# Patient Record
Sex: MALE | Race: WHITE | NOT HISPANIC OR LATINO | Employment: FULL TIME | URBAN - METROPOLITAN AREA
[De-identification: names, ages, dates, MRNs, and addresses within clinical notes are randomized per-mention and may not be internally consistent; named-entity substitution may affect disease eponyms.]

---

## 2017-03-16 DIAGNOSIS — E11.21 TYPE 2 DIABETES MELLITUS WITH DIABETIC NEPHROPATHY, WITH LONG-TERM CURRENT USE OF INSULIN (H): Primary | ICD-10-CM

## 2017-03-16 DIAGNOSIS — Z79.4 TYPE 2 DIABETES MELLITUS WITH DIABETIC NEPHROPATHY, WITH LONG-TERM CURRENT USE OF INSULIN (H): Primary | ICD-10-CM

## 2017-03-17 DIAGNOSIS — E11.21 TYPE 2 DIABETES MELLITUS WITH DIABETIC NEPHROPATHY (H): ICD-10-CM

## 2017-03-17 RX ORDER — METFORMIN HCL 500 MG
2000 TABLET, EXTENDED RELEASE 24 HR ORAL
Qty: 360 TABLET | Refills: 1 | Status: CANCELLED | OUTPATIENT
Start: 2017-03-17

## 2017-03-17 NOTE — TELEPHONE ENCOUNTER
metFORMIN (GLUCOPHAGE-XR) 500 MG 24 hr tablet         Last Written Prescription Date: 9/21/16  Last Fill Quantity: 360, # refills: 1  Last Office Visit with [facility], [facility] or [facility] prescribing provider:  10/31/16   Next 5 appointments (look out 90 days)     Mar 21, 2017  8:20 AM CDT   SHORT with Mike Deal MD   [facility] ([facility]    [address] [place] [address] [phone]                   BP Readings from Last 3 Encounters:   10/31/16 110/80   09/23/16 112/70   03/03/16 126/78     Lab Results   Component Value Date    MICROL 9 07/11/2016     No results found for: MICROALBUMIN  Creatinine   Date Value Ref Range Status   08/06/2015 0.79 0.66 - 1.25 mg/dL Final   ]  GFR Estimate   Date Value Ref Range Status   08/06/2015 >90  Non  GFR Calc   >60 mL/min/1.7m2 Final   04/12/2015 >90  Non  GFR Calc   >60 mL/min/1.7m2 Final   02/09/2015 >90  Non  GFR Calc   >60 mL/min/1.7m2 Final     GFR Estimate If Black   Date Value Ref Range Status   08/06/2015 >90   GFR Calc   >60 mL/min/1.7m2 Final   04/12/2015 >90   GFR Calc   >60 mL/min/1.7m2 Final   02/09/2015 >90   GFR Calc   >60 mL/min/1.7m2 Final     Lab Results   Component Value Date    CHOL 173 07/11/2016     Lab Results   Component Value Date    HDL 37 07/11/2016     Lab Results   Component Value Date    LDL 71 07/11/2016    LDL 90 02/09/2015     Lab Results   Component Value Date    TRIG 323 07/11/2016     Lab Results   Component Value Date    CHOLHDLRATIO 4.5 02/09/2015     Lab Results   Component Value Date    AST 44 02/09/2015     Lab Results   Component Value Date    ALT 85 02/09/2015     Lab Results   Component Value Date    A1C 5.9 07/11/2016    A1C 7.7 11/23/2015    A1C 8.1 10/23/2015    A1C 12.3 08/06/2015    A1C 7.4 02/09/2015     Potassium   Date Value Ref Range Status   08/06/2015 4.1 3.4 - 5.3 mmol/L  Final

## 2017-03-17 NOTE — TELEPHONE ENCOUNTER
Call to patient-unable to reach patient.  Sent as per refill request.    Prescription approved per [facility] Refill Protocol.  Shea Walker RN  Message handled by Nurse Triage.

## 2017-03-17 NOTE — TELEPHONE ENCOUNTER
Verio Flex test strips for the new glucose meter         Last Written Prescription Date:  9/23/16  Last Fill Quantity: 360,   # refills: 3  Last Office Visit with G, P or [facility] prescribing provider: 10/31/16

## 2017-03-21 ENCOUNTER — OFFICE VISIT (OUTPATIENT)
Dept: PEDIATRICS | Facility: CLINIC | Age: 52
End: 2017-03-21
Payer: COMMERCIAL

## 2017-03-21 VITALS
TEMPERATURE: 98.7 F | HEART RATE: 65 BPM | RESPIRATION RATE: 16 BRPM | HEIGHT: 66 IN | SYSTOLIC BLOOD PRESSURE: 136 MMHG | BODY MASS INDEX: 46.62 KG/M2 | WEIGHT: 290.06 LBS | DIASTOLIC BLOOD PRESSURE: 68 MMHG | OXYGEN SATURATION: 98 %

## 2017-03-21 DIAGNOSIS — E11.21 TYPE 2 DIABETES MELLITUS WITH DIABETIC NEPHROPATHY, WITH LONG-TERM CURRENT USE OF INSULIN (H): Primary | ICD-10-CM

## 2017-03-21 DIAGNOSIS — N18.1 CKD (CHRONIC KIDNEY DISEASE) STAGE 1, GFR 90 ML/MIN OR GREATER: ICD-10-CM

## 2017-03-21 DIAGNOSIS — Z79.4 TYPE 2 DIABETES MELLITUS WITH DIABETIC NEPHROPATHY, WITH LONG-TERM CURRENT USE OF INSULIN (H): Primary | ICD-10-CM

## 2017-03-21 DIAGNOSIS — F32.5 MAJOR DEPRESSIVE DISORDER, SINGLE EPISODE IN FULL REMISSION (H): ICD-10-CM

## 2017-03-21 DIAGNOSIS — E66.01 MORBID OBESITY, UNSPECIFIED OBESITY TYPE (H): ICD-10-CM

## 2017-03-21 LAB
ALBUMIN SERPL-MCNC: 4.4 G/DL (ref 3.4–5)
ALP SERPL-CCNC: 39 U/L (ref 40–150)
ALT SERPL W P-5'-P-CCNC: 77 U/L (ref 0–70)
ANION GAP SERPL CALCULATED.3IONS-SCNC: 7 MMOL/L (ref 3–14)
AST SERPL W P-5'-P-CCNC: 32 U/L (ref 0–45)
BILIRUB SERPL-MCNC: 0.4 MG/DL (ref 0.2–1.3)
BUN SERPL-MCNC: 17 MG/DL (ref 7–30)
CALCIUM SERPL-MCNC: 10.1 MG/DL (ref 8.5–10.1)
CHLORIDE SERPL-SCNC: 103 MMOL/L (ref 94–109)
CO2 SERPL-SCNC: 30 MMOL/L (ref 20–32)
CREAT SERPL-MCNC: 0.84 MG/DL (ref 0.66–1.25)
GFR SERPL CREATININE-BSD FRML MDRD: ABNORMAL ML/MIN/1.7M2
GLUCOSE SERPL-MCNC: 122 MG/DL (ref 70–99)
HBA1C MFR BLD: 6.6 % (ref 4.3–6)
POTASSIUM SERPL-SCNC: 4.3 MMOL/L (ref 3.4–5.3)
PROT SERPL-MCNC: 7.7 G/DL (ref 6.8–8.8)
SODIUM SERPL-SCNC: 140 MMOL/L (ref 133–144)

## 2017-03-21 PROCEDURE — 80053 COMPREHEN METABOLIC PANEL: CPT | Performed by: INTERNAL MEDICINE

## 2017-03-21 PROCEDURE — 36415 COLL VENOUS BLD VENIPUNCTURE: CPT | Performed by: INTERNAL MEDICINE

## 2017-03-21 PROCEDURE — 99214 OFFICE O/P EST MOD 30 MIN: CPT | Performed by: INTERNAL MEDICINE

## 2017-03-21 PROCEDURE — 83036 HEMOGLOBIN GLYCOSYLATED A1C: CPT | Performed by: INTERNAL MEDICINE

## 2017-03-21 RX ORDER — LOSARTAN POTASSIUM AND HYDROCHLOROTHIAZIDE 12.5; 5 MG/1; MG/1
1 TABLET ORAL AT BEDTIME
Qty: 90 TABLET | Refills: 3 | Status: SHIPPED | OUTPATIENT
Start: 2017-03-21 | End: 2018-02-20

## 2017-03-21 RX ORDER — METFORMIN HCL 500 MG
2000 TABLET, EXTENDED RELEASE 24 HR ORAL
Qty: 360 TABLET | Refills: 3 | Status: SHIPPED | OUTPATIENT
Start: 2017-03-21 | End: 2018-02-20

## 2017-03-21 ASSESSMENT — ANXIETY QUESTIONNAIRES
2. NOT BEING ABLE TO STOP OR CONTROL WORRYING: NOT AT ALL
5. BEING SO RESTLESS THAT IT IS HARD TO SIT STILL: NOT AT ALL
6. BECOMING EASILY ANNOYED OR IRRITABLE: NOT AT ALL
7. FEELING AFRAID AS IF SOMETHING AWFUL MIGHT HAPPEN: NOT AT ALL
3. WORRYING TOO MUCH ABOUT DIFFERENT THINGS: NOT AT ALL
IF YOU CHECKED OFF ANY PROBLEMS ON THIS QUESTIONNAIRE, HOW DIFFICULT HAVE THESE PROBLEMS MADE IT FOR YOU TO DO YOUR WORK, TAKE CARE OF THINGS AT HOME, OR GET ALONG WITH OTHER PEOPLE: NOT DIFFICULT AT ALL
1. FEELING NERVOUS, ANXIOUS, OR ON EDGE: NOT AT ALL
GAD7 TOTAL SCORE: 0

## 2017-03-21 ASSESSMENT — PATIENT HEALTH QUESTIONNAIRE - PHQ9: 5. POOR APPETITE OR OVEREATING: NOT AT ALL

## 2017-03-21 NOTE — LETTER
My Depression Action Plan  Name: Glen Cho   Date of Birth 1965  Date: 3/21/2017    My doctor: Mike Deal   My clinic: [address]  [address]  [facility] [address] [phone]          GREEN    ZONE   Good Control    What it looks like:     Things are going generally well. You have normal up s and down s. You may even feel depressed from time to time, but bad moods usually last less than a day.   What you need to do:  1. Continue to care for yourself (see self care plan)  2. Check your depression survival kit and update it as needed  3. Follow your physician s recommendations including any medication.  4. Do not stop taking medication unless you consult with your physician first.           YELLOW         ZONE Getting Worse    What it looks like:     Depression is starting to interfere with your life.     It may be hard to get out of bed; you may be starting to isolate yourself from others.    Symptoms of depression are starting to last most all day and this has happened for several days.     You may have suicidal thoughts but they are not constant.   What you need to do:     1. Call your care team, your response to treatment will improve if you keep your care team informed of your progress. Yellow periods are signs an adjustment may need to be made.     2. Continue your self-care, even if you have to fake it!    3. Talk to someone in your support network    4. Open up your depression survival kit           RED    ZONE Medical Alert - Get Help    What it looks like:     Depression is seriously interfering with your life.     You may experience these or other symptoms: You can t get out of bed most days, can t work or engage in other necessary activities, you have trouble taking care of basic hygiene, or basic responsibilities, thoughts of suicide or death that will not go away, self-injurious behavior.     What you need to do:  1. Call your care  team and request a same-day appointment. If they are not available (weekends or after hours) call your local crisis line, emergency room or 911.      Electronically signed by: Charisma Ervin, March 21, 2017    Depression Self Care Plan / Survival Kit    Self-Care for Depression  Here s the deal. Your body and mind are really not as separate as most people think.  What you do and think affects how you feel and how you feel influences what you do and think. This means if you do things that people who feel good do, it will help you feel better.  Sometimes this is all it takes.  There is also a place for medication and therapy depending on how severe your depression is, so be sure to consult with your medical provider and/ or Behavioral Health Consultant if your symptoms are worsening or not improving.     In order to better manage my stress, I will:    Exercise  Get some form of exercise, every day. This will help reduce pain and release endorphins, the  feel good  chemicals in your brain. This is almost as good as taking antidepressants!  This is not the same as joining a gym and then never going! (they count on that by the way ) It can be as simple as just going for a walk or doing some gardening, anything that will get you moving.      Hygiene   Maintain good hygiene (Get out of bed in the morning, Make your bed, Brush your teeth, Take a shower, and Get dressed like you were going to work, even if you are unemployed).  If your clothes don't fit try to get ones that do.    Diet  I will strive to eat foods that are good for me, drink plenty of water, and avoid excessive sugar, caffeine, alcohol, and other mood-altering substances.  Some foods that are helpful in depression are: complex carbohydrates, B vitamins, flaxseed, fish or fish oil, fresh fruits and vegetables.    Psychotherapy  I agree to participate in Individual Therapy (if recommended).    Medication  If prescribed medications, I agree to take them.  Missing  doses can result in serious side effects.  I understand that drinking alcohol, or other illicit drug use, may cause potential side effects.  I will not stop my medication abruptly without first discussing it with my provider.    Staying Connected With Others  I will stay in touch with my friends, family members, and my primary care provider/team.    Use your imagination  Be creative.  We all have a creative side; it doesn t matter if it s oil painting, sand castles, or mud pies! This will also kick up the endorphins.    Witness Beauty  (AKA stop and smell the roses) Take a look outside, even in mid-winter. Notice colors, textures. Watch the squirrels and birds.     Service to others  Be of service to others.  There is always someone else in need.  By helping others we can  get out of ourselves  and remember the really important things.  This also provides opportunities for practicing all the other parts of the program.    Humor  Laugh and be silly!  Adjust your TV habits for less news and crime-drama and more comedy.    Control your stress  Try breathing deep, massage therapy, biofeedback, and meditation. Find time to relax each day.     My support system    Clinic Contact:  Phone number:    Contact 1:  Phone number:    Contact 2:  Phone number:    Buddhist/:  Phone number:    Therapist:  Phone number:    Local crisis center:    Phone number:    Other community support:  Phone number:

## 2017-03-21 NOTE — PATIENT INSTRUCTIONS
Lab work downstairs today.  (Go down the main stairs/elevator and re-enter the main part of the building on the first floor.  On the right hand side--with striped wallpaper--is the lab and xray waiting area. Give them your name at the window there and wait for them to call your name.)     Change to levemir when lantus runs out.      If your diabetes blood test (A1c) is higher, we can consider increasing your nighttime lantus from 65 to 70 for a few days, then to 75, then to 80, etc, to get your AM sugars below 100.    No changes in other meds.

## 2017-03-21 NOTE — MR AVS SNAPSHOT
After Visit Summary   3/21/2017    Glen Cho    MRN: [id number]           Patient Information     Date Of Birth          1965        Visit Information        Provider Department      3/21/2017 8:20 AM Mike Deal MD [facility]        Today's Diagnoses     Type 2 diabetes mellitus with diabetic nephropathy, with long-term current use of insulin (H)    -  1    Morbid obesity, unspecified obesity type (H)        CKD (chronic kidney disease) stage 1, GFR 90 ml/min or greater        Major depressive disorder, single episode in full remission (H)          Care Instructions    Lab work downstairs today.  (Go down the main stairs/elevator and re-enter the main part of the building on the first floor.  On the right hand side--with striped wallpaper--is the lab and xray waiting area. Give them your name at the window there and wait for them to call your name.)     Change to levemir when lantus runs out.      If your diabetes blood test (A1c) is higher, we can consider increasing your nighttime lantus from 65 to 70 for a few days, then to 75, then to 80, etc, to get your AM sugars below 100.    No changes in other meds.        Follow-ups after your visit        Who to contact     If you have questions or need follow up information about today's clinic visit or your schedule please contact [facility] directly at [phone].  Normal or non-critical lab and imaging results will be communicated to you by MyChart, letter or phone within 4 business days after the clinic has received the results. If you do not hear from us within 7 days, please contact the clinic through MyChart or phone. If you have a critical or abnormal lab result, we will notify you by phone as soon as possible.  Submit refill requests through [employer] or call your pharmacy and they will forward the refill request to us. Please allow 3 business days for your refill to be completed.          Additional Information  "About Your Visit        [employer] Information     [employer] gives you secure access to your electronic health record. If you see a primary care provider, you can also send messages to your care team and make appointments. If you have questions, please call your primary care clinic.  If you do not have a primary care provider, please call [phone] and they will assist you.        Care EveryWhere ID     This is your Care EveryWhere ID. This could be used by other organizations to access your [place] medical records  [id number]        Your Vitals Were     Pulse Temperature Respirations Height Pulse Oximetry BMI (Body Mass Index)    65 98.7  F (37.1  C) (Oral) 16 5' 6\" (1.676 m) 98% 46.82 kg/m2       Blood Pressure from Last 3 Encounters:   03/21/17 136/68   10/31/16 110/80   09/23/16 112/70    Weight from Last 3 Encounters:   03/21/17 290 lb 1 oz (131.6 kg)   10/31/16 273 lb (123.8 kg)   09/23/16 265 lb 4.8 oz (120.3 kg)              We Performed the Following     Comprehensive metabolic panel (BMP + Alb, Alk Phos, ALT, AST, Total. Bili, TP)     DEPRESSION ACTION PLAN (DAP)     Hemoglobin A1c          Today's Medication Changes          These changes are accurate as of: 3/21/17  8:47 AM.  If you have any questions, ask your nurse or doctor.               These medicines have changed or have updated prescriptions.        Dose/Directions    LANTUS SOLOSTAR 100 UNIT/ML injection   This may have changed:  additional instructions   Used for:  Type 2 diabetes mellitus with diabetic nephropathy, with long-term current use of insulin (H)   Generic drug:  insulin glargine   Changed by:  Mike Deal MD        75 unit sin the AM, and 65 at night.   Refills:  0            Where to get your medicines      These medications were sent to [facility] [address] [facility] - [place], [place] - [address]  [facility]  [address]  [place] , [facility] [address]     Phone:  [phone]     losartan-hydrochlorothiazide 50-12.5 MG per tablet    " metFORMIN 500 MG 24 hr tablet                Primary Care Provider Office Phone # Fax #    Mike Deal [phone]       [facility] [address] [place] [address] [address] [place] [address]        Thank you!     Thank you for choosing [facility]  for your care. Our goal is always to provide you with excellent care. Hearing back from our patients is one way we can continue to improve our services. Please take a few minutes to complete the written survey that you may receive in the mail after your visit with us. Thank you!             Your Updated Medication List - Protect others around you: Learn how to safely use, store and throw away your medicines at www.disposemymeds.org.          This list is accurate as of: 3/21/17  8:47 AM.  Always use your most recent med list.                   Brand Name Dispense Instructions for use    aspirin 325 MG tablet      Take 325 mg by mouth daily.       blood glucose monitoring lancets     1 Box    Use to test blood sugars 2-3 times daily or as directed.       blood glucose monitoring meter device kit     1 kit    Use to test blood sugar 3 times daily or as directed.       blood glucose monitoring test strip    FREESTYLE INSULINX    360 each    Use to test blood sugar 4 times daily or as directed.       insulin detemir 100 UNIT/ML injection    LEVEMIR FLEXPEN/FLEXTOUCH    3 Month    70 units in AM and 90 units at bedtime       insulin pen needle 32G X 4 MM     200 each    Use 2 pen needles daily or as directed.       LANTUS SOLOSTAR 100 UNIT/ML injection   Generic drug:  insulin glargine      75 unit sin the AM, and 65 at night.       losartan-hydrochlorothiazide 50-12.5 MG per tablet    HYZAAR    90 tablet    Take 1 tablet by mouth At Bedtime       metFORMIN 500 MG 24 hr tablet    GLUCOPHAGE-XR    360 tablet    Take 4 tablets (2,000 mg) by mouth daily (with dinner)       order for DME     1 Device    Equipment being ordered: Please dispense 1 set of  crutches       * order for DME     1 each    Equipment being ordered: glucometer  60 lancets and test strips (5 refills) for twice daily testing.       * order for DME     1 each    Equipment being ordered: glucose meter Please dispense what insurance covers       * order for DME     180 each    Equipment being ordered: test strips  Pleas test three times daily, or as directed.  Please dispense test strips for patient's current meter       * order for DME     300 each    Equipment being ordered: Eli Contour Next test strips Tests 5 times a day       * order for DME     400 each    Equipment being ordered: lancets Uses 4 a day.  Please dispense what insurance covers.       * order for DME     400 Device    Equipment being ordered: Verio flex test strips Patient tests 4 times daily.       * order for DME     1 Device    Equipment being ordered: meter Uses Verio Flex test strips       simvastatin 20 MG tablet    ZOCOR    90 tablet    Take 1 tablet (20 mg) by mouth At Bedtime       * Notice:  This list has 7 medication(s) that are the same as other medications prescribed for you. Read the directions carefully, and ask your doctor or other care provider to review them with you.

## 2017-03-21 NOTE — PROGRESS NOTES
SUBJECTIVE:                                                    Glen Cho is a 51 year old male who presents to clinic today for the following health issues:      Diabetes Follow-up      Patient is checking blood sugars: 3 times per day    Diabetic concerns: None     Symptoms of hypoglycemia (low blood sugar): none     Paresthesias (numbness or burning in feet) or sores: Yes Dry Skin On Right Foot     Date of last diabetic eye exam: Unknown       Amount of exercise or physical activity: None    Problems taking medications regularly: No    Medication side effects: none    Diet: carbohydrate counting      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      Problem list and histories reviewed & adjusted, as indicated.  Additional history: as documented    Last labs were in July.    AM sugars have been 130-140.   After meals then will run 175 and 190.      Taking 75 AM and 65-70 at night.  Has gained about 17 pounds recently.  Not as active as before.  Knees bothered him when was on his feet a lot.      Patient Active Problem List   Diagnosis     Anxiety     Major depressive disorder, single episode in full remission (H)     Rosacea     Hypertension goal BP (blood pressure) < 140/90     Heart murmur     Tear of lateral cartilage or meniscus of knee, current     Tubular adenoma of colon     CKD (chronic kidney disease) stage 1, GFR 90 ml/min or greater     Morbid obesity (H)     Hyperlipidemia LDL goal <100     Type 2 diabetes mellitus with diabetic nephropathy, with long-term current use of insulin (H)     Past Surgical History   Procedure Laterality Date     Ankle surgery  2/2004      R ORIF     Herniorrhaphy umbilical       Arthroscopy knee  3/26/2014     Procedure: ARTHROSCOPY KNEE;  ARTHROSCOPY KNEE- RIGHT   SURGEON REQUESTS CHOICE ANETHESIA ;  Surgeon: Sabino Simpson MD;  Location:  OR       Social History   Substance Use Topics     Smoking status: Former Smoker     Types: Cigarettes     Quit  date: 5/10/2000     Smokeless tobacco: Never Used     Alcohol use No      Comment: rarely     Family History   Problem Relation Age of Onset     CANCER Father      skin     Myocardial Infarction Father 57         Current Outpatient Prescriptions   Medication Sig Dispense Refill     insulin glargine (LANTUS SOLOSTAR) 100 UNIT/ML injection 75 unit sin the AM, and 65 at night.       metFORMIN (GLUCOPHAGE-XR) 500 MG 24 hr tablet Take 4 tablets (2,000 mg) by mouth daily (with dinner) 360 tablet 3     losartan-hydrochlorothiazide (HYZAAR) 50-12.5 MG per tablet Take 1 tablet by mouth At Bedtime 90 tablet 3     simvastatin (ZOCOR) 20 MG tablet Take 1 tablet (20 mg) by mouth At Bedtime 90 tablet 3     aspirin 325 MG tablet Take 325 mg by mouth daily.       order for DME Equipment being ordered: Verio flex test strips  Patient tests 4 times daily. 400 Device 1     order for DME Equipment being ordered: meter  Uses Verio Flex test strips 1 Device 0     insulin detemir (LEVEMIR FLEXPEN/FLEXTOUCH) 100 UNIT/ML injection 70 units in AM and 90 units at bedtime 3 Month 3     order for DME Equipment being ordered: lancets  Uses 4 a day.    Please dispense what insurance covers. 400 each 3     insulin pen needle 32G X 4 MM Use 2 pen needles daily or as directed. 200 each 3     blood glucose monitoring (FREESTYLE INSULINX) test strip Use to test blood sugar 4 times daily or as directed. 360 each 3     [DISCONTINUED] insulin glargine (LANTUS SOLOSTAR) 100 UNIT/ML PEN 70 unit sin the AM, and 90 at night. 3 Month 3     [DISCONTINUED] losartan-hydrochlorothiazide (HYZAAR) 50-12.5 MG per tablet Take 1 tablet by mouth At Bedtime 90 tablet 3     [DISCONTINUED] metFORMIN (GLUCOPHAGE-XR) 500 MG 24 hr tablet Take 4 tablets (2,000 mg) by mouth daily (with dinner) 360 tablet 1     order for DME Equipment being ordered: Eli Contour Next test strips  Tests 5 times a day 300 each 3     blood glucose monitoring (FREESTYLE) lancets Use to test blood  sugars 2-3 times daily or as directed. 1 Box prn     order for DME Equipment being ordered: test strips    Pleas test three times daily, or as directed.    Please dispense test strips for patient's current meter 180 each 3     order for DME Equipment being ordered: glucose meter  Please dispense what insurance covers 1 each 0     blood glucose monitoring (FREESTYLE INSULINX SYSTEM) meter device kit Use to test blood sugar 3 times daily or as directed. 1 kit 0     ORDER FOR DME Equipment being ordered: glucometer    60 lancets and test strips (5 refills) for twice daily testing. 1 each 0     ORDER FOR DME Equipment being ordered: Please dispense 1 set of crutches 1 Device 0     Allergies   Allergen Reactions     Amlodipine Besylate      Water retention     Cefdinir      Joint pains     Lisinopril      Water retention     Amoxicillin Rash     White spots     Recent Labs   Lab Test  03/21/17   0852  07/11/16   0810  11/23/15   1002   08/06/15   1458  04/12/15   1035  02/09/15   0805   03/24/14   0803  01/10/14   1049   02/05/13   0839   06/09/10   0817   A1C  6.6*  5.9  7.7*   < >  12.3*   --   7.4*   < >  9.9*   --    < >   --    < >   --    LDL   --   71   --    --    --    --   Cannot estimate LDL when triglyceride exceeds 400 mg/dL  90   --   63   --    --   100   < >  157*   HDL   --   37*   --    --    --    --   33*   --   23*   --    --   40   < >  31*   TRIG   --   323*   --    --    --    --   541*   --   370*   --    --   271*   < >  316*   ALT   --    --    --    --    --    --   85*   --   64   --    --   75*   < >   --    CR   --    --    --    --   0.79  0.76  0.78   --   0.82   --    < >  0.71   < >  0.90   GFRESTIMATED   --    --    --    --   >90  Non  GFR Calc    >90  Non  GFR Calc    >90  Non  GFR Calc     --   >90   --    < >  >90   < >  >90   GFRESTBLACK   --    --    --    --   >90   GFR Calc    >90   GFR Calc     ">90   GFR Calc     --   >90   --    < >  >90   < >  >90   POTASSIUM   --    --    --    --   4.1  4.3  4.0   --   4.3   --    < >  4.0   < >  3.9   TSH   --    --    --    --    --    --    --    --    --   1.29   --    --    --   1.48    < > = values in this interval not displayed.      BP Readings from Last 3 Encounters:   03/21/17 136/68   10/31/16 110/80   09/23/16 112/70    Wt Readings from Last 3 Encounters:   03/21/17 290 lb 1 oz (131.6 kg)   10/31/16 273 lb (123.8 kg)   09/23/16 265 lb 4.8 oz (120.3 kg)                  Labs reviewed in EPIC    Reviewed and updated as needed this visit by clinical staff       Reviewed and updated as needed this visit by Provider         ROS:  C: NEGATIVE for fever, chills, change in weight  E/M: NEGATIVE for ear, mouth and throat problems  R: NEGATIVE for significant cough or SOB  CV: NEGATIVE for chest pain, palpitations or peripheral edema    OBJECTIVE:                                                    /68 (BP Location: Right arm, Patient Position: Chair, Cuff Size: Adult Large)  Pulse 65  Temp 98.7  F (37.1  C) (Oral)  Resp 16  Ht 5' 6\" (1.676 m)  Wt 290 lb 1 oz (131.6 kg)  SpO2 98%  BMI 46.82 kg/m2  Body mass index is 46.82 kg/(m^2).   GENERAL: healthy, alert, well nourished, well hydrated, no distress  HENT: ear canals- normal; TMs- normal; Nose- normal; Mouth- no ulcers, no lesions  NECK: no tenderness, no adenopathy, no asymmetry, no masses, no stiffness; thyroid- normal to palpation  RESP: lungs clear to auscultation - no rales, no rhonchi, no wheezes  CV: regular rates and rhythm, normal S1 S2, no S3 or S4 and no murmur, no click or rub -  ABDOMEN: soft, no tenderness, no  hepatosplenomegaly, no masses, normal bowel sounds    Diagnostic test results:  Diagnostic Test Results:  none      ASSESSMENT/PLAN:                                                    1. Type 2 diabetes mellitus with diabetic nephropathy, with long-term current use of " insulin (H)  Parameters well controlled.  Continue secondary risk factor modification for BP, cholesterol, anticoagulation, and smoking cessation.   - Hemoglobin A1c  - Comprehensive metabolic panel (BMP + Alb, Alk Phos, ALT, AST, Total. Bili, TP)  - insulin glargine (LANTUS SOLOSTAR) 100 UNIT/ML injection; 75 unit sin the AM, and 65 at night.  - metFORMIN (GLUCOPHAGE-XR) 500 MG 24 hr tablet; Take 4 tablets (2,000 mg) by mouth daily (with dinner)  Dispense: 360 tablet; Refill: 3    2. Morbid obesity, unspecified obesity type (H)  Discussed weight loss strategies.     3. CKD (chronic kidney disease) stage 1, GFR 90 ml/min or greater  Secondary risk factor modification.   - losartan-hydrochlorothiazide (HYZAAR) 50-12.5 MG per tablet; Take 1 tablet by mouth At Bedtime  Dispense: 90 tablet; Refill: 3    4. Major depressive disorder, single episode in full remission (H)  Stable, off meds.  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       See Patient Instructions    Mike Deal MD  [facility]

## 2017-03-21 NOTE — NURSING NOTE
"Chief Complaint   Patient presents with     Diabetes     Diabetes Follow Up       Initial /68 (BP Location: Right arm, Patient Position: Chair, Cuff Size: Adult Large)  Pulse 65  Temp 98.7  F (37.1  C) (Oral)  Resp 16  Ht 5' 6\" (1.676 m)  Wt 290 lb 1 oz (131.6 kg)  SpO2 98%  BMI 46.82 kg/m2 Estimated body mass index is 46.82 kg/(m^2) as calculated from the following:    Height as of this encounter: 5' 6\" (1.676 m).    Weight as of this encounter: 290 lb 1 oz (131.6 kg).  Medication Reconciliation: irlanda Ervin CMA (AAMA) 3/21/2017 8:21 AM      "

## 2017-03-22 ASSESSMENT — ANXIETY QUESTIONNAIRES: GAD7 TOTAL SCORE: 0

## 2017-03-22 ASSESSMENT — PATIENT HEALTH QUESTIONNAIRE - PHQ9: SUM OF ALL RESPONSES TO PHQ QUESTIONS 1-9: 1

## 2017-05-01 ENCOUNTER — MYC MEDICAL ADVICE (OUTPATIENT)
Dept: PEDIATRICS | Facility: CLINIC | Age: 52
End: 2017-05-01

## 2017-05-01 ENCOUNTER — E-VISIT (OUTPATIENT)
Dept: PEDIATRICS | Facility: CLINIC | Age: 52
End: 2017-05-01
Payer: COMMERCIAL

## 2017-05-01 DIAGNOSIS — G25.3 STAPEDIAL MYOCLONUS: Primary | ICD-10-CM

## 2017-05-01 PROCEDURE — 99444 ZZC PHYSICIAN ONLINE EVALUATION & MANAGEMENT SERVICE: CPT | Performed by: INTERNAL MEDICINE

## 2017-05-23 ENCOUNTER — OFFICE VISIT (OUTPATIENT)
Dept: PEDIATRICS | Facility: CLINIC | Age: 52
End: 2017-05-23
Payer: COMMERCIAL

## 2017-05-23 VITALS
SYSTOLIC BLOOD PRESSURE: 136 MMHG | HEIGHT: 66 IN | HEART RATE: 67 BPM | WEIGHT: 279 LBS | TEMPERATURE: 97.8 F | DIASTOLIC BLOOD PRESSURE: 78 MMHG | OXYGEN SATURATION: 96 % | BODY MASS INDEX: 44.84 KG/M2

## 2017-05-23 DIAGNOSIS — R31.29 MICROSCOPIC HEMATURIA: ICD-10-CM

## 2017-05-23 DIAGNOSIS — R35.0 URINARY FREQUENCY: Primary | ICD-10-CM

## 2017-05-23 LAB
ALBUMIN SERPL-MCNC: 4.4 G/DL (ref 3.4–5)
ALBUMIN UR-MCNC: NEGATIVE MG/DL
ALP SERPL-CCNC: 43 U/L (ref 40–150)
ALT SERPL W P-5'-P-CCNC: 71 U/L (ref 0–70)
ANION GAP SERPL CALCULATED.3IONS-SCNC: 9 MMOL/L (ref 3–14)
APPEARANCE UR: CLEAR
AST SERPL W P-5'-P-CCNC: 35 U/L (ref 0–45)
BILIRUB SERPL-MCNC: 0.5 MG/DL (ref 0.2–1.3)
BILIRUB UR QL STRIP: NEGATIVE
BUN SERPL-MCNC: 18 MG/DL (ref 7–30)
CALCIUM SERPL-MCNC: 10.3 MG/DL (ref 8.5–10.1)
CHLORIDE SERPL-SCNC: 105 MMOL/L (ref 94–109)
CO2 SERPL-SCNC: 25 MMOL/L (ref 20–32)
COLOR UR AUTO: YELLOW
CREAT SERPL-MCNC: 0.8 MG/DL (ref 0.66–1.25)
GFR SERPL CREATININE-BSD FRML MDRD: ABNORMAL ML/MIN/1.7M2
GLUCOSE SERPL-MCNC: 159 MG/DL (ref 70–99)
GLUCOSE UR STRIP-MCNC: NEGATIVE MG/DL
HGB UR QL STRIP: ABNORMAL
KETONES UR STRIP-MCNC: NEGATIVE MG/DL
LEUKOCYTE ESTERASE UR QL STRIP: NEGATIVE
NITRATE UR QL: NEGATIVE
PH UR STRIP: 7 PH (ref 5–7)
POTASSIUM SERPL-SCNC: 4.2 MMOL/L (ref 3.4–5.3)
PROT SERPL-MCNC: 7.7 G/DL (ref 6.8–8.8)
PSA SERPL-ACNC: 0.65 UG/L (ref 0–4)
RBC #/AREA URNS AUTO: >100 /HPF (ref 0–2)
SODIUM SERPL-SCNC: 139 MMOL/L (ref 133–144)
SP GR UR STRIP: 1.02 (ref 1–1.03)
URN SPEC COLLECT METH UR: ABNORMAL
UROBILINOGEN UR STRIP-ACNC: 0.2 EU/DL (ref 0.2–1)
WBC #/AREA URNS AUTO: ABNORMAL /HPF (ref 0–2)

## 2017-05-23 PROCEDURE — G0103 PSA SCREENING: HCPCS | Performed by: INTERNAL MEDICINE

## 2017-05-23 PROCEDURE — 99214 OFFICE O/P EST MOD 30 MIN: CPT | Performed by: INTERNAL MEDICINE

## 2017-05-23 PROCEDURE — 36415 COLL VENOUS BLD VENIPUNCTURE: CPT | Performed by: INTERNAL MEDICINE

## 2017-05-23 PROCEDURE — 81001 URINALYSIS AUTO W/SCOPE: CPT | Performed by: INTERNAL MEDICINE

## 2017-05-23 PROCEDURE — 80053 COMPREHEN METABOLIC PANEL: CPT | Performed by: INTERNAL MEDICINE

## 2017-05-23 NOTE — NURSING NOTE
"Chief Complaint   Patient presents with     Urinary Problem       Initial /78 (BP Location: Right arm, Cuff Size: Adult Regular)  Pulse 67  Temp 97.8  F (36.6  C) (Oral)  Ht 5' 6\" (1.676 m)  Wt 279 lb (126.6 kg)  SpO2 96%  BMI 45.03 kg/m2 Estimated body mass index is 45.03 kg/(m^2) as calculated from the following:    Height as of this encounter: 5' 6\" (1.676 m).    Weight as of this encounter: 279 lb (126.6 kg).  Medication Reconciliation: complete     Ana Burrows MA   May 23, 2017,  10:59 AM    "

## 2017-05-23 NOTE — PROGRESS NOTES
SUBJECTIVE:                                                    Glen Cho is a 51 year old male who presents to clinic today for the following health issues:      Genitourinary symptoms  Dysuria, feeling that he has to urinate, but he doesn't - a few nights now. No burning sensation.       Sunday night woke up to urinate 5 times overnight.  Feels since then like has to urinate more than normal.  Normally does not have to urinate during the day more than once or twice per day.    Notes a tingling feeling at head of penis.      Last night, symptoms improved.  However, this AM noted a sensation in the bladder itself that is like a tingling.  NOt painful.  No burining with urination.  No change in color of urine.  Urine is yellow.  No cloudiness.  Last night only woke up 3 times to urinate.    No sexually transmitted disease exposure.      Of note, feels like may have to have a bowel movement, but cannot.      Also of note, has history of kidney stones, but no severe pain as has had in the past.     Problem list and histories reviewed & adjusted, as indicated.  Additional history: as documented    Patient Active Problem List   Diagnosis     Anxiety     Major depressive disorder, single episode in full remission (H)     Rosacea     Hypertension goal BP (blood pressure) < 140/90     Heart murmur     Tear of lateral cartilage or meniscus of knee, current     Tubular adenoma of colon     CKD (chronic kidney disease) stage 1, GFR 90 ml/min or greater     Morbid obesity (H)     Hyperlipidemia LDL goal <100     Type 2 diabetes mellitus with diabetic nephropathy, with long-term current use of insulin (H)     Past Surgical History:   Procedure Laterality Date     ANKLE SURGERY  2/2004     R ORIF     ARTHROSCOPY KNEE  3/26/2014    Procedure: ARTHROSCOPY KNEE;  ARTHROSCOPY KNEE- RIGHT   SURGEON REQUESTS CHOICE ANETHESIA ;  Surgeon: Sabino Simpson MD;  Location: [facility]     HERNIORRHAPHY UMBILICAL         Social History    Substance Use Topics     Smoking status: Former Smoker     Types: Cigarettes     Quit date: 5/10/2000     Smokeless tobacco: Never Used     Alcohol use No      Comment: rarely     Family History   Problem Relation Age of Onset     CANCER Father      skin     Myocardial Infarction Father 57         Current Outpatient Prescriptions   Medication Sig Dispense Refill     metFORMIN (GLUCOPHAGE-XR) 500 MG 24 hr tablet Take 4 tablets (2,000 mg) by mouth daily (with dinner) 360 tablet 3     losartan-hydrochlorothiazide (HYZAAR) 50-12.5 MG per tablet Take 1 tablet by mouth At Bedtime 90 tablet 3     order for DME Equipment being ordered: Verio flex test strips  Patient tests 4 times daily. 400 Device 1     order for DME Equipment being ordered: meter  Uses Verio Flex test strips 1 Device 0     insulin detemir (LEVEMIR FLEXPEN/FLEXTOUCH) 100 UNIT/ML injection 70 units in AM and 90 units at bedtime 3 Month 3     order for DME Equipment being ordered: lancets  Uses 4 a day.    Please dispense what insurance covers. 400 each 3     insulin pen needle 32G X 4 MM Use 2 pen needles daily or as directed. 200 each 3     simvastatin (ZOCOR) 20 MG tablet Take 1 tablet (20 mg) by mouth At Bedtime 90 tablet 3     blood glucose monitoring (FREESTYLE INSULINX) test strip Use to test blood sugar 4 times daily or as directed. 360 each 3     order for DME Equipment being ordered: Eli Contour Next test strips  Tests 5 times a day 300 each 3     blood glucose monitoring (FREESTYLE) lancets Use to test blood sugars 2-3 times daily or as directed. 1 Box prn     order for DME Equipment being ordered: test strips    Pleas test three times daily, or as directed.    Please dispense test strips for patient's current meter 180 each 3     order for DME Equipment being ordered: glucose meter  Please dispense what insurance covers 1 each 0     blood glucose monitoring (FREESTYLE INSULINX SYSTEM) meter device kit Use to test blood sugar 3 times daily or  "as directed. 1 kit 0     ORDER FOR DME Equipment being ordered: glucometer    60 lancets and test strips (5 refills) for twice daily testing. 1 each 0     ORDER FOR DME Equipment being ordered: Please dispense 1 set of crutches 1 Device 0     aspirin 325 MG tablet Take 325 mg by mouth daily.       Allergies   Allergen Reactions     Amlodipine Besylate      Water retention     Cefdinir      Joint pains     Lisinopril      Water retention     Amoxicillin Rash     White spots     BP Readings from Last 3 Encounters:   05/23/17 136/78   03/21/17 136/68   10/31/16 110/80    Wt Readings from Last 3 Encounters:   05/23/17 279 lb (126.6 kg)   03/21/17 290 lb 1 oz (131.6 kg)   10/31/16 273 lb (123.8 kg)                  Labs reviewed in EPIC    Reviewed and updated as needed this visit by clinical staff       Reviewed and updated as needed this visit by Provider         ROS:  C: NEGATIVE for fever, chills, change in weight  E/M: NEGATIVE for ear, mouth and throat problems  R: NEGATIVE for significant cough or SOB  CV: NEGATIVE for chest pain, palpitations or peripheral edema    OBJECTIVE:                                                    /78 (BP Location: Right arm, Cuff Size: Adult Regular)  Pulse 67  Temp 97.8  F (36.6  C) (Oral)  Ht 5' 6\" (1.676 m)  Wt 279 lb (126.6 kg)  SpO2 96%  BMI 45.03 kg/m2  Body mass index is 45.03 kg/(m^2).   GENERAL: healthy, alert, well nourished, well hydrated, no distress  HENT: ear canals- normal; TMs- normal; Nose- normal; Mouth- no ulcers, no lesions  NECK: no tenderness, no adenopathy, no asymmetry, no masses, no stiffness; thyroid- normal to palpation  RESP: lungs clear to auscultation - no rales, no rhonchi, no wheezes  CV: regular rates and rhythm, normal S1 S2, no S3 or S4 and no murmur, no click or rub -  ABDOMEN: soft, no tenderness, no  hepatosplenomegaly, no masses, normal bowel sounds  RECTAL;  No prostate enlargment or pain.   Diagnostic test results:  Diagnostic Test " Results:  none      ASSESSMENT/PLAN:                                                    1. Urinary frequency  Blood in urine.    - UA reflex to Microscopic and Culture  - Urine Microscopic    2. Microscopic hematuria  Check renal ultrasound and labs today.  return to clinic in 1 month to recheck for urine, and if still present would need urology evaluation.   Would also recommend dirty catch at that point, which patient was unable to perform today.   - Comprehensive metabolic panel (BMP + Alb, Alk Phos, ALT, AST, Total. Bili, TP)  - PSA, screen  - US Renal Complete; Future      See Patient Instructions    Mike Dela MD  [facility]

## 2017-05-23 NOTE — PATIENT INSTRUCTIONS
"Lab work downstairs today.  Directions:  As you walk through the first floor, you'll see (on the right) first the pharmacy, then some bathrooms, then the \"Lab and Imaging\" area. Give them your name at the window there and wait for them to call you.     They will call you to set up an ultrasound.      Set up a follow up in 2-4 weeks to make sure urine has cleared.    Mike Deal MD  Internal Medicine and Pediatrics     "

## 2017-05-23 NOTE — MR AVS SNAPSHOT
"              After Visit Summary   5/23/2017    Glen Cho    MRN: [id number]           Patient Information     Date Of Birth          1965        Visit Information        Provider Department      5/23/2017 10:40 AM Mike Deal MD [facility]        Today's Diagnoses     Urinary frequency    -  1    Microscopic hematuria          Care Instructions    Lab work downstairs today.  Directions:  As you walk through the first floor, you'll see (on the right) first the pharmacy, then some bathrooms, then the \"Lab and Imaging\" area. Give them your name at the window there and wait for them to call you.     They will call you to set up an ultrasound.      Set up a follow up in 2-4 weeks to make sure urine has cleared.    Mike Deal MD  Internal Medicine and Pediatrics           Follow-ups after your visit        Future tests that were ordered for you today     Open Future Orders        Priority Expected Expires Ordered    US Renal Complete Routine  5/23/2018 5/23/2017            Who to contact     If you have questions or need follow up information about today's clinic visit or your schedule please contact [facility] directly at [phone].  Normal or non-critical lab and imaging results will be communicated to you by [employer], letter or phone within 4 business days after the clinic has received the results. If you do not hear from us within 7 days, please contact the clinic through [employer] or phone. If you have a critical or abnormal lab result, we will notify you by phone as soon as possible.  Submit refill requests through [employer] or call your pharmacy and they will forward the refill request to us. Please allow 3 business days for your refill to be completed.          Additional Information About Your Visit        [employer] Information     [employer] gives you secure access to your electronic health record. If you see a primary care provider, you can also send messages to your care team " "and make appointments. If you have questions, please call your primary care clinic.  If you do not have a primary care provider, please call [phone] and they will assist you.        Care EveryWhere ID     This is your Care EveryWhere ID. This could be used by other organizations to access your [facility] medical records  [id number]        Your Vitals Were     Pulse Temperature Height Pulse Oximetry BMI (Body Mass Index)       67 97.8  F (36.6  C) (Oral) 5' 6\" (1.676 m) 96% 45.03 kg/m2        Blood Pressure from Last 3 Encounters:   05/23/17 136/78   03/21/17 136/68   10/31/16 110/80    Weight from Last 3 Encounters:   05/23/17 279 lb (126.6 kg)   03/21/17 290 lb 1 oz (131.6 kg)   10/31/16 273 lb (123.8 kg)              We Performed the Following     Comprehensive metabolic panel (BMP + Alb, Alk Phos, ALT, AST, Total. Bili, TP)     PSA, screen     UA reflex to Microscopic and Culture     Urine Microscopic        Primary Care Provider Office Phone # Fax #    Mike Deal [phone]       [facility] [address] [place] [address] [address] [place] [place] [address]        Thank you!     Thank you for choosing [facility]  for your care. Our goal is always to provide you with excellent care. Hearing back from our patients is one way we can continue to improve our services. Please take a few minutes to complete the written survey that you may receive in the mail after your visit with us. Thank you!             Your Updated Medication List - Protect others around you: Learn how to safely use, store and throw away your medicines at www.disposemymeds.org.          This list is accurate as of: 5/23/17 11:15 AM.  Always use your most recent med list.                   Brand Name Dispense Instructions for use    aspirin 325 MG tablet      Take 325 mg by mouth daily.       blood glucose monitoring lancets     1 Box    Use to test blood sugars 2-3 times daily or as directed.       blood glucose monitoring " meter device kit     1 kit    Use to test blood sugar 3 times daily or as directed.       blood glucose monitoring test strip    FREESTYLE INSULINX    360 each    Use to test blood sugar 4 times daily or as directed.       insulin detemir 100 UNIT/ML injection    LEVEMIR FLEXPEN/FLEXTOUCH    3 Month    70 units in AM and 90 units at bedtime       insulin pen needle 32G X 4 MM     200 each    Use 2 pen needles daily or as directed.       losartan-hydrochlorothiazide 50-12.5 MG per tablet    HYZAAR    90 tablet    Take 1 tablet by mouth At Bedtime       metFORMIN 500 MG 24 hr tablet    GLUCOPHAGE-XR    360 tablet    Take 4 tablets (2,000 mg) by mouth daily (with dinner)       order for DME     1 Device    Equipment being ordered: Please dispense 1 set of crutches       * order for DME     1 each    Equipment being ordered: glucometer  60 lancets and test strips (5 refills) for twice daily testing.       * order for DME     1 each    Equipment being ordered: glucose meter Please dispense what insurance covers       * order for DME     180 each    Equipment being ordered: test strips  Pleas test three times daily, or as directed.  Please dispense test strips for patient's current meter       * order for DME     300 each    Equipment being ordered: Eli Contour Next test strips Tests 5 times a day       * order for DME     400 each    Equipment being ordered: lancets Uses 4 a day.  Please dispense what insurance covers.       * order for DME     400 Device    Equipment being ordered: Verio flex test strips Patient tests 4 times daily.       * order for DME     1 Device    Equipment being ordered: meter Uses Verio Flex test strips       simvastatin 20 MG tablet    ZOCOR    90 tablet    Take 1 tablet (20 mg) by mouth At Bedtime       * Notice:  This list has 7 medication(s) that are the same as other medications prescribed for you. Read the directions carefully, and ask your doctor or other care provider to review them  with you.

## 2017-08-08 ENCOUNTER — MYC MEDICAL ADVICE (OUTPATIENT)
Dept: PEDIATRICS | Facility: CLINIC | Age: 52
End: 2017-08-08

## 2017-08-08 NOTE — TELEPHONE ENCOUNTER
Please review and advise.  Also, looks like patient need to be seen to repeat urine tests-see 5/23 office visit plan-was one month f/u to make sure kidneys are normalreturn to clinic in 1 month to recheck for urine, and if still present would need urology evaluation.   Would also recommend dirty catch at that point, which patient was unable to perform today.     Now, patient c/o night sweats.    Ok to schedule appointment to address both issues?    Shea Walker RN  Message handled by Nurse Triage.

## 2017-11-17 DIAGNOSIS — N18.1 CKD (CHRONIC KIDNEY DISEASE) STAGE 1, GFR 90 ML/MIN OR GREATER: ICD-10-CM

## 2017-11-20 RX ORDER — SIMVASTATIN 20 MG
TABLET ORAL
Qty: 30 TABLET | Refills: 0 | Status: SHIPPED | OUTPATIENT
Start: 2017-11-20 | End: 2017-12-26

## 2017-12-23 DIAGNOSIS — Z79.4 TYPE 2 DIABETES MELLITUS WITH DIABETIC NEPHROPATHY, WITH LONG-TERM CURRENT USE OF INSULIN (H): ICD-10-CM

## 2017-12-23 DIAGNOSIS — E11.21 TYPE 2 DIABETES MELLITUS WITH DIABETIC NEPHROPATHY, WITH LONG-TERM CURRENT USE OF INSULIN (H): ICD-10-CM

## 2017-12-26 DIAGNOSIS — N18.1 CKD (CHRONIC KIDNEY DISEASE) STAGE 1, GFR 90 ML/MIN OR GREATER: ICD-10-CM

## 2017-12-26 NOTE — TELEPHONE ENCOUNTER
Requested Prescriptions   Pending Prescriptions Disp Refills     LEVEMIR FLEXTOUCH 100 UNIT/ML injection [Pharmacy Med Name: LEVEMIR FLEXTOUCH 100 UNITS/ML]  Last Written Prescription Date:  11/30/16  Last Fill Quantity: 3,  # refills: 3   Last Office Visit with FMMATHIEU, [facility] or [facility] prescribing provider:  8/11/17   Future Office Visit:       2     Sig: INJECT SUB-Q 70 UNITS IN THE MORNING AND 90 UNITS AT BEDTIME    Long Acting Insulin Protocol Failed    12/23/2017  1:00 AM       Failed - Blood pressure less than 140/90 in past 6 months    BP Readings from Last 3 Encounters:   05/23/17 136/78   03/21/17 136/68   10/31/16 110/80                Failed - LDL on file in past 12 months    Recent Labs   Lab Test  07/11/16   0810   LDL  71            Failed - Microalbumin on file in past 12 months    Recent Labs   Lab Test  07/11/16   0809   MICROL  9   UMALCR  8.22            Failed - HgbA1C in past 3 or 6 months    Recent Labs   Lab Test  03/21/17   0852   A1C  6.6*            Failed - Recent visit with authorizing provider's specialty in past 6 months    Patient had office visit in the last 6 months or has a visit in the next 30 days with authorizing provider.  See chart review.            Passed - Serum creatinine on file in past 12 months    Recent Labs   Lab Test  05/23/17   1116   CR  0.80            Passed - Patient is age 18 or older

## 2017-12-26 NOTE — TELEPHONE ENCOUNTER
Requested Prescriptions   Pending Prescriptions Disp Refills     simvastatin (ZOCOR) 20 MG tablet [Pharmacy Med Name: SIMVASTATIN 20 MG TABLET]  Last Written Prescription Date:  11/20/17  Last Fill Quantity: 30,  # refills: 0   Last Office Visit with [facility], P or [facility] prescribing provider:  5/23/17   Future Office Visit:      30 tablet 0     Sig: TAKE 1 TABLET (20 MG) BY MOUTH AT BEDTIME    Statins Protocol Failed    12/26/2017 12:09 AM       Failed - LDL on file in past 12 months    Recent Labs   Lab Test  07/11/16   0810   LDL  71            Passed - No abnormal creatine kinase in past 12 months    No lab results found.         Passed - Recent or future visit with authorizing provider    Patient had office visit in the last year or has a visit in the next 30 days with authorizing provider.  See chart review.              Passed - Patient is age 18 or older

## 2017-12-28 RX ORDER — SIMVASTATIN 20 MG
TABLET ORAL
Qty: 90 TABLET | Refills: 3 | Status: SHIPPED | OUTPATIENT
Start: 2017-12-28 | End: 2018-02-20

## 2017-12-28 RX ORDER — INSULIN DETEMIR 100 [IU]/ML
INJECTION, SOLUTION SUBCUTANEOUS
Qty: 50 ML | Refills: 2 | Status: SHIPPED | OUTPATIENT
Start: 2017-12-28 | End: 2018-02-20

## 2017-12-28 NOTE — TELEPHONE ENCOUNTER
Please call. Make sure he can get in by March for a diabetes mellitus follow up.    Mike Deal MD  Internal Medicine and Pediatrics

## 2017-12-29 NOTE — TELEPHONE ENCOUNTER
Spoke with Glen - told him I will call in February to make sure he is scheduled for an appointment.    Ana Burrows MA   December 29, 2017,  12:00 PM

## 2018-01-03 DIAGNOSIS — N18.1 CKD (CHRONIC KIDNEY DISEASE) STAGE 1, GFR 90 ML/MIN OR GREATER: ICD-10-CM

## 2018-01-04 RX ORDER — SIMVASTATIN 20 MG
TABLET ORAL
Qty: 30 TABLET | Refills: 0 | OUTPATIENT
Start: 2018-01-04

## 2018-02-20 ENCOUNTER — OFFICE VISIT (OUTPATIENT)
Dept: PEDIATRICS | Facility: CLINIC | Age: 53
End: 2018-02-20
Payer: COMMERCIAL

## 2018-02-20 VITALS
DIASTOLIC BLOOD PRESSURE: 86 MMHG | SYSTOLIC BLOOD PRESSURE: 134 MMHG | TEMPERATURE: 98.3 F | WEIGHT: 288 LBS | OXYGEN SATURATION: 97 % | HEART RATE: 63 BPM | HEIGHT: 66 IN | BODY MASS INDEX: 46.28 KG/M2

## 2018-02-20 DIAGNOSIS — E11.21 TYPE 2 DIABETES MELLITUS WITH DIABETIC NEPHROPATHY, WITH LONG-TERM CURRENT USE OF INSULIN (H): Primary | ICD-10-CM

## 2018-02-20 DIAGNOSIS — Z79.4 TYPE 2 DIABETES MELLITUS WITH DIABETIC NEPHROPATHY, WITH LONG-TERM CURRENT USE OF INSULIN (H): Primary | ICD-10-CM

## 2018-02-20 DIAGNOSIS — E78.5 HYPERLIPIDEMIA LDL GOAL <100: ICD-10-CM

## 2018-02-20 DIAGNOSIS — N18.1 CKD (CHRONIC KIDNEY DISEASE) STAGE 1, GFR 90 ML/MIN OR GREATER: ICD-10-CM

## 2018-02-20 DIAGNOSIS — I10 HYPERTENSION GOAL BP (BLOOD PRESSURE) < 140/90: ICD-10-CM

## 2018-02-20 LAB
CHOLEST SERPL-MCNC: 168 MG/DL
CREAT UR-MCNC: 36 MG/DL
HBA1C MFR BLD: 8.5 % (ref 4.3–6)
HDLC SERPL-MCNC: 29 MG/DL
LDLC SERPL CALC-MCNC: ABNORMAL MG/DL
LDLC SERPL DIRECT ASSAY-MCNC: 92 MG/DL
MICROALBUMIN UR-MCNC: 6 MG/L
MICROALBUMIN/CREAT UR: 15.88 MG/G CR (ref 0–17)
NONHDLC SERPL-MCNC: 139 MG/DL
TRIGL SERPL-MCNC: 442 MG/DL
TSH SERPL DL<=0.005 MIU/L-ACNC: 2.05 MU/L (ref 0.4–4)

## 2018-02-20 PROCEDURE — 84443 ASSAY THYROID STIM HORMONE: CPT | Performed by: INTERNAL MEDICINE

## 2018-02-20 PROCEDURE — 99214 OFFICE O/P EST MOD 30 MIN: CPT | Performed by: INTERNAL MEDICINE

## 2018-02-20 PROCEDURE — 83036 HEMOGLOBIN GLYCOSYLATED A1C: CPT | Performed by: INTERNAL MEDICINE

## 2018-02-20 PROCEDURE — 83721 ASSAY OF BLOOD LIPOPROTEIN: CPT | Mod: 59 | Performed by: INTERNAL MEDICINE

## 2018-02-20 PROCEDURE — 80061 LIPID PANEL: CPT | Performed by: INTERNAL MEDICINE

## 2018-02-20 PROCEDURE — 82043 UR ALBUMIN QUANTITATIVE: CPT | Performed by: INTERNAL MEDICINE

## 2018-02-20 PROCEDURE — 36415 COLL VENOUS BLD VENIPUNCTURE: CPT | Performed by: INTERNAL MEDICINE

## 2018-02-20 RX ORDER — LOSARTAN POTASSIUM AND HYDROCHLOROTHIAZIDE 12.5; 5 MG/1; MG/1
1 TABLET ORAL AT BEDTIME
Qty: 90 TABLET | Refills: 3 | Status: SHIPPED | OUTPATIENT
Start: 2018-02-20 | End: 2019-02-05

## 2018-02-20 RX ORDER — METFORMIN HCL 500 MG
2000 TABLET, EXTENDED RELEASE 24 HR ORAL
Qty: 360 TABLET | Refills: 3 | Status: SHIPPED | OUTPATIENT
Start: 2018-02-20 | End: 2019-02-05

## 2018-02-20 RX ORDER — SIMVASTATIN 20 MG
TABLET ORAL
Qty: 90 TABLET | Refills: 3 | Status: SHIPPED | OUTPATIENT
Start: 2018-02-20 | End: 2019-02-05

## 2018-02-20 ASSESSMENT — PATIENT HEALTH QUESTIONNAIRE - PHQ9: 5. POOR APPETITE OR OVEREATING: NOT AT ALL

## 2018-02-20 ASSESSMENT — ANXIETY QUESTIONNAIRES
IF YOU CHECKED OFF ANY PROBLEMS ON THIS QUESTIONNAIRE, HOW DIFFICULT HAVE THESE PROBLEMS MADE IT FOR YOU TO DO YOUR WORK, TAKE CARE OF THINGS AT HOME, OR GET ALONG WITH OTHER PEOPLE: NOT DIFFICULT AT ALL
GAD7 TOTAL SCORE: 0
7. FEELING AFRAID AS IF SOMETHING AWFUL MIGHT HAPPEN: NOT AT ALL
1. FEELING NERVOUS, ANXIOUS, OR ON EDGE: NOT AT ALL
2. NOT BEING ABLE TO STOP OR CONTROL WORRYING: NOT AT ALL
5. BEING SO RESTLESS THAT IT IS HARD TO SIT STILL: NOT AT ALL
6. BECOMING EASILY ANNOYED OR IRRITABLE: NOT AT ALL
3. WORRYING TOO MUCH ABOUT DIFFERENT THINGS: NOT AT ALL

## 2018-02-20 NOTE — MR AVS SNAPSHOT
"              After Visit Summary   2/20/2018    Glen Cho    MRN: [id number]           Patient Information     Date Of Birth          1965        Visit Information        Provider Department      2/20/2018 8:00 AM Mike Deal MD [facility]        Today's Diagnoses     Type 2 diabetes mellitus with diabetic nephropathy, with long-term current use of insulin (H)    -  1    Hyperlipidemia LDL goal <100        Hypertension goal BP (blood pressure) < 140/90        CKD (chronic kidney disease) stage 1, GFR 90 ml/min or greater          Care Instructions    Lab work downstairs today.  Directions:  As you walk through the first floor, you'll see (on the right) first the pharmacy, then some bathrooms, then the \"Lab and Imaging\" area. Give them your name at the window there and wait for them to call you.     Ashiaer getting your Shingrix vaccine at our pharmacy after March.    For now, no change in insulin dosing.    Mike Deal MD  Internal Medicine and Pediatrics           Follow-ups after your visit        Who to contact     If you have questions or need follow up information about today's clinic visit or your schedule please contact [facility] directly at [phone].  Normal or non-critical lab and imaging results will be communicated to you by MyChart, letter or phone within 4 business days after the clinic has received the results. If you do not hear from us within 7 days, please contact the clinic through MyChart or phone. If you have a critical or abnormal lab result, we will notify you by phone as soon as possible.  Submit refill requests through [employer] or call your pharmacy and they will forward the refill request to us. Please allow 3 business days for your refill to be completed.          Additional Information About Your Visit        MyChart Information     [employer] gives you secure access to your electronic health record. If you see a primary care provider, you can also " "send messages to your care team and make appointments. If you have questions, please call your primary care clinic.  If you do not have a primary care provider, please call [phone] and they will assist you.        Care EveryWhere ID     This is your Care EveryWhere ID. This could be used by other organizations to access your [place] medical records  [id number]        Your Vitals Were     Pulse Temperature Height Pulse Oximetry BMI (Body Mass Index)       63 98.3  F (36.8  C) (Oral) 5' 6\" (1.676 m) 97% 46.48 kg/m2        Blood Pressure from Last 3 Encounters:   02/20/18 134/86   05/23/17 136/78   03/21/17 136/68    Weight from Last 3 Encounters:   02/20/18 288 lb (130.6 kg)   05/23/17 279 lb (126.6 kg)   03/21/17 290 lb 1 oz (131.6 kg)              We Performed the Following     Albumin Random Urine Quantitative with Creat Ratio     Hemoglobin A1c     Lipid panel reflex to direct LDL Fasting     TSH with free T4 reflex          Today's Medication Changes          These changes are accurate as of 2/20/18  8:30 AM.  If you have any questions, ask your nurse or doctor.               These medicines have changed or have updated prescriptions.        Dose/Directions    insulin detemir 100 UNIT/ML injection   Commonly known as:  LEVEMIR FLEXTOUCH   This may have changed:  See the new instructions.   Used for:  Type 2 diabetes mellitus with diabetic nephropathy, with long-term current use of insulin (H)   Changed by:  Mike Deal MD        INJECT SUB-Q 70 UNITS IN THE MORNING AND 90 UNITS AT BEDTIME   Quantity:  150 mL   Refills:  3       insulin pen needle 32G X 4 MM   Commonly known as:  BD KENTRELL U/F   This may have changed:  See the new instructions.   Used for:  Type 2 diabetes mellitus with diabetic nephropathy, with long-term current use of insulin (H)   Changed by:  Mike Deal MD        USE 2 PEN NEEDLES DAILY OR AS DIRECTED.   Quantity:  200 each   Refills:  0       * order for DME   This may have " changed:  Another medication with the same name was changed. Make sure you understand how and when to take each.   Used for:  Type 2 diabetes, HbA1c goal < 7% (H)   Changed by:  Mike Deal MD        Equipment being ordered: glucometer  60 lancets and test strips (5 refills) for twice daily testing.   Quantity:  1 each   Refills:  0       * order for DME   This may have changed:  Another medication with the same name was changed. Make sure you understand how and when to take each.   Used for:  Type 2 diabetes mellitus with diabetic nephropathy, with long-term current use of insulin (H)   Changed by:  Mike Deal MD        Equipment being ordered: meter Uses Verio Flex test strips   Quantity:  1 Device   Refills:  0       * order for DME   This may have changed:  additional instructions   Used for:  Type 2 diabetes mellitus with diabetic nephropathy, with long-term current use of insulin (H)   Changed by:  Mike Deal MD        Equipment being ordered: Verio flex test strips Patient tests 1 times daily.   Quantity:  100 Device   Refills:  3       simvastatin 20 MG tablet   Commonly known as:  ZOCOR   This may have changed:  See the new instructions.   Used for:  CKD (chronic kidney disease) stage 1, GFR 90 ml/min or greater   Changed by:  Mike Deal MD        TAKE 1 TABLET (20 MG) BY MOUTH AT BEDTIME   Quantity:  90 tablet   Refills:  3       * Notice:  This list has 3 medication(s) that are the same as other medications prescribed for you. Read the directions carefully, and ask your doctor or other care provider to review them with you.         Where to get your medicines      These medications were sent to [place] [id number] [place] [facility] [address]  [facility]  [id number]  [facility] [address]     Phone:  [phone]     insulin detemir 100 UNIT/ML injection    insulin pen needle 32G X 4 MM    losartan-hydrochlorothiazide 50-12.5 MG per tablet    metFORMIN 500 MG 24 hr tablet     simvastatin 20 MG tablet         Some of these will need a paper prescription and others can be bought over the counter.  Ask your nurse if you have questions.     Bring a paper prescription for each of these medications     order for DME                Primary Care Provider Office Phone # Fax #    Mike Deal [phone] [address] [place] [address]        Equal Access to Services     [facility]: Hadii aad ku hadasho Soomaali, waaxda luqadaha, qaybta kaalmada adeegyada, waxay idiin hayaan adeeg kharash la'aan . So [facility] [phone].    ATENCIÓN: Si habla español, tiene a jesus disposición servicios gratuitos de asistencia lingüística. Llame al [phone].    We comply with applicable federal civil rights laws and [place] laws. We do not discriminate on the basis of race, color, national origin, age, disability, sex, sexual orientation, or gender identity.            Thank you!     [facility]  [facility]. Our goal is always to provide you with excellent care. Hearing back from our patients is one way we can continue to improve our services. Please take a few minutes to complete the written survey that you may receive in the mail after your visit with us. Thank you!             Your Updated Medication List - Protect others around you: Learn how to safely use, store and throw away your medicines at www.disposemymeds.org.          This list is accurate as of 2/20/18  8:30 AM.  Always use your most recent med list.                   Brand Name Dispense Instructions for use Diagnosis    aspirin 325 MG tablet      Take 325 mg by mouth daily.        blood glucose monitoring lancets     1 Box    Use to test blood sugars 2-3 times daily or as directed.    Type 2 diabetes mellitus with diabetic nephropathy (H)       blood glucose monitoring meter device kit     1 kit    Use to test blood sugar 3 times daily or as directed.    Type 2 diabetes,  HbA1c goal < 7% (H)       blood glucose monitoring test strip    FREESTYLE INSULINX    360 each    Use to test blood sugar 4 times daily or as directed.    Type 2 diabetes mellitus with diabetic nephropathy (H)       insulin detemir 100 UNIT/ML injection    LEVEMIR FLEXTOUCH    150 mL    INJECT SUB-Q 70 UNITS IN THE MORNING AND 90 UNITS AT BEDTIME    Type 2 diabetes mellitus with diabetic nephropathy, with long-term current use of insulin (H)       insulin pen needle 32G X 4 MM    BD KENTRELL U/F    200 each    USE 2 PEN NEEDLES DAILY OR AS DIRECTED.    Type 2 diabetes mellitus with diabetic nephropathy, with long-term current use of insulin (H)       losartan-hydrochlorothiazide 50-12.5 MG per tablet    HYZAAR    90 tablet    Take 1 tablet by mouth At Bedtime    CKD (chronic kidney disease) stage 1, GFR 90 ml/min or greater       metFORMIN 500 MG 24 hr tablet    GLUCOPHAGE-XR    360 tablet    Take 4 tablets (2,000 mg) by mouth daily (with dinner)    Type 2 diabetes mellitus with diabetic nephropathy, with long-term current use of insulin (H)       order for DME     1 Device    Equipment being ordered: Please dispense 1 set of crutches    Tear of medial cartilage or meniscus of knee, current       * order for DME     1 each    Equipment being ordered: glucometer  60 lancets and test strips (5 refills) for twice daily testing.    Type 2 diabetes, HbA1c goal < 7% (H)       * order for DME     1 each    Equipment being ordered: glucose meter Please dispense what insurance covers    Type 2 diabetes, HbA1c goal < 7% (H)       * order for DME     180 each    Equipment being ordered: test strips  Pleas test three times daily, or as directed.  Please dispense test strips for patient's current meter    Type 2 diabetes mellitus with diabetic nephropathy (H)       * order for DME     300 each    Equipment being ordered: Eli Contour Next test strips Tests 5 times a day    Type 2 diabetes, HbA1c goal < 7% (H)       * order for DME      400 each    Equipment being ordered: lancets Uses 4 a day.  Please dispense what insurance covers.    Type 2 diabetes, HbA1c goal < 7% (H)       * order for DME     1 Device    Equipment being ordered: meter Uses Verio Flex test strips    Type 2 diabetes mellitus with diabetic nephropathy, with long-term current use of insulin (H)       * order for DME     100 Device    Equipment being ordered: Verio flex test strips Patient tests 1 times daily.    Type 2 diabetes mellitus with diabetic nephropathy, with long-term current use of insulin (H)       simvastatin 20 MG tablet    ZOCOR    90 tablet    TAKE 1 TABLET (20 MG) BY MOUTH AT BEDTIME    CKD (chronic kidney disease) stage 1, GFR 90 ml/min or greater       * Notice:  This list has 7 medication(s) that are the same as other medications prescribed for you. Read the directions carefully, and ask your doctor or other care provider to review them with you.

## 2018-02-20 NOTE — PATIENT INSTRUCTIONS
"Lab work downstairs today.  Directions:  As you walk through the first floor, you'll see (on the right) first the pharmacy, then some bathrooms, then the \"Lab and Imaging\" area. Give them your name at the window there and wait for them to call you.     Ashiaer getting your Shingrix vaccine at our pharmacy after March.    For now, no change in insulin dosing.    Mike Deal MD  Internal Medicine and Pediatrics   "

## 2018-02-20 NOTE — PROGRESS NOTES
SUBJECTIVE:   Glen Cho is a 52 year old male who presents to clinic today for the following health issues:      Follow up Diabetes, HTN, and Hyperlipidemia  Pt checks sugars at home once daily, with readings between 140-190. Pt does not check BP at home.  No diabetic concerns.  Last diabetic eye exam 1.5 years ago, normal.  Taking meds as prescribed, no SE.  Exercising 3-4 times per week, regular diet.     Fasting 12 hours today.       BP Readings from Last 2 Encounters:   05/23/17 136/78   03/21/17 136/68     Hemoglobin A1C (%)   Date Value   03/21/2017 6.6 (H)   07/11/2016 5.9     LDL Cholesterol Calculated (mg/dL)   Date Value   07/11/2016 71   02/09/2015     Cannot estimate LDL when triglyceride exceeds 400 mg/dL     LDL Cholesterol Direct (mg/dL)   Date Value   02/09/2015 90           Problem list and histories reviewed & adjusted, as indicated.  Additional history: as documented    Patient Active Problem List   Diagnosis     Anxiety     Major depressive disorder, single episode in full remission (H)     Rosacea     Hypertension goal BP (blood pressure) < 140/90     Heart murmur     Tear of lateral cartilage or meniscus of knee, current     Tubular adenoma of colon     CKD (chronic kidney disease) stage 1, GFR 90 ml/min or greater     Morbid obesity (H)     Hyperlipidemia LDL goal <100     Type 2 diabetes mellitus with diabetic nephropathy, with long-term current use of insulin (H)     Past Surgical History:   Procedure Laterality Date     ANKLE SURGERY  2/2004     R ORIF     ARTHROSCOPY KNEE  3/26/2014    Procedure: ARTHROSCOPY KNEE;  ARTHROSCOPY KNEE- RIGHT   SURGEON REQUESTS CHOICE ANETHESIA ;  Surgeon: Sabino Simpson MD;  Location: [facility] OR     HERNIORRHAPHY UMBILICAL         Social History   Substance Use Topics     Smoking status: Former Smoker     Types: Cigarettes     Quit date: 5/10/2000     Smokeless tobacco: Never Used     Alcohol use No      Comment: rarely     Family History   Problem  Relation Age of Onset     CANCER Father      skin     Myocardial Infarction Father 57         Current Outpatient Prescriptions   Medication Sig Dispense Refill     metFORMIN (GLUCOPHAGE-XR) 500 MG 24 hr tablet Take 4 tablets (2,000 mg) by mouth daily (with dinner) 360 tablet 3     simvastatin (ZOCOR) 20 MG tablet TAKE 1 TABLET (20 MG) BY MOUTH AT BEDTIME 90 tablet 3     losartan-hydrochlorothiazide (HYZAAR) 50-12.5 MG per tablet Take 1 tablet by mouth At Bedtime 90 tablet 3     insulin detemir (LEVEMIR FLEXTOUCH) 100 UNIT/ML injection INJECT SUB-Q 70 UNITS IN THE MORNING AND 90 UNITS AT BEDTIME 150 mL 3     insulin pen needle (BD KENTRELL U/F) 32G X 4 MM USE 2 PEN NEEDLES DAILY OR AS DIRECTED. 200 each 0     order for DME Equipment being ordered: Verio flex test strips  Patient tests 1 times daily. 100 Device 3     order for DME Equipment being ordered: meter  Uses Verio Flex test strips 1 Device 0     order for DME Equipment being ordered: lancets  Uses 4 a day.    Please dispense what insurance covers. 400 each 3     blood glucose monitoring (FREESTYLE INSULINX) test strip Use to test blood sugar 4 times daily or as directed. 360 each 3     order for DME Equipment being ordered: Eli Contour Next test strips  Tests 5 times a day 300 each 3     blood glucose monitoring (FREESTYLE) lancets Use to test blood sugars 2-3 times daily or as directed. 1 Box prn     order for DME Equipment being ordered: test strips    Pleas test three times daily, or as directed.    Please dispense test strips for patient's current meter 180 each 3     order for DME Equipment being ordered: glucose meter  Please dispense what insurance covers 1 each 0     blood glucose monitoring (FREESTYLE INSULINX SYSTEM) meter device kit Use to test blood sugar 3 times daily or as directed. 1 kit 0     ORDER FOR DME Equipment being ordered: glucometer    60 lancets and test strips (5 refills) for twice daily testing. 1 each 0     ORDER FOR DME Equipment  being ordered: Please dispense 1 set of crutches 1 Device 0     aspirin 325 MG tablet Take 325 mg by mouth daily.       [DISCONTINUED] LEVEMIR FLEXTOUCH 100 UNIT/ML injection INJECT SUB-Q 70 UNITS IN THE MORNING AND 90 UNITS AT BEDTIME 50 mL 2     [DISCONTINUED] simvastatin (ZOCOR) 20 MG tablet TAKE 1 TABLET (20 MG) BY MOUTH AT BEDTIME 90 tablet 3     [DISCONTINUED] metFORMIN (GLUCOPHAGE-XR) 500 MG 24 hr tablet Take 4 tablets (2,000 mg) by mouth daily (with dinner) 360 tablet 3     [DISCONTINUED] losartan-hydrochlorothiazide (HYZAAR) 50-12.5 MG per tablet Take 1 tablet by mouth At Bedtime 90 tablet 3     Allergies   Allergen Reactions     Amlodipine Besylate      Water retention     Cefdinir      Joint pains     Lisinopril      Water retention     Amoxicillin Rash     White spots     Recent Labs   Lab Test  02/20/18   0832  05/23/17   1116  03/21/17   0852  07/11/16   0810   02/09/15   0805   03/24/14   0803  01/10/14   1049   06/09/10   0817   A1C  8.5*   --   6.6*  5.9   < >  7.4*   < >  9.9*   --    < >   --    LDL   --    --    --   71   --   Cannot estimate LDL when triglyceride exceeds 400 mg/dL  90   --   63   --    < >  157*   HDL   --    --    --   37*   --   33*   --   23*   --    < >  31*   TRIG   --    --    --   323*   --   541*   --   370*   --    < >  316*   ALT   --   71*  77*   --    --   85*   --   64   --    < >   --    CR   --   0.80  0.84   --    < >  0.78   --   0.82   --    < >  0.90   GFRESTIMATED   --   >90  Non  GFR Calc    >90  Non  GFR Calc     --    < >  >90  Non  GFR Calc     --   >90   --    < >  >90   GFRESTBLACK   --   >90   GFR Calc    >90   GFR Calc     --    < >  >90   GFR Calc     --   >90   --    < >  >90   POTASSIUM   --   4.2  4.3   --    < >  4.0   --   4.3   --    < >  3.9   TSH   --    --    --    --    --    --    --    --   1.29   --   1.48    < > = values in this interval not  "displayed.      BP Readings from Last 3 Encounters:   02/20/18 134/86   05/23/17 136/78   03/21/17 136/68    Wt Readings from Last 3 Encounters:   02/20/18 288 lb (130.6 kg)   05/23/17 279 lb (126.6 kg)   03/21/17 290 lb 1 oz (131.6 kg)                  Labs reviewed in EPIC    Reviewed and updated as needed this visit by clinical staff       Reviewed and updated as needed this visit by Provider         ROS:  CONSTITUTIONAL: NEGATIVE for fever, chills, change in weight  ENT/MOUTH: NEGATIVE for ear, mouth and throat problems  RESP: NEGATIVE for significant cough or SOB  CV: NEGATIVE for chest pain, palpitations or peripheral edema    OBJECTIVE:                                                    /86 (BP Location: Right arm, Cuff Size: Adult Regular)  Pulse 63  Temp 98.3  F (36.8  C) (Oral)  Ht 5' 6\" (1.676 m)  Wt 288 lb (130.6 kg)  SpO2 97%  BMI 46.48 kg/m2  Body mass index is 46.48 kg/(m^2).   GENERAL: healthy, alert, well nourished, well hydrated, no distress  HENT: ear canals- normal; TMs- normal; Nose- normal; Mouth- no ulcers, no lesions  NECK: no tenderness, no adenopathy, no asymmetry, no masses, no stiffness; thyroid- normal to palpation  RESP: lungs clear to auscultation - no rales, no rhonchi, no wheezes  CV: regular rates and rhythm, normal S1 S2, no S3 or S4 and no murmur, no click or rub -  ABDOMEN: soft, no tenderness, no  hepatosplenomegaly, no masses, normal bowel sounds    Diagnostic test results:  Diagnostic Test Results:  none      ASSESSMENT/PLAN:                                                    1. Type 2 diabetes mellitus with diabetic nephropathy, with long-term current use of insulin (H)  Labs today show elevation in diabetes blood test (A1c).  Would have him focus more closely on diet and weight loss (admits to winter indiscretions).  Will increase his levemir further for now, as long as fasting sugars are not below 90.  Follow up in 3 months.  Add short acting at that time if not " at goal.    - Hemoglobin A1c  - TSH with free T4 reflex  - Albumin Random Urine Quantitative with Creat Ratio  - metFORMIN (GLUCOPHAGE-XR) 500 MG 24 hr tablet; Take 4 tablets (2,000 mg) by mouth daily (with dinner)  Dispense: 360 tablet; Refill: 3  - insulin detemir (LEVEMIR FLEXTOUCH) 100 UNIT/ML injection; INJECT SUB-Q 70 UNITS IN THE MORNING AND 90 UNITS AT BEDTIME  Dispense: 150 mL; Refill: 3  - insulin pen needle (BD KENTRELL U/F) 32G X 4 MM; USE 2 PEN NEEDLES DAILY OR AS DIRECTED.  Dispense: 200 each; Refill: 0  - order for DME; Equipment being ordered: Verio flex test strips  Patient tests 1 times daily.  Dispense: 100 Device; Refill: 3    2. Hyperlipidemia LDL goal <100  Continue statin.   - Lipid panel reflex to direct LDL Fasting    3. Hypertension goal BP (blood pressure) < 140/90  blood pressure at goal,    4. CKD (chronic kidney disease) stage 1, GFR 90 ml/min or greater  Secondary risk factor modification.   - simvastatin (ZOCOR) 20 MG tablet; TAKE 1 TABLET (20 MG) BY MOUTH AT BEDTIME  Dispense: 90 tablet; Refill: 3  - losartan-hydrochlorothiazide (HYZAAR) 50-12.5 MG per tablet; Take 1 tablet by mouth At Bedtime  Dispense: 90 tablet; Refill: 3      See Patient Instructions    Mike Deal MD  [facility]

## 2018-02-21 ASSESSMENT — PATIENT HEALTH QUESTIONNAIRE - PHQ9: SUM OF ALL RESPONSES TO PHQ QUESTIONS 1-9: 0

## 2018-02-21 ASSESSMENT — ANXIETY QUESTIONNAIRES: GAD7 TOTAL SCORE: 0

## 2018-03-14 ENCOUNTER — MYC MEDICAL ADVICE (OUTPATIENT)
Dept: PEDIATRICS | Facility: CLINIC | Age: 53
End: 2018-03-14

## 2018-03-14 DIAGNOSIS — E11.21 TYPE 2 DIABETES MELLITUS WITH DIABETIC NEPHROPATHY, WITH LONG-TERM CURRENT USE OF INSULIN (H): Primary | ICD-10-CM

## 2018-03-14 DIAGNOSIS — Z79.4 TYPE 2 DIABETES MELLITUS WITH DIABETIC NEPHROPATHY, WITH LONG-TERM CURRENT USE OF INSULIN (H): Primary | ICD-10-CM

## 2018-03-14 NOTE — TELEPHONE ENCOUNTER
Please review patient's blood sugars.  Range for fasting sugars is 220-260.  The numbers higher than in the past.    Please review and advise on insulin dosing.  Taking Levemir 70 units in the morning and 90 units at bedtime.  Taking 2,000 mg metformin daily.  a1c was higher.    Lab Results   Component Value Date    A1C 8.5 02/20/2018    A1C 6.6 03/21/2017    A1C 5.9 07/11/2016    A1C 7.7 11/23/2015    A1C 8.1 10/23/2015     He is working on diet and joined [employer].  Please advise.    Shea Walker, KINGA  Message handled by Nurse Triage.

## 2018-03-15 ENCOUNTER — TELEPHONE (OUTPATIENT)
Dept: FAMILY MEDICINE | Facility: CLINIC | Age: 53
End: 2018-03-15

## 2018-03-15 ENCOUNTER — NURSE TRIAGE (OUTPATIENT)
Dept: NURSING | Facility: CLINIC | Age: 53
End: 2018-03-15

## 2018-03-15 NOTE — TELEPHONE ENCOUNTER
Clinic Action Needed:  Reason for Call:Has questions about the use of his Novolog insulin.  He used it for the first time today and his fasting was 218/ and 2 hour pc was 255.  He typically eats 5 smaller meals per day and wants to know ho to handle this. Says he feels good otherwise.   Amari Vieyra RN [phone]  Patient Recommendations/Teaching:  Routed to: Dr Deal

## 2018-03-15 NOTE — TELEPHONE ENCOUNTER
Call to patient-qs answered, he will follow the plan as the fasting BG are in 220-250 ranges.  The BG does not get lower than 90.  He will send a my-chart in 2 weeks with his readings.    Shea Walker RN  Message handled by Nurse Triage.

## 2018-03-23 ENCOUNTER — TELEPHONE (OUTPATIENT)
Dept: EDUCATION SERVICES | Facility: CLINIC | Age: 53
End: 2018-03-23

## 2018-03-23 ENCOUNTER — ALLIED HEALTH/NURSE VISIT (OUTPATIENT)
Dept: EDUCATION SERVICES | Facility: CLINIC | Age: 53
End: 2018-03-23
Payer: COMMERCIAL

## 2018-03-23 DIAGNOSIS — Z79.4 TYPE 2 DIABETES MELLITUS WITH DIABETIC NEPHROPATHY, WITH LONG-TERM CURRENT USE OF INSULIN (H): ICD-10-CM

## 2018-03-23 DIAGNOSIS — E11.21 TYPE 2 DIABETES MELLITUS WITH DIABETIC NEPHROPATHY, WITH LONG-TERM CURRENT USE OF INSULIN (H): Primary | ICD-10-CM

## 2018-03-23 DIAGNOSIS — Z79.4 TYPE 2 DIABETES MELLITUS WITH DIABETIC NEPHROPATHY, WITH LONG-TERM CURRENT USE OF INSULIN (H): Primary | ICD-10-CM

## 2018-03-23 DIAGNOSIS — E11.21 TYPE 2 DIABETES MELLITUS WITH DIABETIC NEPHROPATHY, WITH LONG-TERM CURRENT USE OF INSULIN (H): ICD-10-CM

## 2018-03-23 PROCEDURE — G0108 DIAB MANAGE TRN  PER INDIV: HCPCS | Performed by: DIETITIAN, REGISTERED

## 2018-03-23 NOTE — LETTER
"    3/23/2018         RE: Glen Cho  [address] [facility] [address]        Dear Colleague,    Thank you for referring your patient, Glen Cho, to the [facility]. Please see a copy of my visit note below.    Diabetes Self Management Training: Individual Review Visit    Glen Cho presents today for education and evaluation of glucose control related to Type 2 diabetes.    He is accompanied by self    Patient's diabetes management related comments/concerns: recently started Novolog- \" I feels like a pin cushion\"    Patient's emotional response to diabetes: expresses readiness to learn and concern for health and well-being    Patient would like this visit to be focused around the following diabetes-related behaviors and goals: Monitoring and Taking Medication    ASSESSMENT:  Patient Problem List and Family Medical History reviewed for relevant medical history, current medical status, and diabetes risk factors.    Current Diabetes Management per Patient:      Diabetes Medication(s)     Biguanides Sig    metFORMIN (GLUCOPHAGE-XR) 500 MG 24 hr tablet Take 4 tablets (2,000 mg) by mouth daily (with dinner)    Insulin Sig    insulin aspart (NOVOLOG FLEXPEN) 100 UNIT/ML injection Inject 15 Units Subcutaneous 3 times daily (with meals) May increase as instructed if your 2 hour post-meal sugars remain above 150.    insulin detemir (LEVEMIR FLEXTOUCH) 100 UNIT/ML injection INJECT SUB-Q 70 UNITS IN THE MORNING AND 90 UNITS AT BEDTIME        Taking Levemir 84 units twice daily (splits into 4 injections)  Takes Novolog 15 units tid. Occasionally takes 20 units for high carb meal like pizza.  Misses injections rarely    Problems taking diabetes medications regularly? No , Side effects? 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 States \"he's always been overweight\". Has been trying to watch portions more carefully- eats smaller, more frequent meals. Had questions about when should he take the Novolog in relation to his meals.  Pt brought in the following food record:           Biggest Challenge to Healthy Eating: wants to lose weight    Physical Activity:    Limitations: knee pain  Joined the [employer] at beginning of the year  Cycling, swimming, leg weights 3x/week    Diabetes Complications:  Acute Complications: At risk for hypoglycemia? yes  Frequency of hypoglycemia: none    Vitals:  There were no vitals taken for this visit.  Estimated body mass index is 46.48 kg/(m^2) as calculated from the following:    Height as of 2/20/18: 1.676 m (5' 6\").    Weight as of 2/20/18: 130.6 kg (288 lb).   Last 3 BP:   BP Readings from Last 3 Encounters:   02/20/18 134/86   05/23/17 136/78   03/21/17 136/68       History   Smoking Status     Former Smoker     Types: Cigarettes     Quit date: 5/10/2000   Smokeless Tobacco     Never Used       Labs:  Lab Results   Component Value Date    A1C 8.5 02/20/2018     Lab Results   Component Value Date     05/23/2017     Lab Results   Component Value Date    LDL  02/20/2018     Cannot estimate LDL when triglyceride exceeds 400 mg/dL    LDL 92 02/20/2018     HDL Cholesterol   Date Value Ref Range Status   02/20/2018 29 (L) >39 mg/dL Final   ]  GFR Estimate   Date Value Ref Range Status   05/23/2017 >90  Non  GFR Calc   >60 mL/min/1.7m2 Final     GFR Estimate If Black   Date Value Ref Range Status   05/23/2017 >90   GFR Calc   >60 mL/min/1.7m2 Final     Lab Results   Component Value Date    CR 0.80 05/23/2017     No results found for: MICROALBUMIN    Socio/Economic Considerations:    Support system: " "spouse/significant other    Health Beliefs and Attitudes:   Patient Activation Measure Survey Score:  ADAM Score (Last Two) 11/7/2011   ADAM Raw Score 49   Activation Score 82.8   ADAM Level 4       Stage of Change: ACTION (Actively working towards change)      Diabetes knowledge and skills assessment:     Patient is knowledgeable in diabetes management concepts related to: Monitoring    Patient needs further education on the following diabetes management concepts: Healthy Eating, Being Active, Monitoring, Taking Medication, Problem Solving, and Reducing Risks     Barriers to Learning Assessment: No Barriers identified    Based on learning assessment above, most appropriate setting for further diabetes education would be: Group class or Individual setting.    INTERVENTION:   Discussed various medication options, monitoring, and lifestyle changes to help improve diabetes control. Pt admits \"because he doesn't feel the effect of high blood sugars, he doesn't always take it seriously enough\".     Education provided today on:  AADE Self-Care Behaviors:  Healthy Eating: carbohydrate counting, consistency in amount, composition, and timing of food intake and weight reduction  Being Active: describe appropriate activity program  Monitoring: log and interpret results, individual blood glucose targets and frequency of monitoring  Taking Medication: action of prescribed medication, when to take medications and basal vs bolus insulin  Problem Solving: high blood glucose - causes, signs/symptoms, treatment and prevention, low blood glucose - causes, signs/symptoms, treatment and prevention and carrying a carbohydrate source at all times  Reducing Risks: prevention, early diagnostic measures and treatment of complications and A1C - goals, relating to blood glucose levels, how often to check    Opportunities for ongoing education and support in diabetes-self management were discussed.    Pt verbalized understanding of concepts " discussed and recommendations provided today.       Education Materials Provided:  [facility] patient brochure  Tresiba start up kit  Carb Counting    PLAN:  See Patient Instructions for co-developed, patient-stated behavior change goals.    1. Consider use of Tresiba U200 in place of Levemir. This would allow for one basal insulin injection per day rather than four (flacatly takes 42 + 42 units bid). Would start with 130 units once daily (~80% of current dose). Will route new Medication request to Dr. Deal.    2. Consider use of GLP-1. Discussed once weekly option Ozempic which provides greater weight loss. However is not yet on pt's formulary. Discussed possibility of Victoza or Trulicity. Pt may be interested in one of these options and will let us know- he would like to do further research.     3. Recommend consider personal Mary [facility] CGM. Info provided.    AVS printed and provided to patient today.    FOLLOW-UP:  Follow-up planned for BG review by phone/e-mail/[employer]ent[employer].   Chart routed to referring provider.    Ongoing plan for education and support: Smartphone Christelle(s), Written resources (magazines, books, etc.), Follow-up visit with diabetes educator  and Follow-up with primary care provider    Kusum Duncan RD, VERÓNICAE  Diabetes     Time Spent: 80 minutes  Encounter Type: Individual    Any diabetes medication dose changes were made via the CDE Protocol and Collaborative Practice Agreement with the patient's primary care provider. A copy of this encounter was shared with the provider.

## 2018-03-23 NOTE — MR AVS SNAPSHOT
After Visit Summary   3/23/2018    Glen Cho    MRN: [id number]           Patient Information     Date Of Birth          1965        Visit Information        Provider Department      3/23/2018 10:30 AM Kusum Duncan [facility]        Care Instructions    My Diabetes Care Goals:    1. Recommend consider Tresiba U200 once daily in place of Levemir. Recommend starting dose 130 units once daily.     We can also consider once weekly Ozempic (GLP-1 non-insulin injectable). Right now - this is not on your formulary- but once daily Victoza or once weekly Trulicity is.     2. Aim for 45 gms of carbs per meal and 15 gms for a snack. Take Novolog with meals only if eatings carbs.     3. Increase Novolog to 20 units before each meal.     Follow up:  Call ([phone]), e-mail [email or web address]), or send [employer] message to educator with blood sugar readings in 2 weeks.     Bring blood glucose meter and logbook with you to all doctor and follow-up appointments.     [facility]:  For Your Diabetes Education and Nutrition Appointments Call:  [phone]   For Diabetes Education or Nutrition Related Questions:   Phone: [phone]  E-mail: [email or web address]  Fax: [phone]   If you need a medication refill please contact your pharmacy. Please allow 3 business days for your refills to be completed.    Instructions for emailing the Diabetes Educators    If you need to communicate a non-urgent message to a Diabetes Educator via email, please send to [email or web address].    Please follow the following email guidelines:    Subject line: Secure: your clinic name (example: Secure: [facility])  In the email please include: First name, middle initial, last name and date of birth.    We will be in touch with you within one (1) business day.             Follow-ups after your visit        Who to contact      If you have questions or need follow up information about today's clinic visit or your schedule please contact [facility] directly at [phone].  Normal or non-critical lab and imaging results will be communicated to you by MyChart, letter or phone within 4 business days after the clinic has received the results. If you do not hear from us within 7 days, please contact the clinic through Leakyhart or phone. If you have a critical or abnormal lab result, we will notify you by phone as soon as possible.  Submit refill requests through [employer] or call your pharmacy and they will forward the refill request to us. Please allow 3 business days for your refill to be completed.          Additional Information About Your Visit        Leapfactor Information     Leapfactor gives you secure access to your electronic health record. If you see a primary care provider, you can also send messages to your care team and make appointments. If you have questions, please call your primary care clinic.  If you do not have a primary care provider, please call [phone] and they will assist you.        Care EveryWhere ID     This is your Care EveryWhere ID. This could be used by other organizations to access your [facility] medical records  [id number]         Blood Pressure from Last 3 Encounters:   02/20/18 134/86   05/23/17 136/78   03/21/17 136/68    Weight from Last 3 Encounters:   02/20/18 130.6 kg (288 lb)   05/23/17 126.6 kg (279 lb)   03/21/17 131.6 kg (290 lb 1 oz)              Today, you had the following     No orders found for display       Primary Care Provider Office Phone # Fax #    Mike Deal [phone] [address] [place] [address]        Equal Access to Services     [facility]: Hadii dillan Aviles, shen doshi, qaavinashta lexi robles. So [facility] [phone].    ATENCIÓN: Maryanne gonzales,  tiene a jesus disposición servicios gratuitos de asistencia lingüística. Tricia santos [phone].    We comply with applicable federal civil rights laws and [place] laws. We do not discriminate on the basis of race, color, national origin, age, disability, sex, sexual orientation, or gender identity.            Thank you!     Thank you for choosing [facility]  for your care. Our goal is always to provide you with excellent care. Hearing back from our patients is one way we can continue to improve our services. Please take a few minutes to complete the written survey that you may receive in the mail after your visit with us. Thank you!             Your Updated Medication List - Protect others around you: Learn how to safely use, store and throw away your medicines at www.disposemymeds.org.          This list is accurate as of 3/23/18  4:07 PM.  Always use your most recent med list.                   Brand Name Dispense Instructions for use Diagnosis    aspirin 325 MG tablet      Take 325 mg by mouth daily.        blood glucose monitoring lancets     1 Box    Use to test blood sugars 2-3 times daily or as directed.    Type 2 diabetes mellitus with diabetic nephropathy (H)       blood glucose monitoring meter device kit     1 kit    Use to test blood sugar 3 times daily or as directed.    Type 2 diabetes, HbA1c goal < 7% (H)       blood glucose monitoring test strip    FREESTYLE INSULINX    360 each    Use to test blood sugar 4 times daily or as directed.    Type 2 diabetes mellitus with diabetic nephropathy (H)       insulin aspart 100 UNIT/ML injection    NovoLOG FLEXPEN    9 mL    Inject 15 Units Subcutaneous 3 times daily (with meals) May increase as instructed if your 2 hour post-meal sugars remain above 150.    Type 2 diabetes mellitus with diabetic nephropathy, with long-term current use of insulin (H)       insulin detemir 100 UNIT/ML injection    LEVEMIR FLEXTOUCH    150 mL    INJECT  SUB-Q 70 UNITS IN THE MORNING AND 90 UNITS AT BEDTIME    Type 2 diabetes mellitus with diabetic nephropathy, with long-term current use of insulin (H)       * insulin pen needle 32G X 4 MM    BD KENTRELL U/F    200 each    USE 2 PEN NEEDLES DAILY OR AS DIRECTED.    Type 2 diabetes mellitus with diabetic nephropathy, with long-term current use of insulin (H)       * insulin pen needle 32G X 4 MM    BD KENTRELL U/F    120 each    Use 4 daily or as directed.    Type 2 diabetes mellitus with diabetic nephropathy, with long-term current use of insulin (H)       losartan-hydrochlorothiazide 50-12.5 MG per tablet    HYZAAR    90 tablet    Take 1 tablet by mouth At Bedtime    CKD (chronic kidney disease) stage 1, GFR 90 ml/min or greater       metFORMIN 500 MG 24 hr tablet    GLUCOPHAGE-XR    360 tablet    Take 4 tablets (2,000 mg) by mouth daily (with dinner)    Type 2 diabetes mellitus with diabetic nephropathy, with long-term current use of insulin (H)       order for DME     1 Device    Equipment being ordered: Please dispense 1 set of crutches    Tear of medial cartilage or meniscus of knee, current       order for DME     1 each    Equipment being ordered: glucometer  60 lancets and test strips (5 refills) for twice daily testing.    Type 2 diabetes, HbA1c goal < 7% (H)       * order for DME     1 each    Equipment being ordered: glucose meter Please dispense what insurance covers    Type 2 diabetes, HbA1c goal < 7% (H)       * order for DME     180 each    Equipment being ordered: test strips  Pleas test three times daily, or as directed.  Please dispense test strips for patient's current meter    Type 2 diabetes mellitus with diabetic nephropathy (H)       * order for DME     300 each    Equipment being ordered: Eli Contour Next test strips Tests 5 times a day    Type 2 diabetes, HbA1c goal < 7% (H)       * order for DME     400 each    Equipment being ordered: lancets Uses 4 a day.  Please dispense what insurance covers.     Type 2 diabetes, HbA1c goal < 7% (H)       * order for DME     1 Device    Equipment being ordered: meter Uses Verio Flex test strips    Type 2 diabetes mellitus with diabetic nephropathy, with long-term current use of insulin (H)       * order for DME     100 Device    Equipment being ordered: Verio flex test strips Patient tests 1 times daily.    Type 2 diabetes mellitus with diabetic nephropathy, with long-term current use of insulin (H)       simvastatin 20 MG tablet    ZOCOR    90 tablet    TAKE 1 TABLET (20 MG) BY MOUTH AT BEDTIME    CKD (chronic kidney disease) stage 1, GFR 90 ml/min or greater       * Notice:  This list has 8 medication(s) that are the same as other medications prescribed for you. Read the directions carefully, and ask your doctor or other care provider to review them with you.

## 2018-03-23 NOTE — TELEPHONE ENCOUNTER
See DM Ed encounter note from today.     Recommend transition from Levemir total daily dose of 168 units (takes 4 injections of 42 units ) to --> U200 Tresiba 130 units once daily. Rx pended for PCP signature.     Pt advised will need ongoing adjustment of basal + meal time insulin. Follow up via [employer] planned.    Kusum Duncan RD, CDE  Diabetes

## 2018-03-23 NOTE — PATIENT INSTRUCTIONS
My Diabetes Care Goals:    1. Recommend consider Tresiba U200 once daily in place of Levemir. Recommend starting dose 130 units once daily.     We can also consider once weekly Ozempic (GLP-1 non-insulin injectable). Right now - this is not on your formulary- but once daily Victoza or once weekly Trulicity is.     2. Aim for 45 gms of carbs per meal and 15 gms for a snack. Take Novolog with meals only if eatings carbs.     3. Increase Novolog to 20 units before each meal.     Follow up:  Call ([phone]), e-mail ([email or web address]), or send "[employer]" message to educator with blood sugar readings in 2 weeks.     Bring blood glucose meter and logbook with you to all doctor and follow-up appointments.     [facility] the [facility]:  For Your Diabetes Education and Nutrition Appointments Call:  [phone]   For Diabetes Education or Nutrition Related Questions:   Phone: [phone]  E-mail: [email or web address]  Fax: [phone]   If you need a medication refill please contact your pharmacy. Please allow 3 business days for your refills to be completed.    Instructions for emailing the Diabetes Educators    If you need to communicate a non-urgent message to a Diabetes Educator via email, please send to [email or web address].    Please follow the following email guidelines:    Subject line: Secure: your clinic name (example: Secure: [facility])  In the email please include: First name, middle initial, last name and date of birth.    We will be in touch with you within one (1) business day.

## 2018-03-23 NOTE — PROGRESS NOTES
"Diabetes Self Management Training: Individual Review Visit    Glen Cho presents today for education and evaluation of glucose control related to Type 2 diabetes.    He is accompanied by self    Patient's diabetes management related comments/concerns: recently started Novolog- \" I feels like a pin cushion\"    Patient's emotional response to diabetes: expresses readiness to learn and concern for health and well-being    Patient would like this visit to be focused around the following diabetes-related behaviors and goals: Monitoring and Taking Medication    ASSESSMENT:  Patient Problem List and Family Medical History reviewed for relevant medical history, current medical status, and diabetes risk factors.    Current Diabetes Management per Patient:      Diabetes Medication(s)     Biguanides Sig    metFORMIN (GLUCOPHAGE-XR) 500 MG 24 hr tablet Take 4 tablets (2,000 mg) by mouth daily (with dinner)    Insulin Sig    insulin aspart (NOVOLOG FLEXPEN) 100 UNIT/ML injection Inject 15 Units Subcutaneous 3 times daily (with meals) May increase as instructed if your 2 hour post-meal sugars remain above 150.    insulin detemir (LEVEMIR FLEXTOUCH) 100 UNIT/ML injection INJECT SUB-Q 70 UNITS IN THE MORNING AND 90 UNITS AT BEDTIME        Taking Levemir 84 units twice daily (splits into 4 injections)  Takes Novolog 15 units tid. Occasionally takes 20 units for high carb meal like pizza.  Misses injections rarely    Problems taking diabetes medications regularly? No , Side effects? 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 States \"he's always been overweight\". Has been trying to watch portions more carefully- " "eats smaller, more frequent meals. Had questions about when should he take the Novolog in relation to his meals.  Pt brought in the following food record:           Biggest Challenge to Healthy Eating: wants to lose weight    Physical Activity:    Limitations: knee pain  Joined the [facility] at beginning of the year  Cycling, swimming, leg weights 3x/week    Diabetes Complications:  Acute Complications: At risk for hypoglycemia? yes  Frequency of hypoglycemia: none    Vitals:  There were no vitals taken for this visit.  Estimated body mass index is 46.48 kg/(m^2) as calculated from the following:    Height as of 2/20/18: 1.676 m (5' 6\").    Weight as of 2/20/18: 130.6 kg (288 lb).   Last 3 BP:   BP Readings from Last 3 Encounters:   02/20/18 134/86   05/23/17 136/78   03/21/17 136/68       History   Smoking Status     Former Smoker     Types: Cigarettes     Quit date: 5/10/2000   Smokeless Tobacco     Never Used       Labs:  Lab Results   Component Value Date    A1C 8.5 02/20/2018     Lab Results   Component Value Date     05/23/2017     Lab Results   Component Value Date    LDL  02/20/2018     Cannot estimate LDL when triglyceride exceeds 400 mg/dL    LDL 92 02/20/2018     HDL Cholesterol   Date Value Ref Range Status   02/20/2018 29 (L) >39 mg/dL Final   ]  GFR Estimate   Date Value Ref Range Status   05/23/2017 >90  Non  GFR Calc   >60 mL/min/1.7m2 Final     GFR Estimate If Black   Date Value Ref Range Status   05/23/2017 >90   GFR Calc   >60 mL/min/1.7m2 Final     Lab Results   Component Value Date    CR 0.80 05/23/2017     No results found for: MICROALBUMIN    Socio/Economic Considerations:    Support system: spouse/significant other    Health Beliefs and Attitudes:   Patient Activation Measure Survey Score:  ADAM Score (Last Two) 11/7/2011   ADAM Raw Score 49   Activation Score 82.8   ADAM Level 4       Stage of Change: ACTION (Actively working towards change)      Diabetes " "knowledge and skills assessment:     Patient is knowledgeable in diabetes management concepts related to: Monitoring    Patient needs further education on the following diabetes management concepts: Healthy Eating, Being Active, Monitoring, Taking Medication, Problem Solving, and Reducing Risks     Barriers to Learning Assessment: No Barriers identified    Based on learning assessment above, most appropriate setting for further diabetes education would be: Group class or Individual setting.    INTERVENTION:   Discussed various medication options, monitoring, and lifestyle changes to help improve diabetes control. Pt admits \"because he doesn't feel the effect of high blood sugars, he doesn't always take it seriously enough\".     Education provided today on:  AADE Self-Care Behaviors:  Healthy Eating: carbohydrate counting, consistency in amount, composition, and timing of food intake and weight reduction  Being Active: describe appropriate activity program  Monitoring: log and interpret results, individual blood glucose targets and frequency of monitoring  Taking Medication: action of prescribed medication, when to take medications and basal vs bolus insulin  Problem Solving: high blood glucose - causes, signs/symptoms, treatment and prevention, low blood glucose - causes, signs/symptoms, treatment and prevention and carrying a carbohydrate source at all times  Reducing Risks: prevention, early diagnostic measures and treatment of complications and A1C - goals, relating to blood glucose levels, how often to check    Opportunities for ongoing education and support in diabetes-self management were discussed.    Pt verbalized understanding of concepts discussed and recommendations provided today.       Education Materials Provided:  [facility] patient brochure  Tresiba start up kit  Carb Counting    PLAN:  See Patient Instructions for co-developed, patient-stated behavior change goals.    1. Consider use of Tresiba U200 in " place of Levemir. This would allow for one basal insulin injection per day rather than four (yamil takes 42 + 42 units bid). Would start with 130 units once daily (~80% of current dose). Will route new Medication request to Dr. Deal.    2. Consider use of GLP-1. Discussed once weekly option Ozempic which provides greater weight loss. However is not yet on pt's formulary. Discussed possibility of Victoza or Trulicity. Pt may be interested in one of these options and will let us know- he would like to do further research.     3. Recommend consider personal [facility] CGM. Info provided.    AVS printed and provided to patient today.    FOLLOW-UP:  Follow-up planned for BG review by phone/e-mail/[employer].   Chart routed to referring provider.    Ongoing plan for education and support: Smartphone Christelle(s), Written resources (magazines, books, etc.), Follow-up visit with diabetes educator  and Follow-up with primary care provider    Kusum Duncan RD, CDE  Diabetes     Time Spent: 80 minutes  Encounter Type: Individual    Any diabetes medication dose changes were made via the CDE Protocol and Collaborative Practice Agreement with the patient's primary care provider. A copy of this encounter was shared with the provider.

## 2018-03-23 NOTE — Clinical Note
Hi Dr. Deal,  Pt seen for DM ed. He is interested in switching to U200 Tresiba, and possibly adding a GLP-1 in the future.   Will send you a separate Medication request for signature on the Tresiba.  Thanks! Kusum Duncan RD, CDE Diabetes

## 2018-04-07 ENCOUNTER — NURSE TRIAGE (OUTPATIENT)
Dept: NURSING | Facility: CLINIC | Age: 53
End: 2018-04-07

## 2018-04-07 ENCOUNTER — TELEPHONE (OUTPATIENT)
Dept: FAMILY MEDICINE | Facility: CLINIC | Age: 53
End: 2018-04-07

## 2018-04-07 DIAGNOSIS — E11.21 TYPE 2 DIABETES MELLITUS WITH DIABETIC NEPHROPATHY, WITH LONG-TERM CURRENT USE OF INSULIN (H): ICD-10-CM

## 2018-04-07 DIAGNOSIS — Z79.4 TYPE 2 DIABETES MELLITUS WITH DIABETIC NEPHROPATHY, WITH LONG-TERM CURRENT USE OF INSULIN (H): ICD-10-CM

## 2018-04-07 NOTE — TELEPHONE ENCOUNTER
"\"I have been increasing my Novolog as instructed to keep my 2 hour post meal numbers <150.  I am up to 25 units before each meal now.  I will not have enough insulin to get me through the weekend.\"      [facility] has script for 15 units Novolog.  Writer will send the script dated 3/23/18 to them now.  Advised pt to continue doing what he has been at home, adjusting the insulin as directed by the doctor.  He stated his understanding and had no further questions.     Linnette Haynes RN/FNA    "

## 2018-04-09 DIAGNOSIS — E11.21 TYPE 2 DIABETES MELLITUS WITH DIABETIC NEPHROPATHY, WITH LONG-TERM CURRENT USE OF INSULIN (H): ICD-10-CM

## 2018-04-09 DIAGNOSIS — Z79.4 TYPE 2 DIABETES MELLITUS WITH DIABETIC NEPHROPATHY, WITH LONG-TERM CURRENT USE OF INSULIN (H): ICD-10-CM

## 2018-04-09 NOTE — TELEPHONE ENCOUNTER
"Requested Prescriptions   Pending Prescriptions Disp Refills     LEVEMIR FLEXTOUCH 100 UNIT/ML injection [Pharmacy Med Name: LEVEMIR FLEXTOUCH 100 UNITS/ML]  Last Written Prescription Date:  02/20/2018  Last Fill Quantity: 150mL,  # refills: 3   Last office visit: 2/20/2018 with prescribing provider:  Mike Deal MD    Future Office Visit:      1     Sig: INJECT SUB-Q 70 UNITS IN THE MORNING AND 90 UNITS AT BEDTIME    Long Acting Insulin Protocol Passed    4/9/2018  4:54 PM       Passed - Blood pressure less than 140/90 in past 6 months    BP Readings from Last 3 Encounters:   02/20/18 134/86   05/23/17 136/78   03/21/17 136/68                Passed - LDL on file in past 12 months    Recent Labs   Lab Test  02/20/18   0832   LDL  Cannot estimate LDL when triglyceride exceeds 400 mg/dL  92            Passed - Microalbumin on file in past 12 months    Recent Labs   Lab Test  02/20/18   0832   MICROL  6   UMALCR  15.88            Passed - Serum creatinine on file in past 12 months    Recent Labs   Lab Test  05/23/17   1116   CR  0.80            Passed - HgbA1C in past 3 or 6 months    Recent Labs   Lab Test  02/20/18   0832   A1C  8.5*            Passed - Patient is age 18 or older       Passed - Recent (6 mo) or future (30 days) visit within the authorizing provider's specialty    Patient had office visit in the last 6 months or has a visit in the next 30 days with authorizing provider or within the authorizing provider's specialty.  See \"Patient Info\" tab in inbasket, or \"Choose Columns\" in Meds & Orders section of the refill encounter.              "

## 2018-04-10 RX ORDER — INSULIN DETEMIR 100 [IU]/ML
INJECTION, SOLUTION SUBCUTANEOUS
Qty: 50 ML | Refills: 1 | Status: SHIPPED | OUTPATIENT
Start: 2018-04-10 | End: 2018-06-04

## 2018-04-10 NOTE — TELEPHONE ENCOUNTER
Prescription approved per [facility] Refill Protocol.    Maddy MOISE RN, BSN, PHN  [place] Flex RN

## 2018-04-17 ENCOUNTER — TRANSFERRED RECORDS (OUTPATIENT)
Dept: HEALTH INFORMATION MANAGEMENT | Facility: CLINIC | Age: 53
End: 2018-04-17

## 2018-04-26 ENCOUNTER — TELEPHONE (OUTPATIENT)
Dept: PEDIATRICS | Facility: CLINIC | Age: 53
End: 2018-04-26

## 2018-04-26 DIAGNOSIS — Z79.4 TYPE 2 DIABETES MELLITUS WITH DIABETIC NEPHROPATHY, WITH LONG-TERM CURRENT USE OF INSULIN (H): ICD-10-CM

## 2018-04-26 DIAGNOSIS — E11.21 TYPE 2 DIABETES MELLITUS WITH DIABETIC NEPHROPATHY, WITH LONG-TERM CURRENT USE OF INSULIN (H): ICD-10-CM

## 2018-04-26 NOTE — TELEPHONE ENCOUNTER
FYI for Dr Deal-  Has increased insulin dose to 30 units three times daily,  New RX sent to pharmacy with this dosing because pharmacy indicates it is too early for patient to get a refill per insurance.    I asked patient to call us if he needs to continue to increase dose, so we can update his RX.  No further questions.  Will call back if any other questions or concerns.  HUGH Mitchell RN

## 2018-06-04 ENCOUNTER — OFFICE VISIT (OUTPATIENT)
Dept: PEDIATRICS | Facility: CLINIC | Age: 53
End: 2018-06-04
Payer: COMMERCIAL

## 2018-06-04 VITALS
WEIGHT: 281 LBS | HEART RATE: 74 BPM | DIASTOLIC BLOOD PRESSURE: 80 MMHG | SYSTOLIC BLOOD PRESSURE: 126 MMHG | OXYGEN SATURATION: 96 % | TEMPERATURE: 97.9 F | BODY MASS INDEX: 45.16 KG/M2 | HEIGHT: 66 IN

## 2018-06-04 DIAGNOSIS — E66.01 MORBID OBESITY (H): ICD-10-CM

## 2018-06-04 DIAGNOSIS — Z79.4 TYPE 2 DIABETES MELLITUS WITH DIABETIC NEPHROPATHY, WITH LONG-TERM CURRENT USE OF INSULIN (H): Primary | ICD-10-CM

## 2018-06-04 DIAGNOSIS — F32.5 MAJOR DEPRESSIVE DISORDER, SINGLE EPISODE IN FULL REMISSION (H): ICD-10-CM

## 2018-06-04 DIAGNOSIS — E11.21 TYPE 2 DIABETES MELLITUS WITH DIABETIC NEPHROPATHY, WITH LONG-TERM CURRENT USE OF INSULIN (H): Primary | ICD-10-CM

## 2018-06-04 LAB — HBA1C MFR BLD: 6.4 % (ref 0–5.6)

## 2018-06-04 PROCEDURE — 99214 OFFICE O/P EST MOD 30 MIN: CPT | Performed by: INTERNAL MEDICINE

## 2018-06-04 PROCEDURE — 36415 COLL VENOUS BLD VENIPUNCTURE: CPT | Performed by: INTERNAL MEDICINE

## 2018-06-04 PROCEDURE — 83036 HEMOGLOBIN GLYCOSYLATED A1C: CPT | Performed by: INTERNAL MEDICINE

## 2018-06-04 NOTE — PATIENT INSTRUCTIONS
"Lab work downstairs today.  Directions:  As you walk through the first floor, you'll see (on the right) first the pharmacy, then some bathrooms, then the \"Lab and Imaging\" area. Give them your name at the window there and wait for them to call you.     If diabetes blood test (A1c) is at goal, we can hold off on any changes and recheck in 6 months.    Mike Deal MD  Internal Medicine and Pediatrics     "

## 2018-06-04 NOTE — PROGRESS NOTES
SUBJECTIVE:   Glen Cho is a 52 year old male who presents to clinic today for the following health issues:      Follow up Diabetes, HTN and Hyperlipidemia    Pt checks sugars from 0-3 times per day, readings in -135 and postprandial 135-155.  Does not check BP.  No diabetic concerns.  Taking medications as prescribed, no SE.  Not exercising outside of work, trying to make healthy food choices, down 7lbs since LOV.      Taking Tresiba 150 units daily, and 30-32 units of novolog.      AM sugars Not checked much, but usually 108-135.   AFter meals 130-150.      Diet has not changed much.  Busy today; missed lunch.      BP Readings from Last 2 Encounters:   02/20/18 134/86   05/23/17 136/78     Hemoglobin A1C (%)   Date Value   02/20/2018 8.5 (H)   03/21/2017 6.6 (H)     LDL Cholesterol Calculated (mg/dL)   Date Value   02/20/2018     Cannot estimate LDL when triglyceride exceeds 400 mg/dL   07/11/2016 71     LDL Cholesterol Direct (mg/dL)   Date Value   02/20/2018 92       Problem list and histories reviewed & adjusted, as indicated.  Additional history: as documented    Patient Active Problem List   Diagnosis     Anxiety     Major depressive disorder, single episode in full remission (H)     Rosacea     Hypertension goal BP (blood pressure) < 140/90     Heart murmur     Tear of lateral cartilage or meniscus of knee, current     Tubular adenoma of colon     CKD (chronic kidney disease) stage 1, GFR 90 ml/min or greater     Morbid obesity (H)     Hyperlipidemia LDL goal <100     Type 2 diabetes mellitus with diabetic nephropathy, with long-term current use of insulin (H)     Past Surgical History:   Procedure Laterality Date     ANKLE SURGERY  2/2004     R ORIF     ARTHROSCOPY KNEE  3/26/2014    Procedure: ARTHROSCOPY KNEE;  ARTHROSCOPY KNEE- RIGHT   SURGEON REQUESTS CHOICE ANETHESIA ;  Surgeon: Sabino Simpson MD;  Location: [facility] OR     HERNIORRHAPHY UMBILICAL         Social History   Substance Use Topics      Smoking status: Former Smoker     Types: Cigarettes     Quit date: 5/10/2000     Smokeless tobacco: Never Used     Alcohol use No      Comment: rarely     Family History   Problem Relation Age of Onset     CANCER Father      skin     Myocardial Infarction Father 57         Current Outpatient Prescriptions   Medication Sig Dispense Refill     aspirin 325 MG tablet Take 325 mg by mouth daily.       blood glucose monitoring (FREESTYLE INSULINX SYSTEM) meter device kit Use to test blood sugar 3 times daily or as directed. 1 kit 0     blood glucose monitoring (FREESTYLE INSULINX) test strip Use to test blood sugar 4 times daily or as directed. 360 each 3     blood glucose monitoring (FREESTYLE) lancets Use to test blood sugars 2-3 times daily or as directed. 1 Box prn     insulin degludec (TRESIBA) 200 UNIT/ML pen Inject 150 Units Subcutaneous daily 27 mL 5     insulin pen needle (BD KENTRELL U/F) 32G X 4 MM Use 4 daily or as directed. 120 each 11     insulin pen needle (BD KENTRELL U/F) 32G X 4 MM USE 2 PEN NEEDLES DAILY OR AS DIRECTED. 200 each 0     losartan-hydrochlorothiazide (HYZAAR) 50-12.5 MG per tablet Take 1 tablet by mouth At Bedtime 90 tablet 3     metFORMIN (GLUCOPHAGE-XR) 500 MG 24 hr tablet Take 4 tablets (2,000 mg) by mouth daily (with dinner) 360 tablet 3     NOVOLOG FLEXPEN 100 UNIT/ML soln INJECT 30 UNITS SUBQ 3X/DAY WITH MEALS. MAY INCREASE AS INSTRUCTED IF 2 HR POST-MEAL SUGARS >150 9 mL 1     order for DME Equipment being ordered: Verio flex test strips  Patient tests 1 times daily. 100 Device 3     order for DME Equipment being ordered: meter  Uses Verio Flex test strips 1 Device 0     order for DME Equipment being ordered: lancets  Uses 4 a day.    Please dispense what insurance covers. 400 each 3     order for DME Equipment being ordered: Eli Contour Next test strips  Tests 5 times a day 300 each 3     order for DME Equipment being ordered: test strips    Pleas test three times daily, or as  "directed.    Please dispense test strips for patient's current meter 180 each 3     order for DME Equipment being ordered: glucose meter  Please dispense what insurance covers 1 each 0     ORDER FOR DME Equipment being ordered: glucometer    60 lancets and test strips (5 refills) for twice daily testing. 1 each 0     ORDER FOR DME Equipment being ordered: Please dispense 1 set of crutches 1 Device 0     simvastatin (ZOCOR) 20 MG tablet TAKE 1 TABLET (20 MG) BY MOUTH AT BEDTIME 90 tablet 3     Allergies   Allergen Reactions     Amlodipine Besylate      Water retention     Cefdinir      Joint pains     Lisinopril      Water retention     Amoxicillin Rash     White spots     BP Readings from Last 3 Encounters:   06/04/18 126/80   02/20/18 134/86   05/23/17 136/78    Wt Readings from Last 3 Encounters:   06/04/18 281 lb (127.5 kg)   02/20/18 288 lb (130.6 kg)   05/23/17 279 lb (126.6 kg)                  Labs reviewed in EPIC    Reviewed and updated as needed this visit by clinical staff  Tobacco  Allergies  Meds  Med Hx  Surg Hx  Fam Hx  Soc Hx      Reviewed and updated as needed this visit by Provider         ROS:  CONSTITUTIONAL: NEGATIVE for fever, chills, change in weight  ENT/MOUTH: NEGATIVE for ear, mouth and throat problems  RESP: NEGATIVE for significant cough or SOB  CV: NEGATIVE for chest pain, palpitations or peripheral edema    OBJECTIVE:                                                    /80 (BP Location: Right arm, Cuff Size: Adult Large)  Pulse 74  Temp 97.9  F (36.6  C) (Oral)  Ht 5' 6\" (1.676 m)  Wt 281 lb (127.5 kg)  SpO2 96%  BMI 45.35 kg/m2  Body mass index is 45.35 kg/(m^2).   GENERAL: healthy, alert, well nourished, well hydrated, no distress  HENT: ear canals- normal; TMs- normal; Nose- normal; Mouth- no ulcers, no lesions  NECK: no tenderness, no adenopathy, no asymmetry, no masses, no stiffness; thyroid- normal to palpation  RESP: lungs clear to auscultation - no rales, no " "rhonchi, no wheezes  CV: regular rates and rhythm, normal S1 S2, no S3 or S4 and no murmur, no click or rub -  ABDOMEN: soft, no tenderness, no  hepatosplenomegaly, no masses, normal bowel sounds    Diagnostic test results:  Diagnostic Test Results:  none      ASSESSMENT/PLAN:                                                    1. Type 2 diabetes mellitus with diabetic nephropathy, with long-term current use of insulin (H)    Sugars appear improved on his log.  Is on very high dose of tresiba (150 units) as well as three times daily short acting.  Other Parameters well controlled.  Continue secondary risk factor modification for BP, cholesterol, anticoagulation, and smoking cessation..     Patient Instructions   Lab work downstairs today.  Directions:  As you walk through the first floor, you'll see (on the right) first the pharmacy, then some bathrooms, then the \"Lab and Imaging\" area. Give them your name at the window there and wait for them to call you.     If diabetes blood test (A1c) is at goal, we can hold off on any changes and recheck in 6 months.    Mike Deal MD  Internal Medicine and Pediatrics        - Hemoglobin A1c  - insulin degludec (TRESIBA) 200 UNIT/ML pen; Inject 150 Units Subcutaneous daily  Dispense: 27 mL; Refill: 5      (E66.01) Morbid obesity (H)  Comment:   Plan: congratulated on significant weight loss.  Diet discussed.  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     (F32.5) Major depressive disorder, single episode in full remission (H)  Comment:   Plan: Symptoms resolved.        See Patient Instructions    Mike Deal MD  [facility]    "

## 2018-06-04 NOTE — LETTER
My Depression Action Plan  Name: Glen Cho   Date of Birth 1965  Date: 6/4/2018    My doctor: Mike Deal   My clinic: [address]  [address]  [facility] [address] [phone]          GREEN    ZONE   Good Control    What it looks like:     Things are going generally well. You have normal up s and down s. You may even feel depressed from time to time, but bad moods usually last less than a day.   What you need to do:  1. Continue to care for yourself (see self care plan)  2. Check your depression survival kit and update it as needed  3. Follow your physician s recommendations including any medication.  4. Do not stop taking medication unless you consult with your physician first.           YELLOW         ZONE Getting Worse    What it looks like:     Depression is starting to interfere with your life.     It may be hard to get out of bed; you may be starting to isolate yourself from others.    Symptoms of depression are starting to last most all day and this has happened for several days.     You may have suicidal thoughts but they are not constant.   What you need to do:     1. Call your care team, your response to treatment will improve if you keep your care team informed of your progress. Yellow periods are signs an adjustment may need to be made.     2. Continue your self-care, even if you have to fake it!    3. Talk to someone in your support network    4. Open up your depression survival kit           RED    ZONE Medical Alert - Get Help    What it looks like:     Depression is seriously interfering with your life.     You may experience these or other symptoms: You can t get out of bed most days, can t work or engage in other necessary activities, you have trouble taking care of basic hygiene, or basic responsibilities, thoughts of suicide or death that will not go away, self-injurious behavior.     What you need to do:  1. Call your care  team and request a same-day appointment. If they are not available (weekends or after hours) call your local crisis line, emergency room or 911.            Depression Self Care Plan / Survival Kit    Self-Care for Depression  Here s the deal. Your body and mind are really not as separate as most people think.  What you do and think affects how you feel and how you feel influences what you do and think. This means if you do things that people who feel good do, it will help you feel better.  Sometimes this is all it takes.  There is also a place for medication and therapy depending on how severe your depression is, so be sure to consult with your medical provider and/ or Behavioral Health Consultant if your symptoms are worsening or not improving.     In order to better manage my stress, I will:    Exercise  Get some form of exercise, every day. This will help reduce pain and release endorphins, the  feel good  chemicals in your brain. This is almost as good as taking antidepressants!  This is not the same as joining a gym and then never going! (they count on that by the way ) It can be as simple as just going for a walk or doing some gardening, anything that will get you moving.      Hygiene   Maintain good hygiene (Get out of bed in the morning, Make your bed, Brush your teeth, Take a shower, and Get dressed like you were going to work, even if you are unemployed).  If your clothes don't fit try to get ones that do.    Diet  I will strive to eat foods that are good for me, drink plenty of water, and avoid excessive sugar, caffeine, alcohol, and other mood-altering substances.  Some foods that are helpful in depression are: complex carbohydrates, B vitamins, flaxseed, fish or fish oil, fresh fruits and vegetables.    Psychotherapy  I agree to participate in Individual Therapy (if recommended).    Medication  If prescribed medications, I agree to take them.  Missing doses can result in serious side effects.  I  understand that drinking alcohol, or other illicit drug use, may cause potential side effects.  I will not stop my medication abruptly without first discussing it with my provider.    Staying Connected With Others  I will stay in touch with my friends, family members, and my primary care provider/team.    Use your imagination  Be creative.  We all have a creative side; it doesn t matter if it s oil painting, sand castles, or mud pies! This will also kick up the endorphins.    Witness Beauty  (AKA stop and smell the roses) Take a look outside, even in mid-winter. Notice colors, textures. Watch the squirrels and birds.     Service to others  Be of service to others.  There is always someone else in need.  By helping others we can  get out of ourselves  and remember the really important things.  This also provides opportunities for practicing all the other parts of the program.    Humor  Laugh and be silly!  Adjust your TV habits for less news and crime-drama and more comedy.    Control your stress  Try breathing deep, massage therapy, biofeedback, and meditation. Find time to relax each day.     My support system    Clinic Contact:  Phone number:    Contact 1:  Phone number:    Contact 2:  Phone number:    Anglican/:  Phone number:    Therapist:  Phone number:    Local crisis center:    Phone number:    Other community support:  Phone number:

## 2018-06-04 NOTE — MR AVS SNAPSHOT
"              After Visit Summary   6/4/2018    Glen Cho    MRN: [id number]           Patient Information     Date Of Birth          1965        Visit Information        Provider Department      6/4/2018 3:00 PM Mike Deal MD [facility]        Today's Diagnoses     Type 2 diabetes mellitus with diabetic nephropathy, with long-term current use of insulin (H)    -  1      Care Instructions    Lab work downstairs today.  Directions:  As you walk through the first floor, you'll see (on the right) first the pharmacy, then some bathrooms, then the \"Lab and Imaging\" area. Give them your name at the window there and wait for them to call you.     If diabetes blood test (A1c) is at goal, we can hold off on any changes and recheck in 6 months.    Mike Deal MD  Internal Medicine and Pediatrics             Follow-ups after your visit        Follow-up notes from your care team     Return in about 6 months (around 12/4/2018) for Diabetes Followup.      Who to contact     If you have questions or need follow up information about today's clinic visit or your schedule please contact [facility] directly at [phone].  Normal or non-critical lab and imaging results will be communicated to you by MyChart, letter or phone within 4 business days after the clinic has received the results. If you do not hear from us within 7 days, please contact the clinic through appsFreedomhart or phone. If you have a critical or abnormal lab result, we will notify you by phone as soon as possible.  Submit refill requests through Net[employer]m or call your pharmacy and they will forward the refill request to us. Please allow 3 business days for your refill to be completed.          Additional Information About Your Visit        MyChart Information     [employer]m gives you secure access to your electronic health record. If you see a primary care provider, you can also send messages to your care team and make appointments. If you " "have questions, please call your primary care clinic.  If you do not have a primary care provider, please call [phone] and they will assist you.        Care EveryWhere ID     This is your Care EveryWhere ID. This could be used by other organizations to access your [facility] medical records  [id number]        Your Vitals Were     Pulse Temperature Height Pulse Oximetry BMI (Body Mass Index)       74 97.9  F (36.6  C) (Oral) 5' 6\" (1.676 m) 96% 45.35 kg/m2        Blood Pressure from Last 3 Encounters:   06/04/18 126/80   02/20/18 134/86   05/23/17 136/78    Weight from Last 3 Encounters:   06/04/18 281 lb (127.5 kg)   02/20/18 288 lb (130.6 kg)   05/23/17 279 lb (126.6 kg)              We Performed the Following     Hemoglobin A1c          Today's Medication Changes          These changes are accurate as of 6/4/18  3:34 PM.  If you have any questions, ask your nurse or doctor.               These medicines have changed or have updated prescriptions.        Dose/Directions    insulin degludec 200 UNIT/ML pen   Commonly known as:  TRESIBA   This may have changed:  how much to take   Used for:  Type 2 diabetes mellitus with diabetic nephropathy, with long-term current use of insulin (H)   Changed by:  Mike Deal MD        Dose:  150 Units   Inject 150 Units Subcutaneous daily   Quantity:  27 mL   Refills:  5            Where to get your medicines      These medications were sent to [place] [id number] [place] [facility] [address]  [address]  [address]  [facility] [address]     Phone:  [phone]     insulin degludec 200 UNIT/ML pen                Primary Care Provider Office Phone # Fax #    Mike Deal [phone] [address] [place] [address]        Equal Access to Services     IRON RODRIGUEZ AH: Hadii dillan Aviles, waaxda luqadaha, qaybta kaalmada sheeba, lexi arechiga. So [facility] [phone].    ATENCIÓN: Si habla " español, tiene a jesus disposición servicios gratuitos de asistencia lingüística. Tricia santos [phone].    We comply with applicable federal civil rights laws and [place] laws. We do not discriminate on the basis of race, color, national origin, age, disability, sex, sexual orientation, or gender identity.            Thank you!     Thank you for choosing [facility]  for your care. Our goal is always to provide you with excellent care. Hearing back from our patients is one way we can continue to improve our services. Please take a few minutes to complete the written survey that you may receive in the mail after your visit with us. Thank you!             Your Updated Medication List - Protect others around you: Learn how to safely use, store and throw away your medicines at www.disposemymeds.org.          This list is accurate as of 6/4/18  3:34 PM.  Always use your most recent med list.                   Brand Name Dispense Instructions for use Diagnosis    aspirin 325 MG tablet      Take 325 mg by mouth daily.        blood glucose monitoring lancets     1 Box    Use to test blood sugars 2-3 times daily or as directed.    Type 2 diabetes mellitus with diabetic nephropathy (H)       blood glucose monitoring meter device kit     1 kit    Use to test blood sugar 3 times daily or as directed.    Type 2 diabetes, HbA1c goal < 7% (H)       blood glucose monitoring test strip    FREESTYLE INSULINX    360 each    Use to test blood sugar 4 times daily or as directed.    Type 2 diabetes mellitus with diabetic nephropathy (H)       insulin degludec 200 UNIT/ML pen    TRESIBA    27 mL    Inject 150 Units Subcutaneous daily    Type 2 diabetes mellitus with diabetic nephropathy, with long-term current use of insulin (H)       * insulin pen needle 32G X 4 MM    BD KENTRELL U/F    200 each    USE 2 PEN NEEDLES DAILY OR AS DIRECTED.    Type 2 diabetes mellitus with diabetic nephropathy, with long-term current use of insulin  (H)       * insulin pen needle 32G X 4 MM    BD KENTRELL U/F    120 each    Use 4 daily or as directed.    Type 2 diabetes mellitus with diabetic nephropathy, with long-term current use of insulin (H)       losartan-hydrochlorothiazide 50-12.5 MG per tablet    HYZAAR    90 tablet    Take 1 tablet by mouth At Bedtime    CKD (chronic kidney disease) stage 1, GFR 90 ml/min or greater       metFORMIN 500 MG 24 hr tablet    GLUCOPHAGE-XR    360 tablet    Take 4 tablets (2,000 mg) by mouth daily (with dinner)    Type 2 diabetes mellitus with diabetic nephropathy, with long-term current use of insulin (H)       NovoLOG FLEXPEN 100 UNIT/ML injection   Generic drug:  insulin aspart     9 mL    INJECT 30 UNITS SUBQ 3X/DAY WITH MEALS. MAY INCREASE AS INSTRUCTED IF 2 HR POST-MEAL SUGARS >150    Type 2 diabetes mellitus with diabetic nephropathy, with long-term current use of insulin (H)       order for DME     1 Device    Equipment being ordered: Please dispense 1 set of crutches    Tear of medial cartilage or meniscus of knee, current       order for DME     1 each    Equipment being ordered: glucometer  60 lancets and test strips (5 refills) for twice daily testing.    Type 2 diabetes, HbA1c goal < 7% (H)       * order for DME     1 each    Equipment being ordered: glucose meter Please dispense what insurance covers    Type 2 diabetes, HbA1c goal < 7% (H)       * order for DME     180 each    Equipment being ordered: test strips  Pleas test three times daily, or as directed.  Please dispense test strips for patient's current meter    Type 2 diabetes mellitus with diabetic nephropathy (H)       * order for DME     300 each    Equipment being ordered: Eli Contour Next test strips Tests 5 times a day    Type 2 diabetes, HbA1c goal < 7% (H)       * order for DME     400 each    Equipment being ordered: lancets Uses 4 a day.  Please dispense what insurance covers.    Type 2 diabetes, HbA1c goal < 7% (H)       * order for DME     1  Device    Equipment being ordered: meter Uses Verio Flex test strips    Type 2 diabetes mellitus with diabetic nephropathy, with long-term current use of insulin (H)       * order for DME     100 Device    Equipment being ordered: Verio flex test strips Patient tests 1 times daily.    Type 2 diabetes mellitus with diabetic nephropathy, with long-term current use of insulin (H)       simvastatin 20 MG tablet    ZOCOR    90 tablet    TAKE 1 TABLET (20 MG) BY MOUTH AT BEDTIME    CKD (chronic kidney disease) stage 1, GFR 90 ml/min or greater       * Notice:  This list has 8 medication(s) that are the same as other medications prescribed for you. Read the directions carefully, and ask your doctor or other care provider to review them with you.

## 2018-06-14 DIAGNOSIS — Z79.4 TYPE 2 DIABETES MELLITUS WITH DIABETIC NEPHROPATHY, WITH LONG-TERM CURRENT USE OF INSULIN (H): ICD-10-CM

## 2018-06-14 DIAGNOSIS — E11.21 TYPE 2 DIABETES MELLITUS WITH DIABETIC NEPHROPATHY, WITH LONG-TERM CURRENT USE OF INSULIN (H): ICD-10-CM

## 2018-06-14 NOTE — TELEPHONE ENCOUNTER
Not due for a refill.     insulin degludec (TRESIBA) 200 UNIT/ML pen was filled on 6/4/2018, qty 27 ml with 5 refills.

## 2018-06-19 RX ORDER — INSULIN DEGLUDEC 200 U/ML
INJECTION, SOLUTION SUBCUTANEOUS
Refills: 1 | OUTPATIENT
Start: 2018-06-19

## 2018-06-19 NOTE — TELEPHONE ENCOUNTER
insulin degludec (TRESIBA) 200 UNIT/ML pen  Rx was sent 06/04/2018 for 27ml and 5 refills.   Pharmacy notified via E-prescribe refusal.  Janet Rutledge RN, BSN

## 2018-06-26 ENCOUNTER — OFFICE VISIT (OUTPATIENT)
Dept: URGENT CARE | Facility: URGENT CARE | Age: 53
End: 2018-06-26
Payer: COMMERCIAL

## 2018-06-26 VITALS
OXYGEN SATURATION: 97 % | TEMPERATURE: 98.2 F | DIASTOLIC BLOOD PRESSURE: 72 MMHG | BODY MASS INDEX: 45.84 KG/M2 | HEART RATE: 69 BPM | WEIGHT: 284 LBS | SYSTOLIC BLOOD PRESSURE: 120 MMHG

## 2018-06-26 DIAGNOSIS — S46.911A: Primary | ICD-10-CM

## 2018-06-26 DIAGNOSIS — M25.521 PAIN IN JOINT INVOLVING UPPER ARM, RIGHT: ICD-10-CM

## 2018-06-26 PROCEDURE — 99213 OFFICE O/P EST LOW 20 MIN: CPT | Performed by: PHYSICIAN ASSISTANT

## 2018-06-26 NOTE — PROGRESS NOTES
SUBJECTIVE:   Glen Cho is a 53 year old male presenting with a chief complaint of right arm pain. 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 Reports the pain felt like an electric shock, and that immediately after he noticed an obvious deformity of his biceps displaced up towards his shoulder. Pain is 2/10 at rest, 4/0 with right arm flexion, and 8/10 with right arm pronation/supination. He reports the swelling has since decreased but has pain with pronation and supination of his right arm. Has not taken anything to help with the pain. Never had anything like this happen before.     He is an established patient of [facility].    Review of Systems  Skin: Negative for rash or new ecchymosis  Ears/Nose/Throat: Negative for ENT complaints  Respiratory: Negative for shortness of breath or cough  Cardiovascular: Negative for chest pain  Gastrointestinal: Negative for nausea or vomiting  Musculoskeletal: Positive for pain to his right arm, previous deformity to his right biceps, and pain with pronation and supination of right arm  Neurologic: Negative for numbness or tingling of right arm  Psychiatric: Negative for mood or behavior changes    Past Medical History:   Diagnosis Date     Diabetes (H)      High cholesterol      HTN (hypertension)      Obesity      Sleep apnea     DOES NOT HAVE A CPAP     Family History   Problem Relation Age of Onset     Cancer Father      skin     Myocardial Infarction Father 57     Current Outpatient Prescriptions   Medication Sig Dispense Refill     aspirin 325 MG tablet Take 325 mg by mouth daily.       blood glucose monitoring (FREESTYLE INSULINX SYSTEM) meter device kit Use to test blood sugar 3 times daily or as directed. 1 kit 0     blood glucose monitoring (FREESTYLE INSULINX) test strip Use to test blood sugar 4 times daily or as directed. 360 each 3     blood glucose  monitoring (FREESTYLE) lancets Use to test blood sugars 2-3 times daily or as directed. 1 Box prn     insulin degludec (TRESIBA) 200 UNIT/ML pen Inject 150 Units Subcutaneous daily 27 mL 5     insulin pen needle (BD KENTRELL U/F) 32G X 4 MM Use 4 daily or as directed. 120 each 11     insulin pen needle (BD KENTRELL U/F) 32G X 4 MM USE 2 PEN NEEDLES DAILY OR AS DIRECTED. 200 each 0     losartan-hydrochlorothiazide (HYZAAR) 50-12.5 MG per tablet Take 1 tablet by mouth At Bedtime 90 tablet 3     metFORMIN (GLUCOPHAGE-XR) 500 MG 24 hr tablet Take 4 tablets (2,000 mg) by mouth daily (with dinner) 360 tablet 3     NOVOLOG FLEXPEN 100 UNIT/ML soln INJECT 30 UNITS SUBQ 3X/DAY WITH MEALS. MAY INCREASE AS INSTRUCTED IF 2 HR POST-MEAL SUGARS >150 9 mL 1     order for DME Equipment being ordered: Verio flex test strips  Patient tests 1 times daily. 100 Device 3     order for DME Equipment being ordered: meter  Uses Verio Flex test strips 1 Device 0     order for DME Equipment being ordered: lancets  Uses 4 a day.    Please dispense what insurance covers. 400 each 3     order for DME Equipment being ordered: Eli Contour Next test strips  Tests 5 times a day 300 each 3     order for DME Equipment being ordered: test strips    Pleas test three times daily, or as directed.    Please dispense test strips for patient's current meter 180 each 3     order for DME Equipment being ordered: glucose meter  Please dispense what insurance covers 1 each 0     ORDER FOR DME Equipment being ordered: glucometer    60 lancets and test strips (5 refills) for twice daily testing. 1 each 0     ORDER FOR DME Equipment being ordered: Please dispense 1 set of crutches 1 Device 0     simvastatin (ZOCOR) 20 MG tablet TAKE 1 TABLET (20 MG) BY MOUTH AT BEDTIME 90 tablet 3     Social History   Substance Use Topics     Smoking status: Former Smoker     Types: Cigarettes     Quit date: 5/10/2000     Smokeless tobacco: Never Used     Alcohol use No      Comment:  rarely       OBJECTIVE  Physical Exam  /72  Pulse 69  Temp 98.2  F (36.8  C) (Tympanic)  Wt 284 lb (128.8 kg)  SpO2 97%  BMI 45.84 kg/m2  Constitutional: healthy, alert and no distress  Head: Normocephalic. No masses, lesions, tenderness or abnormalities  Cardiovascular: RRR, no murmurs  Respiratory: CTAB, breathing comfortably  Gastrointestinal: Abdomen soft, non-tender.  Musculoskeletal: Patient able to range his arm but expresses moderate pain when asked to pronate and supinate. Normal right shoulder and right wrist exam. Sensation and pulses intact.   Skin: No obvious signs of bruising   Neurologic: Sensation intact in right arm, hand, and fingers    ASSESSMENT:  53 year old male presenting with sudden onset of right arm pain and deformity of right biceps. Physical exam and history most consistent with muscle strain at this time.     PLAN:  1) Ice, elevation, rest, and NSAIDs to help with pain and inflammation.   2) Follow up with primary care provider within 1 week for recheck and possible referral to orthopedics.   3) Worker's comp form completed for patient.  4) Arm sling provided and fitted to help encourage patient to rest his right arm as it heals.       Pt seen in conjunction with EMANUEL Suh student, who served as a scribe for this encounter. I have reviewed the ROS and PSFH documented by the student.  I performed the pertinent history, exam, and assessment and plan components as documented above.     Cruz Alvarez PA-C

## 2018-06-26 NOTE — LETTER
REPORT OF WORK COMP    [facility]  [address]  [address]  [place] [address] [phone]      PATIENT DATA    Employee Name: Glen Cho      : 1965     SS#: [id number]    Work related injury: Yes  Employer at time of injury: [employer]  Employer contact & phone: [phone]  Employed elsewhere? No  Workers' Compensation Carrier/Managed Care Plan: Yes    Today's date: 2018  Date of injury: 2018  Date of first visit: 2018    PROVIDER EVALUATION: Please fill in as needed.  Please give copy to employee for employer.    1. Diagnosis: Muscle Strain    2. Treatment: Rest, ice, sling.  3. Medication: NSAIDs  NOTE: When ordering a medication, [place] Rules require Work Comp or WC on prescriptions.    4. No work from N/A to N/A.  5. Return to work date: N/A   ** WITH RESTRICTIONS? Yes, with work restrictions: Restricted use of right arm  DURATION OF LIMITATIONS: 7 days pending follow up with primary care provider.    RESTRICTIONS: Unlimited unless listed. Restrictions apply to home and leisure also.  If work restrictions is not available, the employee is totally disabled.    Maximum Medical Improvement (Date): TBD  Any Permanent Partial Disability? Deferred to future exam/consult.    Provider comments: Patient with severe pain when pronating or supinating right arm. Felt a pop and noticed a bulge in right upper arm. Trial rest, ice, NSAIDs, sling. Follow up with primary provider.    Medical Examiner: Cruz Alvarez          License or registration: TARIQ    Next appointment: 1 week    CC: Employer, Managed Care Plan/Payor, Patient

## 2018-06-26 NOTE — MR AVS SNAPSHOT
After Visit Summary   6/26/2018    Glen Cho    MRN: [id number]           Patient Information     Date Of Birth          1965        Visit Information        Provider Department      6/26/2018 4:25 PM Cruz Alvarez PA-C [facility]        Today's Diagnoses     Muscle strain, upper arm, right, initial encounter    -  1    Pain in joint involving upper arm, right          Care Instructions      Rest, Ice, Elevation    800 mg Ibuprofen 3-5 days    Follow up if worsening symptoms, or failure to improve in 4-7 days.     Sling is to remind you to limit use of right arm, range of motion activities without resistance is recommended    Muscle Strain in the Extremities  A muscle strain is a stretching and tearing of muscle fibers. This causes pain, especially when you move that muscle. There may also be some swelling and bruising.  Home care    Keep the hurt area raised to reduce pain and swelling. This is especially important during the first 48 hours.    Apply an ice pack over the injured area for 15 to 20 minutes every 3 to 6 hours. You should do this for the first 24 to 48 hours. You can make an ice pack by filling a plastic bag that seals at the top with ice cubes and then wrapping it with a thin towel. Be careful not to injure your skin with the ice treatments. Ice should never be applied directly to skin. Continue the use of ice packs for relief of pain and swelling as needed. After 48 hours, apply heat (warm shower or warm bath) for 15 to 20 minutes several times a day, or alternate ice and heat.    You may use over-the-counter pain medicine to control pain, unless another medicine was prescribed. If you have chronic liver or kidney disease or ever had a stomach ulcer or GI bleeding, talk with your healthcare provider before using these medicines.    For leg strains: If crutches have been recommended, don t put full weight on the hurt leg until you can do so without  pain. You can return to sports when you are able to hop and run on the injured leg without pain.  Follow-up care  Follow up with your healthcare provider, or as advised.  When to seek medical advice  Call your healthcare provider right away if any of these occur:    The toes of the injured leg become swollen, cold, blue, numb, or tingly    Pain or swelling increases  Date Last Reviewed: 11/19/2015 2000-2017 [employer]. [address] [place] [address]. All rights reserved. This information is not intended as a substitute for professional medical care. Always follow your healthcare professional's instructions.                Follow-ups after your visit        Who to contact     If you have questions or need follow up information about today's clinic visit or your schedule please contact [facility] directly at [phone].  Normal or non-critical lab and imaging results will be communicated to you by Parents R Peoplehart, letter or phone within 4 business days after the clinic has received the results. If you do not hear from us within 7 days, please contact the clinic through Parents R Peoplehart or phone. If you have a critical or abnormal lab result, we will notify you by phone as soon as possible.  Submit refill requests through Toplist or call your pharmacy and they will forward the refill request to us. Please allow 3 business days for your refill to be completed.          Additional Information About Your Visit        Toplist Information     Toplist gives you secure access to your electronic health record. If you see a primary care provider, you can also send messages to your care team and make appointments. If you have questions, please call your primary care clinic.  If you do not have a primary care provider, please call [phone] and they will assist you.        Care EveryWhere ID     This is your Care EveryWhere ID. This could be used by other organizations to access your [place]  medical records  [id number]        Your Vitals Were     Pulse Temperature Pulse Oximetry BMI (Body Mass Index)          69 98.2  F (36.8  C) (Tympanic) 97% 45.84 kg/m2         Blood Pressure from Last 3 Encounters:   06/26/18 120/72   06/04/18 126/80   02/20/18 134/86    Weight from Last 3 Encounters:   06/26/18 284 lb (128.8 kg)   06/04/18 281 lb (127.5 kg)   02/20/18 288 lb (130.6 kg)              Today, you had the following     No orders found for display       Primary Care Provider Office Phone # Fax #    Mike Deal [phone] [address] [place] [address]        Equal Access to Services     [facility]: Mable camposo Sojavier, waaxda luqadaha, qaybta kaalmada adeegyada, lexi zhong . So [facility] [phone].    ATENCIÓN: Si habla español, tiene a jesus disposición servicios gratuitos de asistencia lingüística. Llame al [phone].    We comply with applicable federal civil rights laws and [place] laws. We do not discriminate on the basis of race, color, national origin, age, disability, sex, sexual orientation, or gender identity.            Thank you!     Thank you for choosing [facility]  for your care. Our goal is always to provide you with excellent care. Hearing back from our patients is one way we can continue to improve our services. Please take a few minutes to complete the written survey that you may receive in the mail after your visit with us. Thank you!             Your Updated Medication List - Protect others around you: Learn how to safely use, store and throw away your medicines at www.disposemymeds.org.          This list is accurate as of 6/26/18  5:26 PM.  Always use your most recent med list.                   Brand Name Dispense Instructions for use Diagnosis    aspirin 325 MG tablet      Take 325 mg by mouth daily.        blood glucose monitoring lancets     1 Box    Use to test blood sugars 2-3  times daily or as directed.    Type 2 diabetes mellitus with diabetic nephropathy (H)       blood glucose monitoring meter device kit     1 kit    Use to test blood sugar 3 times daily or as directed.    Type 2 diabetes, HbA1c goal < 7% (H)       blood glucose monitoring test strip    FREESTYLE INSULINX    360 each    Use to test blood sugar 4 times daily or as directed.    Type 2 diabetes mellitus with diabetic nephropathy (H)       insulin degludec 200 UNIT/ML pen    TRESIBA    27 mL    Inject 150 Units Subcutaneous daily    Type 2 diabetes mellitus with diabetic nephropathy, with long-term current use of insulin (H)       * insulin pen needle 32G X 4 MM    BD KENTRELL U/F    200 each    USE 2 PEN NEEDLES DAILY OR AS DIRECTED.    Type 2 diabetes mellitus with diabetic nephropathy, with long-term current use of insulin (H)       * insulin pen needle 32G X 4 MM    BD KENTRELL U/F    120 each    Use 4 daily or as directed.    Type 2 diabetes mellitus with diabetic nephropathy, with long-term current use of insulin (H)       losartan-hydrochlorothiazide 50-12.5 MG per tablet    HYZAAR    90 tablet    Take 1 tablet by mouth At Bedtime    CKD (chronic kidney disease) stage 1, GFR 90 ml/min or greater       metFORMIN 500 MG 24 hr tablet    GLUCOPHAGE-XR    360 tablet    Take 4 tablets (2,000 mg) by mouth daily (with dinner)    Type 2 diabetes mellitus with diabetic nephropathy, with long-term current use of insulin (H)       NovoLOG FLEXPEN 100 UNIT/ML injection   Generic drug:  insulin aspart     9 mL    INJECT 30 UNITS SUBQ 3X/DAY WITH MEALS. MAY INCREASE AS INSTRUCTED IF 2 HR POST-MEAL SUGARS >150    Type 2 diabetes mellitus with diabetic nephropathy, with long-term current use of insulin (H)       order for DME     1 Device    Equipment being ordered: Please dispense 1 set of crutches    Tear of medial cartilage or meniscus of knee, current       order for DME     1 each    Equipment being ordered: glucometer  60 lancets and  test strips (5 refills) for twice daily testing.    Type 2 diabetes, HbA1c goal < 7% (H)       * order for DME     1 each    Equipment being ordered: glucose meter Please dispense what insurance covers    Type 2 diabetes, HbA1c goal < 7% (H)       * order for DME     180 each    Equipment being ordered: test strips  Pleas test three times daily, or as directed.  Please dispense test strips for patient's current meter    Type 2 diabetes mellitus with diabetic nephropathy (H)       * order for DME     300 each    Equipment being ordered: Eli Contour Next test strips Tests 5 times a day    Type 2 diabetes, HbA1c goal < 7% (H)       * order for DME     400 each    Equipment being ordered: lancets Uses 4 a day.  Please dispense what insurance covers.    Type 2 diabetes, HbA1c goal < 7% (H)       * order for DME     1 Device    Equipment being ordered: meter Uses Verio Flex test strips    Type 2 diabetes mellitus with diabetic nephropathy, with long-term current use of insulin (H)       * order for DME     100 Device    Equipment being ordered: Verio flex test strips Patient tests 1 times daily.    Type 2 diabetes mellitus with diabetic nephropathy, with long-term current use of insulin (H)       simvastatin 20 MG tablet    ZOCOR    90 tablet    TAKE 1 TABLET (20 MG) BY MOUTH AT BEDTIME    CKD (chronic kidney disease) stage 1, GFR 90 ml/min or greater       * Notice:  This list has 8 medication(s) that are the same as other medications prescribed for you. Read the directions carefully, and ask your doctor or other care provider to review them with you.

## 2018-06-26 NOTE — PATIENT INSTRUCTIONS
Rest, Ice, Elevation    800 mg Ibuprofen 3-5 days    Follow up if worsening symptoms, or failure to improve in 4-7 days.     Sling is to remind you to limit use of right arm, range of motion activities without resistance is recommended    Muscle Strain in the Extremities  A muscle strain is a stretching and tearing of muscle fibers. This causes pain, especially when you move that muscle. There may also be some swelling and bruising.  Home care    Keep the hurt area raised to reduce pain and swelling. This is especially important during the first 48 hours.    Apply an ice pack over the injured area for 15 to 20 minutes every 3 to 6 hours. You should do this for the first 24 to 48 hours. You can make an ice pack by filling a plastic bag that seals at the top with ice cubes and then wrapping it with a thin towel. Be careful not to injure your skin with the ice treatments. Ice should never be applied directly to skin. Continue the use of ice packs for relief of pain and swelling as needed. After 48 hours, apply heat (warm shower or warm bath) for 15 to 20 minutes several times a day, or alternate ice and heat.    You may use over-the-counter pain medicine to control pain, unless another medicine was prescribed. If you have chronic liver or kidney disease or ever had a stomach ulcer or GI bleeding, talk with your healthcare provider before using these medicines.    For leg strains: If crutches have been recommended, don t put full weight on the hurt leg until you can do so without pain. You can return to sports when you are able to hop and run on the injured leg without pain.  Follow-up care  Follow up with your healthcare provider, or as advised.  When to seek medical advice  Call your healthcare provider right away if any of these occur:    The toes of the injured leg become swollen, cold, blue, numb, or tingly    Pain or swelling increases  Date Last Reviewed: 11/19/2015 2000-2017 [employer]. [address]  [place] [address]. All rights reserved. This information is not intended as a substitute for professional medical care. Always follow your healthcare professional's instructions.

## 2018-07-02 ENCOUNTER — OFFICE VISIT (OUTPATIENT)
Dept: PEDIATRICS | Facility: CLINIC | Age: 53
End: 2018-07-02
Payer: COMMERCIAL

## 2018-07-02 VITALS
SYSTOLIC BLOOD PRESSURE: 138 MMHG | WEIGHT: 283.38 LBS | TEMPERATURE: 97 F | HEIGHT: 66 IN | DIASTOLIC BLOOD PRESSURE: 78 MMHG | BODY MASS INDEX: 45.54 KG/M2 | HEART RATE: 73 BPM | OXYGEN SATURATION: 98 %

## 2018-07-02 DIAGNOSIS — S46.211A RUPTURE OF RIGHT BICEPS TENDON, INITIAL ENCOUNTER: Primary | ICD-10-CM

## 2018-07-02 PROCEDURE — 99213 OFFICE O/P EST LOW 20 MIN: CPT | Performed by: INTERNAL MEDICINE

## 2018-07-02 NOTE — MR AVS SNAPSHOT
After Visit Summary   7/2/2018    Glen Cho    MRN: [id number]           Patient Information     Date Of Birth          1965        Visit Information        Provider Department      7/2/2018 8:40 AM Mike Deal MD [facility]        Today's Diagnoses     Rupture of right biceps tendon, initial encounter    -  1      Care Instructions    physical therapy will call you, or you can set appointment downstairs today.    Orthopedics will call you.    Mike Deal MD  Internal Medicine and Pediatrics             Follow-ups after your visit        Additional Services     [facility] PT, HAND, AND CHIROPRACTIC REFERRAL       **This order will print in the [facility] Scheduling Office**    Physical Therapy, Hand Therapy and Chiropractic Care are available through:    *[facility]  *[facility]  *[facility]    Call one number to schedule at any of the above locations: [phone].    Your provider has referred you to: Physical Therapy at [facility] or [facility]    Indication/Reason for Referral: right biceps tear/ rupture.   Onset of Illness: 5 days.   Therapy Orders: Evaluate and Treat  Special Programs: None  Special Request: None    Cheri Alonzo      Additional Comments for the Therapist or Chiropractor:     Please be aware that coverage of these services is subject to the terms and limitations of your health insurance plan.  Call member services at your health plan with any benefit or coverage questions.      Please bring the following to your appointment:    *Your personal calendar for scheduling future appointments  *Comfortable clothing            ORTHO  REFERRAL       [facility] is referring you to the Orthopedic  Services at [facility].       The  Representative will assist you in the coordination of your Orthopedic and Musculoskeletal Care as prescribed by your physician.    The   Representative will call you within 1 business day to help schedule your appointment, or you may contact the  Representative at:    All areas ~ [phone]     Type of Referral : Surgical / Specialist       Timeframe requested: 1 - 2 days    Coverage of these services is subject to the terms and limitations of your health insurance plan.  Please call member services at your health plan with any benefit or coverage questions.      If X-rays, CT or MRI's have been performed, please contact the facility where they were done to arrange for , prior to your scheduled appointment.  Please bring this referral request to your appointment and present it to your specialist.                  Follow-up notes from your care team     Return in about 4 weeks (around 7/30/2018) for Medical Check Up.      Who to contact     If you have questions or need follow up information about today's clinic visit or your schedule please contact [facility] directly at [phone].  Normal or non-critical lab and imaging results will be communicated to you by MyChart, letter or phone within 4 business days after the clinic has received the results. If you do not hear from us within 7 days, please contact the clinic through Onovativehart or phone. If you have a critical or abnormal lab result, we will notify you by phone as soon as possible.  Submit refill requests through Imaging3 or call your pharmacy and they will forward the refill request to us. Please allow 3 business days for your refill to be completed.          Additional Information About Your Visit        Imaging3 Information     Imaging3 gives you secure access to your electronic health record. If you see a primary care provider, you can also send messages to your care team and make appointments. If you have questions, please call your primary care clinic.  If you do not have a primary care provider, please call [phone] and they will assist you.    "     Care EveryWhere ID     This is your Care EveryWhere ID. This could be used by other organizations to access your [place] medical records  [id number]        Your Vitals Were     Pulse Temperature Height Pulse Oximetry BMI (Body Mass Index)       73 97  F (36.1  C) (Tympanic) 5' 6\" (1.676 m) 98% 45.74 kg/m2        Blood Pressure from Last 3 Encounters:   07/02/18 138/78   06/26/18 120/72   06/04/18 126/80    Weight from Last 3 Encounters:   07/02/18 283 lb 6 oz (128.5 kg)   06/26/18 284 lb (128.8 kg)   06/04/18 281 lb (127.5 kg)              We Performed the Following     [facility] PT, HAND, AND CHIROPRACTIC REFERRAL     ORTHO  REFERRAL        Primary Care Provider Office Phone # Fax #    Mike Deal [phone] [address] [place] [address]        Equal Access to Services     [facility]: Hadii aad ku hadasho Soomaali, waaxda luqadaha, qaybta kaalmada adeegyada, waxay idiin hayaan yong zhong . So [facility] [phone].    ATENCIÓN: Si habla español, tiene a jesus disposición servicios gratuitos de asistencia lingüística. Llame al [phone].    We comply with applicable federal civil rights laws and [place] laws. We do not discriminate on the basis of race, color, national origin, age, disability, sex, sexual orientation, or gender identity.            Thank you!     Thank you for choosing [facility]  for your care. Our goal is always to provide you with excellent care. Hearing back from our patients is one way we can continue to improve our services. Please take a few minutes to complete the written survey that you may receive in the mail after your visit with us. Thank you!             Your Updated Medication List - Protect others around you: Learn how to safely use, store and throw away your medicines at www.disposemymeds.org.          This list is accurate as of 7/2/18  8:57 AM.  Always use your most recent med list.                   Brand " Name Dispense Instructions for use Diagnosis    aspirin 325 MG tablet      Take 325 mg by mouth daily.        blood glucose monitoring lancets     1 Box    Use to test blood sugars 2-3 times daily or as directed.    Type 2 diabetes mellitus with diabetic nephropathy (H)       blood glucose monitoring meter device kit     1 kit    Use to test blood sugar 3 times daily or as directed.    Type 2 diabetes, HbA1c goal < 7% (H)       blood glucose monitoring test strip    FREESTYLE INSULINX    360 each    Use to test blood sugar 4 times daily or as directed.    Type 2 diabetes mellitus with diabetic nephropathy (H)       insulin degludec 200 UNIT/ML pen    TRESIBA    27 mL    Inject 150 Units Subcutaneous daily    Type 2 diabetes mellitus with diabetic nephropathy, with long-term current use of insulin (H)       * insulin pen needle 32G X 4 MM    BD KENTRELL U/F    200 each    USE 2 PEN NEEDLES DAILY OR AS DIRECTED.    Type 2 diabetes mellitus with diabetic nephropathy, with long-term current use of insulin (H)       * insulin pen needle 32G X 4 MM    BD KENTRELL U/F    120 each    Use 4 daily or as directed.    Type 2 diabetes mellitus with diabetic nephropathy, with long-term current use of insulin (H)       losartan-hydrochlorothiazide 50-12.5 MG per tablet    HYZAAR    90 tablet    Take 1 tablet by mouth At Bedtime    CKD (chronic kidney disease) stage 1, GFR 90 ml/min or greater       metFORMIN 500 MG 24 hr tablet    GLUCOPHAGE-XR    360 tablet    Take 4 tablets (2,000 mg) by mouth daily (with dinner)    Type 2 diabetes mellitus with diabetic nephropathy, with long-term current use of insulin (H)       NovoLOG FLEXPEN 100 UNIT/ML injection   Generic drug:  insulin aspart     9 mL    INJECT 30 UNITS SUBQ 3X/DAY WITH MEALS. MAY INCREASE AS INSTRUCTED IF 2 HR POST-MEAL SUGARS >150    Type 2 diabetes mellitus with diabetic nephropathy, with long-term current use of insulin (H)       order for DME     1 Device    Equipment being  ordered: Please dispense 1 set of crutches    Tear of medial cartilage or meniscus of knee, current       order for DME     1 each    Equipment being ordered: glucometer  60 lancets and test strips (5 refills) for twice daily testing.    Type 2 diabetes, HbA1c goal < 7% (H)       * order for DME     1 each    Equipment being ordered: glucose meter Please dispense what insurance covers    Type 2 diabetes, HbA1c goal < 7% (H)       * order for DME     180 each    Equipment being ordered: test strips  Pleas test three times daily, or as directed.  Please dispense test strips for patient's current meter    Type 2 diabetes mellitus with diabetic nephropathy (H)       * order for DME     300 each    Equipment being ordered: Eli Contour Next test strips Tests 5 times a day    Type 2 diabetes, HbA1c goal < 7% (H)       * order for DME     400 each    Equipment being ordered: lancets Uses 4 a day.  Please dispense what insurance covers.    Type 2 diabetes, HbA1c goal < 7% (H)       * order for DME     1 Device    Equipment being ordered: meter Uses Verio Flex test strips    Type 2 diabetes mellitus with diabetic nephropathy, with long-term current use of insulin (H)       * order for DME     100 Device    Equipment being ordered: Verio flex test strips Patient tests 1 times daily.    Type 2 diabetes mellitus with diabetic nephropathy, with long-term current use of insulin (H)       simvastatin 20 MG tablet    ZOCOR    90 tablet    TAKE 1 TABLET (20 MG) BY MOUTH AT BEDTIME    CKD (chronic kidney disease) stage 1, GFR 90 ml/min or greater       * Notice:  This list has 8 medication(s) that are the same as other medications prescribed for you. Read the directions carefully, and ask your doctor or other care provider to review them with you.

## 2018-07-02 NOTE — PROGRESS NOTES
"  SUBJECTIVE:   Glen Cho is a 53 year old male who presents to clinic today for the following health issues:      ED/UC Followup:    Facility:  [facility]  Date of visit: 6/26/48  Reason for visit: right arm pain   Current Status: Pt states still having pain and now has bruising        Pulling paneling with right arm; felt a pop in his antecubital area; notes pain and swelling along right forearm.  Less muscle mass distally at biceps attachement; noted a muscle \"balled up in muscle biceps belly.\"      Still with pain, but overall the ability to move arm is weaker, but has intact range of motion.  Can lift easily.      Initially, supination was more difficult than it is now.  Certain movements make it worse Bruising is still present.      Problem list and histories reviewed & adjusted, as indicated.  Additional history: as documented    Patient Active Problem List   Diagnosis     Anxiety     Major depressive disorder, single episode in full remission (H)     Rosacea     Hypertension goal BP (blood pressure) < 140/90     Heart murmur     Tear of lateral cartilage or meniscus of knee, current     Tubular adenoma of colon     CKD (chronic kidney disease) stage 1, GFR 90 ml/min or greater     Morbid obesity (H)     Hyperlipidemia LDL goal <100     Type 2 diabetes mellitus with diabetic nephropathy, with long-term current use of insulin (H)     Past Surgical History:   Procedure Laterality Date     ANKLE SURGERY  2/2004     R ORIF     ARTHROSCOPY KNEE  3/26/2014    Procedure: ARTHROSCOPY KNEE;  ARTHROSCOPY KNEE- RIGHT   SURGEON REQUESTS CHOICE ANETHESIA ;  Surgeon: Sabino Simpson MD;  Location: [facility] OR     HERNIORRHAPHY UMBILICAL         Social History   Substance Use Topics     Smoking status: Former Smoker     Types: Cigarettes     Quit date: 5/10/2000     Smokeless tobacco: Never Used     Alcohol use No      Comment: rarely     Family History   Problem Relation Age of Onset     Cancer Father      " skin     Myocardial Infarction Father 57         Current Outpatient Prescriptions   Medication Sig Dispense Refill     aspirin 325 MG tablet Take 325 mg by mouth daily.       blood glucose monitoring (FREESTYLE INSULINX SYSTEM) meter device kit Use to test blood sugar 3 times daily or as directed. 1 kit 0     blood glucose monitoring (FREESTYLE INSULINX) test strip Use to test blood sugar 4 times daily or as directed. 360 each 3     blood glucose monitoring (FREESTYLE) lancets Use to test blood sugars 2-3 times daily or as directed. 1 Box prn     insulin degludec (TRESIBA) 200 UNIT/ML pen Inject 150 Units Subcutaneous daily 27 mL 5     insulin pen needle (BD KENTRELL U/F) 32G X 4 MM Use 4 daily or as directed. 120 each 11     insulin pen needle (BD KENTRELL U/F) 32G X 4 MM USE 2 PEN NEEDLES DAILY OR AS DIRECTED. 200 each 0     losartan-hydrochlorothiazide (HYZAAR) 50-12.5 MG per tablet Take 1 tablet by mouth At Bedtime 90 tablet 3     metFORMIN (GLUCOPHAGE-XR) 500 MG 24 hr tablet Take 4 tablets (2,000 mg) by mouth daily (with dinner) 360 tablet 3     NOVOLOG FLEXPEN 100 UNIT/ML soln INJECT 30 UNITS SUBQ 3X/DAY WITH MEALS. MAY INCREASE AS INSTRUCTED IF 2 HR POST-MEAL SUGARS >150 9 mL 1     order for DME Equipment being ordered: Verio flex test strips  Patient tests 1 times daily. 100 Device 3     order for DME Equipment being ordered: meter  Uses Verio Flex test strips 1 Device 0     order for DME Equipment being ordered: test strips    Pleas test three times daily, or as directed.    Please dispense test strips for patient's current meter 180 each 3     order for DME Equipment being ordered: glucose meter  Please dispense what insurance covers 1 each 0     ORDER FOR DME Equipment being ordered: glucometer    60 lancets and test strips (5 refills) for twice daily testing. 1 each 0     ORDER FOR DME Equipment being ordered: Please dispense 1 set of crutches 1 Device 0     simvastatin (ZOCOR) 20 MG tablet TAKE 1 TABLET (20 MG)  "BY MOUTH AT BEDTIME 90 tablet 3     order for DME Equipment being ordered: lancets  Uses 4 a day.    Please dispense what insurance covers. 400 each 3     order for DME Equipment being ordered: Eli Contour Next test strips  Tests 5 times a day 300 each 3     Allergies   Allergen Reactions     Amlodipine Besylate      Water retention     Cefdinir      Joint pains     Lisinopril      Water retention     Amoxicillin Rash     White spots     BP Readings from Last 3 Encounters:   07/02/18 138/78   06/26/18 120/72   06/04/18 126/80    Wt Readings from Last 3 Encounters:   07/02/18 283 lb 6 oz (128.5 kg)   06/26/18 284 lb (128.8 kg)   06/04/18 281 lb (127.5 kg)                  Labs reviewed in EPIC    Reviewed and updated as needed this visit by clinical staff       Reviewed and updated as needed this visit by Provider         ROS:  CONSTITUTIONAL: NEGATIVE for fever, chills, change in weight  ENT/MOUTH: NEGATIVE for ear, mouth and throat problems  RESP: NEGATIVE for significant cough or SOB  CV: NEGATIVE for chest pain, palpitations or peripheral edema    OBJECTIVE:                                                    /78 (BP Location: Right arm, Patient Position: Chair, Cuff Size: Adult Large)  Pulse 73  Temp 97  F (36.1  C) (Tympanic)  Ht 5' 6\" (1.676 m)  Wt 283 lb 6 oz (128.5 kg)  SpO2 98%  BMI 45.74 kg/m2  Body mass index is 45.74 kg/(m^2).   GENERAL: healthy, alert, well nourished, well hydrated, no distress  HENT: ear canals- normal; TMs- normal; Nose- normal; Mouth- no ulcers, no lesions  NECK: no tenderness, no adenopathy, no asymmetry, no masses, no stiffness; thyroid- normal to palpation  RESP: lungs clear to auscultation - no rales, no rhonchi, no wheezes  CV: regular rates and rhythm, normal S1 S2, no S3 or S4 and no murmur, no click or rub -  ABDOMEN: soft, no tenderness, no  hepatosplenomegaly, no masses, normal bowel sounds  MS: pain along proximal forearm with ecchymosis.  Biceps mass " displaced proximally (Mild).  Weaker arm flexion on right compared to left.     Diagnostic test results:  Diagnostic Test Results:  none      ASSESSMENT/PLAN:                                                    1. Rupture of right biceps tendon, initial encounter    Begin with physical therapy, and follow up with orthopedics to discuss any surgical options, given distal tendon rupture.  Has good range of motion, but persistent weakness.     May use ice three times daily and rest and ibuprofen for inflammation .    Patient Instructions   physical therapy will call you, or you can set appointment downstairs today.    Orthopedics will call you.    Mike Deal MD  Internal Medicine and Pediatrics        - [facility] PT, HAND, AND CHIROPRACTIC REFERRAL  - ORTHO  REFERRAL      See Patient Instructions    Mike Dela MD  [facility]

## 2018-07-02 NOTE — PATIENT INSTRUCTIONS
physical therapy will call you, or you can set appointment downstairs today.    Orthopedics will call you.    Mike Deal MD  Internal Medicine and Pediatrics

## 2018-07-03 DIAGNOSIS — Z79.4 TYPE 2 DIABETES MELLITUS WITH DIABETIC NEPHROPATHY, WITH LONG-TERM CURRENT USE OF INSULIN (H): ICD-10-CM

## 2018-07-03 DIAGNOSIS — E11.21 TYPE 2 DIABETES MELLITUS WITH DIABETIC NEPHROPATHY, WITH LONG-TERM CURRENT USE OF INSULIN (H): ICD-10-CM

## 2018-07-03 NOTE — TELEPHONE ENCOUNTER
"Requested Prescriptions   Pending Prescriptions Disp Refills     NOVOLOG FLEXPEN 100 UNIT/ML soln [Pharmacy Med Name: NOVOLOG 100 UNITS/ML FLEXPEN]    Last Written Prescription Date:  5/29/2018  Last Fill Quantity: 9 ml,  # refills: 1   Last office visit: 7/2/2018 with prescribing provider:  Mike Deal     Future Office Visit:      1     Sig: INJECT 30 UNITS SUBQ 3X/DAY WITH MEALS. MAY INCREASE AS INSTRUCTED IF 2 HR POST-MEAL SUGARS >150    Short Acting Insulin Protocol Failed    7/3/2018 11:26 AM       Failed - Serum creatinine on file in past 12 months    Recent Labs   Lab Test  05/23/17   1116   CR  0.80            Passed - Blood pressure less than 140/90 in past 6 months    BP Readings from Last 3 Encounters:   07/02/18 138/78   06/26/18 120/72   06/04/18 126/80                Passed - LDL on file in past 12 months    Recent Labs   Lab Test  02/20/18   0832   LDL  Cannot estimate LDL when triglyceride exceeds 400 mg/dL  92            Passed - Microalbumin on file in past 12 months    Recent Labs   Lab Test  02/20/18   0832   MICROL  6   UMALCR  15.88            Passed - HgbA1C in past 3 or 6 months    If HgbA1C is 8 or greater, it needs to be on file within the past 3 months.  If less than 8, must be on file within the past 6 months.     Recent Labs   Lab Test  06/04/18   1532   A1C  6.4*            Passed - Patient is age 18 or older       Passed - Recent (6 mo) or future (30 days) visit within the authorizing provider's specialty    Patient had office visit in the last 6 months or has a visit in the next 30 days with authorizing provider or within the authorizing provider's specialty.  See \"Patient Info\" tab in inbasket, or \"Choose Columns\" in Meds & Orders section of the refill encounter.              "

## 2018-07-05 ENCOUNTER — THERAPY VISIT (OUTPATIENT)
Dept: PHYSICAL THERAPY | Facility: CLINIC | Age: 53
End: 2018-07-05
Attending: INTERNAL MEDICINE
Payer: COMMERCIAL

## 2018-07-05 DIAGNOSIS — M25.521 RIGHT ELBOW PAIN: Primary | ICD-10-CM

## 2018-07-05 PROCEDURE — 97161 PT EVAL LOW COMPLEX 20 MIN: CPT | Mod: GP | Performed by: PHYSICAL THERAPIST

## 2018-07-05 PROCEDURE — 97110 THERAPEUTIC EXERCISES: CPT | Mod: GP | Performed by: PHYSICAL THERAPIST

## 2018-07-05 NOTE — PROGRESS NOTES
"[facility] Initial Evaluation  Subjective:  Patient is a 53 year old male presenting with rehab right shoulder hpi. The history is provided by the patient. No  was used.   Glen Cho is a 53 year old male with a right shoulder condition.  Condition occurred with:  Other (hanging paneling).  Condition occurred: at work.  This is a new condition  On 6/26/2018, the patient was hanging paneling and felt sudden pain in his R cubital space and upper forearm. Reports some bruising through his forearm that has since resolved. Feels his motion is normal. Reports a visible \"ball\" in his upper arm after the injury, is now unable to see muscle definition when flexing his bicep. Reports his pain has decreased the past few days with rest, hasn't attempted lifting anything over 10 pounds. .    Patient reports pain:  Other (R cubital fossa).  Radiates to:  Lower arm.  Pain is described as sharp and is intermittent and reported as 1/10 and 7/10.  Associated symptoms:  Edema, other and loss of strength (bruising). Pain is the same all the time.  Symptoms are exacerbated by lifting and carrying and relieved by rest, NSAID's and ice.  Since onset symptoms are gradually improving.        General health as reported by patient is fair.  Pertinent medical history includes:  High blood pressure, overweight, depression, diabetes, sleep disorder/apnea and smoking.  Medical allergies: yes (amoxicillin).  Other surgeries include:  Orthopedic surgery and other (broken tib/fib).  Current medications:  High blood pressure medication.  Current occupation is .  Patient is currently not working due to present treatment problem.  Primary job tasks include:  Lifting, prolonged standing, repetitive tasks, operating a machine and other (push/pull).    Barriers include:  None as reported by the patient.    Red flags:  None as reported by the patient.                        Objective:  System                 "   Shoulder Evaluation:  ROM:  AROM:  normal (mild pain with flexion)                                  Strength:    Flexion: Left:5/5   Pain:    Right: 5-/5      Pain:  +  Extension:  Left: 5/5    Pain:    Right: 5/5    Pain:  Abduction:  Left: 5/5  Pain:    Right: 5-/5      Pain:+    Internal Rotation:  Left:5/5     Pain:    Right: 5/5     Pain:  External Rotation:   Left:5/5     Pain:   Right:5/5     Pain:        Elbow Flexion:  Left:5/5     Pain:    Right:5-/5      Pain:+  Elbow Extension:  Left:5/5     Pain:    Right:5/5     Pain:    Special Tests:  Special tests assessed shoulder: Speeds +R.      Palpation:        Right shoulder tenderness not present at:Bicipital Groove       ROM:  AROM:  Arom wnl elbow/wrist: R supination mildly limited, unable to maintain elbow extension with full supination.                          Strength:        Flexion Wrist:  Left: 5/5 Pain:    Right: 5/5 Pain:  Extension Wrist: Left: 5/5 Pain:  Right: 5/5 Pain:  Supination Elbow/Wrist: Left: 5/5 Pain:    Right: 5-/5 Pain:  Pronation Elbow/Wrist: Left: 5/5 Pain:        Right: 4/5 Pain:            Palpation:      Right wrist/elbow tenderness present at:Biceps                                General     ROS    Assessment/Plan:    Patient is a 53 year old male with right side elbow complaints.    Patient has the following significant findings with corresponding treatment plan.                Diagnosis 1:  R elbow pain   Pain -  hot/cold therapy, manual therapy, education and home program  Decreased ROM/flexibility - manual therapy and therapeutic exercise  Decreased strength - therapeutic exercise and therapeutic activities  Inflammation - cold therapy  Edema - cold therapy  Impaired muscle performance - neuro re-education  Decreased function - therapeutic activities    Therapy Evaluation Codes:   1) History comprised of:   Personal factors that impact the plan of care:      None.    Comorbidity factors that impact the plan of care are:       Diabetes, Depression, High blood pressure, Osteoarthritis, Overweight, Sleep disorder/apnea and Smoking.     Medications impacting care: High blood pressure.  2) Examination of Body Systems comprised of:   Body structures and functions that impact the plan of care:      Elbow.   Activity limitations that impact the plan of care are:      Cooking, Lifting and Working.  3) Clinical presentation characteristics are:   Stable/Uncomplicated.  4) Decision-Making    Low complexity using standardized patient assessment instrument and/or measureable assessment of functional outcome.  Cumulative Therapy Evaluation is: Low complexity.    Previous and current functional limitations:  (See Goal Flow Sheet for this information)    Short term and Long term goals: (See Goal Flow Sheet for this information)     Communication ability:  Patient appears to be able to clearly communicate and understand verbal and written communication and follow directions correctly.  Treatment Explanation - The following has been discussed with the patient:   RX ordered/plan of care  Anticipated outcomes  Possible risks and side effects  This patient would benefit from PT intervention to resume normal activities.   Rehab potential is good.    Frequency:  1 X week, once daily  Duration:  for 8 weeks  Discharge Plan:  Achieve all LTG.  Independent in home treatment program.  Reach maximal therapeutic benefit.    Please refer to the daily flowsheet for treatment today, total treatment time and time spent performing 1:1 timed codes.

## 2018-07-05 NOTE — LETTER
"[facility]  [address]  [address]  [place] [address]  [phone]    2018    Re: Glen Cho   :   1965  MRN:  [id number]   REFERRING PHYSICIAN:   Mike Deal    [facility]    Date of Initial Evaluation: 2018  Visits:  Rxs Used: 1  Reason for Referral:  Right elbow pain  EVALUATION SUMMARY  [facility] Initial Evaluation  Subjective:  Patient is a 53 year old male presenting with rehab right shoulder hpi. The history is provided by the patient. No  was used.   Glen Cho is a 53 year old male with a right shoulder condition.  Condition occurred with:  Other (hanging paneling).  Condition occurred: at work.  This is a new condition  On 2018, the patient was hanging paneling and felt sudden pain in his R cubital space and upper forearm. Reports some bruising through his forearm that has since resolved. Feels his motion is normal. Reports a visible \"ball\" in his upper arm after the injury, is now unable to see muscle definition when flexing his bicep. Reports his pain has decreased the past few days with rest, hasn't attempted lifting anything over 10 pounds. .    Patient reports pain:  Other (R cubital fossa).  Radiates to:  Lower arm.  Pain is described as sharp and is intermittent and reported as 1/10 and 7/10.  Associated symptoms:  Edema, other and loss of strength (bruising). Pain is the same all the time.  Symptoms are exacerbated by lifting and carrying and relieved by rest, NSAID's and ice.  Since onset symptoms are gradually improving.        General health as reported by patient is fair.  Pertinent medical history includes:  High blood pressure, overweight, depression, diabetes, sleep disorder/apnea and smoking.  Medical allergies: yes (amoxicillin).  Other surgeries include:  Orthopedic surgery and other (broken tib/fib).  Current medications:  High blood pressure " medication.  Current occupation is .  Patient is currently not working due to present treatment problem.  Primary job tasks include:  Lifting, prolonged standing, repetitive tasks, operating a machine and other (push/pull).  Barriers include:  None as reported by the patient.  Red flags:  None as reported by the patient.  Objective:                  Re: Glen Cho   :   1965      Shoulder Evaluation:  ROM:  AROM:  normal (mild pain with flexion)  Strength:    Flexion: Left:5/5   Pain:    Right: 5-/5      Pain:  +  Extension:  Left: 5/5    Pain:    Right: 5/5    Pain:  Abduction:  Left: 5/5  Pain:    Right: 5-/5      Pain:+  Internal Rotation:  Left:5/5     Pain:    Right: 5/5     Pain:  External Rotation:   Left:5/5     Pain:   Right:5/5     Pain:    Elbow Flexion:  Left:5/5     Pain:    Right:5-/5      Pain:+  Elbow Extension:  Left:5/5     Pain:    Right:5/5     Pain:  Special Tests:  Special tests assessed shoulder: Speeds +R.  Palpation:    Right shoulder tenderness not present at:Bicipital Groove  ROM:  AROM:  Arom wnl elbow/wrist: R supination mildly limited, unable to maintain elbow extension with full supination.  Strength:    Flexion Wrist:  Left: 5/5 Pain:    Right: 5/5 Pain:  Extension Wrist: Left: 5/5 Pain:  Right: 5/5 Pain:  Supination Elbow/Wrist: Left: 5/5 Pain:    Right: 5-/5 Pain:  Pronation Elbow/Wrist: Left: 5/5 Pain:        Right: 4/5 Pain:  Palpation:    Right wrist/elbow tenderness present at:Biceps  Assessment/Plan:    Patient is a 53 year old male with right side elbow complaints.    Patient has the following significant findings with corresponding treatment plan.                Diagnosis 1:  R elbow pain   Pain -  hot/cold therapy, manual therapy, education and home program  Decreased ROM/flexibility - manual therapy and therapeutic exercise  Decreased strength - therapeutic exercise and therapeutic activities  Inflammation - cold therapy  Edema - cold therapy  Impaired muscle  performance - neuro re-education  Decreased function - therapeutic activities                                Re: Glen Cho   :   1965      Therapy Evaluation Codes:   1) History comprised of:   Personal factors that impact the plan of care:      None.    Comorbidity factors that impact the plan of care are:      Diabetes, Depression, High blood pressure, Osteoarthritis, Overweight, Sleep disorder/apnea and Smoking.     Medications impacting care: High blood pressure.  2) Examination of Body Systems comprised of:   Body structures and functions that impact the plan of care:      Elbow.   Activity limitations that impact the plan of care are:      Cooking, Lifting and Working.  3) Clinical presentation characteristics are:   Stable/Uncomplicated.  4) Decision-Making    Low complexity using standardized patient assessment instrument and/or measureable assessment of functional outcome.  Cumulative Therapy Evaluation is: Low complexity.  Previous and current functional limitations:  (See Goal Flow Sheet for this information)    Short term and Long term goals: (See Goal Flow Sheet for this information)   Communication ability:  Patient appears to be able to clearly communicate and understand verbal and written communication and follow directions correctly.  Treatment Explanation - The following has been discussed with the patient:   RX ordered/plan of care  Anticipated outcomes  Possible risks and side effects  This patient would benefit from PT intervention to resume normal activities.   Rehab potential is good.  Frequency:  1 X week, once daily  Duration:  for 8 weeks  Discharge Plan:  Achieve all LTG.  Independent in home treatment program.  Reach maximal therapeutic benefit.  Please refer to the daily flowsheet for treatment today, total treatment time and time spent performing 1:1 timed codes.   Thank you for your referral.    INQUIRIES  Therapist:  Eloisa Nielson, PT  [facility]  [address]  [address]  [place] [address]  Phone: [phone]  Fax: [phone]

## 2018-07-09 RX ORDER — INSULIN ASPART 100 [IU]/ML
INJECTION, SOLUTION INTRAVENOUS; SUBCUTANEOUS
Qty: 9 ML | Refills: 1 | Status: SHIPPED | OUTPATIENT
Start: 2018-07-09 | End: 2018-08-23

## 2018-07-09 NOTE — TELEPHONE ENCOUNTER
Routing refill request to provider for review/approval because:  Labs not current:  LILY MOISE RN, BSN, PHN  [place] [facility] RN

## 2018-08-07 ENCOUNTER — TRANSFERRED RECORDS (OUTPATIENT)
Dept: HEALTH INFORMATION MANAGEMENT | Facility: CLINIC | Age: 53
End: 2018-08-07

## 2018-08-20 ENCOUNTER — TRANSFERRED RECORDS (OUTPATIENT)
Dept: HEALTH INFORMATION MANAGEMENT | Facility: CLINIC | Age: 53
End: 2018-08-20

## 2018-08-20 DIAGNOSIS — E11.21 TYPE 2 DIABETES MELLITUS WITH DIABETIC NEPHROPATHY, WITH LONG-TERM CURRENT USE OF INSULIN (H): ICD-10-CM

## 2018-08-20 DIAGNOSIS — Z79.4 TYPE 2 DIABETES MELLITUS WITH DIABETIC NEPHROPATHY, WITH LONG-TERM CURRENT USE OF INSULIN (H): ICD-10-CM

## 2018-08-20 NOTE — TELEPHONE ENCOUNTER
"Requested Prescriptions   Pending Prescriptions Disp Refills     NOVOLOG FLEXPEN 100 UNIT/ML soln [Pharmacy Med Name: NOVOLOG 100 UNITS/ML FLEXPEN]    Last Written Prescription Date:  7/9/2018  Last Fill Quantity: 9 ml,  # refills: 1   Last office visit: 12/28/2015 with prescribing provider:  Mike Deal       Future Office Visit:      2     Sig: INJECT 20 UNITS SUB-Q 3 TIMES DAILY WITH MEALS,MAY INCREASE IF 2 HOUR POST MEAL SUGARS REMAIN <150    Short Acting Insulin Protocol Failed    8/20/2018  2:12 AM       Failed - Serum creatinine on file in past 12 months    Recent Labs   Lab Test  05/23/17   1116   CR  0.80            Passed - Blood pressure less than 140/90 in past 6 months    BP Readings from Last 3 Encounters:   07/02/18 138/78   06/26/18 120/72   06/04/18 126/80                Passed - LDL on file in past 12 months    Recent Labs   Lab Test  02/20/18   0832   LDL  Cannot estimate LDL when triglyceride exceeds 400 mg/dL  92            Passed - Microalbumin on file in past 12 months    Recent Labs   Lab Test  02/20/18   0832   MICROL  6   UMALCR  15.88            Passed - HgbA1C in past 3 or 6 months    If HgbA1C is 8 or greater, it needs to be on file within the past 3 months.  If less than 8, must be on file within the past 6 months.     Recent Labs   Lab Test  06/04/18   1532   A1C  6.4*            Passed - Patient is age 18 or older       Passed - Recent (6 mo) or future (30 days) visit within the authorizing provider's specialty    Patient had office visit in the last 6 months or has a visit in the next 30 days with authorizing provider or within the authorizing provider's specialty.  See \"Patient Info\" tab in inbasket, or \"Choose Columns\" in Meds & Orders section of the refill encounter.              "

## 2018-08-22 RX ORDER — INSULIN ASPART 100 [IU]/ML
INJECTION, SOLUTION INTRAVENOUS; SUBCUTANEOUS
Qty: 18 ML | Refills: 11 | Status: SHIPPED | OUTPATIENT
Start: 2018-08-22 | End: 2018-08-23

## 2018-08-22 NOTE — TELEPHONE ENCOUNTER
Routing refill request to provider for review/approval because:  Labs not current:  Juan GarciaRN BSN  [facility]  [phone]

## 2018-08-23 ENCOUNTER — TELEPHONE (OUTPATIENT)
Dept: PEDIATRICS | Facility: CLINIC | Age: 53
End: 2018-08-23

## 2018-08-23 DIAGNOSIS — E11.21 TYPE 2 DIABETES MELLITUS WITH DIABETIC NEPHROPATHY, WITH LONG-TERM CURRENT USE OF INSULIN (H): ICD-10-CM

## 2018-08-23 DIAGNOSIS — Z79.4 TYPE 2 DIABETES MELLITUS WITH DIABETIC NEPHROPATHY, WITH LONG-TERM CURRENT USE OF INSULIN (H): ICD-10-CM

## 2018-08-23 NOTE — TELEPHONE ENCOUNTER
Pharmacy called.    Please clarify Novolog Sig instructions.     Patient was under the impression he was still using 30 units TID and to increase use if post meal BS >150. Max daily dose would then be 90 a day. Patient stated he has never had to use more than that.    Ok to discontinue 20 units TID dose?     Please review and sign appropriate dosing.    Maddy MOISE RN, BSN, PHN  George Hopper RN

## 2018-11-27 DIAGNOSIS — Z79.4 TYPE 2 DIABETES MELLITUS WITH DIABETIC NEPHROPATHY, WITH LONG-TERM CURRENT USE OF INSULIN (H): ICD-10-CM

## 2018-11-27 DIAGNOSIS — E11.21 TYPE 2 DIABETES MELLITUS WITH DIABETIC NEPHROPATHY, WITH LONG-TERM CURRENT USE OF INSULIN (H): ICD-10-CM

## 2018-11-27 NOTE — TELEPHONE ENCOUNTER
Not due for a refill.     insulin aspart (NOVOLOG FLEXPEN) 100 UNIT/ML injection was filled on 8/23/2018, qty 9 ml with 10 refills.

## 2018-11-28 RX ORDER — INSULIN ASPART 100 [IU]/ML
INJECTION, SOLUTION INTRAVENOUS; SUBCUTANEOUS
Refills: 1 | OUTPATIENT
Start: 2018-11-28

## 2019-01-18 DIAGNOSIS — Z79.4 TYPE 2 DIABETES MELLITUS WITH DIABETIC NEPHROPATHY, WITH LONG-TERM CURRENT USE OF INSULIN (H): ICD-10-CM

## 2019-01-18 DIAGNOSIS — E11.21 TYPE 2 DIABETES MELLITUS WITH DIABETIC NEPHROPATHY, WITH LONG-TERM CURRENT USE OF INSULIN (H): ICD-10-CM

## 2019-01-21 RX ORDER — INSULIN DEGLUDEC 200 U/ML
INJECTION, SOLUTION SUBCUTANEOUS
Qty: 27 ML | Refills: 0 | Status: SHIPPED | OUTPATIENT
Start: 2019-01-21 | End: 2019-02-05

## 2019-01-21 NOTE — TELEPHONE ENCOUNTER
Called and left message to return call and schedule appointment due. Patient is due for DM follow up.    Velvet Hong MA 11:02 AM 1/21/2019

## 2019-01-21 NOTE — TELEPHONE ENCOUNTER
Medication is being filled for 1 time refill only due to:  Patient needs to be seen because Due for diabetic office visit and labs.     Wendie Avila RN

## 2019-01-21 NOTE — TELEPHONE ENCOUNTER
"Requested Prescriptions   Pending Prescriptions Disp Refills     TRESIBA FLEXTOUCH 200 UNIT/ML pen [Pharmacy Med Name: TRESIBA FLEXTOUCH 200 UNITS/ML]    Last Written Prescription Date:  6/4/2018  Last Fill Quantity: 27 ml,  # refills: 5   Last office visit: 7/2/2018 with prescribing provider:  Mike Deal     Future Office Visit:      5     Sig: INJECT 150 UNITS SUBCUTANEOUS DAILY    Long Acting Insulin Protocol Failed - 1/18/2019  1:30 AM       Failed - Blood pressure less than 140/90 in past 6 months    BP Readings from Last 3 Encounters:   07/02/18 138/78   06/26/18 120/72   06/04/18 126/80                Failed - Serum creatinine on file in past 12 months    Recent Labs   Lab Test 05/23/17  1116   CR 0.80            Failed - HgbA1C in past 3 or 6 months    If HgbA1C is 8 or greater, it needs to be on file within the past 3 months.  If less than 8, must be on file within the past 6 months.     Recent Labs   Lab Test 06/04/18  1532   A1C 6.4*            Failed - Recent (6 mo) or future (30 days) visit within the authorizing provider's specialty    Patient had office visit in the last 6 months or has a visit in the next 30 days with authorizing provider or within the authorizing provider's specialty.  See \"Patient Info\" tab in inbasket, or \"Choose Columns\" in Meds & Orders section of the refill encounter.           Passed - LDL on file in past 12 months    Recent Labs   Lab Test 02/20/18  0832   LDL Cannot estimate LDL when triglyceride exceeds 400 mg/dL  92            Passed - Microalbumin on file in past 12 months    Recent Labs   Lab Test 02/20/18  0832   MICROL 6   UMALCR 15.88            Passed - Medication is active on med list       Passed - Patient is age 18 or older          "

## 2019-01-24 PROBLEM — M25.521 RIGHT ELBOW PAIN: Status: RESOLVED | Noted: 2018-07-05 | Resolved: 2019-01-24

## 2019-01-28 NOTE — TELEPHONE ENCOUNTER
2nd attempt, sent [employer] message.  Patient had apt today but cancelled.  Daphne Steiner CMA ([facility])

## 2019-02-05 ENCOUNTER — OFFICE VISIT (OUTPATIENT)
Dept: PEDIATRICS | Facility: CLINIC | Age: 54
End: 2019-02-05
Payer: COMMERCIAL

## 2019-02-05 VITALS
HEART RATE: 66 BPM | SYSTOLIC BLOOD PRESSURE: 110 MMHG | OXYGEN SATURATION: 97 % | BODY MASS INDEX: 42.16 KG/M2 | TEMPERATURE: 98.4 F | DIASTOLIC BLOOD PRESSURE: 80 MMHG | HEIGHT: 66 IN | WEIGHT: 262.3 LBS

## 2019-02-05 DIAGNOSIS — E11.21 TYPE 2 DIABETES MELLITUS WITH DIABETIC NEPHROPATHY, WITH LONG-TERM CURRENT USE OF INSULIN (H): Primary | ICD-10-CM

## 2019-02-05 DIAGNOSIS — R01.1 HEART MURMUR: ICD-10-CM

## 2019-02-05 DIAGNOSIS — G47.33 OSA (OBSTRUCTIVE SLEEP APNEA): ICD-10-CM

## 2019-02-05 DIAGNOSIS — N18.1 CKD (CHRONIC KIDNEY DISEASE) STAGE 1, GFR 90 ML/MIN OR GREATER: ICD-10-CM

## 2019-02-05 DIAGNOSIS — I10 HYPERTENSION GOAL BP (BLOOD PRESSURE) < 140/90: ICD-10-CM

## 2019-02-05 DIAGNOSIS — Z79.4 TYPE 2 DIABETES MELLITUS WITH DIABETIC NEPHROPATHY, WITH LONG-TERM CURRENT USE OF INSULIN (H): Primary | ICD-10-CM

## 2019-02-05 LAB
ALBUMIN SERPL-MCNC: 4.2 G/DL (ref 3.4–5)
ALP SERPL-CCNC: 47 U/L (ref 40–150)
ALT SERPL W P-5'-P-CCNC: 28 U/L (ref 0–70)
ANION GAP SERPL CALCULATED.3IONS-SCNC: 4 MMOL/L (ref 3–14)
AST SERPL W P-5'-P-CCNC: 15 U/L (ref 0–45)
BILIRUB SERPL-MCNC: 0.4 MG/DL (ref 0.2–1.3)
BUN SERPL-MCNC: 16 MG/DL (ref 7–30)
CALCIUM SERPL-MCNC: 9.4 MG/DL (ref 8.5–10.1)
CHLORIDE SERPL-SCNC: 105 MMOL/L (ref 94–109)
CHOLEST SERPL-MCNC: 183 MG/DL
CO2 SERPL-SCNC: 29 MMOL/L (ref 20–32)
CREAT SERPL-MCNC: 0.74 MG/DL (ref 0.66–1.25)
CREAT UR-MCNC: 37 MG/DL
GFR SERPL CREATININE-BSD FRML MDRD: >90 ML/MIN/{1.73_M2}
GLUCOSE SERPL-MCNC: 120 MG/DL (ref 70–99)
HBA1C MFR BLD: 5.9 % (ref 0–5.6)
HDLC SERPL-MCNC: 30 MG/DL
LDLC SERPL CALC-MCNC: 100 MG/DL
MICROALBUMIN UR-MCNC: <5 MG/L
MICROALBUMIN/CREAT UR: NORMAL MG/G CR (ref 0–17)
NONHDLC SERPL-MCNC: 153 MG/DL
POTASSIUM SERPL-SCNC: 4.1 MMOL/L (ref 3.4–5.3)
PROT SERPL-MCNC: 7.6 G/DL (ref 6.8–8.8)
SODIUM SERPL-SCNC: 138 MMOL/L (ref 133–144)
TRIGL SERPL-MCNC: 265 MG/DL
TSH SERPL DL<=0.005 MIU/L-ACNC: 1.12 MU/L (ref 0.4–4)

## 2019-02-05 PROCEDURE — 82043 UR ALBUMIN QUANTITATIVE: CPT | Performed by: INTERNAL MEDICINE

## 2019-02-05 PROCEDURE — 36415 COLL VENOUS BLD VENIPUNCTURE: CPT | Performed by: INTERNAL MEDICINE

## 2019-02-05 PROCEDURE — 80053 COMPREHEN METABOLIC PANEL: CPT | Performed by: INTERNAL MEDICINE

## 2019-02-05 PROCEDURE — 83036 HEMOGLOBIN GLYCOSYLATED A1C: CPT | Performed by: INTERNAL MEDICINE

## 2019-02-05 PROCEDURE — 84443 ASSAY THYROID STIM HORMONE: CPT | Performed by: INTERNAL MEDICINE

## 2019-02-05 PROCEDURE — 80061 LIPID PANEL: CPT | Performed by: INTERNAL MEDICINE

## 2019-02-05 PROCEDURE — 99214 OFFICE O/P EST MOD 30 MIN: CPT | Performed by: INTERNAL MEDICINE

## 2019-02-05 RX ORDER — LOSARTAN POTASSIUM AND HYDROCHLOROTHIAZIDE 12.5; 5 MG/1; MG/1
1 TABLET ORAL EVERY OTHER DAY
Qty: 90 TABLET | Refills: 3 | COMMUNITY
Start: 2019-02-05 | End: 2020-04-29 | Stop reason: ALTCHOICE

## 2019-02-05 RX ORDER — SIMVASTATIN 20 MG
20 TABLET ORAL EVERY OTHER DAY
Qty: 45 TABLET | Refills: 3 | COMMUNITY
Start: 2019-02-05 | End: 2019-08-01

## 2019-02-05 RX ORDER — METFORMIN HCL 500 MG
1000 TABLET, EXTENDED RELEASE 24 HR ORAL
Qty: 360 TABLET | Refills: 3 | COMMUNITY
Start: 2019-02-05 | End: 2020-03-31

## 2019-02-05 ASSESSMENT — MIFFLIN-ST. JEOR: SCORE: 1967.53

## 2019-02-05 ASSESSMENT — ANXIETY QUESTIONNAIRES
3. WORRYING TOO MUCH ABOUT DIFFERENT THINGS: NOT AT ALL
2. NOT BEING ABLE TO STOP OR CONTROL WORRYING: NOT AT ALL
IF YOU CHECKED OFF ANY PROBLEMS ON THIS QUESTIONNAIRE, HOW DIFFICULT HAVE THESE PROBLEMS MADE IT FOR YOU TO DO YOUR WORK, TAKE CARE OF THINGS AT HOME, OR GET ALONG WITH OTHER PEOPLE: NOT DIFFICULT AT ALL
6. BECOMING EASILY ANNOYED OR IRRITABLE: NOT AT ALL
7. FEELING AFRAID AS IF SOMETHING AWFUL MIGHT HAPPEN: NOT AT ALL
5. BEING SO RESTLESS THAT IT IS HARD TO SIT STILL: NOT AT ALL
GAD7 TOTAL SCORE: 0
1. FEELING NERVOUS, ANXIOUS, OR ON EDGE: NOT AT ALL

## 2019-02-05 ASSESSMENT — PATIENT HEALTH QUESTIONNAIRE - PHQ9
SUM OF ALL RESPONSES TO PHQ QUESTIONS 1-9: 0
5. POOR APPETITE OR OVEREATING: NOT AT ALL

## 2019-02-05 NOTE — PATIENT INSTRUCTIONS
"Lab work downstairs today.  Directions:  As you walk through the first floor, you'll see (on the right) first the pharmacy, then some bathrooms, then the \"Lab and Imaging\" area. Give them your name at the window there and wait for them to call you.    If things look good with labs, follow up in 6 months.      "

## 2019-02-05 NOTE — PROGRESS NOTES
SUBJECTIVE:   Glen Cho is a 53 year old male who presents to clinic today for the following health issues:      Diabetes Follow-up    Patient is checking blood sugars: twice daily.    Blood sugar testing frequency justification: On insulin, frequency appropriate   Results are as follows:         am - 100-115         suppertime -  after work     Diabetic concerns: None     Symptoms of hypoglycemia (low blood sugar): none     Paresthesias (numbness or burning in feet) or sores: No     Date of last diabetic eye exam: summer of 2018    BP Readings from Last 2 Encounters:   07/02/18 138/78   06/26/18 120/72     Hemoglobin A1C (%)   Date Value   06/04/2018 6.4 (H)   02/20/2018 8.5 (H)     LDL Cholesterol Calculated (mg/dL)   Date Value   02/20/2018     Cannot estimate LDL when triglyceride exceeds 400 mg/dL   07/11/2016 71     LDL Cholesterol Direct (mg/dL)   Date Value   02/20/2018 92       Diabetes Management Resources    Amount of exercise or physical activity: None    Problems taking medications regularly: No    Medication side effects: none    Diet: regular (no restrictions)    AM sugars near 100 at end of day.  Eats dinner then late, and in -115 in AM. Taking 15 of novolog twice daily, (Breakfast and dinner).    Tresiba: 80 per day.      Still on simvastatin every other day, due to concerns about it affecting his sugars.     Takes metformin 1000 daily instead of 2000 mg.    Losartan every other day.    Problem list and histories reviewed & adjusted, as indicated.  Additional history: as documented    Patient Active Problem List   Diagnosis     Anxiety     Major depressive disorder, single episode in full remission (H)     Rosacea     Hypertension goal BP (blood pressure) < 140/90     Heart murmur     Tear of lateral cartilage or meniscus of knee, current     Tubular adenoma of colon     CKD (chronic kidney disease) stage 1, GFR 90 ml/min or greater     Morbid obesity (H)     Hyperlipidemia LDL  goal <100     Type 2 diabetes mellitus with diabetic nephropathy, with long-term current use of insulin (H)     CORINA (obstructive sleep apnea)     Past Surgical History:   Procedure Laterality Date     ANKLE SURGERY  2004     R ORIF     ARTHROSCOPY KNEE  3/26/2014    Procedure: ARTHROSCOPY KNEE;  ARTHROSCOPY KNEE- RIGHT   SURGEON REQUESTS CHOICE ANETHESIA ;  Surgeon: Sabino Simpson MD;  Location: [facility] OR     HERNIORRHAPHY UMBILICAL         Social History     Tobacco Use     Smoking status: Former Smoker     Types: Cigarettes     Last attempt to quit: 5/10/2000     Years since quittin.7     Smokeless tobacco: Never Used   Substance Use Topics     Alcohol use: No     Alcohol/week: 0.0 oz     Comment: rarely     Family History   Problem Relation Age of Onset     Cancer Father         skin     Myocardial Infarction Father 57         Current Outpatient Medications   Medication Sig Dispense Refill     aspirin 325 MG tablet Take 325 mg by mouth daily.       blood glucose monitoring (FREESTYLE INSULINX SYSTEM) meter device kit Use to test blood sugar 3 times daily or as directed. 1 kit 0     blood glucose monitoring (FREESTYLE INSULINX) test strip Use to test blood sugar 4 times daily or as directed. 360 each 3     blood glucose monitoring (FREESTYLE) lancets Use to test blood sugars 2-3 times daily or as directed. 1 Box prn     insulin aspart (NOVOLOG FLEXPEN) 100 UNIT/ML injection INJECT 20 UNITS SUB-Q 3 TIMES DAILY WITH MEALS,MAY INCREASE IF 2 HOUR POST MEAL SUGARS REMAIN <150.  Max daily dose 75 units per day 18 mL 11     insulin aspart (NOVOLOG FLEXPEN) 100 UNIT/ML pen Inject 18 Units Subcutaneous 2 times daily (with meals) 9 mL 10     insulin degludec (TRESIBA FLEXTOUCH) 200 UNIT/ML pen Inject 80 Units Subcutaneous every morning 36 mL 0     insulin pen needle (BD KENTRELL U/F) 32G X 4 MM Use 4 daily or as directed. 120 each 11     insulin pen needle (BD KENTRELL U/F) 32G X 4 MM USE 2 PEN NEEDLES DAILY OR AS DIRECTED.  200 each 0     losartan-hydrochlorothiazide (HYZAAR) 50-12.5 MG tablet Take 1 tablet by mouth every other day 90 tablet 3     metFORMIN (GLUCOPHAGE-XR) 500 MG 24 hr tablet Take 2 tablets (1,000 mg) by mouth daily (with dinner) 360 tablet 3     order for DME Equipment being ordered: continuous positive airway pressure machine with mask and tubing. 1 each 0     order for DME Equipment being ordered: Portable continuous positive airway pressure with mask and tubing. 1 each 0     order for DME Equipment being ordered: Verio flex test strips  Patient tests 1 times daily. 100 Device 3     order for DME Equipment being ordered: lancets  Uses 4 a day.    Please dispense what insurance covers. 400 each 3     order for DME Equipment being ordered: Eli Contour Next test strips  Tests 5 times a day 300 each 3     simvastatin (ZOCOR) 20 MG tablet Take 1 tablet (20 mg) by mouth every other day TAKE 1 TABLET (20 MG) BY MOUTH AT BEDTIME 45 tablet 3     order for DME Equipment being ordered: glucose meter  Please dispense what insurance covers 1 each 0     ORDER FOR DME Equipment being ordered: glucometer    60 lancets and test strips (5 refills) for twice daily testing. 1 each 0     Allergies   Allergen Reactions     Amlodipine Besylate      Water retention     Cefdinir      Joint pains     Lisinopril      Water retention     Amoxicillin Rash     White spots     BP Readings from Last 3 Encounters:   02/05/19 110/80   07/02/18 138/78   06/26/18 120/72    Wt Readings from Last 3 Encounters:   02/05/19 119 kg (262 lb 4.8 oz)   07/02/18 128.5 kg (283 lb 6 oz)   06/26/18 128.8 kg (284 lb)                  Labs reviewed in EPIC    Reviewed and updated as needed this visit by clinical staff       Reviewed and updated as needed this visit by Provider         ROS:  CONSTITUTIONAL: NEGATIVE for fever, chills, change in weight  ENT/MOUTH: NEGATIVE for ear, mouth and throat problems  RESP: NEGATIVE for significant cough or SOB  CV: NEGATIVE  "for chest pain, palpitations or peripheral edema    OBJECTIVE:                                                    /80   Pulse 66   Temp 98.4  F (36.9  C) (Oral)   Ht 1.676 m (5' 6\")   Wt 119 kg (262 lb 4.8 oz)   SpO2 97%   BMI 42.34 kg/m    Body mass index is 42.34 kg/m .   GENERAL: healthy, alert, well nourished, well hydrated, no distress  HENT: ear canals- normal; TMs- normal; Nose- normal; Mouth- no ulcers, no lesions  NECK: no tenderness, no adenopathy, no asymmetry, no masses, no stiffness; thyroid- normal to palpation  RESP: lungs clear to auscultation - no rales, no rhonchi, no wheezes  CV: regular rates and rhythm, normal S1 S2, no S3 or S4 and no murmur, no click or rub -  ABDOMEN: soft, no tenderness, no  hepatosplenomegaly, no masses, normal bowel sounds    Diagnostic test results:  Diagnostic Test Results:  none      ASSESSMENT/PLAN:                                                    1. CKD (chronic kidney disease) stage 1, GFR 90 ml/min or greater  Secondary risk factor modification.   - losartan-hydrochlorothiazide (HYZAAR) 50-12.5 MG tablet; Take 1 tablet by mouth every other day  Dispense: 90 tablet; Refill: 3  - simvastatin (ZOCOR) 20 MG tablet; Take 1 tablet (20 mg) by mouth every other day TAKE 1 TABLET (20 MG) BY MOUTH AT BEDTIME  Dispense: 45 tablet; Refill: 3    2. Type 2 diabetes mellitus with diabetic nephropathy, with long-term current use of insulin (H)  Parameters well controlled.  Continue secondary risk factor modification for BP, cholesterol, anticoagulation, and smoking cessation.   - metFORMIN (GLUCOPHAGE-XR) 500 MG 24 hr tablet; Take 2 tablets (1,000 mg) by mouth daily (with dinner)  Dispense: 360 tablet; Refill: 3  - Hemoglobin A1c  - Lipid panel reflex to direct LDL Fasting  - Comprehensive metabolic panel  - Albumin Random Urine Quantitative with Creat Ratio  - TSH with free T4 reflex  - insulin degludec (TRESIBA FLEXTOUCH) 200 UNIT/ML pen; Inject 80 Units " Subcutaneous every morning  Dispense: 36 mL; Refill: 0  - insulin aspart (NOVOLOG FLEXPEN) 100 UNIT/ML pen; Inject 18 Units Subcutaneous 2 times daily (with meals)  Dispense: 9 mL; Refill: 10    Hypertension:  He has self tapered his meds to every other day, which, given his current blood pressure, seems reasonable.    Cholesterol:  On statin every other day; seems to be doing well.     3. CORINA (obstructive sleep apnea)  Reordered for both regular and portable supples.   - order for DME; Equipment being ordered: continuous positive airway pressure machine with mask and tubing.  Dispense: 1 each; Refill: 0  - order for DME; Equipment being ordered: Portable continuous positive airway pressure with mask and tubing.  Dispense: 1 each; Refill: 0      See Patient Instructions    Mike Deal MD  [facility]

## 2019-02-06 ASSESSMENT — ANXIETY QUESTIONNAIRES: GAD7 TOTAL SCORE: 0

## 2019-02-28 DIAGNOSIS — E11.21 TYPE 2 DIABETES MELLITUS WITH DIABETIC NEPHROPATHY, WITH LONG-TERM CURRENT USE OF INSULIN (H): ICD-10-CM

## 2019-02-28 DIAGNOSIS — Z79.4 TYPE 2 DIABETES MELLITUS WITH DIABETIC NEPHROPATHY, WITH LONG-TERM CURRENT USE OF INSULIN (H): ICD-10-CM

## 2019-02-28 NOTE — TELEPHONE ENCOUNTER
"Requested Prescriptions   Pending Prescriptions Disp Refills     TRESIBA FLEXTOUCH 200 UNIT/ML pen [Pharmacy Med Name: TRESIBA FLEXTOUCH 200 UNITS/ML]  Last Written Prescription Date:  02/05/2019  Last Fill Quantity: 36 mL,  # refills: 0   Last office visit: 2/5/2019 with prescribing provider:  Mike Deal MD    Future Office Visit:      0     Sig: INJECT 150 UNITS SUBCUTANEOUS DAILY    Long Acting Insulin Protocol Passed - 2/28/2019  1:28 AM       Passed - Blood pressure less than 140/90 in past 6 months    BP Readings from Last 3 Encounters:   02/05/19 110/80   07/02/18 138/78   06/26/18 120/72                Passed - LDL on file in past 12 months    Recent Labs   Lab Test 02/05/19  0906   *            Passed - Microalbumin on file in past 12 months    Recent Labs   Lab Test 02/05/19  0908   MICROL <5   UMALCR Unable to calculate due to low value            Passed - Serum creatinine on file in past 12 months    Recent Labs   Lab Test 02/05/19  0906   CR 0.74            Passed - HgbA1C in past 3 or 6 months    If HgbA1C is 8 or greater, it needs to be on file within the past 3 months.  If less than 8, must be on file within the past 6 months.     Recent Labs   Lab Test 02/05/19  0906   A1C 5.9*            Passed - Medication is active on med list       Passed - Patient is age 18 or older       Passed - Recent (6 mo) or future (30 days) visit within the authorizing provider's specialty    Patient had office visit in the last 6 months or has a visit in the next 30 days with authorizing provider or within the authorizing provider's specialty.  See \"Patient Info\" tab in inbasket, or \"Choose Columns\" in Meds & Orders section of the refill encounter.              "

## 2019-03-04 NOTE — TELEPHONE ENCOUNTER
Routing refill request to provider for review/approval because:  Contraindication  Dose of 80u exceeds daily max of 50u  Malathi CANALES RN

## 2019-04-11 DIAGNOSIS — N18.1 CKD (CHRONIC KIDNEY DISEASE) STAGE 1, GFR 90 ML/MIN OR GREATER: ICD-10-CM

## 2019-04-11 NOTE — TELEPHONE ENCOUNTER
"Requested Prescriptions   Pending Prescriptions Disp Refills     simvastatin (ZOCOR) 20 MG tablet [Pharmacy Med Name: SIMVASTATIN 20 MG TABLET]  Last Written Prescription Date:  02/05/2019  Last Fill Quantity: 45 tablet,  # refills: 3    Last office visit: 2/5/2019 with prescribing provider:  Mike Deal MD        Future Office Visit:     90 tablet 0     Sig: TAKE 1 TABLET (20 MG) BY MOUTH AT BEDTIME       Statins Protocol Passed - 4/11/2019  1:30 AM        Passed - LDL on file in past 12 months     Recent Labs   Lab Test 02/05/19  0906   *             Passed - No abnormal creatine kinase in past 12 months     No lab results found.             Passed - Recent (12 mo) or future (30 days) visit within the authorizing provider's specialty     Patient had office visit in the last 12 months or has a visit in the next 30 days with authorizing provider or within the authorizing provider's specialty.  See \"Patient Info\" tab in inbasket, or \"Choose Columns\" in Meds & Orders section of the refill encounter.              Passed - Medication is active on med list        Passed - Patient is age 18 or older          "

## 2019-04-12 RX ORDER — SIMVASTATIN 20 MG
20 TABLET ORAL DAILY
Qty: 90 TABLET | Refills: 3 | Status: SHIPPED | OUTPATIENT
Start: 2019-04-12 | End: 2020-04-30

## 2019-04-12 NOTE — TELEPHONE ENCOUNTER
Please review the dose patient should be taking.    LDL Cholesterol Calculated   Date Value Ref Range Status   02/05/2019 100 (H) <100 mg/dL Final     Comment:     Above desirable:  100-129 mg/dl  Borderline High:  130-159 mg/dL  High:             160-189 mg/dL  Very high:       >189 mg/dl       Shea Walker RN  Message handled by Nurse Triage.

## 2019-07-26 ENCOUNTER — NURSE TRIAGE (OUTPATIENT)
Dept: NURSING | Facility: CLINIC | Age: 54
End: 2019-07-26

## 2019-07-26 ENCOUNTER — HOSPITAL ENCOUNTER (EMERGENCY)
Facility: CLINIC | Age: 54
Discharge: HOME | End: 2019-07-26
Attending: EMERGENCY MEDICINE | Admitting: EMERGENCY MEDICINE
Payer: COMMERCIAL

## 2019-07-26 ENCOUNTER — TELEPHONE (OUTPATIENT)
Dept: PEDIATRICS | Facility: CLINIC | Age: 54
End: 2019-07-26

## 2019-07-26 VITALS
BODY MASS INDEX: 41.97 KG/M2 | WEIGHT: 260 LBS | OXYGEN SATURATION: 99 % | RESPIRATION RATE: 18 BRPM | SYSTOLIC BLOOD PRESSURE: 155 MMHG | DIASTOLIC BLOOD PRESSURE: 64 MMHG | HEART RATE: 63 BPM | TEMPERATURE: 97.4 F

## 2019-07-26 DIAGNOSIS — M54.50 RIGHT-SIDED LOW BACK PAIN WITHOUT SCIATICA, UNSPECIFIED CHRONICITY: ICD-10-CM

## 2019-07-26 PROCEDURE — 99284 EMERGENCY DEPT VISIT MOD MDM: CPT

## 2019-07-26 PROCEDURE — 96372 THER/PROPH/DIAG INJ SC/IM: CPT

## 2019-07-26 PROCEDURE — 25000132 ZZH RX MED GY IP 250 OP 250 PS 637: Performed by: EMERGENCY MEDICINE

## 2019-07-26 PROCEDURE — 25000128 H RX IP 250 OP 636: Performed by: EMERGENCY MEDICINE

## 2019-07-26 RX ORDER — ACETAMINOPHEN 325 MG/1
975 TABLET ORAL EVERY 8 HOURS PRN
Qty: 20 TABLET | Refills: 0 | Status: SHIPPED | OUTPATIENT
Start: 2019-07-26 | End: 2021-04-29

## 2019-07-26 RX ORDER — IBUPROFEN 600 MG/1
600 TABLET, FILM COATED ORAL EVERY 6 HOURS PRN
Qty: 20 TABLET | Refills: 0 | Status: SHIPPED | OUTPATIENT
Start: 2019-07-26 | End: 2019-08-25

## 2019-07-26 RX ORDER — KETOROLAC TROMETHAMINE 30 MG/ML
30 INJECTION, SOLUTION INTRAMUSCULAR; INTRAVENOUS ONCE
Status: COMPLETED | OUTPATIENT
Start: 2019-07-26 | End: 2019-07-26

## 2019-07-26 RX ORDER — DIAZEPAM 5 MG
5 TABLET ORAL EVERY 6 HOURS PRN
Qty: 20 TABLET | Refills: 0 | Status: SHIPPED | OUTPATIENT
Start: 2019-07-26 | End: 2021-04-29

## 2019-07-26 RX ORDER — DIAZEPAM 5 MG
5 TABLET ORAL ONCE
Status: COMPLETED | OUTPATIENT
Start: 2019-07-26 | End: 2019-07-26

## 2019-07-26 RX ORDER — LIDOCAINE 4 G/G
1 PATCH TOPICAL EVERY 24 HOURS
Qty: 5 PATCH | Refills: 0 | Status: SHIPPED | OUTPATIENT
Start: 2019-07-26 | End: 2019-08-01

## 2019-07-26 RX ADMIN — KETOROLAC TROMETHAMINE 30 MG: 30 INJECTION, SOLUTION INTRAMUSCULAR at 02:28

## 2019-07-26 RX ADMIN — DIAZEPAM 5 MG: 5 TABLET ORAL at 02:28

## 2019-07-26 SDOH — HEALTH STABILITY: MENTAL HEALTH: HOW OFTEN DO YOU HAVE A DRINK CONTAINING ALCOHOL?: NEVER

## 2019-07-26 ASSESSMENT — ENCOUNTER SYMPTOMS
DYSURIA: 0
SLEEP DISTURBANCE: 1
HEMATURIA: 0
NUMBNESS: 0
BACK PAIN: 1

## 2019-07-26 NOTE — TELEPHONE ENCOUNTER
Called and LVM for patient. Scheduled him for 8/1/19 at 7:55 AM with Dr. Deal, instructed patient to call clinic back if this appointment time does not work.   Fabiola Thorpe MA 11:33 AM 7/26/2019

## 2019-07-26 NOTE — TELEPHONE ENCOUNTER
Patient was handed paperwork at discharge and didn't get the printouts for Lidocaine patch or valium. It shows local print. I connected the patient with the ER staff.  Deanne Walton RN-[facility]     LEFT SHOULDER LEFT SHOULDER/done

## 2019-07-26 NOTE — TELEPHONE ENCOUNTER
Reason for call:  Other   Patient called regarding (reason for call): appointment  Additional comments: Patient was seen in the ER and needs an appointment for follow up plus still having pain    Phone number to reach patient:  Home number on file [phone] (home)    Best Time:  any    Can we leave a detailed message on this number?  YES

## 2019-07-26 NOTE — ED AVS SNAPSHOT
[facility] Department  [address]  [facility] [address]  Phone:  [phone]  Fax:  [phone]                                    Glen Cho   MRN: [id number]    Department:  [facility] Emergency Department   Date of Visit:  7/26/2019           After Visit Summary Signature Page    I have received my discharge instructions, and my questions have been answered. I have discussed any challenges I see with this plan with the nurse or doctor.    ..........................................................................................................................................  Patient/Patient Representative Signature      ..........................................................................................................................................  Patient Representative Print Name and Relationship to Patient    ..................................................               ................................................  Date                                   Time    ..........................................................................................................................................  Reviewed by Signature/Title    ...................................................              ..............................................  Date                                               Time          22EPIC Rev 08/18

## 2019-07-26 NOTE — ED PROVIDER NOTES
"  History     Chief Complaint:    Back Pain    HPI   Glen Cho is a 54 year old male with a history of hypertension, diabetes, arthritis, who presents with back pain. The patient reports that 4 days ago he was cutting down apple trees with a chainsaw and afterwards he started to experience a right low back \"twinge\" that has progressively been worsening pain to the point that he was unable to go to work yesterday and could not sleep this evening. The patient states that the pain will move intermittently but it is mainly between the right low back near his spine to his right hip. The patient endorses that the pain is worse with bearing weight onto hips for example walking and sitting and it is slightly worse with bending forward. She denies groin pain, urine incontinence, dysuria, hematuria, numbness, tingling, stool incontinence or bowel issues. He notes that he has had kidney stones x3 in the past and this does not feel similar. The patient has not been able to sleep the past 2 nights therefore prompting his presentation. The patient has been using analgesic patches to combat the pain.    Allergies:  Amlodipine Besylate  Cefdinir  Lisinopril  Amoxicillin; rash      Medications:    Aspirin  Novolog Flexpen  Tresiba Flextouch  Hyzaar  Glucophage-XR  Zocor  Freestyle Insulinx     Past Medical History:    Arthritis   Diabetes  High cholesterol  Hypertension  Obesity  Sleep apnea   Anxiety  Depression  Rosacea  Tear of lateral cartilage or meniscus of knee  Heart murmur   Tubular adenoma of colon  Hyperlipidemia     Past Surgical History:    Ankle surgery- R ORIF  Arthroscopy knee   Herniorrhaphy umbilical     Family History:    Father: skin cancer, MI     Social History:  The patient was accompanied to the ED by wife.  Smoking Status: Current Every Day Smoker  Smokeless Tobacco: Never Used  Alcohol Use: negative   Drug Use: Negative  PCP: Mike Deal   Marital Status:        Review of Systems "   Gastrointestinal:        No incontinence or bowel issues   Genitourinary: Negative for dysuria, enuresis and hematuria.        No groin pain   Musculoskeletal: Positive for back pain.        Hip pain   Neurological: Negative for numbness.        No tingling   Psychiatric/Behavioral: Positive for sleep disturbance.   All other systems reviewed and are negative.    Physical Exam     Patient Vitals for the past 24 hrs:   BP Temp Temp src Pulse Heart Rate Resp SpO2 Weight   07/26/19 0255 155/64 -- -- 63 -- 18 99 % --   07/26/19 0212 -- -- -- -- -- -- -- 117.9 kg (260 lb)   07/26/19 0201 156/88 97.4  F (36.3  C) Temporal -- 66 20 99 % --      Physical Exam  Vitals reviewed.  General: The patient appears uncomfortable  Head:  The scalp, face, and head appear normal  Eyes:  The pupils are equal and round    Conjunctivae and sclerae are normal  ENT:    Nose normal. Moist mucous membranes  Neck:  Normal range of motion  CV:  Regular rate and underlying rhythm     Normal S1 and S2    DP/PT pulses 2+ bilaterally  Resp:  Lungs are clear    Non-labored breathing    No rales    No wheezing   GI:  Abdomen is soft, there is no rigidity    No distension    No rebound tenderness     No abdominal tenderness    No guarding. No CVA tenderness  MS:  Back:    The cervical and thoracic spine is non-tender in the midline    The lumbar spine is non-tender in the midline    There is mild R. lumbar paraspinous muscular pain to palpation    Legs:    No reproducible hip tenderness    There is normal motor strength:     Iliopsoas, quadriceps, hamstrings, tibialis anterior, gastrocnemius, EHL    Sensation intact L2-S1 on bilateral lower extremities    Patellar reflexes are normal    There is normal capillary refill to the toes  Skin:  No rash or acute skin lesions noted.  No shingles noted.  Neuro: Speech is normal and fluent    Awake and alert    Gait stable    Emergency Department Course     Interventions:  Medications   ketorolac (TORADOL)  injection 30 mg (30 mg Intramuscular Given 7/26/19 0228)   diazepam (VALIUM) tablet 5 mg (5 mg Oral Given 7/26/19 0228)      Emergency Department Course:    0213 Nursing notes and vitals reviewed. I performed an exam of the patient as documented above.     0255 Patient reported some pain improvement.  I offered additional analgesia though he is declining.  Prior to discharge, I personally reviewed the results with the patient and all related questions were answered. The patient verbalized understanding and is amenable to plan.     Impression & Plan      Medical Decision Making:  Glen Cho is a 54 year old male who presents to the emergency department today for evaluation of back pain.  The patient did not sustain any trauma, therefore x-rays are not necessary due to the low likelihood of fracture or subluxation. The patient does not admit to fever, saddle/perineal anesthesia, bilateral foot numbness, or bowel or bladder dysfunction.  No red flags in history of cancer or IVDU. There is no clinical evidence of cauda equina syndrome, discitis, spinal/epidural space hematoma or epidural abscess. I discussed with patient kidney stones can occasionally present with pain in similar location though clinically I have a lower suspicion at this time given patient reports pain started after increased physical activity.  I suspect patient's symptoms are consistent with a musculoskeletal (myofascial) strain. The patient will be discharged with lidocaine patches, muscle relaxants and antiinflammatories to use as directed.  Counseled on ice or heat to the back and stretching exercises.  No heavy lifting, bending or twisting. Return if increasing pain, numbness, weakness, or bowel or bladder dysfunction.  The patient was advised to schedule follow-up with his/her primary doctor within 3 days to re-assess symptoms.      Diagnosis:    ICD-10-CM    1. Right-sided low back pain without sciatica, unspecified chronicity M54.5         Disposition:   The patient is discharged to home.     Discharge Medications:       START taking      Dose / Directions   acetaminophen 325 MG tablet  Commonly known as:  TYLENOL      Dose:  975 mg  Take 3 tablets (975 mg) by mouth every 8 hours as needed for mild pain  Quantity:  20 tablet  Refills:  0     diazepam 5 MG tablet  Commonly known as:  VALIUM      Dose:  5 mg  Take 1 tablet (5 mg) by mouth every 6 hours as needed for anxiety or sleep  Quantity:  20 tablet  Refills:  0     ibuprofen 600 MG tablet  Commonly known as:  ADVIL/MOTRIN      Dose:  600 mg  Take 1 tablet (600 mg) by mouth every 6 hours as needed for moderate pain  Quantity:  20 tablet  Refills:  0     Lidocaine 4 % Patch  Commonly known as:  LIDOCARE      Dose:  1 patch  Place 1 patch onto the skin every 24 hours To prevent lidocaine toxicity, patient should be patch free for 12 hrs daily.  Quantity:  5 patch  Refills:  0       Where to get your medicines      Some of these will need a paper prescription and others can be bought over the counter. Ask your nurse if you have questions.    Bring a paper prescription for each of these medications    acetaminophen 325 MG tablet    diazepam 5 MG tablet    ibuprofen 600 MG tablet    Lidocaine 4 % Patch       Scribe Disclosure:  I, Orla Severson, am serving as a scribe at 2:13 AM on 7/26/2019 to document services personally performed by Patricia Godinez DO based on my observations and the provider's statements to me.   [facility] EMERGENCY DEPARTMENT       Patricia Godinez DO  07/26/19 0453

## 2019-08-01 ENCOUNTER — OFFICE VISIT (OUTPATIENT)
Dept: PEDIATRICS | Facility: CLINIC | Age: 54
End: 2019-08-01
Payer: COMMERCIAL

## 2019-08-01 VITALS
TEMPERATURE: 97.9 F | HEART RATE: 68 BPM | OXYGEN SATURATION: 98 % | SYSTOLIC BLOOD PRESSURE: 139 MMHG | BODY MASS INDEX: 42.43 KG/M2 | WEIGHT: 262.9 LBS | DIASTOLIC BLOOD PRESSURE: 84 MMHG

## 2019-08-01 DIAGNOSIS — S39.012D STRAIN OF LUMBAR REGION, SUBSEQUENT ENCOUNTER: Primary | ICD-10-CM

## 2019-08-01 LAB
ALBUMIN SERPL-MCNC: 4.1 G/DL (ref 3.4–5)
ALBUMIN UR-MCNC: NEGATIVE MG/DL
ALP SERPL-CCNC: 59 U/L (ref 40–150)
ALT SERPL W P-5'-P-CCNC: 35 U/L (ref 0–70)
ANION GAP SERPL CALCULATED.3IONS-SCNC: 9 MMOL/L (ref 3–14)
APPEARANCE UR: CLEAR
AST SERPL W P-5'-P-CCNC: 15 U/L (ref 0–45)
BILIRUB SERPL-MCNC: 0.4 MG/DL (ref 0.2–1.3)
BILIRUB UR QL STRIP: NEGATIVE
BUN SERPL-MCNC: 17 MG/DL (ref 7–30)
CALCIUM SERPL-MCNC: 9.2 MG/DL (ref 8.5–10.1)
CHLORIDE SERPL-SCNC: 108 MMOL/L (ref 94–109)
CO2 SERPL-SCNC: 22 MMOL/L (ref 20–32)
COLOR UR AUTO: YELLOW
CREAT SERPL-MCNC: 0.72 MG/DL (ref 0.66–1.25)
GFR SERPL CREATININE-BSD FRML MDRD: >90 ML/MIN/{1.73_M2}
GLUCOSE SERPL-MCNC: 91 MG/DL (ref 70–99)
GLUCOSE UR STRIP-MCNC: NEGATIVE MG/DL
HBA1C MFR BLD: 6 % (ref 0–5.6)
HGB UR QL STRIP: NEGATIVE
KETONES UR STRIP-MCNC: NEGATIVE MG/DL
LEUKOCYTE ESTERASE UR QL STRIP: NEGATIVE
NITRATE UR QL: NEGATIVE
PH UR STRIP: 5.5 PH (ref 5–7)
POTASSIUM SERPL-SCNC: 4.1 MMOL/L (ref 3.4–5.3)
PROT SERPL-MCNC: 7.7 G/DL (ref 6.8–8.8)
SODIUM SERPL-SCNC: 139 MMOL/L (ref 133–144)
SOURCE: NORMAL
SP GR UR STRIP: <=1.005 (ref 1–1.03)
UROBILINOGEN UR STRIP-ACNC: 0.2 EU/DL (ref 0.2–1)

## 2019-08-01 PROCEDURE — 80053 COMPREHEN METABOLIC PANEL: CPT | Performed by: INTERNAL MEDICINE

## 2019-08-01 PROCEDURE — 81003 URINALYSIS AUTO W/O SCOPE: CPT | Performed by: INTERNAL MEDICINE

## 2019-08-01 PROCEDURE — 83036 HEMOGLOBIN GLYCOSYLATED A1C: CPT | Performed by: INTERNAL MEDICINE

## 2019-08-01 PROCEDURE — 36415 COLL VENOUS BLD VENIPUNCTURE: CPT | Performed by: INTERNAL MEDICINE

## 2019-08-01 PROCEDURE — 99214 OFFICE O/P EST MOD 30 MIN: CPT | Performed by: INTERNAL MEDICINE

## 2019-08-01 RX ORDER — HYDROCODONE BITARTRATE AND ACETAMINOPHEN 5; 325 MG/1; MG/1
1 TABLET ORAL EVERY 6 HOURS PRN
Qty: 18 TABLET | Refills: 0 | Status: SHIPPED | OUTPATIENT
Start: 2019-08-01 | End: 2019-08-04

## 2019-08-01 RX ORDER — CYCLOBENZAPRINE HCL 10 MG
10 TABLET ORAL 3 TIMES DAILY PRN
Qty: 30 TABLET | Refills: 0 | Status: SHIPPED | OUTPATIENT
Start: 2019-08-01 | End: 2021-04-29

## 2019-08-01 NOTE — PROGRESS NOTES
"Subjective     Glen Cho is a 54 year old male who presents to clinic today for the following health issues:    HPI   ED/UC Followup:    Facility:  [facility] ER  Date of visit: 7/26/19  Reason for visit:  Right sided low back pain without sciatica unspecified chronicity  Current Status:  Slightly improved still experiencing pain in right side above the hip, treating with Ibuprofen 800mg 2x/day, and Tylenol 975 MG 2x daily  rotating between the 2      Hurt right flank after using chainsaw, stretching in a weird way.  This was about 10 days ago.  Then worsened about 7 days ago; tried some ibuprofen and heat pads.  Pain seemed to migrate from right hip to flank to back.  Poor sleep.  Went to emergency room and was given toradol and valium.  Sent home.  Then about 5 days ago, felt his back \"crack\" a few times, and thinks that pain improved.      Pain was dull/throbbing.  No shooting pain to leg.  Occasionally will go around groin.  But does not feel like his history of kidney stones.     Since emergency room, pain has been about the same.  Valium helps sleep.  Ibuprofen and tylenol not helping much.       Patient Active Problem List   Diagnosis     Anxiety     Major depressive disorder, single episode in full remission (H)     Rosacea     Hypertension goal BP (blood pressure) < 140/90     Heart murmur     Tear of lateral cartilage or meniscus of knee, current     Tubular adenoma of colon     CKD (chronic kidney disease) stage 1, GFR 90 ml/min or greater     Morbid obesity (H)     Hyperlipidemia LDL goal <100     Type 2 diabetes mellitus with diabetic nephropathy, with long-term current use of insulin (H)     CORINA (obstructive sleep apnea)     Past Surgical History:   Procedure Laterality Date     ANKLE SURGERY  2/2004     R ORIF     ARTHROSCOPY KNEE  3/26/2014    Procedure: ARTHROSCOPY KNEE;  ARTHROSCOPY KNEE- RIGHT   SURGEON REQUESTS CHOICE ANETHESIA ;  Surgeon: Sabino Simpson MD;  Location:  OR     " HERNIORRHAPHY [facility]MBILICAL         Social History     Tobacco Use     Smoking status: Current Every Day Smoker     Types: Cigarettes     Last attempt to quit: 5/10/2000     Years since quittin.2     Smokeless tobacco: Never Used   Substance Use Topics     Alcohol use: Never     Alcohol/week: 0.0 oz     Frequency: Never     Comment: rarely     Family History   Problem Relation Age of Onset     Cancer Father         skin     Myocardial Infarction Father 57         Current Outpatient Medications   Medication Sig Dispense Refill     acetaminophen (TYLENOL) 325 MG tablet Take 3 tablets (975 mg) by mouth every 8 hours as needed for mild pain 20 tablet 0     aspirin 325 MG tablet Take 325 mg by mouth daily.       cyclobenzaprine (FLEXERIL) 10 MG tablet Take 1 tablet (10 mg) by mouth 3 times daily as needed for muscle spasms 30 tablet 0     diazepam (VALIUM) 5 MG tablet Take 1 tablet (5 mg) by mouth every 6 hours as needed for anxiety or sleep 20 tablet 0     HYDROcodone-acetaminophen (NORCO) 5-325 MG tablet Take 1 tablet by mouth every 6 hours as needed for pain 18 tablet 0     ibuprofen (ADVIL/MOTRIN) 600 MG tablet Take 1 tablet (600 mg) by mouth every 6 hours as needed for moderate pain 20 tablet 0     insulin aspart (NOVOLOG FLEXPEN) 100 UNIT/ML injection INJECT 20 UNITS SUB-Q 3 TIMES DAILY WITH MEALS,MAY INCREASE IF 2 HOUR POST MEAL SUGARS REMAIN <150.  Max daily dose 75 units per day 18 mL 11     insulin aspart (NOVOLOG FLEXPEN) 100 UNIT/ML pen Inject 18 Units Subcutaneous 2 times daily (with meals) 9 mL 10     insulin degludec (TRESIBA FLEXTOUCH) 200 UNIT/ML pen Inject 80 Units Subcutaneous daily 36 mL 2     losartan-hydrochlorothiazide (HYZAAR) 50-12.5 MG tablet Take 1 tablet by mouth every other day 90 tablet 3     metFORMIN (GLUCOPHAGE-XR) 500 MG 24 hr tablet Take 2 tablets (1,000 mg) by mouth daily (with dinner) 360 tablet 3     simvastatin (ZOCOR) 20 MG tablet Take 1 tablet (20 mg) by mouth daily 90 tablet 3      blood glucose monitoring (FREESTYLE INSULINX SYSTEM) meter device kit Use to test blood sugar 3 times daily or as directed. 1 kit 0     blood glucose monitoring (FREESTYLE INSULINX) test strip Use to test blood sugar 4 times daily or as directed. 360 each 3     blood glucose monitoring (FREESTYLE) lancets Use to test blood sugars 2-3 times daily or as directed. 1 Box prn     insulin pen needle (BD KENTRELL U/F) 32G X 4 MM Use 4 daily or as directed. 120 each 11     insulin pen needle (BD KENTRELL U/F) 32G X 4 MM USE 2 PEN NEEDLES DAILY OR AS DIRECTED. 200 each 0     order for DME Equipment being ordered: continuous positive airway pressure machine with mask and tubing. 1 each 0     order for DME Equipment being ordered: Portable continuous positive airway pressure with mask and tubing. 1 each 0     order for DME Equipment being ordered: Verio flex test strips  Patient tests 1 times daily. 100 Device 3     order for DME Equipment being ordered: lancets  Uses 4 a day.    Please dispense what insurance covers. 400 each 3     order for DME Equipment being ordered: Eli Contour Next test strips  Tests 5 times a day 300 each 3     order for DME Equipment being ordered: glucose meter  Please dispense what insurance covers 1 each 0     ORDER FOR DME Equipment being ordered: glucometer    60 lancets and test strips (5 refills) for twice daily testing. 1 each 0     Allergies   Allergen Reactions     Amlodipine Besylate      Water retention     Cefdinir      Joint pains     Lisinopril      Water retention     Amoxicillin Rash     White spots     Recent Labs   Lab Test 02/05/19  0906 06/04/18  1532 02/20/18  0832 05/23/17  1116 03/21/17  0852 07/11/16  0810   A1C 5.9* 6.4* 8.5*  --  6.6* 5.9   *  --  Cannot estimate LDL when triglyceride exceeds 400 mg/dL  92  --   --  71   HDL 30*  --  29*  --   --  37*   TRIG 265*  --  442*  --   --  323*   ALT 28  --   --  71* 77*  --    CR 0.74  --   --  0.80 0.84  --    GFRESTIMATED  >90  --   --  >90  Non  GFR Calc   >90  Non  GFR Calc    --    GFRESTBLACK >90  --   --  >90   GFR Calc   >90   GFR Calc    --    POTASSIUM 4.1  --   --  4.2 4.3  --    TSH 1.12  --  2.05  --   --   --       BP Readings from Last 3 Encounters:   08/01/19 139/84   07/26/19 155/64   02/05/19 110/80    Wt Readings from Last 3 Encounters:   08/01/19 119.3 kg (262 lb 14.4 oz)   07/26/19 117.9 kg (260 lb)   02/05/19 119 kg (262 lb 4.8 oz)                      Reviewed and updated as needed this visit by Provider         Review of Systems   ROS COMP: CONSTITUTIONAL: NEGATIVE for fever, chills, change in weight  INTEGUMENTARY/SKIN: NEGATIVE for worrisome rashes, moles or lesions  EYES: NEGATIVE for vision changes or irritation  ENT/MOUTH: NEGATIVE for ear, mouth and throat problems  RESP: NEGATIVE for significant cough or SOB  CV: NEGATIVE for chest pain, palpitations or peripheral edema  GI: NEGATIVE for nausea, abdominal pain, heartburn, or change in bowel habits  MUSCULOSKELETAL: as above.   NEURO: NEGATIVE for weakness, dizziness or paresthesias  HEME: NEGATIVE for bleeding problems      Objective    /84   Pulse 68   Temp 97.9  F (36.6  C) (Oral)   Wt 119.3 kg (262 lb 14.4 oz)   SpO2 98%   BMI 42.43 kg/m    Body mass index is 42.43 kg/m .  Physical Exam   GENERAL: healthy, alert and no distress  EYES: Eyes grossly normal to inspection, PERRL and conjunctivae and sclerae normal  HENT: ear canals and TM's normal, nose and mouth without ulcers or lesions  NECK: no adenopathy, no asymmetry, masses, or scars and thyroid normal to palpation  RESP: lungs clear to auscultation - no rales, rhonchi or wheezes  CV: regular rate and rhythm, normal S1 S2, no S3 or S4, no murmur, click or rub, no peripheral edema and peripheral pulses strong  ABDOMEN: soft, nontender, no hepatosplenomegaly, no masses and bowel sounds normal  MS: pain along right flank, but not  "radiating to buttock or legs.  SKIN: no suspicious lesions or rashes  NEURO: Normal strength and tone, mentation intact and speech normal  PSYCH: mentation appears normal, affect normal/bright    Diagnostic Test Results:  Labs reviewed in Epic        Assessment & Plan     1. Strain of lumbar region, subsequent encounter  Begin physical therapy and daytime flexeril (cyclobenzaprine).  as needed hydrocodone/acetaminophen at night; Informed consent given regarding risks and benefits.  Counselled regarding safe use of machinery, vehicles, and work while taking medicine, as it may alter concentration.   Told that imaging might not be helpful at this poitn, but would obtain urinalysis and electrolytes and kidney tests to r/o kidney issues; has history of kidney stones.   - Hemoglobin A1c  - Comprehensive metabolic panel  - *UA reflex to Microscopic and Culture ([place] [facility] (except [place] and [place])  - [facility] PT, HAND, AND CHIROPRACTIC REFERRAL; Future  - cyclobenzaprine (FLEXERIL) 10 MG tablet; Take 1 tablet (10 mg) by mouth 3 times daily as needed for muscle spasms  Dispense: 30 tablet; Refill: 0  - HYDROcodone-acetaminophen (NORCO) 5-325 MG tablet; Take 1 tablet by mouth every 6 hours as needed for pain  Dispense: 18 tablet; Refill: 0     Tobacco Cessation:   reports that he has been smoking cigarettes.  He has never used smokeless tobacco.        BMI:   Estimated body mass index is 42.43 kg/m  as calculated from the following:    Height as of 2/5/19: 1.676 m (5' 6\").    Weight as of this encounter: 119.3 kg (262 lb 14.4 oz).   Weight management plan: Patient was referred to their PCP to discuss a diet and exercise plan.        See Patient Instructions    No follow-ups on file.    Mike Deal MD  [facility]        "

## 2019-08-01 NOTE — PATIENT INSTRUCTIONS
"Lab work downstairs today.  Directions:  As you walk through the first floor, you'll see (on the right) first the pharmacy, then some bathrooms, then the \"Lab and Imaging\" area. Give them your name at the window there and wait for them to call you.     May take flexeril (cyclobenzaprine) if it does not cause you significant drowsiness.    May try hydrocodone/acetaminophen; no driving.    Begin physical therapy.    Mike Deal MD  Internal Medicine and Pediatrics     "

## 2019-10-29 DIAGNOSIS — N18.1 CKD (CHRONIC KIDNEY DISEASE) STAGE 1, GFR 90 ML/MIN OR GREATER: ICD-10-CM

## 2019-10-29 DIAGNOSIS — E11.21 TYPE 2 DIABETES MELLITUS WITH DIABETIC NEPHROPATHY, WITH LONG-TERM CURRENT USE OF INSULIN (H): ICD-10-CM

## 2019-10-29 DIAGNOSIS — I10 HYPERTENSION GOAL BP (BLOOD PRESSURE) < 140/90: Primary | ICD-10-CM

## 2019-10-29 DIAGNOSIS — Z79.4 TYPE 2 DIABETES MELLITUS WITH DIABETIC NEPHROPATHY, WITH LONG-TERM CURRENT USE OF INSULIN (H): ICD-10-CM

## 2019-10-29 RX ORDER — HYDROCHLOROTHIAZIDE 12.5 MG/1
12.5 TABLET ORAL DAILY
Qty: 90 TABLET | Refills: 1 | Status: SHIPPED | OUTPATIENT
Start: 2019-10-29 | End: 2020-04-29

## 2019-10-29 RX ORDER — METFORMIN HCL 500 MG
2000 TABLET, EXTENDED RELEASE 24 HR ORAL
Qty: 360 TABLET | Refills: 3 | OUTPATIENT
Start: 2019-10-29

## 2019-10-29 RX ORDER — LOSARTAN POTASSIUM 50 MG/1
50 TABLET ORAL DAILY
Qty: 90 TABLET | Refills: 1 | Status: SHIPPED | OUTPATIENT
Start: 2019-10-29 | End: 2020-04-29

## 2019-10-29 RX ORDER — LOSARTAN POTASSIUM AND HYDROCHLOROTHIAZIDE 12.5; 5 MG/1; MG/1
1 TABLET ORAL AT BEDTIME
Qty: 90 TABLET | Refills: 3 | OUTPATIENT
Start: 2019-10-29

## 2019-10-29 NOTE — TELEPHONE ENCOUNTER
"Patient is not due for refills on metformin, refills available at pharmacy. Will notify patient.     Due to recall, pharmacy requesting separate prescriptions. Patient aware. Rx sent per pharmacy request.     Requested Prescriptions   Pending Prescriptions Disp Refills     losartan (COZAAR) 50 MG tablet 90 tablet 1     Sig: Take 1 tablet (50 mg) by mouth daily       There is no refill protocol information for this order        hydrochlorothiazide (HYDRODIURIL) 12.5 MG tablet 90 tablet 1     Sig: Take 1 tablet (12.5 mg) by mouth daily       There is no refill protocol information for this order      Refused Prescriptions Disp Refills     losartan-hydrochlorothiazide (HYZAAR) 50-12.5 MG tablet [Pharmacy Med Name: LOSARTAN-HCTZ 50-12.5 MG TAB] 90 tablet 3     Sig: TAKE 1 TABLET BY MOUTH AT BEDTIME       Angiotensin-II Receptors Passed - 10/29/2019  4:45 PM        Passed - Last blood pressure under 140/90 in past 12 months     BP Readings from Last 3 Encounters:   08/01/19 139/84   07/26/19 155/64   02/05/19 110/80                 Passed - Recent (12 mo) or future (30 days) visit within the authorizing provider's specialty     Patient has had an office visit with the authorizing provider or a provider within the authorizing providers department within the previous 12 mos or has a future within next 30 days. See \"Patient Info\" tab in inbasket, or \"Choose Columns\" in Meds & Orders section of the refill encounter.              Passed - Medication is active on med list        Passed - Patient is age 18 or older        Passed - Normal serum creatinine on file in past 12 months     Recent Labs   Lab Test 08/01/19  0848   CR 0.72             Passed - Normal serum potassium on file in past 12 months     Recent Labs   Lab Test 08/01/19  0848   POTASSIUM 4.1                    metFORMIN (GLUCOPHAGE-XR) 500 MG 24 hr tablet [Pharmacy Med Name: METFORMIN  MG TABLET] 360 tablet 3     Sig: TAKE 4 TABLETS (2,000 MG) BY MOUTH " "DAILY (WITH DINNER)       Biguanide Agents Passed - 10/29/2019  4:45 PM        Passed - Blood pressure less than 140/90 in past 6 months     BP Readings from Last 3 Encounters:   08/01/19 139/84   07/26/19 155/64   02/05/19 110/80                 Passed - Patient has documented LDL within the past 12 mos.     Recent Labs   Lab Test 02/05/19  0906   *             Passed - Patient has had a Microalbumin in the past 15 mos.     Recent Labs   Lab Test 02/05/19  0908   MICROL <5   UMALCR Unable to calculate due to low value             Passed - Patient is age 10 or older        Passed - Patient has documented A1c within the specified period of time.     If HgbA1C is 8 or greater, it needs to be on file within the past 3 months.  If less than 8, must be on file within the past 6 months.     Recent Labs   Lab Test 08/01/19  0848   A1C 6.0*             Passed - Patient's CR is NOT>1.4 OR Patient's EGFR is NOT<45 within past 12 mos.     Recent Labs   Lab Test 08/01/19  0848   GFRESTIMATED >90   GFRESTBLACK >90       Recent Labs   Lab Test 08/01/19  0848   CR 0.72             Passed - Patient does NOT have a diagnosis of CHF.        Passed - Medication is active on med list        Passed - Recent (6 mo) or future (30 days) visit within the authorizing provider's specialty     Patient had office visit in the last 6 months or has a visit in the next 30 days with authorizing provider or within the authorizing provider's specialty.  See \"Patient Info\" tab in inbasket, or \"Choose Columns\" in Meds & Orders section of the refill encounter.              "

## 2019-11-02 DIAGNOSIS — E11.21 TYPE 2 DIABETES MELLITUS WITH DIABETIC NEPHROPATHY, WITH LONG-TERM CURRENT USE OF INSULIN (H): ICD-10-CM

## 2019-11-02 DIAGNOSIS — Z79.4 TYPE 2 DIABETES MELLITUS WITH DIABETIC NEPHROPATHY, WITH LONG-TERM CURRENT USE OF INSULIN (H): ICD-10-CM

## 2019-11-02 NOTE — TELEPHONE ENCOUNTER
"Requested Prescriptions   Pending Prescriptions Disp Refills     insulin pen needle (BD KENTRELL U/F) 32G X 4 MM miscellaneous [Pharmacy Med Name: BD UF KENTRELL PEN NEEDLE [id number]]  Last Written Prescription Date:  03/14/2018  Last Fill Quantity: 120 each,  # refills: 11   Last Office Visit: 8/1/2019 Mike Deal MD  Future Office Visit:       14     Sig: USE 4 DAILY OR AS DIRECTED.       Diabetic Supplies Protocol Passed - 11/2/2019 10:45 AM        Passed - Medication is active on med list        Passed - Patient is 18 years of age or older        Passed - Recent (6 mo) or future (30 days) visit within the authorizing provider's specialty     Patient had office visit in the last 6 months or has a visit in the next 30 days with authorizing provider.  See \"Patient Info\" tab in inbasket, or \"Choose Columns\" in Meds & Orders section of the refill encounter.              "

## 2019-11-03 ENCOUNTER — HEALTH MAINTENANCE LETTER (OUTPATIENT)
Age: 54
End: 2019-11-03

## 2019-11-04 DIAGNOSIS — E11.21 TYPE 2 DIABETES MELLITUS WITH DIABETIC NEPHROPATHY, WITH LONG-TERM CURRENT USE OF INSULIN (H): ICD-10-CM

## 2019-11-04 DIAGNOSIS — Z79.4 TYPE 2 DIABETES MELLITUS WITH DIABETIC NEPHROPATHY, WITH LONG-TERM CURRENT USE OF INSULIN (H): ICD-10-CM

## 2019-11-04 RX ORDER — METFORMIN HCL 500 MG
2000 TABLET, EXTENDED RELEASE 24 HR ORAL
Qty: 360 TABLET | Refills: 0 | Status: SHIPPED | OUTPATIENT
Start: 2019-11-04 | End: 2020-01-29

## 2019-11-04 NOTE — TELEPHONE ENCOUNTER
Prescription approved per [facility], [facility] or [facility] refill protocol.  Karuna SMITH - Registered Nurse  [facility]  Acute and Diagnostic Services

## 2019-11-04 NOTE — TELEPHONE ENCOUNTER
"Requested Prescriptions   Pending Prescriptions Disp Refills     metFORMIN (GLUCOPHAGE-XR) 500 MG 24 hr tablet [Pharmacy Med Name: METFORMIN  MG TABLET]    Last Written Prescription Date:  2/5/2019  Last Fill Quantity: 360,  # refills: 3   Last office visit: 8/1/2019 with prescribing provider:  Mike Deal     Future Office Visit:     360 tablet 3     Sig: TAKE 4 TABLETS (2,000 MG) BY MOUTH DAILY (WITH DINNER)       Biguanide Agents Passed - 11/4/2019 10:11 AM        Passed - Blood pressure less than 140/90 in past 6 months     BP Readings from Last 3 Encounters:   08/01/19 139/84   07/26/19 155/64   02/05/19 110/80                 Passed - Patient has documented LDL within the past 12 mos.     Recent Labs   Lab Test 02/05/19  0906   *             Passed - Patient has had a Microalbumin in the past 15 mos.     Recent Labs   Lab Test 02/05/19  0908   MICROL <5   UMALCR Unable to calculate due to low value             Passed - Patient is age 10 or older        Passed - Patient has documented A1c within the specified period of time.     If HgbA1C is 8 or greater, it needs to be on file within the past 3 months.  If less than 8, must be on file within the past 6 months.     Recent Labs   Lab Test 08/01/19  0848   A1C 6.0*             Passed - Patient's CR is NOT>1.4 OR Patient's EGFR is NOT<45 within past 12 mos.     Recent Labs   Lab Test 08/01/19  0848   GFRESTIMATED >90   GFRESTBLACK >90       Recent Labs   Lab Test 08/01/19  0848   CR 0.72             Passed - Patient does NOT have a diagnosis of CHF.        Passed - Medication is active on med list        Passed - Recent (6 mo) or future (30 days) visit within the authorizing provider's specialty     Patient had office visit in the last 6 months or has a visit in the next 30 days with authorizing provider or within the authorizing provider's specialty.  See \"Patient Info\" tab in inbasket, or \"Choose Columns\" in Meds & Orders section of the refill " encounter.

## 2019-11-04 NOTE — TELEPHONE ENCOUNTER
Medication is being filled for 1 time refill only due to:  due for a physical   Shea Walker RN  Message handled by Nurse Triage.

## 2019-11-05 DIAGNOSIS — N18.1 CKD (CHRONIC KIDNEY DISEASE) STAGE 1, GFR 90 ML/MIN OR GREATER: ICD-10-CM

## 2019-11-05 RX ORDER — LOSARTAN POTASSIUM AND HYDROCHLOROTHIAZIDE 12.5; 5 MG/1; MG/1
1 TABLET ORAL EVERY OTHER DAY
Qty: 90 TABLET | Refills: 3 | OUTPATIENT
Start: 2019-11-05

## 2019-11-05 NOTE — TELEPHONE ENCOUNTER
See refill encounter dated 10/29/19.  Losartan and hydrochlorothiazide filled separately due to recall.   Suzanne.     SHAYE Quintana RN

## 2019-11-05 NOTE — TELEPHONE ENCOUNTER
Not due for a refill.     losartan-hydrochlorothiazide (HYZAAR) 50-12.5 MG tablet was filled on 2/5/2019, qty 90 with 3 refills.

## 2020-01-16 ENCOUNTER — TELEPHONE (OUTPATIENT)
Dept: PEDIATRICS | Facility: CLINIC | Age: 55
End: 2020-01-16

## 2020-01-16 NOTE — LETTER
January 16, 2020      Glen Cho  [address] [facility] [address]        Dear Glen,       We care about your health and have reviewed your health plan including your medical conditions, medications, and lab results.  Based on this review, it is recommended that you follow up regarding the following health topic(s):  -Diabetes    We recommend you take the following action(s):  -Schedule Diabetic Eye Exam or Forward records to our clinic      Please call us at the [facility] - [phone] (or use [employer]) to address the above recommendations.     Thank you for trusting [facility] and we appreciate the opportunity to serve you.  We look forward to supporting your healthcare needs in the future.    Healthy Regards,    Your Health Care Team  [facility]

## 2020-01-16 NOTE — TELEPHONE ENCOUNTER
Panel Management Review      Patient has the following on his problem list:     Diabetes    ASA: Passed    Last A1C  Lab Results   Component Value Date    A1C 6.0 08/01/2019    A1C 5.9 02/05/2019    A1C 6.4 06/04/2018    A1C 8.5 02/20/2018    A1C 6.6 03/21/2017     A1C tested: Passed    Last LDL:    Lab Results   Component Value Date    CHOL 183 02/05/2019     Lab Results   Component Value Date    HDL 30 02/05/2019     Lab Results   Component Value Date     02/05/2019     Lab Results   Component Value Date    TRIG 265 02/05/2019     Lab Results   Component Value Date    CHOLHDLRATIO 4.5 02/09/2015     Lab Results   Component Value Date    NHDL 153 02/05/2019       Is the patient on a Statin? YES             Is the patient on Aspirin? YES    Medications     HMG CoA Reductase Inhibitors     simvastatin (ZOCOR) 20 MG tablet       Salicylates     aspirin 325 MG tablet             Last three blood pressure readings:  BP Readings from Last 3 Encounters:   08/01/19 139/84   07/26/19 155/64   02/05/19 110/80       Date of last diabetes office visit: 2/5/19     Tobacco History:     History   Smoking Status     Current Every Day Smoker     Types: Cigarettes     Last attempt to quit: 5/10/2000   Smokeless Tobacco     Never Used           Summary:    Patient is due/failing the following:   Diabetic eye exam     Action needed:   Schedule diabetic eye exam or have records sent to clinic    Type of outreach:    Sent letter.    Questions for provider review:    None                                                                                                                                    Memo Valenzuela CMA

## 2020-02-10 ENCOUNTER — HEALTH MAINTENANCE LETTER (OUTPATIENT)
Age: 55
End: 2020-02-10

## 2020-03-31 ENCOUNTER — MYC MEDICAL ADVICE (OUTPATIENT)
Dept: PEDIATRICS | Facility: CLINIC | Age: 55
End: 2020-03-31

## 2020-03-31 ENCOUNTER — MYC REFILL (OUTPATIENT)
Dept: PEDIATRICS | Facility: CLINIC | Age: 55
End: 2020-03-31

## 2020-03-31 DIAGNOSIS — E11.21 TYPE 2 DIABETES MELLITUS WITH DIABETIC NEPHROPATHY, WITH LONG-TERM CURRENT USE OF INSULIN (H): ICD-10-CM

## 2020-03-31 DIAGNOSIS — Z79.4 TYPE 2 DIABETES MELLITUS WITH DIABETIC NEPHROPATHY, WITH LONG-TERM CURRENT USE OF INSULIN (H): ICD-10-CM

## 2020-03-31 RX ORDER — INSULIN ASPART 100 [IU]/ML
INJECTION, SOLUTION INTRAVENOUS; SUBCUTANEOUS
Qty: 18 ML | Refills: 11 | OUTPATIENT
Start: 2020-03-31

## 2020-03-31 RX ORDER — INSULIN ASPART 100 [IU]/ML
18 INJECTION, SOLUTION INTRAVENOUS; SUBCUTANEOUS 2 TIMES DAILY WITH MEALS
Qty: 36 ML | Refills: 3 | Status: SHIPPED | OUTPATIENT
Start: 2020-03-31 | End: 2021-10-26

## 2020-03-31 RX ORDER — METFORMIN HCL 500 MG
2000 TABLET, EXTENDED RELEASE 24 HR ORAL
Qty: 360 TABLET | Refills: 3 | Status: SHIPPED | OUTPATIENT
Start: 2020-03-31 | End: 2021-05-17

## 2020-03-31 RX ORDER — METFORMIN HCL 500 MG
2000 TABLET, EXTENDED RELEASE 24 HR ORAL
Qty: 360 TABLET | Refills: 3 | Status: CANCELLED | OUTPATIENT
Start: 2020-03-31

## 2020-04-30 DIAGNOSIS — N18.1 CKD (CHRONIC KIDNEY DISEASE) STAGE 1, GFR 90 ML/MIN OR GREATER: ICD-10-CM

## 2020-04-30 RX ORDER — SIMVASTATIN 20 MG
20 TABLET ORAL DAILY
Qty: 90 TABLET | Refills: 3 | Status: SHIPPED | OUTPATIENT
Start: 2020-04-30 | End: 2021-06-25

## 2020-05-06 ENCOUNTER — TELEPHONE (OUTPATIENT)
Dept: BEHAVIORAL HEALTH | Facility: CLINIC | Age: 55
End: 2020-05-06

## 2020-05-06 SDOH — SOCIAL STABILITY: SOCIAL INSECURITY: WITHIN THE LAST YEAR, HAVE YOU BEEN AFRAID OF YOUR PARTNER OR EX-PARTNER?: NO

## 2020-05-06 SDOH — ECONOMIC STABILITY: FOOD INSECURITY: WITHIN THE PAST 12 MONTHS, THE FOOD YOU BOUGHT JUST DIDN'T LAST AND YOU DIDN'T HAVE MONEY TO GET MORE.: NEVER TRUE

## 2020-05-06 SDOH — ECONOMIC STABILITY: TRANSPORTATION INSECURITY
IN THE PAST 12 MONTHS, HAS LACK OF TRANSPORTATION KEPT YOU FROM MEETINGS, WORK, OR FROM GETTING THINGS NEEDED FOR DAILY LIVING?: NO

## 2020-05-06 SDOH — ECONOMIC STABILITY: TRANSPORTATION INSECURITY
IN THE PAST 12 MONTHS, HAS THE LACK OF TRANSPORTATION KEPT YOU FROM MEDICAL APPOINTMENTS OR FROM GETTING MEDICATIONS?: NO

## 2020-05-06 SDOH — HEALTH STABILITY: MENTAL HEALTH
STRESS IS WHEN SOMEONE FEELS TENSE, NERVOUS, ANXIOUS, OR CAN'T SLEEP AT NIGHT BECAUSE THEIR MIND IS TROUBLED. HOW STRESSED ARE YOU?: NOT AT ALL

## 2020-05-06 SDOH — ECONOMIC STABILITY: FOOD INSECURITY: WITHIN THE PAST 12 MONTHS, YOU WORRIED THAT YOUR FOOD WOULD RUN OUT BEFORE YOU GOT MONEY TO BUY MORE.: NEVER TRUE

## 2020-05-06 SDOH — SOCIAL STABILITY: SOCIAL NETWORK: IN A TYPICAL WEEK, HOW MANY TIMES DO YOU TALK ON THE PHONE WITH FAMILY, FRIENDS, OR NEIGHBORS?: THREE TIMES A WEEK

## 2020-05-06 NOTE — TELEPHONE ENCOUNTER
Community Paramedic Social Needs Outreach  RUST/Voicemail       Clinical Data: Community Paramedic Outreach    Outreach attempted x 1.      Left message on patient's voicemail with call back information and requested return call.    Community Paramedic Plan will try to reach patient again in 1-2 business days.

## 2020-05-06 NOTE — TELEPHONE ENCOUNTER
CHG outreach to patient to schedule virtual visit with PCP.    Video appontment scheduled for 05/11/2020 at 1040.    Venkatesh Ely at 2:04 PM on 5/6/2020  [facility] Health Guide  Phone [phone]

## 2020-05-06 NOTE — TELEPHONE ENCOUNTER
Community Paramedic Social Needs Assessment    Spoke with: Patient      Refer also to Social Determinants of Health tab for information obtained    1. Have you or any family members you live with been unable to meet basic needs of food, clothing, medication, utilities, , health care needs, other: No    2. How do you get to and from appointments/work/groceries, household needs? Pt has his own vehicle and is able to drive    3. What is your housing situation? unknown    4. How many people do you currently live with? unknown    5. Are you currently employed? Yes    6. Does the patient feel physically and emotionally safe where they currently live?  Yes    7. Are you experiencing increased, disruptive stress around Covid-19?  No    8. How often do you speak with those that care about you or you feel close to? On a regular basis      Concerns/Barriers Noted During Call: no    Resources Given: none    Referrals: no

## 2020-05-27 ENCOUNTER — MYC MEDICAL ADVICE (OUTPATIENT)
Dept: PEDIATRICS | Facility: CLINIC | Age: 55
End: 2020-05-27

## 2020-05-27 DIAGNOSIS — Z79.4 TYPE 2 DIABETES MELLITUS WITH DIABETIC NEPHROPATHY, WITH LONG-TERM CURRENT USE OF INSULIN (H): ICD-10-CM

## 2020-05-27 DIAGNOSIS — E11.21 TYPE 2 DIABETES MELLITUS WITH DIABETIC NEPHROPATHY, WITH LONG-TERM CURRENT USE OF INSULIN (H): ICD-10-CM

## 2020-05-27 RX ORDER — PEN NEEDLE, DIABETIC 32GX 5/32"
NEEDLE, DISPOSABLE MISCELLANEOUS
Qty: 360 EACH | Refills: 5 | Status: SHIPPED | OUTPATIENT
Start: 2020-05-27 | End: 2021-06-25

## 2020-05-27 NOTE — TELEPHONE ENCOUNTER
Dr. Deal-  Please see [employer] message. You refilled his other meds in March for 6 months. He is hoping you would do the same for his needles. 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 Are you willing to fill for him? Kerry Perdomo, RN on 5/27/2020 at 4:05 PM      Mike Deal MD   to Glen Cho R    3/31/20 2:07 PM   Glen,     Absolutely.  I'd have you stay away for 6 months or so.  I've refilled a year supply of those 3 meds.   Please stay safe.  Call us in Septmeber to see if that would be a good time for labs and a possible visit.     Mike Deal MD  Internal Medicine and Pediatrics

## 2020-07-03 DIAGNOSIS — Z79.4 TYPE 2 DIABETES MELLITUS WITH DIABETIC NEPHROPATHY, WITH LONG-TERM CURRENT USE OF INSULIN (H): ICD-10-CM

## 2020-07-03 DIAGNOSIS — E11.21 TYPE 2 DIABETES MELLITUS WITH DIABETIC NEPHROPATHY, WITH LONG-TERM CURRENT USE OF INSULIN (H): ICD-10-CM

## 2020-07-03 NOTE — TELEPHONE ENCOUNTER
Medication is being filled for 1 time refill only due to:  Patient needs to be seen because due for 6 month DM appointment for ongoing medication managment.    Please call patient and assist with scheduling prior to additional refills.    Karuna SMITH - Registered Nurse  [facility]  Acute and Diagnostic Services

## 2020-07-06 NOTE — TELEPHONE ENCOUNTER
I spoke to patient and notified of refill. I offered to help patient schedule appointment however he declined at this time.    LEONEL BAR MA on 7/6/2020 at 1:54 PM

## 2020-08-03 DIAGNOSIS — I10 HYPERTENSION GOAL BP (BLOOD PRESSURE) < 140/90: ICD-10-CM

## 2020-08-04 ENCOUNTER — E-VISIT (OUTPATIENT)
Dept: PEDIATRICS | Facility: CLINIC | Age: 55
End: 2020-08-04
Payer: COMMERCIAL

## 2020-08-04 DIAGNOSIS — H60.333 ACUTE SWIMMER'S EAR OF BOTH SIDES: Primary | ICD-10-CM

## 2020-08-04 DIAGNOSIS — Z79.4 TYPE 2 DIABETES MELLITUS WITH DIABETIC NEPHROPATHY, WITH LONG-TERM CURRENT USE OF INSULIN (H): ICD-10-CM

## 2020-08-04 DIAGNOSIS — E11.21 TYPE 2 DIABETES MELLITUS WITH DIABETIC NEPHROPATHY, WITH LONG-TERM CURRENT USE OF INSULIN (H): ICD-10-CM

## 2020-08-04 DIAGNOSIS — E78.5 HYPERLIPIDEMIA LDL GOAL <100: ICD-10-CM

## 2020-08-04 PROCEDURE — 99421 OL DIG E/M SVC 5-10 MIN: CPT | Performed by: INTERNAL MEDICINE

## 2020-08-04 RX ORDER — LOSARTAN POTASSIUM 50 MG/1
50 TABLET ORAL DAILY
Qty: 90 TABLET | Refills: 0 | Status: SHIPPED | OUTPATIENT
Start: 2020-08-04 | End: 2020-11-02

## 2020-08-04 NOTE — TELEPHONE ENCOUNTER
Routing refill request to provider for review/approval because:  Labs not current:  BP, creatinine, K  Patient needs to be seen because it has been more than 1 year since last office visit.    Care team please assist patient in scheduling appt.  PCP please advise on refill.    Clemencia GUNN RN, BSN

## 2020-08-05 RX ORDER — CIPROFLOXACIN 0.5 MG/.25ML
0.25 SOLUTION/ DROPS AURICULAR (OTIC) 2 TIMES DAILY
Qty: 1 EACH | Refills: 1 | Status: SHIPPED | OUTPATIENT
Start: 2020-08-05 | End: 2021-04-29

## 2020-08-26 ENCOUNTER — MYC MEDICAL ADVICE (OUTPATIENT)
Dept: PEDIATRICS | Facility: CLINIC | Age: 55
End: 2020-08-26

## 2020-08-26 DIAGNOSIS — Z79.4 TYPE 2 DIABETES MELLITUS WITH DIABETIC NEPHROPATHY, WITH LONG-TERM CURRENT USE OF INSULIN (H): ICD-10-CM

## 2020-08-26 DIAGNOSIS — E11.21 TYPE 2 DIABETES MELLITUS WITH DIABETIC NEPHROPATHY, WITH LONG-TERM CURRENT USE OF INSULIN (H): ICD-10-CM

## 2020-08-31 NOTE — TELEPHONE ENCOUNTER
Prior Authorization Approval    Authorization Effective Date: 8/17/2020  Authorization Expiration Date: 8/31/2021  Medication: Tresiba-APPROVED  Approved Dose/Quantity:   Reference #:     Insurance Company: [employer] - Phone [phone] Fax [phone]  Expected CoPay:       CoPay Card Available:      Foundation Assistance Needed:    Which Pharmacy is filling the prescription (Not needed for infusion/clinic administered): [facility] [id number] [place] [facility] [id number] [place] [address]  Pharmacy Notified: Yes  Patient Notified: No    Pharmacy will notify patient when medication is ready.

## 2020-08-31 NOTE — TELEPHONE ENCOUNTER
Central Prior Authorization Team   Phone: [phone]      PA Initiation    Medication: Tresiba  Insurance Company: [employer] - Phone [phone] Fax [phone]  Pharm[facility] [id number] [place] [facility] - [place], [place] - [address]  Filling Pharmacy Phone: [phone]  Filling Pharmacy Fax:    Start Date: 8/31/2020    Started PA on CMM and a response of Drug is covered by current benefit plan. No further PA activity needed.  Called and spoke with Benita at [employer], PA is needed, medication is a non-preferred.  Preferred is Basalgar Kwik pen.  Completed PA via phone.  Case [id number]

## 2020-08-31 NOTE — TELEPHONE ENCOUNTER
Dr. Deal,    Please see message from patient and advise    Routing to prior auth team  Med pended    Thank you  Uli Ni RN on 8/31/2020 at 12:20 PM

## 2020-09-24 ENCOUNTER — MYC MEDICAL ADVICE (OUTPATIENT)
Dept: PEDIATRICS | Facility: CLINIC | Age: 55
End: 2020-09-24

## 2020-09-24 NOTE — TELEPHONE ENCOUNTER
Labs are ordered for diabetes.  Needs to see Dr. Deal after getting labs done to discuss.     Has been having flank pain right side.     Called patient.    Front side, between ribs and hip.  Inconsistent pain. Doesn't hurt all the time.   Started 2 weeks ago.   Has been having sharper pains on occasion for the past week.   Pain is mostly associated with movement.  Does not hurt to put pressure on the area.   Pain is 1-2/10 intensity.     Has had some inconsistent gi discomfort and loose stools.   Loose stool, but not diarrhea, and occurs 2-3 times in a day.   Does not happen every day.     Denies changes in urine.  Denies pain with urination.   Denies fever.   Denies blood in the stool.     Advised patient to be seen in acute visit today.   Patient reports that he can only be seen after he is done with work today at 4 pm.   There was no provider appt available after 4pm today.   Advised patient to go to urgent care.  Patient reports that he doesn't want to go to urgent care unless it gets worse.    Educated patient regarding signs and symptoms that necessitate emergency medical attention.     Patient verbalized understanding of plan of care.     Lab appt scheduled for 10/16/20 for a1c and labs.  Diabetes follow up appt scheduled with Dr. Deal for 10/20/20.    Marcos Henry RN on 9/24/2020 at 9:30 AM

## 2020-10-16 DIAGNOSIS — E78.5 HYPERLIPIDEMIA LDL GOAL <100: ICD-10-CM

## 2020-10-16 DIAGNOSIS — E11.21 TYPE 2 DIABETES MELLITUS WITH DIABETIC NEPHROPATHY, WITH LONG-TERM CURRENT USE OF INSULIN (H): ICD-10-CM

## 2020-10-16 DIAGNOSIS — Z79.4 TYPE 2 DIABETES MELLITUS WITH DIABETIC NEPHROPATHY, WITH LONG-TERM CURRENT USE OF INSULIN (H): ICD-10-CM

## 2020-10-16 LAB
ALBUMIN SERPL-MCNC: 3.9 G/DL (ref 3.4–5)
ALP SERPL-CCNC: 71 U/L (ref 40–150)
ALT SERPL W P-5'-P-CCNC: 36 U/L (ref 0–70)
ANION GAP SERPL CALCULATED.3IONS-SCNC: 9 MMOL/L (ref 3–14)
AST SERPL W P-5'-P-CCNC: 21 U/L (ref 0–45)
BILIRUB SERPL-MCNC: 0.4 MG/DL (ref 0.2–1.3)
BUN SERPL-MCNC: 15 MG/DL (ref 7–30)
CALCIUM SERPL-MCNC: 9.8 MG/DL (ref 8.5–10.1)
CHLORIDE SERPL-SCNC: 104 MMOL/L (ref 94–109)
CHOLEST SERPL-MCNC: 221 MG/DL
CO2 SERPL-SCNC: 23 MMOL/L (ref 20–32)
CREAT SERPL-MCNC: 0.78 MG/DL (ref 0.66–1.25)
GFR SERPL CREATININE-BSD FRML MDRD: >90 ML/MIN/{1.73_M2}
GLUCOSE SERPL-MCNC: 107 MG/DL (ref 70–99)
HBA1C MFR BLD: 6.3 % (ref 0–5.6)
HDLC SERPL-MCNC: 27 MG/DL
LDLC SERPL CALC-MCNC: 129 MG/DL
NONHDLC SERPL-MCNC: 194 MG/DL
POTASSIUM SERPL-SCNC: 4.2 MMOL/L (ref 3.4–5.3)
PROT SERPL-MCNC: 8.4 G/DL (ref 6.8–8.8)
SODIUM SERPL-SCNC: 136 MMOL/L (ref 133–144)
TRIGL SERPL-MCNC: 327 MG/DL

## 2020-10-16 PROCEDURE — 36415 COLL VENOUS BLD VENIPUNCTURE: CPT | Performed by: INTERNAL MEDICINE

## 2020-10-16 PROCEDURE — 80053 COMPREHEN METABOLIC PANEL: CPT | Performed by: INTERNAL MEDICINE

## 2020-10-16 PROCEDURE — 80061 LIPID PANEL: CPT | Performed by: INTERNAL MEDICINE

## 2020-10-16 PROCEDURE — 83036 HEMOGLOBIN GLYCOSYLATED A1C: CPT | Performed by: INTERNAL MEDICINE

## 2020-10-16 PROCEDURE — 82043 UR ALBUMIN QUANTITATIVE: CPT | Performed by: INTERNAL MEDICINE

## 2020-10-17 LAB
CREAT UR-MCNC: 39 MG/DL
MICROALBUMIN UR-MCNC: 51 MG/L
MICROALBUMIN/CREAT UR: 129.85 MG/G CR (ref 0–17)

## 2020-10-30 DIAGNOSIS — I10 HYPERTENSION GOAL BP (BLOOD PRESSURE) < 140/90: ICD-10-CM

## 2020-11-02 RX ORDER — HYDROCHLOROTHIAZIDE 12.5 MG/1
TABLET ORAL
Qty: 90 TABLET | Refills: 0 | Status: SHIPPED | OUTPATIENT
Start: 2020-11-02 | End: 2021-02-01

## 2020-11-02 RX ORDER — LOSARTAN POTASSIUM 50 MG/1
50 TABLET ORAL DAILY
Qty: 90 TABLET | Refills: 0 | Status: SHIPPED | OUTPATIENT
Start: 2020-11-02 | End: 2021-02-01

## 2020-11-02 NOTE — TELEPHONE ENCOUNTER
Routing refill request to provider for review/approval because:  Drug interaction warning  Outdated BP    Lenora Randall RN on 11/2/2020 at 9:11 AM

## 2020-11-09 ENCOUNTER — E-VISIT (OUTPATIENT)
Dept: PEDIATRICS | Facility: CLINIC | Age: 55
End: 2020-11-09
Payer: COMMERCIAL

## 2020-11-09 ENCOUNTER — MYC MEDICAL ADVICE (OUTPATIENT)
Dept: PEDIATRICS | Facility: CLINIC | Age: 55
End: 2020-11-09

## 2020-11-09 DIAGNOSIS — H60.92 OTITIS EXTERNA OF LEFT EAR, UNSPECIFIED CHRONICITY, UNSPECIFIED TYPE: Primary | ICD-10-CM

## 2020-11-09 PROCEDURE — 99421 OL DIG E/M SVC 5-10 MIN: CPT | Performed by: INTERNAL MEDICINE

## 2020-11-10 ENCOUNTER — TELEPHONE (OUTPATIENT)
Dept: PEDIATRICS | Facility: CLINIC | Age: 55
End: 2020-11-10

## 2020-11-10 DIAGNOSIS — H60.393 INFECTIVE OTITIS EXTERNA, BILATERAL: Primary | ICD-10-CM

## 2020-11-10 NOTE — TELEPHONE ENCOUNTER
Per pharmacy, Ciprofloxacin-hydrocortisone otic suspension costs $300, and patient is requesting an alternative.    Routing to Dr. Deal:  Please prescribe alternative medication, or route back to have patient call their insurance for recommended alternatives.    Marcos Henry RN on 11/10/2020 at 4:20 PM

## 2020-11-10 NOTE — TELEPHONE ENCOUNTER
General Call:     Who is calling:  [facility]    Reason for Call:  Need alternative for ciprofloxacin-hydrocortisone because this medication is more than $300. Please call them back at [phone]    What are your questions or concerns:  Too expensive. Need alternative med.    Date of last appointment with provider: E-visit 10/9/20    Okay to leave a detailed message:Yes at Other phone number:  [phone]

## 2020-11-11 RX ORDER — CIPROFLOXACIN 0.5 MG/.25ML
0.25 SOLUTION/ DROPS AURICULAR (OTIC) 2 TIMES DAILY
Qty: 1 EACH | Refills: 2 | Status: SHIPPED | OUTPATIENT
Start: 2020-11-11 | End: 2021-04-29

## 2020-11-11 NOTE — TELEPHONE ENCOUNTER
Patient called and he was told by his insurance that ofloxacin would be covered.  Calling to inquire if Dr Deal would order this medicine for him?  Mylene Wilde LPN

## 2020-11-12 RX ORDER — OFLOXACIN 3 MG/ML
5 SOLUTION AURICULAR (OTIC) 2 TIMES DAILY
Qty: 10 ML | Refills: 0 | Status: SHIPPED | OUTPATIENT
Start: 2020-11-12 | End: 2020-11-22

## 2020-11-12 NOTE — TELEPHONE ENCOUNTER
I've already sent in cipro as a replacement for ciproHC.    Is cipro also not covered?    If it's not, I've also sent in floxin.    Mike Deal MD  Internal Medicine and Pediatrics

## 2020-11-16 ENCOUNTER — HEALTH MAINTENANCE LETTER (OUTPATIENT)
Age: 55
End: 2020-11-16

## 2021-01-04 ENCOUNTER — TRANSFERRED RECORDS (OUTPATIENT)
Dept: HEALTH INFORMATION MANAGEMENT | Facility: CLINIC | Age: 56
End: 2021-01-04

## 2021-01-04 LAB — RETINOPATHY: NEGATIVE

## 2021-01-30 ENCOUNTER — E-VISIT (OUTPATIENT)
Dept: PEDIATRICS | Facility: CLINIC | Age: 56
End: 2021-01-30
Payer: COMMERCIAL

## 2021-01-30 DIAGNOSIS — M54.50 ACUTE MIDLINE LOW BACK PAIN WITHOUT SCIATICA: Primary | ICD-10-CM

## 2021-01-30 PROCEDURE — 99422 OL DIG E/M SVC 11-20 MIN: CPT | Performed by: INTERNAL MEDICINE

## 2021-01-31 RX ORDER — CYCLOBENZAPRINE HCL 10 MG
10 TABLET ORAL 3 TIMES DAILY PRN
Qty: 15 TABLET | Refills: 0 | Status: SHIPPED | OUTPATIENT
Start: 2021-01-31 | End: 2021-04-29

## 2021-04-12 ENCOUNTER — TELEPHONE (OUTPATIENT)
Dept: PEDIATRICS | Facility: CLINIC | Age: 56
End: 2021-04-12

## 2021-04-12 NOTE — TELEPHONE ENCOUNTER
Prior Authorization Retail Medication Request    Medication/Dose: insulin degludec (TRESIBA FLEXTOUCH) 200 UNIT/ML pen 80 units/36mL  ICD code (if different than what is on RX):    Previously Tried and Failed:  metFORMIN (GLUCOPHAGE-XR) 500 MG 24 hr tablet (4 tabs daily); insulin aspart (NOVOLOG FLEXPEN) 100 UNIT/ML pen; LEVEMIR FLEXTOUCH 100 UNIT/ML injection; exenatide ER (BYDUREON) 2 MG recon susp for weekly inj .  Rationale:  reorder    Insurance Name: [employer]  Insurance ID:  [id number]       Pharmacy Information (if different than what is on RX)  Name:    Phone:      LEONEL BAR MA on 4/12/2021 at 2:30 PM

## 2021-04-13 NOTE — TELEPHONE ENCOUNTER
Central Prior Authorization Team   Phone: [phone]      PA Initiation    Medication: insulin degludec (TRESIBA FLEXTOUCH) 200 UNIT/ML pen-Initiated  Insurance Company: "[employer]" - Phone [phone] Fax [phone]  [facility] [id number] [place], [place] [facility] [address]  Filling Pharmacy Phone: [phone]  Filling Pharmacy Fax:    Start Date: 4/13/2021

## 2021-04-14 NOTE — TELEPHONE ENCOUNTER
Prior Authorization Approval    Authorization Effective Date: 3/14/2021  Authorization Expiration Date: 4/14/2024  Medication: insulin degludec (TRESIBA FLEXTOUCH) 200 UNIT/ML pen-APPROVED  Approved Dose/Quantity:   Reference #:     Insurance Company: [employer] - Phone [phone] Fax [phone]  Expected CoPay:       CoPay Card Available:      Foundation Assistance Needed:    Which Pharmacy is filling the prescription (Not needed for infusion/clinic administered): [facility] [id number] [facility] [id number] [place] [address]  Pharmacy Notified: Yes  Patient Notified: No    Pharmacy will notify patient when medication is ready.

## 2021-04-19 ENCOUNTER — MYC MEDICAL ADVICE (OUTPATIENT)
Dept: PEDIATRICS | Facility: CLINIC | Age: 56
End: 2021-04-19

## 2021-04-19 DIAGNOSIS — M17.12 PRIMARY OSTEOARTHRITIS OF LEFT KNEE: Primary | ICD-10-CM

## 2021-04-19 NOTE — TELEPHONE ENCOUNTER
Dr. Deal-    He has been staying out of the clinic. But would like to have his knees checked out. History of arthritis. Can he get referral to ortho or would you like to see him first? Kerry Perdomo RN on 4/19/2021 at 2:15 PM

## 2021-04-28 ENCOUNTER — OFFICE VISIT (OUTPATIENT)
Dept: ORTHOPEDICS | Facility: CLINIC | Age: 56
End: 2021-04-28
Payer: COMMERCIAL

## 2021-04-28 ENCOUNTER — ANCILLARY PROCEDURE (OUTPATIENT)
Dept: GENERAL RADIOLOGY | Facility: CLINIC | Age: 56
End: 2021-04-28
Attending: STUDENT IN AN ORGANIZED HEALTH CARE EDUCATION/TRAINING PROGRAM
Payer: COMMERCIAL

## 2021-04-28 VITALS
WEIGHT: 245 LBS | DIASTOLIC BLOOD PRESSURE: 98 MMHG | SYSTOLIC BLOOD PRESSURE: 146 MMHG | HEIGHT: 66 IN | BODY MASS INDEX: 39.37 KG/M2

## 2021-04-28 DIAGNOSIS — M25.562 CHRONIC PAIN OF LEFT KNEE: ICD-10-CM

## 2021-04-28 DIAGNOSIS — G89.29 CHRONIC PAIN OF LEFT KNEE: Primary | ICD-10-CM

## 2021-04-28 DIAGNOSIS — M25.562 CHRONIC PAIN OF LEFT KNEE: Primary | ICD-10-CM

## 2021-04-28 DIAGNOSIS — G89.29 CHRONIC PAIN OF LEFT KNEE: ICD-10-CM

## 2021-04-28 DIAGNOSIS — M17.0 PRIMARY OSTEOARTHRITIS OF BOTH KNEES: ICD-10-CM

## 2021-04-28 PROCEDURE — 20611 DRAIN/INJ JOINT/BURSA W/US: CPT | Mod: 50 | Performed by: STUDENT IN AN ORGANIZED HEALTH CARE EDUCATION/TRAINING PROGRAM

## 2021-04-28 PROCEDURE — 73562 X-RAY EXAM OF KNEE 3: CPT | Performed by: RADIOLOGY

## 2021-04-28 PROCEDURE — 99203 OFFICE O/P NEW LOW 30 MIN: CPT | Mod: 25 | Performed by: STUDENT IN AN ORGANIZED HEALTH CARE EDUCATION/TRAINING PROGRAM

## 2021-04-28 RX ADMIN — LIDOCAINE HYDROCHLORIDE 3 ML: 10 INJECTION, SOLUTION INFILTRATION; PERINEURAL at 17:14

## 2021-04-28 RX ADMIN — TRIAMCINOLONE ACETONIDE 40 MG: 40 INJECTION, SUSPENSION INTRA-ARTICULAR; INTRAMUSCULAR at 17:14

## 2021-04-28 RX ADMIN — ROPIVACAINE HYDROCHLORIDE 3 ML: 5 INJECTION, SOLUTION EPIDURAL; INFILTRATION; PERINEURAL at 17:14

## 2021-04-28 ASSESSMENT — MIFFLIN-ST. JEOR: SCORE: 1879.06

## 2021-04-28 NOTE — LETTER
"    4/28/2021         RE: Glen Cho  [address]  [place] [address]        Dear Colleague,    Thank you for referring your patient, Glen Cho, to the [facility]. Please see a copy of my visit note below.    ASSESSMENT & PLAN    1. Chronic pain of left knee  2. Primary osteoarthritis of both knees  - XR Knee Standing AP Bilat [place] Bilat Lat Left; Future  - PHYSICAL THERAPY REFERRAL (Internal); Future      Patient here with chronic bilateral knee pain, left worse than right recently.  History of partial medial meniscectomy on the right.  Left knee x-rays obtained today and reviewed with patient, demonstrating mild to moderate medial and patellofemoral DJD.  We discussed the nature of osteoarthritis as well as available treatment options.  Patient elects to proceed with bilateral steroid injections today, which he tolerated well, and referral to physical therapy.  Patient is well aware of the role of weight loss in arthritis management and hopefully with better symptomatic management he will be able to increase his activity level.  Follow-up with me as needed going forward.    Desmond Varela MD  [facility]    -----  Chief Complaint   Patient presents with     Left Knee - Pain       SUBJECTIVE  Glen Cho is a/an 55 year old male who is seen in consultation at the request of  Mike Deal M.D. for evaluation of  Left knee pain.     The patient is seen by themselves.    Date of Onset: \"years\". Reports insidious onset without acute precipitating event.  Location of Pain: left medial knee, occasional left lateral knee  Worsened by: prolonged standing, prolonged walking, valgus stress, sitting with knee bent and feet back  Better with: elevation / rest  Treatments tried: rest/activity avoidance, Tylenol and ibuprofen  Associated symptoms: no distal numbness or tingling; denies swelling or warmth.  No locking " "or significant mechanical symptoms.    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      OBJECTIVE:  BP (!) 146/98   Ht 1.676 m (5' 6\")   Wt 111.1 kg (245 lb)   BMI 39.54 kg/m     General: healthy, alert and in no distress  HEENT: no scleral icterus or conjunctival erythema  Skin: no visible suspicious lesions or rashes.   Resp: normal respiratory effort without conversational dyspnea   Psych: normal mood and affect  Gait: antalgic    MSK:   Right knee without deformity, well-healed arthroscopy scars skin otherwise unremarkable.  Trace effusion present  Range of motion -3 to 120 degrees  Minimal tenderness along medial joint line only  Extensor mechanism intact  Negative Lachman  CMS intact distally    Left knee without deformity, skin intact and unremarkable  Small effusion appreciated  Range of motion -3 to 120 degrees, symmetric  Focal tenderness along medial joint line only  Extensor mechanism intact  Negative Lachman  CMS intact distally      RADIOLOGY:  Xr Knee Standing Ap Bilat [place] Bilat Lat Left    Result Date: 4/28/2021  XR KNEE STANDING AP BILAT [place] BILAT LAT LT 4/28/2021 4:26 PM HISTORY: Chronic left knee pain. COMPARISON: 3/3/2014 right knee.     IMPRESSION: Right: Moderate to advanced medial compartment narrowing with mild hypertrophic change has progressed. Mild hypertrophic change in the patellofemoral compartment. Normal patellar alignment. Left: Moderate to advanced medial compartment narrowing with mild hypertrophic change. Mild hypertrophic change in the patellofemoral compartment. Normal patellar alignment. Trace knee joint effusion. VOLODYMYR TURNER MD     Large Joint Injection/Arthocentesis: bilateral knee    Date/Time: 4/28/2021 5:14 PM  Performed by: Desmond Varela MD  Authorized by: Desmond Varela MD "     Indications:  Pain and osteoarthritis  Needle Size:  22 G  Guidance: ultrasound    Approach:  Anterolateral  Location:  Knee  Laterality:  Bilateral      Medications (Right):  40 mg triamcinolone 40 MG/ML; 3 mL lidocaine 1 %; 3 mL ropivacaine 5 MG/ML  Medications (Left):  40 mg triamcinolone 40 MG/ML; 3 mL lidocaine 1 %; 3 mL ropivacaine 5 MG/ML  Outcome:  Tolerated well, no immediate complications  Procedure discussed: discussed risks, benefits, and alternatives    Consent Given by:  Patient  Timeout: timeout called immediately prior to procedure    Prep: patient was prepped and draped in usual sterile fashion              Again, thank you for allowing me to participate in the care of your patient.        Sincerely,        Desmond Varela MD

## 2021-04-28 NOTE — TELEPHONE ENCOUNTER
[employer] message sent to patient with below.     LEONEL BAR MA on 4/28/2021 at 1:24 PM

## 2021-04-28 NOTE — PATIENT INSTRUCTIONS
Steroid injection of the bilateral knee was performed today in clinic    - Would not soak in a hot tub, bath or swimming pool for 48 hours  - Ok to shower  - Ice today and only do your normal amounts of activity  - The lidocaine (what is giving you pain relief right now) will likely stop working in 1-2 hours.  You will then have pain again, similar to before you received the injection. The corticosteroid will not start working until approximately 1-2 weeks from now.  In a small percentage of people, cortisone can cause flushing/redness in the face. This usually lasts for 1-3 days and resolves. Cool compress and Ibuprofen/Tylenol can help if this happens.      Referral to physical therapy - [phone]

## 2021-04-29 RX ORDER — ROPIVACAINE HYDROCHLORIDE 5 MG/ML
3 INJECTION, SOLUTION EPIDURAL; INFILTRATION; PERINEURAL
Status: DISCONTINUED | OUTPATIENT
Start: 2021-04-28 | End: 2023-07-17

## 2021-04-29 RX ORDER — LIDOCAINE HYDROCHLORIDE 10 MG/ML
3 INJECTION, SOLUTION INFILTRATION; PERINEURAL
Status: DISCONTINUED | OUTPATIENT
Start: 2021-04-28 | End: 2023-07-17

## 2021-04-29 RX ORDER — TRIAMCINOLONE ACETONIDE 40 MG/ML
40 INJECTION, SUSPENSION INTRA-ARTICULAR; INTRAMUSCULAR
Status: DISCONTINUED | OUTPATIENT
Start: 2021-04-28 | End: 2023-07-17

## 2021-05-29 ENCOUNTER — HEALTH MAINTENANCE LETTER (OUTPATIENT)
Age: 56
End: 2021-05-29

## 2021-06-12 ENCOUNTER — TRANSFERRED RECORDS (OUTPATIENT)
Dept: HEALTH INFORMATION MANAGEMENT | Facility: CLINIC | Age: 56
End: 2021-06-12

## 2021-06-14 ENCOUNTER — TELEPHONE (OUTPATIENT)
Dept: PEDIATRICS | Facility: CLINIC | Age: 56
End: 2021-06-14

## 2021-06-14 ENCOUNTER — MYC MEDICAL ADVICE (OUTPATIENT)
Dept: PEDIATRICS | Facility: CLINIC | Age: 56
End: 2021-06-14

## 2021-06-14 DIAGNOSIS — E11.21 TYPE 2 DIABETES MELLITUS WITH DIABETIC NEPHROPATHY, WITH LONG-TERM CURRENT USE OF INSULIN (H): Primary | ICD-10-CM

## 2021-06-14 DIAGNOSIS — Z72.0 TOBACCO ABUSE: ICD-10-CM

## 2021-06-14 DIAGNOSIS — E11.21 TYPE 2 DIABETES MELLITUS WITH DIABETIC NEPHROPATHY, WITH LONG-TERM CURRENT USE OF INSULIN (H): ICD-10-CM

## 2021-06-14 DIAGNOSIS — E78.5 HYPERLIPIDEMIA LDL GOAL <100: Primary | ICD-10-CM

## 2021-06-14 DIAGNOSIS — Z79.4 TYPE 2 DIABETES MELLITUS WITH DIABETIC NEPHROPATHY, WITH LONG-TERM CURRENT USE OF INSULIN (H): Primary | ICD-10-CM

## 2021-06-14 DIAGNOSIS — Z79.4 TYPE 2 DIABETES MELLITUS WITH DIABETIC NEPHROPATHY, WITH LONG-TERM CURRENT USE OF INSULIN (H): ICD-10-CM

## 2021-06-14 RX ORDER — BUPROPION HYDROCHLORIDE 150 MG/1
150 TABLET ORAL EVERY MORNING
Qty: 7 TABLET | Refills: 0 | Status: SHIPPED | OUTPATIENT
Start: 2021-06-14 | End: 2022-09-19

## 2021-06-14 RX ORDER — SIMVASTATIN 20 MG
20 TABLET ORAL AT BEDTIME
Qty: 90 TABLET | Refills: 0 | Status: SHIPPED | OUTPATIENT
Start: 2021-06-14 | End: 2021-06-25

## 2021-06-14 RX ORDER — BUPROPION HYDROCHLORIDE 300 MG/1
300 TABLET ORAL EVERY MORNING
Qty: 90 TABLET | Refills: 1 | Status: SHIPPED | OUTPATIENT
Start: 2021-06-14 | End: 2022-09-19

## 2021-06-14 NOTE — TELEPHONE ENCOUNTER
We recevied a fax from patient's pharmacy, [facility] on [address] in [place]. Fax was patient's request to restart Simvastatin 20 mg daily if appropriate.     Routing to Dr. Deal to review. It does look like patient is overdue to be seen and to have labs.     LEONEL BAR MA on 6/14/2021 at 12:00 PM

## 2021-06-14 NOTE — TELEPHONE ENCOUNTER
Please see Doris from patient. Patient requesting orders for labs and wellbutrin for smoking cessation.    Upon chart review, it was recommended in 8/4/20 e-visit to schedule an appointment and labs day or so before appointment. Labs were done, but appointment was not. Patient last in clinic visit was 8/19/19, has had several e-visits since then.    Will route to Dr. Deal- Would you like for patient to be scheduled for in person visit to address medication and lab request? A1C is due in , please sign pended order if appropriate and add any other labs necessary. Please route to MA/TC to schedule lab and OV if appropriate. Thanks!    Jovana SMITH RN

## 2021-06-15 DIAGNOSIS — Z79.4 TYPE 2 DIABETES MELLITUS WITH DIABETIC NEPHROPATHY, WITH LONG-TERM CURRENT USE OF INSULIN (H): ICD-10-CM

## 2021-06-15 DIAGNOSIS — E11.21 TYPE 2 DIABETES MELLITUS WITH DIABETIC NEPHROPATHY, WITH LONG-TERM CURRENT USE OF INSULIN (H): ICD-10-CM

## 2021-06-15 LAB — HBA1C MFR BLD: 7.3 % (ref 0–5.6)

## 2021-06-15 PROCEDURE — 83036 HEMOGLOBIN GLYCOSYLATED A1C: CPT | Performed by: INTERNAL MEDICINE

## 2021-06-15 PROCEDURE — 36415 COLL VENOUS BLD VENIPUNCTURE: CPT | Performed by: INTERNAL MEDICINE

## 2021-06-25 ENCOUNTER — OFFICE VISIT (OUTPATIENT)
Dept: PEDIATRICS | Facility: CLINIC | Age: 56
End: 2021-06-25
Payer: COMMERCIAL

## 2021-06-25 VITALS
SYSTOLIC BLOOD PRESSURE: 144 MMHG | DIASTOLIC BLOOD PRESSURE: 95 MMHG | OXYGEN SATURATION: 97 % | BODY MASS INDEX: 41.97 KG/M2 | WEIGHT: 260 LBS | HEART RATE: 69 BPM | TEMPERATURE: 97.4 F

## 2021-06-25 DIAGNOSIS — E78.5 HYPERLIPIDEMIA LDL GOAL <100: ICD-10-CM

## 2021-06-25 DIAGNOSIS — Z79.4 TYPE 2 DIABETES MELLITUS WITH DIABETIC NEPHROPATHY, WITH LONG-TERM CURRENT USE OF INSULIN (H): Primary | ICD-10-CM

## 2021-06-25 DIAGNOSIS — E66.01 MORBID OBESITY (H): ICD-10-CM

## 2021-06-25 DIAGNOSIS — I10 ESSENTIAL HYPERTENSION: ICD-10-CM

## 2021-06-25 DIAGNOSIS — E11.21 TYPE 2 DIABETES MELLITUS WITH DIABETIC NEPHROPATHY, WITH LONG-TERM CURRENT USE OF INSULIN (H): Primary | ICD-10-CM

## 2021-06-25 DIAGNOSIS — L91.8 SKIN TAG: ICD-10-CM

## 2021-06-25 DIAGNOSIS — F32.5 MAJOR DEPRESSIVE DISORDER, SINGLE EPISODE IN FULL REMISSION (H): ICD-10-CM

## 2021-06-25 PROCEDURE — 11200 RMVL SKIN TAGS UP TO&INC 15: CPT | Performed by: INTERNAL MEDICINE

## 2021-06-25 PROCEDURE — 96127 BRIEF EMOTIONAL/BEHAV ASSMT: CPT | Mod: 59 | Performed by: INTERNAL MEDICINE

## 2021-06-25 PROCEDURE — 99214 OFFICE O/P EST MOD 30 MIN: CPT | Mod: 25 | Performed by: INTERNAL MEDICINE

## 2021-06-25 PROCEDURE — 99207 PR FOOT EXAM NO CHARGE: CPT | Mod: 25 | Performed by: INTERNAL MEDICINE

## 2021-06-25 RX ORDER — PEN NEEDLE, DIABETIC 32GX 5/32"
NEEDLE, DISPOSABLE MISCELLANEOUS
Qty: 360 EACH | Refills: 5 | Status: SHIPPED | OUTPATIENT
Start: 2021-06-25 | End: 2022-07-06

## 2021-06-25 RX ORDER — SIMVASTATIN 20 MG
20 TABLET ORAL AT BEDTIME
Qty: 90 TABLET | Refills: 3 | Status: SHIPPED | OUTPATIENT
Start: 2021-06-25 | End: 2022-09-19

## 2021-06-25 RX ORDER — LOSARTAN POTASSIUM AND HYDROCHLOROTHIAZIDE 25; 100 MG/1; MG/1
1 TABLET ORAL DAILY
Qty: 90 TABLET | Refills: 3 | Status: SHIPPED | OUTPATIENT
Start: 2021-06-25 | End: 2022-09-19

## 2021-06-25 RX ORDER — INSULIN DEGLUDEC 200 U/ML
80 INJECTION, SOLUTION SUBCUTANEOUS DAILY
Qty: 36 ML | Refills: 1 | Status: SHIPPED | OUTPATIENT
Start: 2021-06-25 | End: 2022-03-22

## 2021-06-25 RX ORDER — METFORMIN HCL 500 MG
2000 TABLET, EXTENDED RELEASE 24 HR ORAL
Qty: 360 TABLET | Refills: 3 | Status: SHIPPED | OUTPATIENT
Start: 2021-06-25 | End: 2022-09-19

## 2021-06-25 ASSESSMENT — ANXIETY QUESTIONNAIRES
7. FEELING AFRAID AS IF SOMETHING AWFUL MIGHT HAPPEN: NOT AT ALL
3. WORRYING TOO MUCH ABOUT DIFFERENT THINGS: NOT AT ALL
4. TROUBLE RELAXING: NOT AT ALL
GAD7 TOTAL SCORE: 1
5. BEING SO RESTLESS THAT IT IS HARD TO SIT STILL: NOT AT ALL
GAD7 TOTAL SCORE: 1
1. FEELING NERVOUS, ANXIOUS, OR ON EDGE: SEVERAL DAYS
6. BECOMING EASILY ANNOYED OR IRRITABLE: NOT AT ALL
2. NOT BEING ABLE TO STOP OR CONTROL WORRYING: NOT AT ALL
7. FEELING AFRAID AS IF SOMETHING AWFUL MIGHT HAPPEN: NOT AT ALL
GAD7 TOTAL SCORE: 1

## 2021-06-25 ASSESSMENT — PATIENT HEALTH QUESTIONNAIRE - PHQ9
SUM OF ALL RESPONSES TO PHQ QUESTIONS 1-9: 0
10. IF YOU CHECKED OFF ANY PROBLEMS, HOW DIFFICULT HAVE THESE PROBLEMS MADE IT FOR YOU TO DO YOUR WORK, TAKE CARE OF THINGS AT HOME, OR GET ALONG WITH OTHER PEOPLE: NOT DIFFICULT AT ALL
SUM OF ALL RESPONSES TO PHQ QUESTIONS 1-9: 0

## 2021-06-25 NOTE — PROGRESS NOTES
"    Assessment & Plan     Major depressive disorder, single episode in full remission (H)  Continue on wellbutrin; his symptoms are not serious currently, and his wellbutrin currently is for tobacco cessation.  Counselled extensively on risks of suicidality.  Contracts for safety.     Type 2 diabetes mellitus with diabetic nephropathy, with long-term current use of insulin (H)  diabetes blood test (A1c) within normal limits; quitting smoking today.   - insulin pen needle (BD KENTRELL U/F) 32G X 4 MM miscellaneous; USE 4 DAILY OR AS DIRECTED.  - simvastatin (ZOCOR) 20 MG tablet; Take 1 tablet (20 mg) by mouth At Bedtime  - metFORMIN (GLUCOPHAGE-XR) 500 MG 24 hr tablet; Take 4 tablets (2,000 mg) by mouth daily (with dinner)  - insulin degludec (TRESIBA FLEXTOUCH) 200 UNIT/ML pen; Inject 80 Units Subcutaneous daily    Morbid obesity (H)  Discussed weight management.     Hyperlipidemia LDL goal <100  Continue statin.   - simvastatin (ZOCOR) 20 MG tablet; Take 1 tablet (20 mg) by mouth At Bedtime    Essential hypertension  Not at goal. Increase to 100/25 and follow up in 1 month.  - losartan-hydrochlorothiazide (HYZAAR) 100-25 MG tablet; Take 1 tablet by mouth daily    Tobacco abuse: quitting successfully.    Skin tag:    Procedure note:  Skin tag removal:    Risks of bleeding, infection, and scarring verbally described to patient, and verbal consent obtained.  Cleaned area with iodine, then injected 0.5 cc of 2% lidocaine with epinephrine into base of lesion.  Removed skin tag with 15 scalpel.  Cauterized with silver nitrate, then dressed with bandage.  Patient tolerated procedure well.              Tobacco Cessation:   reports that he has been smoking cigarettes. He has never used smokeless tobacco.  Tobacco Cessation Action Plan: Pharmacotherapies : Zyban/Wellbutrin    BMI:   Estimated body mass index is 41.97 kg/m  as calculated from the following:    Height as of 4/28/21: 1.676 m (5' 6\").    Weight as of this encounter: " 117.9 kg (260 lb).   Weight management plan: Patient was referred to their PCP to discuss a diet and exercise plan.    Patient Instructions   Let's increase your losartan / hydrochlorothiazide to 100/ 25     Follow up for a blood pressure recheck in 1-2 months.    Increase your tresiba to 80 units at bedtime.    No changes in other meds.    Good luck with smoking.          Return in about 2 months (around 2021) for medicine followup, with me, in person.    Mike Deal MD  [facility] MEGAN Carroll is a 56 year old who presents for the following health issues diabetes check in. Possible skin tag removal. Right butt check    History of Present Illness       Mental Health Follow-up:  Patient presents to follow-up on Depression.Patient's depression since last visit has been:  Good  The patient is not having other symptoms associated with depression.      Any significant life events: job concerns  Patient is not feeling anxious or having panic attacks.  Patient has no concerns about alcohol or drug use.     Social History  Tobacco Use    Smoking status: Current Every Day Smoker      Types: Cigarettes      Quit date: 5/10/2000      Years since quittin.1    Smokeless tobacco: Never Used  Alcohol use: Never    Alcohol/week: 0.0 standard drinks    Frequency: Never    Comment: rarely  Drug use: No      Today's PHQ-9         PHQ-9 Total Score:     (P) 0   PHQ-9 Q9 Thoughts of better off dead/self-harm past 2 weeks :   (P) Not at all   Thoughts of suicide or self harm:      Self-harm Plan:        Self-harm Action:          Safety concerns for self or others:           Diabetes:   He presents for follow up of diabetes.  He is checking home blood glucose a few times a month. When his blood glucose is low, the patient is asymptomatic for confusion, blurred vision, lethargy and reports not feeling dizzy, shaky, or weak.               Trying to quit smoking; on wellbutrin. Stress about finding  "a new job. Not sleeping well    upper respiratory infection recently; [facility]; retching; feels like \"popped both bottom ribs.\" Strain in left ribcage muscles.  No fevers.      Diabetes Follow-up    How often are you checking your blood sugar? A few times a month  What time of day are you checking your blood sugars (select all that apply)?  Before meals  Have you had any blood sugars above 200?  No  Have you had any blood sugars below 70?  No    What symptoms do you notice when your blood sugar is low?  None    What concerns do you have today about your diabetes? None and Getting infections often     Do you have any of these symptoms? (Select all that apply)  No numbness or tingling in feet.  No redness, sores or blisters on feet.  No complaints of excessive thirst.  No reports of blurry vision.  No significant changes to weight.    Have you had a diabetic eye exam in the last 12 months? Yes- Date of last eye exam: 6 months,  Location: [facility]    Taking Tresiba 50 once daily. , and novolog about 15-20 units per meal.    AM sugars not being checked much; but usually about 125 or so.              Hyperlipidemia Follow-Up      Are you regularly taking any medication or supplement to lower your cholesterol?   Yes- daily    Are you having muscle aches or other side effects that you think could be caused by your cholesterol lowering medication?  No    Hypertension Follow-up      Do you check your blood pressure regularly outside of the clinic? No     Are you following a low salt diet? No    Are your blood pressures ever more than 140 on the top number (systolic) OR more   than 90 on the bottom number (diastolic), for example 140/90? No    BP Readings from Last 2 Encounters:   06/25/21 (!) 144/95   04/28/21 (!) 146/98     Hemoglobin A1C (%)   Date Value   06/15/2021 7.3 (H)   10/16/2020 6.3 (H)     LDL Cholesterol Calculated (mg/dL)   Date Value   10/16/2020 129 (H)   02/05/2019 " 100 (H)     Answers for HPI/ROS submitted by the patient on 2021   Chronic problems general questions HPI Form  If you checked off any problems, how difficult have these problems made it for you to do your work, take care of things at home, or get along with other people?: Not difficult at all  PHQ9 TOTAL SCORE: 0  BONNY 7 TOTAL SCORE: 1  Dietary sodium intake:: No    Social History     Tobacco Use     Smoking status: Current Every Day Smoker     Types: Cigarettes     Last attempt to quit: 5/10/2000     Years since quittin.1     Smokeless tobacco: Never Used   Substance Use Topics     Alcohol use: Never     Alcohol/week: 0.0 standard drinks     Frequency: Never     Comment: rarely     Drug use: No     PHQ 2018   PHQ-9 Total Score 0 0 0   Q9: Thoughts of better off dead/self-harm past 2 weeks Not at all Not at all Not at all     BONNY-7 SCORE 2018   Total Score - - -   Total Score - - 1 (minimal anxiety)   Total Score 0 0 1     Last PHQ-9 2021   1.  Little interest or pleasure in doing things 0   2.  Feeling down, depressed, or hopeless 0   3.  Trouble falling or staying asleep, or sleeping too much 0   4.  Feeling tired or having little energy 0   5.  Poor appetite or overeating 0   6.  Feeling bad about yourself 0   7.  Trouble concentrating 0   8.  Moving slowly or restless 0   Q9: Thoughts of better off dead/self-harm past 2 weeks 0   PHQ-9 Total Score 0   Difficulty at work, home, or with people -     BONNY-7  2021   1. Feeling nervous, anxious, or on edge 1   2. Not being able to stop or control worrying 0   3. Worrying too much about different things 0   4. Trouble relaxing 0   5. Being so restless that it is hard to sit still 0   6. Becoming easily annoyed or irritable 0   7. Feeling afraid, as if something awful might happen 0   BONNY-7 Total Score 1   If you checked any problems, how difficult have they made it for you to do your work, take care of  things at home, or get along with other people? -       Suicide Assessment Five-step Evaluation and Treatment (SAFE-T)        Review of Systems   CONSTITUTIONAL: NEGATIVE for fever, chills, change in weight  INTEGUMENTARY/SKIN: NEGATIVE for worrisome rashes, moles or lesions  EYES: NEGATIVE for vision changes or irritation  ENT/MOUTH: NEGATIVE for ear, mouth and throat problems  RESP: NEGATIVE for significant cough or SOB  BREAST: NEGATIVE for masses, tenderness or discharge  CV: NEGATIVE for chest pain, palpitations or peripheral edema  GI: NEGATIVE for nausea, abdominal pain, heartburn, or change in bowel habits  : NEGATIVE for frequency, dysuria, or hematuria  MUSCULOSKELETAL: NEGATIVE for significant arthralgias or myalgia  NEURO: NEGATIVE for weakness, dizziness or paresthesias  ENDOCRINE: NEGATIVE for temperature intolerance, skin/hair changes  HEME: NEGATIVE for bleeding problems  PSYCHIATRIC: NEGATIVE for changes in mood or affect      Objective    BP (!) 144/95 (BP Location: Right arm, Patient Position: Sitting, Cuff Size: Adult Large)   Pulse 69   Temp 97.4  F (36.3  C) (Tympanic)   Wt 117.9 kg (260 lb)   SpO2 97%   BMI 41.97 kg/m    Body mass index is 41.97 kg/m .  Physical Exam   GENERAL: healthy, alert and no distress  EYES: Eyes grossly normal to inspection, PERRL and conjunctivae and sclerae normal  HENT: ear canals and TM's normal, nose and mouth without ulcers or lesions  NECK: no adenopathy, no asymmetry, masses, or scars and thyroid normal to palpation  RESP: lungs clear to auscultation - no rales, rhonchi or wheezes  CV: regular rate and rhythm, normal S1 S2, no S3 or S4, no murmur, click or rub, no peripheral edema and peripheral pulses strong  ABDOMEN: soft, nontender, no hepatosplenomegaly, no masses and bowel sounds normal  MS: no gross musculoskeletal defects noted, no edema  SKIN: no suspicious lesions or rashes.  Large skin tag right buttock.   NEURO: Normal strength and tone,  mentation intact and speech normal  PSYCH: mentation appears normal, affect normal/bright  Diabetic Foot Exam:  normal DP and PT pulses, no trophic changes or ulcerative lesions and normal sensory exam

## 2021-06-25 NOTE — PATIENT INSTRUCTIONS
Let's increase your losartan / hydrochlorothiazide to 100/ 25     Follow up for a blood pressure recheck in 1-2 months.    Increase your tresiba to 80 units at bedtime.    No changes in other meds.    Good luck with smoking.

## 2021-06-26 ASSESSMENT — ANXIETY QUESTIONNAIRES: GAD7 TOTAL SCORE: 1

## 2021-06-26 ASSESSMENT — PATIENT HEALTH QUESTIONNAIRE - PHQ9: SUM OF ALL RESPONSES TO PHQ QUESTIONS 1-9: 0

## 2021-07-27 NOTE — TELEPHONE ENCOUNTER
Patient scheduled an appointment today.   Requested Prescriptions   Pending Prescriptions Disp Refills     simvastatin (ZOCOR) 20 MG tablet [Pharmacy Med Name: SIMVASTATIN 20 MG TABLET] 90 tablet 1     Sig: TAKE 1 TABLET (20 MG) BY MOUTH AT BEDTIME    Statins Protocol Failed    11/17/2017  8:08 AM       Failed - LDL on file in past 12 months    Recent Labs   Lab Test  07/11/16   0810   LDL  71            Passed - No abnormal creatine kinase in past 12 months    No lab results found.         Passed - Recent or future visit with authorizing provider    Patient had office visit in the last year or has a visit in the next 30 days with authorizing provider.  See chart review.              Passed - Patient is age 18 or older        Medication is being filled for 1 time refill only due to:  Patient needs labs FLP. Full Px with fasting labs advised in pharmacy note.     Teresa Hargrove RN -- [facility]

## 2021-08-02 ENCOUNTER — LAB REQUISITION (OUTPATIENT)
Dept: LAB | Facility: CLINIC | Age: 56
End: 2021-08-02

## 2021-08-02 LAB — HBV SURFACE AB SERPL IA-ACNC: 0.88 M[IU]/ML

## 2021-08-02 PROCEDURE — 86481 TB AG RESPONSE T-CELL SUSP: CPT | Performed by: INTERNAL MEDICINE

## 2021-08-02 PROCEDURE — 86706 HEP B SURFACE ANTIBODY: CPT | Performed by: INTERNAL MEDICINE

## 2021-08-04 LAB
GAMMA INTERFERON BACKGROUND BLD IA-ACNC: 0.03 IU/ML
M TB IFN-G BLD-IMP: NEGATIVE
M TB IFN-G CD4+ BCKGRND COR BLD-ACNC: 9.97 IU/ML
MITOGEN IGNF BCKGRD COR BLD-ACNC: 0.01 IU/ML
MITOGEN IGNF BCKGRD COR BLD-ACNC: 0.02 IU/ML
QUANTIFERON MITOGEN: 10 IU/ML
QUANTIFERON NIL TUBE: 0.03 IU/ML
QUANTIFERON TB1 TUBE: 0.04 IU/ML
QUANTIFERON TB2 TUBE: 0.05

## 2021-09-18 ENCOUNTER — HEALTH MAINTENANCE LETTER (OUTPATIENT)
Age: 56
End: 2021-09-18

## 2021-10-24 DIAGNOSIS — E11.21 TYPE 2 DIABETES MELLITUS WITH DIABETIC NEPHROPATHY, WITH LONG-TERM CURRENT USE OF INSULIN (H): ICD-10-CM

## 2021-10-24 DIAGNOSIS — Z79.4 TYPE 2 DIABETES MELLITUS WITH DIABETIC NEPHROPATHY, WITH LONG-TERM CURRENT USE OF INSULIN (H): ICD-10-CM

## 2021-10-26 RX ORDER — INSULIN ASPART 100 [IU]/ML
18 INJECTION, SOLUTION INTRAVENOUS; SUBCUTANEOUS 2 TIMES DAILY WITH MEALS
Qty: 12 ML | Refills: 5 | Status: SHIPPED | OUTPATIENT
Start: 2021-10-26 | End: 2022-09-19

## 2021-10-26 NOTE — TELEPHONE ENCOUNTER
Routing refill request to provider for review/approval because:  Labs not current:  CR  A break in medication    Lenora Randall RN on 10/26/2021 at 11:36 AM

## 2021-11-29 ENCOUNTER — TELEPHONE (OUTPATIENT)
Dept: PEDIATRICS | Facility: CLINIC | Age: 56
End: 2021-11-29
Payer: COMMERCIAL

## 2021-11-29 ENCOUNTER — PATIENT OUTREACH (OUTPATIENT)
Dept: PEDIATRICS | Facility: CLINIC | Age: 56
End: 2021-11-29
Payer: COMMERCIAL

## 2021-11-29 NOTE — TELEPHONE ENCOUNTER
Attempt #3 LVM and sent mychart. Closing encounter.    Yulisa Augustin, EMT at 5:20 PM on December 1, 2021  [facility]  [phone]        Attempt #2 LVM     Yulisa Augustin EMT at 9:47 AM on November 30, 2021  [facility]  [phone]      Attempt #1 called about MTM . LVCARLOS Augustin EMT at 10:15 AM on November 29, 2021  [facility]  [phone]

## 2021-12-01 ENCOUNTER — TELEPHONE (OUTPATIENT)
Dept: PEDIATRICS | Facility: CLINIC | Age: 56
End: 2021-12-01
Payer: COMMERCIAL

## 2021-12-02 ENCOUNTER — TRANSFERRED RECORDS (OUTPATIENT)
Dept: HEALTH INFORMATION MANAGEMENT | Facility: CLINIC | Age: 56
End: 2021-12-02
Payer: COMMERCIAL

## 2022-01-08 ENCOUNTER — HEALTH MAINTENANCE LETTER (OUTPATIENT)
Age: 57
End: 2022-01-08

## 2022-03-18 DIAGNOSIS — E11.21 TYPE 2 DIABETES MELLITUS WITH DIABETIC NEPHROPATHY, WITH LONG-TERM CURRENT USE OF INSULIN (H): ICD-10-CM

## 2022-03-18 DIAGNOSIS — Z79.4 TYPE 2 DIABETES MELLITUS WITH DIABETIC NEPHROPATHY, WITH LONG-TERM CURRENT USE OF INSULIN (H): ICD-10-CM

## 2022-03-22 RX ORDER — INSULIN DEGLUDEC 200 U/ML
80 INJECTION, SOLUTION SUBCUTANEOUS DAILY
Qty: 36 ML | Refills: 0 | Status: SHIPPED | OUTPATIENT
Start: 2022-03-22 | End: 2022-06-29

## 2022-03-22 NOTE — TELEPHONE ENCOUNTER
Medication is being filled for 1 time refill only due to:  Patient needs labs A1c, creatinine. Patient needs to be seen because due for follow up w/ PCP .  Routing to MA/TC pool. The Pt is due for a visit with PCP. Please call and help them schedule.    RN will issue a 90 day supply.      Uvaldo CHRISTIAN RN

## 2022-06-01 ENCOUNTER — TRANSFERRED RECORDS (OUTPATIENT)
Dept: HEALTH INFORMATION MANAGEMENT | Facility: CLINIC | Age: 57
End: 2022-06-01

## 2022-06-01 ENCOUNTER — MYC MEDICAL ADVICE (OUTPATIENT)
Dept: PEDIATRICS | Facility: CLINIC | Age: 57
End: 2022-06-01
Payer: COMMERCIAL

## 2022-06-01 DIAGNOSIS — M19.90 ARTHRITIS: Primary | ICD-10-CM

## 2022-06-01 LAB — RETINOPATHY: NEGATIVE

## 2022-06-01 RX ORDER — CELECOXIB 100 MG/1
100 CAPSULE ORAL 2 TIMES DAILY
Qty: 60 CAPSULE | Refills: 2 | Status: SHIPPED | OUTPATIENT
Start: 2022-06-01 | End: 2022-06-17

## 2022-06-17 ENCOUNTER — MYC MEDICAL ADVICE (OUTPATIENT)
Dept: PEDIATRICS | Facility: CLINIC | Age: 57
End: 2022-06-17
Payer: COMMERCIAL

## 2022-06-17 DIAGNOSIS — M19.90 ARTHRITIS: ICD-10-CM

## 2022-06-17 RX ORDER — CELECOXIB 100 MG/1
200 CAPSULE ORAL 2 TIMES DAILY
Qty: 120 CAPSULE | Refills: 0 | Status: SHIPPED | OUTPATIENT
Start: 2022-06-17 | End: 2022-07-20

## 2022-07-06 DIAGNOSIS — Z79.4 TYPE 2 DIABETES MELLITUS WITH DIABETIC NEPHROPATHY, WITH LONG-TERM CURRENT USE OF INSULIN (H): ICD-10-CM

## 2022-07-06 DIAGNOSIS — E11.21 TYPE 2 DIABETES MELLITUS WITH DIABETIC NEPHROPATHY, WITH LONG-TERM CURRENT USE OF INSULIN (H): ICD-10-CM

## 2022-07-06 RX ORDER — PEN NEEDLE, DIABETIC 32GX 5/32"
NEEDLE, DISPOSABLE MISCELLANEOUS 3 TIMES DAILY
Qty: 300 EACH | Refills: 0 | Status: SHIPPED | OUTPATIENT
Start: 2022-07-06 | End: 2023-06-21

## 2022-07-06 NOTE — TELEPHONE ENCOUNTER
Pt calls, needs today, agrees to make annual fasting appointment, refill extended  Prescription approved per [facility] Refill Protocol.      Lab Results   Component Value Date    A1C 7.3 06/15/2021    A1C 6.3 10/16/2020     Recent Labs   Lab Test 10/16/20  0738 02/05/19  0906 07/11/16  0810 02/09/15  0805   CHOL 221* 183   < > 150   HDL 27* 30*   < > 33*   * 100*   < > Cannot estimate LDL when triglyceride exceeds 400 mg/dL  90   TRIG 327* 265*   < > 541*   CHOLHDLRATIO  --   --   --  4.5    < > = values in this interval not displayed.     BP Readings from Last 2 Encounters:   06/25/21 (!) 144/95   04/28/21 (!) 146/98     Kusum Muniz, RN, BSN  [facility]

## 2022-08-20 ENCOUNTER — HEALTH MAINTENANCE LETTER (OUTPATIENT)
Age: 57
End: 2022-08-20

## 2022-09-16 ENCOUNTER — LAB (OUTPATIENT)
Dept: LAB | Facility: CLINIC | Age: 57
End: 2022-09-16
Payer: COMMERCIAL

## 2022-09-16 DIAGNOSIS — Z79.4 TYPE 2 DIABETES MELLITUS WITH DIABETIC NEPHROPATHY, WITH LONG-TERM CURRENT USE OF INSULIN (H): ICD-10-CM

## 2022-09-16 DIAGNOSIS — E11.21 TYPE 2 DIABETES MELLITUS WITH DIABETIC NEPHROPATHY, WITH LONG-TERM CURRENT USE OF INSULIN (H): ICD-10-CM

## 2022-09-16 LAB
ALBUMIN SERPL BCG-MCNC: 4.7 G/DL (ref 3.5–5.2)
ALP SERPL-CCNC: 73 U/L (ref 40–129)
ALT SERPL W P-5'-P-CCNC: 25 U/L (ref 10–50)
ANION GAP SERPL CALCULATED.3IONS-SCNC: 15 MMOL/L (ref 7–15)
AST SERPL W P-5'-P-CCNC: 22 U/L (ref 10–50)
BILIRUB SERPL-MCNC: 0.3 MG/DL
BUN SERPL-MCNC: 21 MG/DL (ref 8–23)
CALCIUM SERPL-MCNC: 9.9 MG/DL (ref 8.6–10)
CHLORIDE SERPL-SCNC: 100 MMOL/L (ref 98–107)
CHOLEST SERPL-MCNC: 190 MG/DL
CREAT SERPL-MCNC: 0.82 MG/DL (ref 0.67–1.17)
CREAT UR-MCNC: 69.3 MG/DL
DEPRECATED HCO3 PLAS-SCNC: 23 MMOL/L (ref 22–29)
GFR SERPL CREATININE-BSD FRML MDRD: >90 ML/MIN/1.73M2
GLUCOSE SERPL-MCNC: 138 MG/DL (ref 70–99)
HBA1C MFR BLD: 7.2 % (ref 0–5.6)
HDLC SERPL-MCNC: 28 MG/DL
LDLC SERPL CALC-MCNC: 98 MG/DL
MICROALBUMIN UR-MCNC: 14.2 MG/L
MICROALBUMIN/CREAT UR: 20.49 MG/G CR (ref 0–17)
NONHDLC SERPL-MCNC: 162 MG/DL
POTASSIUM SERPL-SCNC: 4.7 MMOL/L (ref 3.4–5.3)
PROT SERPL-MCNC: 7.6 G/DL (ref 6.4–8.3)
SODIUM SERPL-SCNC: 138 MMOL/L (ref 136–145)
TRIGL SERPL-MCNC: 318 MG/DL
TSH SERPL DL<=0.005 MIU/L-ACNC: 2.34 UIU/ML (ref 0.3–4.2)

## 2022-09-16 PROCEDURE — 84443 ASSAY THYROID STIM HORMONE: CPT

## 2022-09-16 PROCEDURE — 82043 UR ALBUMIN QUANTITATIVE: CPT

## 2022-09-16 PROCEDURE — 83036 HEMOGLOBIN GLYCOSYLATED A1C: CPT

## 2022-09-16 PROCEDURE — 36415 COLL VENOUS BLD VENIPUNCTURE: CPT

## 2022-09-16 PROCEDURE — 80053 COMPREHEN METABOLIC PANEL: CPT

## 2022-09-16 PROCEDURE — 80061 LIPID PANEL: CPT

## 2022-09-19 ENCOUNTER — OFFICE VISIT (OUTPATIENT)
Dept: PEDIATRICS | Facility: CLINIC | Age: 57
End: 2022-09-19
Payer: COMMERCIAL

## 2022-09-19 VITALS
TEMPERATURE: 98 F | HEIGHT: 64 IN | SYSTOLIC BLOOD PRESSURE: 134 MMHG | OXYGEN SATURATION: 97 % | DIASTOLIC BLOOD PRESSURE: 76 MMHG | BODY MASS INDEX: 41.45 KG/M2 | RESPIRATION RATE: 18 BRPM | WEIGHT: 242.8 LBS | HEART RATE: 76 BPM

## 2022-09-19 DIAGNOSIS — E66.01 MORBID OBESITY (H): ICD-10-CM

## 2022-09-19 DIAGNOSIS — Z11.59 NEED FOR HEPATITIS C SCREENING TEST: ICD-10-CM

## 2022-09-19 DIAGNOSIS — E11.21 TYPE 2 DIABETES MELLITUS WITH DIABETIC NEPHROPATHY, WITH LONG-TERM CURRENT USE OF INSULIN (H): ICD-10-CM

## 2022-09-19 DIAGNOSIS — Z11.4 SCREENING FOR HIV (HUMAN IMMUNODEFICIENCY VIRUS): ICD-10-CM

## 2022-09-19 DIAGNOSIS — H60.92 OTITIS EXTERNA OF LEFT EAR, UNSPECIFIED CHRONICITY, UNSPECIFIED TYPE: ICD-10-CM

## 2022-09-19 DIAGNOSIS — E78.5 HYPERLIPIDEMIA LDL GOAL <100: ICD-10-CM

## 2022-09-19 DIAGNOSIS — I10 ESSENTIAL HYPERTENSION: ICD-10-CM

## 2022-09-19 DIAGNOSIS — Z79.4 TYPE 2 DIABETES MELLITUS WITH DIABETIC NEPHROPATHY, WITH LONG-TERM CURRENT USE OF INSULIN (H): ICD-10-CM

## 2022-09-19 DIAGNOSIS — Z12.11 SCREEN FOR COLON CANCER: ICD-10-CM

## 2022-09-19 DIAGNOSIS — Z23 NEED FOR VACCINATION: ICD-10-CM

## 2022-09-19 DIAGNOSIS — Z00.00 ROUTINE GENERAL MEDICAL EXAMINATION AT A HEALTH CARE FACILITY: Primary | ICD-10-CM

## 2022-09-19 DIAGNOSIS — M19.90 ARTHRITIS: ICD-10-CM

## 2022-09-19 PROCEDURE — 91312 COVID-19,PF,PFIZER BOOSTER BIVALENT: CPT | Performed by: INTERNAL MEDICINE

## 2022-09-19 PROCEDURE — 0124A COVID-19,PF,PFIZER BOOSTER BIVALENT: CPT | Performed by: INTERNAL MEDICINE

## 2022-09-19 PROCEDURE — 90471 IMMUNIZATION ADMIN: CPT | Performed by: INTERNAL MEDICINE

## 2022-09-19 PROCEDURE — 90677 PCV20 VACCINE IM: CPT | Performed by: INTERNAL MEDICINE

## 2022-09-19 PROCEDURE — 99396 PREV VISIT EST AGE 40-64: CPT | Mod: 25 | Performed by: INTERNAL MEDICINE

## 2022-09-19 PROCEDURE — 90682 RIV4 VACC RECOMBINANT DNA IM: CPT | Performed by: INTERNAL MEDICINE

## 2022-09-19 PROCEDURE — 99214 OFFICE O/P EST MOD 30 MIN: CPT | Mod: 25 | Performed by: INTERNAL MEDICINE

## 2022-09-19 PROCEDURE — 90472 IMMUNIZATION ADMIN EACH ADD: CPT | Performed by: INTERNAL MEDICINE

## 2022-09-19 RX ORDER — CELECOXIB 200 MG/1
200 CAPSULE ORAL 2 TIMES DAILY
Qty: 60 CAPSULE | Refills: 5 | Status: SHIPPED | OUTPATIENT
Start: 2022-09-19 | End: 2023-03-20

## 2022-09-19 RX ORDER — METFORMIN HCL 500 MG
1000 TABLET, EXTENDED RELEASE 24 HR ORAL
Qty: 180 TABLET | Refills: 3 | Status: SHIPPED | OUTPATIENT
Start: 2022-09-19 | End: 2023-03-20

## 2022-09-19 RX ORDER — INSULIN ASPART 100 [IU]/ML
18 INJECTION, SOLUTION INTRAVENOUS; SUBCUTANEOUS 2 TIMES DAILY WITH MEALS
Qty: 36 ML | Refills: 3 | Status: SHIPPED | OUTPATIENT
Start: 2022-09-19 | End: 2022-09-26

## 2022-09-19 RX ORDER — ACETIC ACID 20.65 MG/ML
4 SOLUTION AURICULAR (OTIC) 4 TIMES DAILY
Qty: 15 ML | Refills: 5 | Status: SHIPPED | OUTPATIENT
Start: 2022-09-19 | End: 2023-03-20

## 2022-09-19 RX ORDER — INSULIN DEGLUDEC 200 U/ML
50 INJECTION, SOLUTION SUBCUTANEOUS DAILY
Qty: 24 ML | Refills: 3 | Status: SHIPPED | OUTPATIENT
Start: 2022-09-19 | End: 2022-09-26

## 2022-09-19 RX ORDER — LOSARTAN POTASSIUM AND HYDROCHLOROTHIAZIDE 25; 100 MG/1; MG/1
1 TABLET ORAL DAILY
Qty: 90 TABLET | Refills: 3 | Status: SHIPPED | OUTPATIENT
Start: 2022-09-19 | End: 2023-03-20

## 2022-09-19 RX ORDER — SIMVASTATIN 20 MG
20 TABLET ORAL AT BEDTIME
Qty: 90 TABLET | Refills: 3 | Status: ON HOLD | OUTPATIENT
Start: 2022-09-19 | End: 2023-07-17

## 2022-09-19 SDOH — ECONOMIC STABILITY: INCOME INSECURITY: IN THE LAST 12 MONTHS, WAS THERE A TIME WHEN YOU WERE NOT ABLE TO PAY THE MORTGAGE OR RENT ON TIME?: NO

## 2022-09-19 SDOH — HEALTH STABILITY: PHYSICAL HEALTH: ON AVERAGE, HOW MANY MINUTES DO YOU ENGAGE IN EXERCISE AT THIS LEVEL?: 0 MIN

## 2022-09-19 SDOH — ECONOMIC STABILITY: INCOME INSECURITY: HOW HARD IS IT FOR YOU TO PAY FOR THE VERY BASICS LIKE FOOD, HOUSING, MEDICAL CARE, AND HEATING?: NOT VERY HARD

## 2022-09-19 SDOH — HEALTH STABILITY: PHYSICAL HEALTH: ON AVERAGE, HOW MANY DAYS PER WEEK DO YOU ENGAGE IN MODERATE TO STRENUOUS EXERCISE (LIKE A BRISK WALK)?: 0 DAYS

## 2022-09-19 SDOH — ECONOMIC STABILITY: FOOD INSECURITY: WITHIN THE PAST 12 MONTHS, YOU WORRIED THAT YOUR FOOD WOULD RUN OUT BEFORE YOU GOT MONEY TO BUY MORE.: NEVER TRUE

## 2022-09-19 SDOH — ECONOMIC STABILITY: FOOD INSECURITY: WITHIN THE PAST 12 MONTHS, THE FOOD YOU BOUGHT JUST DIDN'T LAST AND YOU DIDN'T HAVE MONEY TO GET MORE.: NEVER TRUE

## 2022-09-19 ASSESSMENT — SOCIAL DETERMINANTS OF HEALTH (SDOH)
DO YOU BELONG TO ANY CLUBS OR ORGANIZATIONS SUCH AS CHURCH GROUPS UNIONS, FRATERNAL OR ATHLETIC GROUPS, OR SCHOOL GROUPS?: NO
HOW OFTEN DO YOU ATTEND CHURCH OR RELIGIOUS SERVICES?: NEVER
IN A TYPICAL WEEK, HOW MANY TIMES DO YOU TALK ON THE PHONE WITH FAMILY, FRIENDS, OR NEIGHBORS?: TWICE A WEEK
HOW OFTEN DO YOU GET TOGETHER WITH FRIENDS OR RELATIVES?: NEVER

## 2022-09-19 ASSESSMENT — ENCOUNTER SYMPTOMS
JOINT SWELLING: 0
WEAKNESS: 0
HEARTBURN: 0
MYALGIAS: 0
SORE THROAT: 0
HEADACHES: 0
ABDOMINAL PAIN: 0
NERVOUS/ANXIOUS: 0
PALPITATIONS: 0
ARTHRALGIAS: 1
HEMATURIA: 0
HEMATOCHEZIA: 0
FREQUENCY: 0
COUGH: 0
DYSURIA: 0
CONSTIPATION: 0
FEVER: 0
SHORTNESS OF BREATH: 0
EYE PAIN: 0
DIARRHEA: 1
CHILLS: 0
DIZZINESS: 0
PARESTHESIAS: 0
NAUSEA: 0

## 2022-09-19 ASSESSMENT — PATIENT HEALTH QUESTIONNAIRE - PHQ9
SUM OF ALL RESPONSES TO PHQ QUESTIONS 1-9: 1
SUM OF ALL RESPONSES TO PHQ QUESTIONS 1-9: 1
10. IF YOU CHECKED OFF ANY PROBLEMS, HOW DIFFICULT HAVE THESE PROBLEMS MADE IT FOR YOU TO DO YOUR WORK, TAKE CARE OF THINGS AT HOME, OR GET ALONG WITH OTHER PEOPLE: NOT DIFFICULT AT ALL

## 2022-09-19 ASSESSMENT — LIFESTYLE VARIABLES
AUDIT-C TOTAL SCORE: 1
HOW OFTEN DO YOU HAVE SIX OR MORE DRINKS ON ONE OCCASION: NEVER
SKIP TO QUESTIONS 9-10: 1
HOW OFTEN DO YOU HAVE A DRINK CONTAINING ALCOHOL: MONTHLY OR LESS
HOW MANY STANDARD DRINKS CONTAINING ALCOHOL DO YOU HAVE ON A TYPICAL DAY: 1 OR 2

## 2022-09-19 ASSESSMENT — PAIN SCALES - GENERAL: PAINLEVEL: NO PAIN (0)

## 2022-09-19 NOTE — PROGRESS NOTES
SUBJECTIVE:   CC: Glen is an 57 year old who presents for preventative health visit.       Patient has been advised of split billing requirements and indicates understanding: Yes  Healthy Habits:     Getting at least 3 servings of Calcium per day:  NO    Bi-annual eye exam:  Yes    Dental care twice a year:  NO    Sleep apnea or symptoms of sleep apnea:  Sleep apnea    Diet:  Diabetic, Carbohydrate counting and Breakfast skipped    Frequency of exercise:  None    Taking medications regularly:  Yes    Barriers to taking medications:  None    Medication side effects:  None    PHQ-2 Total Score: 0    Additional concerns today:  No    Not taking his novolog more than 25% of the time.    KNee: the synvisc injections did not help.  Awaiting knee replacement at some point.     Some AM diarrhea, if eats within 2-3 hours of going to bed;          Today's PHQ-2 Score:   PHQ-2 (  [employer]) 2021   Q1: Little interest or pleasure in doing things 0   Q2: Feeling down, depressed or hopeless 0   PHQ-2 Score 0   PHQ-2 Total Score (12-17 Years)- Positive if 3 or more points; Administer PHQ-A if positive 0   Q1: Little interest or pleasure in doing things Not at all   Q2: Feeling down, depressed or hopeless Not at all   PHQ-2 Score 0       Abuse: Current or Past(Physical, Sexual or Emotional)- No  Do you feel safe in your environment? Yes    Have you ever done Advance Care Planning? (For example, a Health Directive, POLST, or a discussion with a medical provider or your loved ones about your wishes): No, advance care planning information given to patient to review.  Patient plans to discuss their wishes with loved ones or provider.      Social History     Tobacco Use     Smoking status: Current Every Day Smoker     Types: Cigarettes     Last attempt to quit: 5/10/2000     Years since quittin.3     Smokeless tobacco: Never Used   Substance Use Topics     Alcohol use: Never     Alcohol/week: 0.0 standard drinks     Comment:  rarely     If you drink alcohol do you typically have >3 drinks per day or >7 drinks per week? No    Alcohol Use 10/24/2011   Prescreen: >3 drinks/day or >7 drinks/week? The patient does not drink >3 drinks per day nor >7 drinks per week.   No flowsheet data found.    Last PSA:   PSA   Date Value Ref Range Status   2017 0.65 0 - 4 ug/L Final     Comment:     Assay Method:  Chemiluminescence using Siemens Vista analyzer       Reviewed orders with patient. Reviewed health maintenance and updated orders accordingly - Yes  Lab work is in process  Labs reviewed in EPIC  BP Readings from Last 3 Encounters:   22 134/76   21 (!) 144/95   21 (!) 146/98    Wt Readings from Last 3 Encounters:   22 110.1 kg (242 lb 12.8 oz)   21 117.9 kg (260 lb)   21 111.1 kg (245 lb)                  Patient Active Problem List   Diagnosis     Anxiety     Rosacea     Hypertension goal BP (blood pressure) < 140/90     Heart murmur     Tear of lateral cartilage or meniscus of knee, current     Tubular adenoma of colon     CKD (chronic kidney disease) stage 1, GFR 90 ml/min or greater     Morbid obesity (H)     Hyperlipidemia LDL goal <100     Type 2 diabetes mellitus with diabetic nephropathy, with long-term current use of insulin (H)     CORINA (obstructive sleep apnea)     Past Surgical History:   Procedure Laterality Date     ANKLE SURGERY  2004     R ORIF     ARTHROSCOPY KNEE  3/26/2014    Procedure: ARTHROSCOPY KNEE;  ARTHROSCOPY KNEE- RIGHT   SURGEON REQUESTS CHOICE ANETHESIA ;  Surgeon: Sabino Simpson MD;  Location: [facility]     HERNIORRHAPHY UMBILICAL         Social History     Tobacco Use     Smoking status: Current Every Day Smoker     Types: Cigarettes     Last attempt to quit: 5/10/2000     Years since quittin.3     Smokeless tobacco: Never Used   Substance Use Topics     Alcohol use: Never     Alcohol/week: 0.0 standard drinks     Comment: rarely     Family History   Problem Relation  Age of Onset     Cancer Father         skin     Myocardial Infarction Father 57         Current Outpatient Medications   Medication Sig Dispense Refill     acetic acid (VOSOL) 2 % otic solution Place 4 drops into both ears 4 times daily 15 mL 5     aspirin 325 MG tablet Take 325 mg by mouth daily.       celecoxib (CELEBREX) 200 MG capsule Take 1 capsule (200 mg) by mouth 2 times daily 60 capsule 5     insulin aspart (NOVOLOG FLEXPEN) 100 UNIT/ML pen Inject 18 Units Subcutaneous 2 times daily (with meals) 36 mL 3     insulin degludec (TRESIBA FLEXTOUCH) 200 UNIT/ML pen Inject 50 Units Subcutaneous daily Needs appointment with Dr. Deal before next refill. 24 mL 3     losartan-hydrochlorothiazide (HYZAAR) 100-25 MG tablet Take 1 tablet by mouth daily 90 tablet 3     metFORMIN (GLUCOPHAGE XR) 500 MG 24 hr tablet Take 2 tablets (1,000 mg) by mouth daily (with dinner) 180 tablet 3     simvastatin (ZOCOR) 20 MG tablet Take 1 tablet (20 mg) by mouth At Bedtime 90 tablet 3     blood glucose monitoring (FREESTYLE INSULINX SYSTEM) meter device kit Use to test blood sugar 3 times daily or as directed. 1 kit 0     blood glucose monitoring (FREESTYLE) lancets Use to test blood sugars 2-3 times daily or as directed. 1 Box prn     insulin pen needle (BD KENTRELL U/F) 32G X 4 MM USE 2 PEN NEEDLES DAILY OR AS DIRECTED. 200 each 0     insulin pen needle (BD PEN NEEDLE KENTRELL 2ND GEN) 32G X 4 MM miscellaneous by In Vitro route 3 times daily 300 each 0     order for DME Equipment being ordered: Verio flex test strips  Patient tests 1 times daily. 100 Device 3     order for DME Equipment being ordered: lancets  Uses 4 a day.    Please dispense what insurance covers. 400 each 3     order for DME Equipment being ordered: glucose meter  Please dispense what insurance covers 1 each 0     ORDER FOR DME Equipment being ordered: glucometer    60 lancets and test strips (5 refills) for twice daily testing. 1 each 0     Allergies   Allergen Reactions      Amlodipine Besylate      Water retention     Cefdinir      Joint pains     Lisinopril      Water retention     Amoxicillin Rash     White spots       Reviewed and updated as needed this visit by clinical staff                    Reviewed and updated as needed this visit by Provider                     Past Medical History:   Diagnosis Date     Arthritis      Chronic kidney disease      Diabetes (H)      High cholesterol      HTN (hypertension)      Obesity      Sleep apnea     DOES NOT HAVE A CPAP      Past Surgical History:   Procedure Laterality Date     ANKLE SURGERY  2/2004     R ORIF     ARTHROSCOPY KNEE  3/26/2014    Procedure: ARTHROSCOPY KNEE;  ARTHROSCOPY KNEE- RIGHT   SURGEON REQUESTS CHOICE ANETHESIA ;  Surgeon: Sabino Simpson MD;  Location: [facility]     HERNIORRHAPHY UMBILICAL         Review of Systems   Constitutional: Negative for chills and fever.   HENT: Negative for congestion, ear pain, hearing loss and sore throat.    Eyes: Negative for pain and visual disturbance.   Respiratory: Negative for cough and shortness of breath.    Cardiovascular: Negative for chest pain, palpitations and peripheral edema.   Gastrointestinal: Positive for diarrhea. Negative for abdominal pain, constipation, heartburn, hematochezia and nausea.   Genitourinary: Negative for dysuria, frequency, genital sores, hematuria, impotence, penile discharge and urgency.   Musculoskeletal: Positive for arthralgias. Negative for joint swelling and myalgias.   Skin: Negative for rash.   Neurological: Negative for dizziness, weakness, headaches and paresthesias.   Psychiatric/Behavioral: Negative for mood changes. The patient is not nervous/anxious.      CONSTITUTIONAL: NEGATIVE for fever, chills, change in weight  INTEGUMENTARY/SKIN: NEGATIVE for worrisome rashes, moles or lesions  EYES: NEGATIVE for vision changes or irritation  ENT: NEGATIVE for ear, mouth and throat problems  RESP: NEGATIVE for significant cough or SOB  CV:  NEGATIVE for chest pain, palpitations or peripheral edema  GI: NEGATIVE for nausea, abdominal pain, heartburn, or change in bowel habits   male: negative for dysuria, hematuria, decreased urinary stream, erectile dysfunction, urethral discharge  MUSCULOSKELETAL: NEGATIVE for significant arthralgias or myalgia  NEURO: NEGATIVE for weakness, dizziness or paresthesias  PSYCHIATRIC: NEGATIVE for changes in mood or affect    OBJECTIVE:   There were no vitals taken for this visit.    Physical Exam  GENERAL: healthy, alert and no distress  EYES: Eyes grossly normal to inspection, PERRL and conjunctivae and sclerae normal  HENT: ear canals and TM's normal, nose and mouth without ulcers or lesions  NECK: no adenopathy, no asymmetry, masses, or scars and thyroid normal to palpation  RESP: lungs clear to auscultation - no rales, rhonchi or wheezes  CV: regular rate and rhythm, normal S1 S2, no S3 or S4, no murmur, click or rub, no peripheral edema and peripheral pulses strong  ABDOMEN: soft, nontender, no hepatosplenomegaly, no masses and bowel sounds normal  MS: no gross musculoskeletal defects noted, no edema  SKIN: no suspicious lesions or rashes  NEURO: Normal strength and tone, mentation intact and speech normal  PSYCH: mentation appears normal, affect normal/bright    Diagnostic Test Results:  Labs reviewed in Epic    ASSESSMENT/PLAN:   (Z00.00) Routine general medical examination at a health care facility  (primary encounter diagnosis)  Comment:   Plan: Discussed diet, exercise, testicular self exam, blood pressure, cholesterol, and need for cancer surveillance at appropriate ages.     (Z12.11) Screen for colon cancer  Comment:   Plan: Colonoscopy Screening  Referral        Due.  Ordered.     (E11.21,  Z79.4) Type 2 diabetes mellitus with diabetic nephropathy, with long-term current use of insulin (H)  Comment:   Plan: metFORMIN (GLUCOPHAGE XR) 500 MG 24 hr tablet,         simvastatin (ZOCOR) 20 MG tablet,  "insulin         degludec (TRESIBA FLEXTOUCH) 200 UNIT/ML pen,         insulin aspart (NOVOLOG FLEXPEN) 100 UNIT/ML         pen, OFFICE/OUTPT VISIT,EST,LEVL III        Parameters well controlled.  Continue secondary risk factor modification for BP, cholesterol, anticoagulation, and smoking cessation.     (I10) Essential hypertension  Comment:   Plan: losartan-hydrochlorothiazide (HYZAAR) 100-25 MG        tablet, OFFICE/OUTPT VISIT,EST,LEVL III            (E78.5) Hyperlipidemia LDL goal <100  Comment:   Plan: simvastatin (ZOCOR) 20 MG tablet, OFFICE/OUTPT         VISIT,EST,LEVL III            (M19.90) Arthritis  Comment:   Plan: celecoxib (CELEBREX) 200 MG capsule        Told to limit to 200 mg daily; needs to follow up with orthopedics for knee replacement.     (E66.01) Morbid obesity (H)  Comment:   Plan: making his diabetes mellitus and hypertension more difficult to manage.     (H60.92) Otitis externa of left ear, unspecified chronicity, unspecified type  Comment:   Plan: acetic acid (VOSOL) 2 % otic solution,         OFFICE/OUTPT VISIT,EST,LEVL III            (Z23) Need for vaccination  Comment:   Plan: INFLUENZA QUAD, PF (RIV4) (FLUBLOK)              Patient has been advised of split billing requirements and indicates understanding: Yes    COUNSELING:   Reviewed preventive health counseling, as reflected in patient instructions       Regular exercise       Healthy diet/nutrition       Vision screening       Hearing screening       Colorectal cancer screening       Prostate cancer screening    Estimated body mass index is 41.97 kg/m  as calculated from the following:    Height as of 4/28/21: 1.676 m (5' 6\").    Weight as of 6/25/21: 117.9 kg (260 lb).     Weight management plan: Patient was referred to their PCP to discuss a diet and exercise plan.    He reports that he has been smoking cigarettes. He has never used smokeless tobacco.  Nicotine/Tobacco Cessation Plan:   Self help information given to " patient      Counseling Resources:  ATP IV Guidelines  Pooled Cohorts Equation Calculator  FRAX Risk Assessment  ICSI Preventive Guidelines  Dietary Guidelines for Americans, 2010  Lessno's MyPlate  ASA Prophylaxis  Lung CA Screening    Mike Deal MD  [facility]

## 2022-09-19 NOTE — PATIENT INSTRUCTIONS
Flu, Prevnar 20, and COVID bivalent today.    Still due for shinrix 1 and 2, and Tetanus.    They will call you for colonoscopy.    Limit your celebrex to max of 200 mg twice daily, preferably 100 mg twice daily.    Try acetic acid 2% drops for your ears.    I would consider proceeding with the knee replacement.        Preventive Health Recommendations  Male Ages 50 - 64    Yearly exam:             See your health care provider every year in order to  o   Review health changes.   o   Discuss preventive care.    o   Review your medicines if your doctor has prescribed any.   Have a cholesterol test every 5 years, or more frequently if you are at risk for high cholesterol/heart disease.   Have a diabetes test (fasting glucose) every three years. If you are at risk for diabetes, you should have this test more often.   Have a colonoscopy at age 50, or have a yearly FIT test (stool test). These exams will check for colon cancer.    Talk with your health care provider about whether or not a prostate cancer screening test (PSA) is right for you.  You should be tested each year for STDs (sexually transmitted diseases), if you re at risk.     Shots: Get a flu shot each year. Get a tetanus shot every 10 years.     Nutrition:  Eat at least 5 servings of fruits and vegetables daily.   Eat whole-grain bread, whole-wheat pasta and brown rice instead of white grains and rice.   Get adequate Calcium and Vitamin D.     Lifestyle  Exercise for at least 150 minutes a week (30 minutes a day, 5 days a week). This will help you control your weight and prevent disease.   Limit alcohol to one drink per day.   No smoking.   Wear sunscreen to prevent skin cancer.   See your dentist every six months for an exam and cleaning.   See your eye doctor every 1 to 2 years.    
Fair

## 2022-09-21 DIAGNOSIS — Z79.4 TYPE 2 DIABETES MELLITUS WITH DIABETIC NEPHROPATHY, WITH LONG-TERM CURRENT USE OF INSULIN (H): ICD-10-CM

## 2022-09-21 DIAGNOSIS — E11.21 TYPE 2 DIABETES MELLITUS WITH DIABETIC NEPHROPATHY, WITH LONG-TERM CURRENT USE OF INSULIN (H): ICD-10-CM

## 2022-09-22 NOTE — TELEPHONE ENCOUNTER
LM for pt to call insurance to see what insulin is covered. Novolog not covered by insurance.  Nakia SANTO, KEELEY,AMANDA

## 2022-09-26 RX ORDER — INSULIN ASPART 100 [IU]/ML
18 INJECTION, SOLUTION INTRAVENOUS; SUBCUTANEOUS 2 TIMES DAILY WITH MEALS
Refills: 3 | OUTPATIENT
Start: 2022-09-26

## 2022-09-26 RX ORDER — INSULIN LISPRO 100 [IU]/ML
18 INJECTION, SOLUTION INTRAVENOUS; SUBCUTANEOUS
Qty: 15 ML | Refills: 11 | Status: ON HOLD | OUTPATIENT
Start: 2022-09-26 | End: 2023-07-17

## 2022-09-26 RX ORDER — INSULIN DEGLUDEC 200 U/ML
50 INJECTION, SOLUTION SUBCUTANEOUS DAILY
Refills: 3 | OUTPATIENT
Start: 2022-09-26

## 2022-09-26 NOTE — TELEPHONE ENCOUNTER
Pt called back re: Tresiba and Novolog are not covered by his new insurance.    He called his insurance company and was advised of alternatives:    - For Tresiba - either Lantus or Toujeo is recommended  Pt states he has tried Lantus before and had awful side effects, made him lethargic. Pt willing to try Toujeo. Pt is totally out of Tresiba, asks if clinic can send this to pharmacy ASAP.    - Recommended substituting Humalog for Novolog.    Pharmacy: [facility], [place]    To care team: Kindly xiomy up for PCP. Please call pt back for updates    Tammy Méndez RN/[place] Nurse Advisor

## 2023-01-01 ENCOUNTER — TRANSFERRED RECORDS (OUTPATIENT)
Dept: MULTI SPECIALTY CLINIC | Facility: CLINIC | Age: 58
End: 2023-01-01

## 2023-01-01 LAB — RETINOPATHY: NORMAL

## 2023-03-20 ENCOUNTER — OFFICE VISIT (OUTPATIENT)
Dept: PEDIATRICS | Facility: CLINIC | Age: 58
End: 2023-03-20
Payer: COMMERCIAL

## 2023-03-20 VITALS
SYSTOLIC BLOOD PRESSURE: 132 MMHG | BODY MASS INDEX: 41.01 KG/M2 | RESPIRATION RATE: 18 BRPM | TEMPERATURE: 98.6 F | HEART RATE: 84 BPM | WEIGHT: 240.2 LBS | DIASTOLIC BLOOD PRESSURE: 75 MMHG | OXYGEN SATURATION: 97 % | HEIGHT: 64 IN

## 2023-03-20 DIAGNOSIS — Z01.818 PREOP GENERAL PHYSICAL EXAM: Primary | ICD-10-CM

## 2023-03-20 DIAGNOSIS — G47.33 OSA (OBSTRUCTIVE SLEEP APNEA): ICD-10-CM

## 2023-03-20 DIAGNOSIS — E78.5 HYPERLIPIDEMIA LDL GOAL <100: ICD-10-CM

## 2023-03-20 DIAGNOSIS — Z79.4 TYPE 2 DIABETES MELLITUS WITH DIABETIC NEPHROPATHY, WITH LONG-TERM CURRENT USE OF INSULIN (H): ICD-10-CM

## 2023-03-20 DIAGNOSIS — I10 HYPERTENSION GOAL BP (BLOOD PRESSURE) < 140/90: ICD-10-CM

## 2023-03-20 DIAGNOSIS — M19.90 ARTHRITIS: ICD-10-CM

## 2023-03-20 DIAGNOSIS — N18.1 CKD (CHRONIC KIDNEY DISEASE) STAGE 1, GFR 90 ML/MIN OR GREATER: ICD-10-CM

## 2023-03-20 DIAGNOSIS — E11.21 TYPE 2 DIABETES MELLITUS WITH DIABETIC NEPHROPATHY, WITH LONG-TERM CURRENT USE OF INSULIN (H): ICD-10-CM

## 2023-03-20 LAB — HBA1C MFR BLD: 7.8 % (ref 0–5.6)

## 2023-03-20 PROCEDURE — 90471 IMMUNIZATION ADMIN: CPT | Performed by: INTERNAL MEDICINE

## 2023-03-20 PROCEDURE — 99214 OFFICE O/P EST MOD 30 MIN: CPT | Mod: 25 | Performed by: INTERNAL MEDICINE

## 2023-03-20 PROCEDURE — 83036 HEMOGLOBIN GLYCOSYLATED A1C: CPT | Performed by: INTERNAL MEDICINE

## 2023-03-20 PROCEDURE — 90715 TDAP VACCINE 7 YRS/> IM: CPT | Performed by: INTERNAL MEDICINE

## 2023-03-20 PROCEDURE — 93000 ELECTROCARDIOGRAM COMPLETE: CPT | Performed by: INTERNAL MEDICINE

## 2023-03-20 PROCEDURE — 36415 COLL VENOUS BLD VENIPUNCTURE: CPT | Performed by: INTERNAL MEDICINE

## 2023-03-20 RX ORDER — CELECOXIB 200 MG/1
200 CAPSULE ORAL 2 TIMES DAILY
Qty: 60 CAPSULE | Refills: 5 | Status: ON HOLD | OUTPATIENT
Start: 2023-03-20 | End: 2023-07-17

## 2023-03-20 RX ORDER — HYDROCHLOROTHIAZIDE 25 MG/1
25 TABLET ORAL DAILY
Qty: 90 TABLET | Refills: 3 | Status: ON HOLD | OUTPATIENT
Start: 2023-03-20 | End: 2023-07-17

## 2023-03-20 RX ORDER — METFORMIN HCL 500 MG
1000 TABLET, EXTENDED RELEASE 24 HR ORAL
Qty: 180 TABLET | Refills: 3 | Status: SHIPPED | OUTPATIENT
Start: 2023-03-20 | End: 2023-11-20

## 2023-03-20 RX ORDER — LOSARTAN POTASSIUM 100 MG/1
100 TABLET ORAL DAILY
Qty: 90 TABLET | Refills: 3 | Status: ON HOLD | OUTPATIENT
Start: 2023-03-20 | End: 2023-07-17

## 2023-03-20 ASSESSMENT — PATIENT HEALTH QUESTIONNAIRE - PHQ9
10. IF YOU CHECKED OFF ANY PROBLEMS, HOW DIFFICULT HAVE THESE PROBLEMS MADE IT FOR YOU TO DO YOUR WORK, TAKE CARE OF THINGS AT HOME, OR GET ALONG WITH OTHER PEOPLE: NOT DIFFICULT AT ALL
SUM OF ALL RESPONSES TO PHQ QUESTIONS 1-9: 5
SUM OF ALL RESPONSES TO PHQ QUESTIONS 1-9: 5

## 2023-03-20 ASSESSMENT — PAIN SCALES - GENERAL: PAINLEVEL: NO PAIN (0)

## 2023-03-20 NOTE — PROGRESS NOTES
Answers for HPI/ROS submitted by the patient on 3/20/2023  If you checked off any problems, how difficult have these problems made it for you to do your work, take care of things at home, or get along with other people?: Not difficult at all  PHQ9 TOTAL SCORE: 5    [facility]  [address]  [address]  [place] [address]  Phone: [phone]  Fax: [phone]  Primary Provider: Mike Arellano for HPI/ROS submitted by the patient on 3/20/2023  If you checked off any problems, how difficult have these problems made it for you to do your work, take care of things at home, or get along with other people?: Not difficult at all  PHQ9 TOTAL SCORE: 5      Pre-op Performing Provider: MIKE ARELLANO      PREOPERATIVE EVALUATION:  Today's date: 3/20/2023    Glen Cho is a 57 year old male who presents for a preoperative evaluation.    Surgical Information:  Surgery/Procedure: Total L knee  Surgery Location: [place]  Surgeon: Dr. Peña  Surgery Date: 4/4/23  Time of Surgery: 8:00  Where patient plans to recover: Other: Admitted one night   Fax number for surgical facility: [phone]      Type of Anesthesia Anticipated: General    Assessment & Plan     The proposed surgical procedure is considered INTERMEDIATE risk.    Type 2 diabetes mellitus with diabetic nephropathy, with long-term current use of insulin (H)  Well controlled.  See management of preoperative evaluation meds below.   - HEMOGLOBIN A1C; Future  - metFORMIN (GLUCOPHAGE XR) 500 MG 24 hr tablet; Take 2 tablets (1,000 mg) by mouth daily (with dinner)    Preop general physical exam  Has chronic murmur since childhood.  electrocardiogram stable.   - EKG 12-lead complete w/read - Clinics    Arthritis  Surgical and post-op care per primary surgical service.    - celecoxib (CELEBREX) 200 MG capsule; Take 1 capsule (200 mg) by mouth 2 times daily    Hyperlipidemia LDL goal <100  On statin.     Hypertension goal BP  (blood pressure) < 140/90  blood pressure controlled.   - losartan (COZAAR) 100 MG tablet; Take 1 tablet (100 mg) by mouth daily  - hydrochlorothiazide (HYDRODIURIL) 25 MG tablet; Take 1 tablet (25 mg) by mouth daily    CKD (chronic kidney disease) stage 1, GFR 90 ml/min or greater  Secondary risk factor modification.     CORINA (obstructive sleep apnea)  New machine at sleep center  - Adult Sleep Eval & Management  Referral; Future      1)  Call the sleep center for continuous positive airway pressure replacement.    2)  Lab work today:  We can do labs in the exam room today, or you can get them done downstairs in the lab.      3)  Adacel shot today.    4)  Hold your metformin on AM of surgery .    5)  No short acting insulin on AM of surgery, as you'll not be eating that AM.    6) Do not take the hydrochlorothiazide on the AM of surgyer.    7)  Take only 40 of the lantus insulin the night before surgery.            Risks and Recommendations:RECOMMENDATION:  APPROVAL GIVEN to proceed with proposed procedure, without further diagnostic evaluation.            Subjective     HPI related to upcoming procedure: left osteoarthritis; needs knee replacement.     Preop Questions 3/20/2023   1. Have you ever had a heart attack or stroke? No   2. Have you ever had surgery on your heart or blood vessels, such as a stent placement, a coronary artery bypass, or surgery on an artery in your head, neck, heart, or legs? No   3. Do you have chest pain with activity? No   4. Do you have a history of  heart failure? UNKNOWN -    5. Do you currently have a cold, bronchitis or symptoms of other infection? UNKNOWN -    6. Do you have a cough, shortness of breath, or wheezing? No   7. Do you or anyone in your family have previous history of blood clots? No   8. Do you or does anyone in your family have a serious bleeding problem such as prolonged bleeding following surgeries or cuts? No   9. Have you ever had problems with anemia or  been told to take iron pills? No   10. Have you had any abnormal blood loss such as black, tarry or bloody stools? No   11. Have you ever had a blood transfusion? No   12. Are you willing to have a blood transfusion if it is medically needed before, during, or after your surgery? Yes   13. Have you or any of your relatives ever had problems with anesthesia? No   14. Do you have sleep apnea, excessive snoring or daytime drowsiness? YES -    14a. Do you have a CPAP machine? Yes   15. Do you have any artifical heart valves or other implanted medical devices like a pacemaker, defibrillator, or continuous glucose monitor? No   16. Do you have artificial joints? No   17. Are you allergic to latex? No           Preoperative Review of :   reviewed - no record of controlled substances prescribed.      Status of Chronic Conditions:  See problem list for active medical problems.  Problems all longstanding and stable, except as noted/documented.  See ROS for pertinent symptoms related to these conditions.      Review of Systems  CONSTITUTIONAL: NEGATIVE for fever, chills, change in weight  INTEGUMENTARY/SKIN: NEGATIVE for worrisome rashes, moles or lesions  EYES: NEGATIVE for vision changes or irritation  ENT/MOUTH: NEGATIVE for ear, mouth and throat problems  RESP: NEGATIVE for significant cough or SOB  CV: NEGATIVE for chest pain, palpitations or peripheral edema  GI: NEGATIVE for nausea, abdominal pain, heartburn, or change in bowel habits  : NEGATIVE for frequency, dysuria, or hematuria  MUSCULOSKELETAL: NEGATIVE for significant arthralgias or myalgia  NEURO: NEGATIVE for weakness, dizziness or paresthesias  ENDOCRINE: NEGATIVE for temperature intolerance, skin/hair changes  HEME: NEGATIVE for bleeding problems  PSYCHIATRIC: NEGATIVE for changes in mood or affect    Patient Active Problem List    Diagnosis Date Noted     CORINA (obstructive sleep apnea) 02/05/2019     Priority: Medium     Type 2 diabetes mellitus with  diabetic nephropathy, with long-term current use of insulin (H) 10/31/2016     Priority: Medium     Hyperlipidemia LDL goal <100 10/23/2015     Priority: Medium     Morbid obesity (H) 10/12/2015     Priority: Medium     CKD (chronic kidney disease) stage 1, GFR 90 ml/min or greater 02/09/2015     Priority: Medium     Tubular adenoma of colon 12/18/2014     Priority: Medium     Heart murmur 03/20/2014     Priority: Medium     Needs echo at follow up in April 2014.       Tear of lateral cartilage or meniscus of knee, current 03/20/2014     Priority: Medium     Hypertension goal BP (blood pressure) < 140/90 08/22/2013     Priority: Medium     Rosacea 02/06/2013     Priority: Medium     Anxiety 12/12/2011     Priority: Medium      Past Medical History:   Diagnosis Date     Arthritis      Chronic kidney disease      Diabetes (H)      High cholesterol      HTN (hypertension)      Obesity      Sleep apnea     DOES NOT HAVE A CPAP     Past Surgical History:   Procedure Laterality Date     ANKLE SURGERY  2/2004     R ORIF     ARTHROSCOPY KNEE  3/26/2014    Procedure: ARTHROSCOPY KNEE;  ARTHROSCOPY KNEE- RIGHT   SURGEON REQUESTS CHOICE ANETHESIA ;  Surgeon: Sabino Simpson MD;  Location: [facility] OR     HERNIORRHAPHY UMBILICAL       Current Outpatient Medications   Medication Sig Dispense Refill     blood glucose monitoring (FREESTYLE INSULINX SYSTEM) meter device kit Use to test blood sugar 3 times daily or as directed. 1 kit 0     blood glucose monitoring (FREESTYLE) lancets Use to test blood sugars 2-3 times daily or as directed. 1 Box prn     celecoxib (CELEBREX) 200 MG capsule Take 1 capsule (200 mg) by mouth 2 times daily 60 capsule 5     hydrochlorothiazide (HYDRODIURIL) 25 MG tablet Take 1 tablet (25 mg) by mouth daily 90 tablet 3     insulin glargine (LANTUS PEN) 100 UNIT/ML pen Inject 50 Units Subcutaneous At Bedtime 15 mL 11     insulin lispro (HUMALOG KWIKPEN) 100 UNIT/ML (1 unit dial) KWIKPEN Inject 18 Units  "Subcutaneous 3 times daily (before meals) 15 mL 11     insulin pen needle (BD KENTRELL U/F) 32G X 4 MM USE 2 PEN NEEDLES DAILY OR AS DIRECTED. 200 each 0     insulin pen needle (BD PEN NEEDLE KENTRELL 2ND GEN) 32G X 4 MM miscellaneous by In Vitro route 3 times daily 300 each 0     losartan (COZAAR) 100 MG tablet Take 1 tablet (100 mg) by mouth daily 90 tablet 3     metFORMIN (GLUCOPHAGE XR) 500 MG 24 hr tablet Take 2 tablets (1,000 mg) by mouth daily (with dinner) 180 tablet 3     simvastatin (ZOCOR) 20 MG tablet Take 1 tablet (20 mg) by mouth At Bedtime 90 tablet 3     aspirin 325 MG tablet Take 325 mg by mouth daily. (Patient not taking: Reported on 3/20/2023)       order for DME Equipment being ordered: Verio flex test strips  Patient tests 1 times daily. 100 Device 3     order for DME Equipment being ordered: lancets  Uses 4 a day.    Please dispense what insurance covers. 400 each 3     order for DME Equipment being ordered: glucose meter  Please dispense what insurance covers 1 each 0     ORDER FOR DME Equipment being ordered: glucometer    60 lancets and test strips (5 refills) for twice daily testing. 1 each 0       Allergies   Allergen Reactions     Amlodipine Besylate      Water retention     Cefdinir      Joint pains     Lisinopril      Water retention     Amoxicillin Rash     White spots        Social History     Tobacco Use     Smoking status: Every Day     Types: Cigarettes     Last attempt to quit: 5/10/2000     Years since quittin.8     Smokeless tobacco: Never   Substance Use Topics     Alcohol use: Never     Alcohol/week: 0.0 standard drinks     Comment: rarely     Family History   Problem Relation Age of Onset     Cancer Father         skin     Myocardial Infarction Father 57     History   Drug Use No         Objective     /75   Pulse 84   Temp 98.6  F (37  C) (Tympanic)   Resp 18   Ht 1.63 m (5' 4.17\")   Wt 109 kg (240 lb 3.2 oz)   SpO2 97%   BMI 41.01 kg/m      Physical Exam    GENERAL " APPEARANCE: healthy, alert and no distress     EYES: EOMI,  PERRL     HENT: ear canals and TM's normal and nose and mouth without ulcers or lesions     NECK: no adenopathy, no asymmetry, masses, or scars and thyroid normal to palpation     RESP: lungs clear to auscultation - no rales, rhonchi or wheezes     CV: regular rates and rhythm, normal S1 S2, no S3 or S4 and no murmur, click or rub     ABDOMEN:  soft, nontender, no HSM or masses and bowel sounds normal     MS: extremities normal- no gross deformities noted, no evidence of inflammation in joints, FROM in all extremities.     SKIN: no suspicious lesions or rashes     NEURO: Normal strength and tone, sensory exam grossly normal, mentation intact and speech normal     PSYCH: mentation appears normal. and affect normal/bright     LYMPHATICS: No cervical adenopathy    Recent Labs   Lab Test 09/16/22  0826 06/15/21  1105     --    POTASSIUM 4.7  --    CR 0.82  --    A1C 7.2* 7.3*        Diagnostics:  Labs pending at this time.  Results will be reviewed when available.   EKG: appears normal, NSR, normal axis, normal intervals, no acute ST/T changes c/w ischemia, no LVH by voltage criteria, unchanged from previous tracings    Revised Cardiac Risk Index (RCRI):  The patient has the following serious cardiovascular risks for perioperative complications:   - No serious cardiac risks = 0 points     RCRI Interpretation: 0 points: Class I (very low risk - 0.4% complication rate)           Signed Electronically by: Mike Deal MD  Copy of this evaluation report is provided to requesting physician.

## 2023-03-20 NOTE — PATIENT INSTRUCTIONS
For informational purposes only. Not to replace the advice of your health care provider. Copyright   2003,  [facility] [employer] [facility]. All rights reserved. Clinically reviewed by Karen Lerma MD. [employer] [id number] - REV .  Preparing for Your Surgery  Getting started  A nurse will call you to review your health history and instructions. They will give you an arrival time based on your scheduled surgery time. Please be ready to share:  Your doctor's clinic name and phone number  Your medical, surgical, and anesthesia history  A list of allergies and sensitivities  A list of medicines, including herbal treatments and over-the-counter drugs  Whether the patient has a legal guardian (ask how to send us the papers in advance)  Please tell us if you're pregnant--or if there's any chance you might be pregnant. Some surgeries may injure a fetus (unborn baby), so they require a pregnancy test. Surgeries that are safe for a fetus don't always need a test, and you can choose whether to have one.   If you have a child who's having surgery, please ask for a copy of Preparing for Your Child's Surgery.    Preparing for surgery  Within 10 to 30 days of surgery: Have a pre-op exam (sometimes called an H&P, or History and Physical). This can be done at a clinic or pre-operative center.  If you're having a , you may not need this exam. Talk to your care team.  At your pre-op exam, talk to your care team about all medicines you take. If you need to stop any medicines before surgery, ask when to start taking them again.  We do this for your safety. Many medicines can make you bleed too much during surgery. Some change how well surgery (anesthesia) drugs work.  Call your insurance company to let them know you're having surgery. (If you don't have insurance, call [phone].)  Call your clinic if there's any change in your health. This includes signs of a cold or flu (sore throat, runny nose, cough, rash, fever). It  also includes a scrape or scratch near the surgery site.  If you have questions on the day of surgery, call your hospital or surgery center.  Eating and drinking guidelines  For your safety: Unless your surgeon tells you otherwise, follow the guidelines below.  Eat and drink as usual until 8 hours before you arrive for surgery. After that, no food or milk.  Drink clear liquids until 2 hours before you arrive. These are liquids you can see through, like water, Gatorade, and Propel Water. They also include plain black coffee and tea (no cream or milk), candy, and breath mints. You can spit out gum when you arrive.  If you drink alcohol: Stop drinking it the night before surgery.  If your care team tells you to take medicine on the morning of surgery, it's okay to take it with a sip of water.  Preventing infection  Shower or bathe the night before and morning of your surgery. Follow the instructions your clinic gave you. (If no instructions, use regular soap.)  Don't shave or clip hair near your surgery site. We'll remove the hair if needed.  Don't smoke or vape the morning of surgery. You may chew nicotine gum up to 2 hours before surgery. A nicotine patch is okay.  Note: Some surgeries require you to completely quit smoking and nicotine. Check with your surgeon.  Your care team will make every effort to keep you safe from infection. We will:  Clean our hands often with soap and water (or an alcohol-based hand rub).  Clean the skin at your surgery site with a special soap that kills germs.  Give you a special gown to keep you warm. (Cold raises the risk of infection.)  Wear special hair covers, masks, gowns and gloves during surgery.  Give antibiotic medicine, if prescribed. Not all surgeries need antibiotics.  What to bring on the day of surgery  Photo ID and insurance card  Copy of your health care directive, if you have one  Glasses and hearing aids (bring cases)  You can't wear contacts during surgery  Inhaler and  eye drops, if you use them (tell us about these when you arrive)  CPAP machine or breathing device, if you use them  A few personal items, if spending the night  If you have . . .  A pacemaker, ICD (cardiac defibrillator) or other implant: Bring the ID card.  An implanted stimulator: Bring the remote control.  A legal guardian: Bring a copy of the certified (court-stamped) guardianship papers.  Please remove any jewelry, including body piercings. Leave jewelry and other valuables at home.  If you're going home the day of surgery  You must have a responsible adult drive you home. They should stay with you overnight as well.  If you don't have someone to stay with you, and you aren't safe to go home alone, we may keep you overnight. Insurance often won't pay for this.  After surgery  If it's hard to control your pain or you need more pain medicine, please call your surgeon's office.  Questions?   If you have any questions for your care team, list them here: _________________________________________________________________________________________________________________________________________________________________________ ____________________________________ ____________________________________ ____________________________________    For informational purposes only. Not to replace the advice of your health care provider. Copyright   2003, 2019 [facility]. All rights reserved. Clinically reviewed by Karen Lerma MD. [employer] [id number] - REV 12/22.  Preparing for Your Surgery  Getting started  A nurse will call you to review your health history and instructions. They will give you an arrival time based on your scheduled surgery time. Please be ready to share:  Your doctor's clinic name and phone number  Your medical, surgical, and anesthesia history  A list of allergies and sensitivities  A list of medicines, including herbal treatments and over-the-counter drugs  Whether the patient has a legal guardian  (ask how to send us the papers in advance)  Please tell us if you're pregnant--or if there's any chance you might be pregnant. Some surgeries may injure a fetus (unborn baby), so they require a pregnancy test. Surgeries that are safe for a fetus don't always need a test, and you can choose whether to have one.   If you have a child who's having surgery, please ask for a copy of Preparing for Your Child's Surgery.    Preparing for surgery  Within 10 to 30 days of surgery: Have a pre-op exam (sometimes called an H&P, or History and Physical). This can be done at a clinic or pre-operative center.  If you're having a , you may not need this exam. Talk to your care team.  At your pre-op exam, talk to your care team about all medicines you take. If you need to stop any medicines before surgery, ask when to start taking them again.  We do this for your safety. Many medicines can make you bleed too much during surgery. Some change how well surgery (anesthesia) drugs work.  Call your insurance company to let them know you're having surgery. (If you don't have insurance, call [phone].)  Call your clinic if there's any change in your health. This includes signs of a cold or flu (sore throat, runny nose, cough, rash, fever). It also includes a scrape or scratch near the surgery site.  If you have questions on the day of surgery, call your hospital or surgery center.  Eating and drinking guidelines  For your safety: Unless your surgeon tells you otherwise, follow the guidelines below.  Eat and drink as usual until 8 hours before you arrive for surgery. After that, no food or milk.  Drink clear liquids until 2 hours before you arrive. These are liquids you can see through, like water, Gatorade, and Propel Water. They also include plain black coffee and tea (no cream or milk), candy, and breath mints. You can spit out gum when you arrive.  If you drink alcohol: Stop drinking it the night before surgery.  If your care  team tells you to take medicine on the morning of surgery, it's okay to take it with a sip of water.  Preventing infection  Shower or bathe the night before and morning of your surgery. Follow the instructions your clinic gave you. (If no instructions, use regular soap.)  Don't shave or clip hair near your surgery site. We'll remove the hair if needed.  Don't smoke or vape the morning of surgery. You may chew nicotine gum up to 2 hours before surgery. A nicotine patch is okay.  Note: Some surgeries require you to completely quit smoking and nicotine. Check with your surgeon.  Your care team will make every effort to keep you safe from infection. We will:  Clean our hands often with soap and water (or an alcohol-based hand rub).  Clean the skin at your surgery site with a special soap that kills germs.  Give you a special gown to keep you warm. (Cold raises the risk of infection.)  Wear special hair covers, masks, gowns and gloves during surgery.  Give antibiotic medicine, if prescribed. Not all surgeries need antibiotics.  What to bring on the day of surgery  Photo ID and insurance card  Copy of your health care directive, if you have one  Glasses and hearing aids (bring cases)  You can't wear contacts during surgery  Inhaler and eye drops, if you use them (tell us about these when you arrive)  CPAP machine or breathing device, if you use them  A few personal items, if spending the night  If you have . . .  A pacemaker, ICD (cardiac defibrillator) or other implant: Bring the ID card.  An implanted stimulator: Bring the remote control.  A legal guardian: Bring a copy of the certified (court-stamped) guardianship papers.  Please remove any jewelry, including body piercings. Leave jewelry and other valuables at home.  If you're going home the day of surgery  You must have a responsible adult drive you home. They should stay with you overnight as well.  If you don't have someone to stay with you, and you aren't safe to  go home alone, we may keep you overnight. Insurance often won't pay for this.  After surgery  If it's hard to control your pain or you need more pain medicine, please call your surgeon's office.  What to do with your medicines before the surgery:    1)  Call the sleep center for continuous positive airway pressure replacement.    2)  Lab work today:  We can do labs in the exam room today, or you can get them done downstairs in the lab.      3)  Adacel shot today.    4)  Hold your metformin on AM of surgery .    5)  No short acting insulin on AM of surgery, as you'll not be eating that AM.    6) Do not take the hydrochlorothiazide on the AM of surgyer.    7)  Take only 40 of the lantus insulin the night before surgery.

## 2023-05-30 ENCOUNTER — HOSPITAL ENCOUNTER (EMERGENCY)
Facility: CLINIC | Age: 58
Discharge: HOME | End: 2023-05-30
Attending: EMERGENCY MEDICINE | Admitting: EMERGENCY MEDICINE
Payer: COMMERCIAL

## 2023-05-30 ENCOUNTER — APPOINTMENT (OUTPATIENT)
Dept: CT IMAGING | Facility: CLINIC | Age: 58
End: 2023-05-30
Attending: EMERGENCY MEDICINE
Payer: COMMERCIAL

## 2023-05-30 VITALS
SYSTOLIC BLOOD PRESSURE: 135 MMHG | TEMPERATURE: 97.4 F | DIASTOLIC BLOOD PRESSURE: 83 MMHG | RESPIRATION RATE: 18 BRPM | HEART RATE: 66 BPM | OXYGEN SATURATION: 97 %

## 2023-05-30 DIAGNOSIS — R07.9 CHEST PAIN, UNSPECIFIED TYPE: ICD-10-CM

## 2023-05-30 DIAGNOSIS — Z87.891 HISTORY OF SMOKING: ICD-10-CM

## 2023-05-30 DIAGNOSIS — I10 HYPERTENSION, UNSPECIFIED TYPE: ICD-10-CM

## 2023-05-30 DIAGNOSIS — E13.9 OTHER SPECIFIED DIABETES MELLITUS WITHOUT COMPLICATION, WITHOUT LONG-TERM CURRENT USE OF INSULIN (H): ICD-10-CM

## 2023-05-30 LAB
ANION GAP SERPL CALCULATED.3IONS-SCNC: 14 MMOL/L (ref 7–15)
BASOPHILS # BLD AUTO: 0 10E3/UL (ref 0–0.2)
BASOPHILS NFR BLD AUTO: 1 %
BUN SERPL-MCNC: 18.8 MG/DL (ref 8–23)
CALCIUM SERPL-MCNC: 10.2 MG/DL (ref 8.6–10)
CHLORIDE SERPL-SCNC: 100 MMOL/L (ref 98–107)
CREAT SERPL-MCNC: 0.71 MG/DL (ref 0.67–1.17)
D DIMER PPP FEU-MCNC: 0.98 UG/ML FEU (ref 0–0.5)
DEPRECATED HCO3 PLAS-SCNC: 23 MMOL/L (ref 22–29)
EOSINOPHIL # BLD AUTO: 0.2 10E3/UL (ref 0–0.7)
EOSINOPHIL NFR BLD AUTO: 2 %
ERYTHROCYTE [DISTWIDTH] IN BLOOD BY AUTOMATED COUNT: 13.6 % (ref 10–15)
GFR SERPL CREATININE-BSD FRML MDRD: >90 ML/MIN/1.73M2
GLUCOSE SERPL-MCNC: 127 MG/DL (ref 70–99)
HCT VFR BLD AUTO: 42.5 % (ref 40–53)
HGB BLD-MCNC: 14.2 G/DL (ref 13.3–17.7)
HOLD SPECIMEN: NORMAL
IMM GRANULOCYTES # BLD: 0 10E3/UL
IMM GRANULOCYTES NFR BLD: 0 %
LYMPHOCYTES # BLD AUTO: 2.6 10E3/UL (ref 0.8–5.3)
LYMPHOCYTES NFR BLD AUTO: 34 %
MCH RBC QN AUTO: 29.2 PG (ref 26.5–33)
MCHC RBC AUTO-ENTMCNC: 33.4 G/DL (ref 31.5–36.5)
MCV RBC AUTO: 87 FL (ref 78–100)
MONOCYTES # BLD AUTO: 0.5 10E3/UL (ref 0–1.3)
MONOCYTES NFR BLD AUTO: 7 %
NEUTROPHILS # BLD AUTO: 4.4 10E3/UL (ref 1.6–8.3)
NEUTROPHILS NFR BLD AUTO: 56 %
NRBC # BLD AUTO: 0 10E3/UL
NRBC BLD AUTO-RTO: 0 /100
PLATELET # BLD AUTO: 292 10E3/UL (ref 150–450)
POTASSIUM SERPL-SCNC: 4.3 MMOL/L (ref 3.4–5.3)
RBC # BLD AUTO: 4.87 10E6/UL (ref 4.4–5.9)
SODIUM SERPL-SCNC: 137 MMOL/L (ref 136–145)
TROPONIN T SERPL HS-MCNC: 19 NG/L
TROPONIN T SERPL HS-MCNC: 19 NG/L
WBC # BLD AUTO: 7.8 10E3/UL (ref 4–11)

## 2023-05-30 PROCEDURE — 82310 ASSAY OF CALCIUM: CPT | Performed by: EMERGENCY MEDICINE

## 2023-05-30 PROCEDURE — 99285 EMERGENCY DEPT VISIT HI MDM: CPT | Mod: 25

## 2023-05-30 PROCEDURE — 36415 COLL VENOUS BLD VENIPUNCTURE: CPT | Performed by: EMERGENCY MEDICINE

## 2023-05-30 PROCEDURE — 84484 ASSAY OF TROPONIN QUANT: CPT | Mod: 91 | Performed by: EMERGENCY MEDICINE

## 2023-05-30 PROCEDURE — 85379 FIBRIN DEGRADATION QUANT: CPT | Performed by: EMERGENCY MEDICINE

## 2023-05-30 PROCEDURE — 93005 ELECTROCARDIOGRAM TRACING: CPT

## 2023-05-30 PROCEDURE — 71275 CT ANGIOGRAPHY CHEST: CPT

## 2023-05-30 PROCEDURE — 84484 ASSAY OF TROPONIN QUANT: CPT | Performed by: EMERGENCY MEDICINE

## 2023-05-30 PROCEDURE — 85014 HEMATOCRIT: CPT | Performed by: EMERGENCY MEDICINE

## 2023-05-30 PROCEDURE — 250N000011 HC RX IP 250 OP 636: Performed by: EMERGENCY MEDICINE

## 2023-05-30 PROCEDURE — 250N000009 HC RX 250: Performed by: EMERGENCY MEDICINE

## 2023-05-30 RX ORDER — IOPAMIDOL 755 MG/ML
500 INJECTION, SOLUTION INTRAVASCULAR ONCE
Status: COMPLETED | OUTPATIENT
Start: 2023-05-30 | End: 2023-05-30

## 2023-05-30 RX ORDER — ASPIRIN 81 MG/1
324 TABLET, CHEWABLE ORAL ONCE
Status: COMPLETED | OUTPATIENT
Start: 2023-05-30 | End: 2023-05-30

## 2023-05-30 RX ADMIN — SODIUM CHLORIDE 100 ML: 9 INJECTION, SOLUTION INTRAVENOUS at 14:16

## 2023-05-30 RX ADMIN — IOPAMIDOL 85 ML: 755 INJECTION, SOLUTION INTRAVENOUS at 14:16

## 2023-05-30 ASSESSMENT — ACTIVITIES OF DAILY LIVING (ADL)
ADLS_ACUITY_SCORE: 35
ADLS_ACUITY_SCORE: 35

## 2023-05-30 NOTE — ED PROVIDER NOTES
History     Chief Complaint:  Chest Pain       HPI   Glen hCo is a 57 year old male with history of type II diabetes, CKD, hypertension, hyperlipidemia, among others, who presents with for evaluation of chest discomfort.  Since his neurological surgery several weeks ago, the patient has had intermittent episodes of central chest discomfort lasting 1 to 2 minutes.  The symptoms are occasionally at rest and occasionally with exertion.  At times he will walk for 2 to 3 miles with his wife and have no symptoms, and other times walking to work, such as this morning, he will have some transient discomfort.  He had an episode today at PT, prompting evaluation here.  Associated with this was no abdominal pain, back pain, shortness of breath, fever, cough, or any other concerns.        Review of External Notes:   Reviewed UC visit from today      Medications:    Aspirin 325 mg  Blood glucose  Celebrex  Hydrodiuril  Insulin glargine  Insulin lispro  Cozaar  Metformin  Zocor  Wellbutrin XL  Insulin degludec  Hyzaar    Past Medical History:    Arthritis   CKD, stage I  Type II diabetes  Hyperlipidemia   Hypertension   Obesity   Anxiety  Rosacea  Tear of lateral cartilage or meniscus of knee  Tubular adenoma of colon  Morbid obesity  CORINA    Past Surgical History:    ORIF of right ankle  Knee arthroscopy  Umbilical herniorrhaphy     Physical Exam     Patient Vitals for the past 24 hrs:   BP Temp Temp src Pulse Resp SpO2   05/30/23 1230 (!) 141/95 97.4  F (36.3  C) Temporal 64 20 99 %      Physical Exam  Constitutional: Alert, attentive  HENT:    Nose: Nose normal.    Mouth/Throat: Oropharynx is clear, mucous membranes are moist   Eyes: EOM are normal.   CV: regular rate and rhythm; no murmurs, rubs or gallups  Chest: Effort normal and breath sounds normal.   GI:  There is no tenderness. No distension. Normal bowel sounds  MSK: Normal range of motion.   Neurological: Alert, attentive  Skin: Skin is warm and  dry.      Emergency Department Course   ECG  Sinus rhythm   Normal ECG   When compared with ECG of 12-APR-2015 12:43,   No significant change was found     Imaging:  CT Chest Pulmonary Embolism w Contrast   Final Result   IMPRESSION:   1.  Moderate emphysema with likely superimposed mild pulmonary edema.    2.  No pulmonary embolus.   3.  Significant calcification of aortic valve leaflets, can be see   with aortic stenosis.      HAILEY REYES MD            SYSTEM ID:  [id number]      Echo Stress Echocardiogram    (Results Pending)      Report per radiology    Laboratory:  Labs Ordered and Resulted from Time of ED Arrival to Time of ED Departure   BASIC METABOLIC PANEL - Abnormal       Result Value    Sodium 137      Potassium 4.3      Chloride 100      Carbon Dioxide (CO2) 23      Anion Gap 14      Urea Nitrogen 18.8      Creatinine 0.71      Calcium 10.2 (*)     Glucose 127 (*)     GFR Estimate >90     D DIMER QUANTITATIVE - Abnormal    D-Dimer Quantitative 0.98 (*)    TROPONIN T, HIGH SENSITIVITY - Normal    Troponin T, High Sensitivity 19     TROPONIN T, HIGH SENSITIVITY - Normal    Troponin T, High Sensitivity 19     CBC WITH PLATELETS AND DIFFERENTIAL    WBC Count 7.8      RBC Count 4.87      Hemoglobin 14.2      Hematocrit 42.5      MCV 87      MCH 29.2      MCHC 33.4      RDW 13.6      Platelet Count 292      % Neutrophils 56      % Lymphocytes 34      % Monocytes 7      % Eosinophils 2      % Basophils 1      % Immature Granulocytes 0      NRBCs per 100 WBC 0      Absolute Neutrophils 4.4      Absolute Lymphocytes 2.6      Absolute Monocytes 0.5      Absolute Eosinophils 0.2      Absolute Basophils 0.0      Absolute Immature Granulocytes 0.0      Absolute NRBCs 0.0          Procedures       Emergency Department Course & Assessments:       Interventions:  Medications   aspirin (ASA) chewable tablet 324 mg (324 mg Oral Not Given 5/30/23 1340)   CT SCAN FLUSH (100 mLs Intravenous $Given 5/30/23 1412)    iopamidol (ISOVUE-370) solution 500 mL (85 mLs Intravenous $Given 5/30/23 1415)      Assessments:   I obtained history and examined the patient as noted above.    Independent Interpretation (X-rays, CTs, rhythm strip):  No PTX on CT PE      Disposition:  The patient was discharged to home.     Impression & Plan        HEART Score  Background  Calculates the overall risk of adverse event in patient's presenting with chest pain.  Based on 5 criteria (each assigned 0-2 points) including suspiciousness of history, EKG, age, risk factors and troponin.    Data  57 year old male  has Anxiety; Rosacea; Hypertension goal BP (blood pressure) < 140/90; Heart murmur; Tear of lateral cartilage or meniscus of knee, current; Tubular adenoma of colon; CKD (chronic kidney disease) stage 1, GFR 90 ml/min or greater; Morbid obesity (H); Hyperlipidemia LDL goal <100; Type 2 diabetes mellitus with diabetic nephropathy, with long-term current use of insulin (H); and CORINA (obstructive sleep apnea) on their problem list.   reports that he has been smoking cigarettes. He has never used smokeless tobacco.  family history includes Cancer in his father; Myocardial Infarction (age of onset: 57) in his father.  Lab Results   Component Value Date    TROPI  04/12/2015     <0.015  The 99th percentile for upper reference range is 0.045 ug/L.  Troponin values in   the range of 0.045 - 0.120 ug/L may be associated with risks of adverse   clinical events.   Effective 7/30/2014, the reference range for this assay has changed to reflect   new instrumentation/methodology.       Criteria   0-2 points for each of 5 items (maximum of 10 points):  Score 1- History moderately suspicious for coronary syndrome  Score 0- EKG Normal  Score 1- Age 45 to 65 years old  Score 2- Three or more risk factors for or history of atherosclerotic disease  Score 0- Within normal limits for troponin levels  Interpretation  Risk of adverse outcome  Heart Score: 4  Total Score  4-6- Adverse Outcome Risk 20.3% - Supports admission with standard rule-out management -serial troponins and stress testing    Medical Decision Making:  This is a 57-year-old male with history of hypertension, diabetes, smoking, who presents for evaluation of chest pain.  He has had this symptom intermittently for several weeks.  EKG is nonischemic and initial troponin is negative.  His D-dimer elevation prompted CT PE, which fortunately showed no evidence of TAD, pneumonia, pneumothorax, or PE.  He remains chest pain-free in the department.  While the pain is atypical, he does have risk factors placing him with a heart score of 4.  I recommended admission given the above, and he declined.  Indicates that he has travel coming up tomorrow [place] and will follow-up thereafter.  He also notes that he will return immediately or seek medical care immediately should he have recurrent chest pain.  He discusses all of the above demonstrating decision-making capacity understanding that symptoms could be a precursor to a potential fatal MI.    Diagnosis:    ICD-10-CM    1. Chest pain, unspecified type  R07.9 Echo Stress Echocardiogram      2. Hypertension, unspecified type  I10       3. Other specified diabetes mellitus without complication, without long-term current use of insulin (H)  E13.9       4. History of smoking  Z87.891             Scribe Disclosure:  I, Carlos Mathur, am serving as a scribe at 1:36 PM on 5/30/2023 to document services personally performed by Jose Juan Galvin MD, based on my observations and the provider's statements to me.   5/30/2023   Jose Juan Galvin MD [place], John Eric, MD  05/30/23 8234

## 2023-05-30 NOTE — ED TRIAGE NOTES
Pt arrives for CP that started while he was at PT this AM. States he has had intermittent episodes of CP since April. The pain has now resolved. Denies SOB. Denies cardiac hx. ABCs intact.      Triage Assessment     Row Name 05/30/23 1231       Triage Assessment (Adult)    Airway WDL WDL       Respiratory WDL    Respiratory WDL WDL       Cardiac WDL    Cardiac WDL WDL       Cognitive/Neuro/Behavioral WDL    Cognitive/Neuro/Behavioral WDL WDL

## 2023-05-31 LAB
ATRIAL RATE - MUSE: 71 BPM
DIASTOLIC BLOOD PRESSURE - MUSE: NORMAL MMHG
INTERPRETATION ECG - MUSE: NORMAL
P AXIS - MUSE: 28 DEGREES
PR INTERVAL - MUSE: 164 MS
QRS DURATION - MUSE: 94 MS
QT - MUSE: 410 MS
QTC - MUSE: 445 MS
R AXIS - MUSE: 35 DEGREES
SYSTOLIC BLOOD PRESSURE - MUSE: NORMAL MMHG
T AXIS - MUSE: 62 DEGREES
VENTRICULAR RATE- MUSE: 71 BPM

## 2023-06-09 ENCOUNTER — TELEPHONE (OUTPATIENT)
Dept: MEDSURG UNIT | Facility: CLINIC | Age: 58
End: 2023-06-09

## 2023-06-09 ENCOUNTER — TELEPHONE (OUTPATIENT)
Dept: PEDIATRICS | Facility: CLINIC | Age: 58
End: 2023-06-09

## 2023-06-09 ENCOUNTER — OFFICE VISIT (OUTPATIENT)
Dept: CARDIOLOGY | Facility: CLINIC | Age: 58
End: 2023-06-09
Payer: COMMERCIAL

## 2023-06-09 ENCOUNTER — HOSPITAL ENCOUNTER (OUTPATIENT)
Dept: CARDIOLOGY | Facility: CLINIC | Age: 58
Discharge: HOME | End: 2023-06-09
Attending: EMERGENCY MEDICINE | Admitting: EMERGENCY MEDICINE
Payer: COMMERCIAL

## 2023-06-09 ENCOUNTER — TELEPHONE (OUTPATIENT)
Dept: CARDIOLOGY | Facility: CLINIC | Age: 58
End: 2023-06-09

## 2023-06-09 VITALS — DIASTOLIC BLOOD PRESSURE: 70 MMHG | SYSTOLIC BLOOD PRESSURE: 122 MMHG

## 2023-06-09 DIAGNOSIS — R94.39 ABNORMAL STRESS TEST: ICD-10-CM

## 2023-06-09 DIAGNOSIS — E78.5 DYSLIPIDEMIA: ICD-10-CM

## 2023-06-09 DIAGNOSIS — R07.9 CHEST PAIN, UNSPECIFIED TYPE: Primary | ICD-10-CM

## 2023-06-09 DIAGNOSIS — R07.9 CHEST PAIN, UNSPECIFIED TYPE: ICD-10-CM

## 2023-06-09 DIAGNOSIS — I25.10 CORONARY ARTERY DISEASE INVOLVING NATIVE CORONARY ARTERY OF NATIVE HEART, UNSPECIFIED WHETHER ANGINA PRESENT: ICD-10-CM

## 2023-06-09 DIAGNOSIS — I25.10 CORONARY ARTERY DISEASE INVOLVING NATIVE CORONARY ARTERY OF NATIVE HEART, UNSPECIFIED WHETHER ANGINA PRESENT: Primary | ICD-10-CM

## 2023-06-09 DIAGNOSIS — E78.5 HYPERLIPIDEMIA LDL GOAL <100: ICD-10-CM

## 2023-06-09 DIAGNOSIS — I10 HYPERTENSION GOAL BP (BLOOD PRESSURE) < 140/90: ICD-10-CM

## 2023-06-09 DIAGNOSIS — I10 BENIGN ESSENTIAL HYPERTENSION: ICD-10-CM

## 2023-06-09 DIAGNOSIS — I35.0 NONRHEUMATIC AORTIC VALVE STENOSIS: ICD-10-CM

## 2023-06-09 PROCEDURE — 93016 CV STRESS TEST SUPVJ ONLY: CPT | Performed by: INTERNAL MEDICINE

## 2023-06-09 PROCEDURE — 99417 PROLNG OP E/M EACH 15 MIN: CPT | Performed by: INTERNAL MEDICINE

## 2023-06-09 PROCEDURE — 93350 STRESS TTE ONLY: CPT | Mod: 26 | Performed by: INTERNAL MEDICINE

## 2023-06-09 PROCEDURE — 99215 OFFICE O/P EST HI 40 MIN: CPT | Performed by: INTERNAL MEDICINE

## 2023-06-09 PROCEDURE — 255N000002 HC RX 255 OP 636: Performed by: EMERGENCY MEDICINE

## 2023-06-09 PROCEDURE — 93321 DOPPLER ECHO F-UP/LMTD STD: CPT | Mod: 26 | Performed by: INTERNAL MEDICINE

## 2023-06-09 PROCEDURE — 93325 DOPPLER ECHO COLOR FLOW MAPG: CPT | Mod: TC

## 2023-06-09 PROCEDURE — 93018 CV STRESS TEST I&R ONLY: CPT | Performed by: INTERNAL MEDICINE

## 2023-06-09 PROCEDURE — 93325 DOPPLER ECHO COLOR FLOW MAPG: CPT | Mod: 26 | Performed by: INTERNAL MEDICINE

## 2023-06-09 RX ORDER — ASPIRIN 325 MG
325 TABLET ORAL ONCE
Status: CANCELLED | OUTPATIENT
Start: 2023-06-09 | End: 2023-06-09

## 2023-06-09 RX ORDER — ATENOLOL 50 MG/1
50 TABLET ORAL DAILY
Qty: 90 TABLET | Refills: 1 | Status: ON HOLD | OUTPATIENT
Start: 2023-06-09 | End: 2023-07-17

## 2023-06-09 RX ORDER — ASPIRIN 81 MG/1
81 TABLET ORAL DAILY
Qty: 90 TABLET | Refills: 3 | Status: SHIPPED | OUTPATIENT
Start: 2023-06-09

## 2023-06-09 RX ORDER — LIDOCAINE 40 MG/G
CREAM TOPICAL
Status: CANCELLED | OUTPATIENT
Start: 2023-06-09

## 2023-06-09 RX ORDER — POTASSIUM CHLORIDE 1500 MG/1
20 TABLET, EXTENDED RELEASE ORAL
Status: CANCELLED | OUTPATIENT
Start: 2023-06-09

## 2023-06-09 RX ORDER — ASPIRIN 81 MG/1
243 TABLET, CHEWABLE ORAL ONCE
Status: CANCELLED | OUTPATIENT
Start: 2023-06-09

## 2023-06-09 RX ORDER — NITROGLYCERIN 0.4 MG/1
TABLET SUBLINGUAL
Qty: 25 TABLET | Refills: 2 | Status: ON HOLD | OUTPATIENT
Start: 2023-06-09 | End: 2023-07-17

## 2023-06-09 RX ORDER — SODIUM CHLORIDE 9 MG/ML
INJECTION, SOLUTION INTRAVENOUS CONTINUOUS
Status: CANCELLED | OUTPATIENT
Start: 2023-06-09

## 2023-06-09 RX ADMIN — HUMAN ALBUMIN MICROSPHERES AND PERFLUTREN 3 ML: 10; .22 INJECTION, SOLUTION INTRAVENOUS at 11:48

## 2023-06-09 NOTE — TELEPHONE ENCOUNTER
Chart reviewed for upcoming appt.  LVM at clinic regarding needed orders for procedure.  Message left at [phone]

## 2023-06-09 NOTE — TELEPHONE ENCOUNTER
Received a call from Dr. Diez (Cardiology).     Pt had markedly positive stress test. May have multivessel disease.     They are planning on cath next week and Cardiology is working on setting this up.     Started on atenolol and aspirin. He is already on a statin.     If any sustained chest pain this weekend was counseled to proceed to the ED.      Appreciate the communication and care from our Cardiology colleagues.     Routing to PCP as RA Haji MD  Internal Medicine-Pediatrics

## 2023-06-09 NOTE — TELEPHONE ENCOUNTER
Coronary angiogram/PCI/Right Heart Cath prep instructions.     Patient is scheduled for a Coronary Angio w/possible PCI at [facility] - [address] [place] [address] - Main Entrance of the Hospital, on 6/12/23. Check in time is at 930am and procedure to follow.    Patient instructed to remain NPO for solid foods 8 hours prior to arrival and may have clear liquids up to 2 hours prior to arrival.    Patient is on metformin and has been advised to hold Metformin the day of the procedure. They should continue to hold until after follow up BMP scheduled 6/14/23 is reviewed.  Pt will be called and advised when they can resume their metformin.    Patient is not on anticoagulation.    Patient is taking taking hydrochlorothiazide and has been instructed to hold it the morning of procedure. and has been advised to hold this the morning of the procedure.    Patient is not currently taking ASA and has been advised to take one 325 mg tablet the day prior and morning of the procedure.    Pt is not on a SGLT2 inhibitor.    Patient advised to take their other daily medications the morning of the procedure with small sips of water.     Verified patient does not have a contrast allergy.    Verified patient has someone available to drive them home from the hospital and can stay with them for 24 hours after the procedure.     Patient is aware of visitor policy.    Patient expresses understanding of above instructions and denies further questions at this time.    Clarisse DONATO   [facility] [place]

## 2023-06-09 NOTE — LETTER
6/9/2023    Mike Deal MD  [address] [place] [address]    RE: lGen Cho       Dear Colleague,     I had the pleasure of seeing Glen Cho in the [facility].      Cardiology Progress Note    High complexity consultation. More than 80 minutes in reviewing stress test on urgent basis, review of records, discussion with primary care provider and coordinating care to arrange coronary angiography and possible revascularization     Assessment & Plan       1.  Coronary Artery Disease : recent onset typical exertional angina. Markedly abnormal stress echocardiogram  2.  Moderate Aortic Stenosis ( JUNIE 1.5cm2/MSG 32 mmHg/DI 0.35)   3.  Type 2 diabetes mellitus  4. Osteoarthritis sp ORIF right ankle  5. Obstructive Sleep Apnea  6. Dyslipidemia  7. Obesity     In view of the patient's markedly abnormal stress echocardiogram and history of progressive angina, in addition to aggressive lifestyle intervention and guideline directed medical therapy, diagnostic angiography with possible revascularization are warranted in the near future.  The patient has moderate aortic stenosis, which may affect our choices of revascularization.      In particular, if the patient has multivessel disease he may need aortic valve replacement along with bypass surgery.  If he has focal coronary disease, we may offer percutaneous intervention, delaying aortic intervention to another time.    Recommendations    1.  Avoid vigorous activity between now and the time of his angiogram.  Should the patient experience any change in symptoms between now and the time of his procedure, we will advise that he go directly to emergency room.  2.  Aspirin 81 daily  3.  Atenolol 50 mg daily  4.  Continue losartan/ hydrochlorothiazide/Simvastatin.  We may consider switching from simvastatin to atorvastatin at the time of his procedure  5.  Mediterranean-style weight loss diet  6.  Immediate cessation of tobacco use  7.   Diagnostic coronary angiography with possible revascularization with consideration of how to manage the patient's moderate aortic stenosis.    I have examined the patient, reviewed the history, medications and pre procedural tests. I have explained to the patient the risks of death, MI, stroke, hematoma, possible urgent bypass surgery for failed PCI, use of stents, thienopyridine agents, possible peripheral vascular complications, arrhythmia, the use of FFR in clinical decision-making and alternative of medical therapy alone in regards to left heart catheterization, left ventriculography, coronary angiography, and possible percutaneous coronary intervention. The patient voiced understanding and wishes to proceed. The patient has a good right radial pulse, normal ulnar pulse and a normal Avery's sign.     I have provided prescriptions for all medications and discussed his management in detail with Dr. Deal' partner today.      Jacobo Diez MD, MD    History of Present illness  Glen Cho is a 57 year old male who was seen urgently in the Stress Echocardiogram as an outpatient to evaluate a markedly abnormal stress echocardiogram and a history of progressive anigna     Since shortly after his recent right ankle surgery, describes the onset of exercise associated substernal chest discomfort rating to his jaw and arm.  The discomfort does not awaken from sleep, but has occurred in a progressive fashion, precipitated by less activity, and increasing in severity and duration.  Thus far he has had no prolonged episodes of discomfort at rest, but was seen in our emergency room on 5/30/2023, noted to have a normal ECG, normal troponin levels, and normal CT pulmonary angiogram, and was referred for an outpatient stress echocardiogram, performed today.  The patient only walked 3 minutes and 30 seconds before developing typical angina, EKG changes, exercise associated LV chamber dilation, and both  anterior and inferior regional wall motion abnormalities.  Although patient symptoms and EKG/echo abnormalities resolved probably in the recovery phase.  Our monitoring staff became quite concerned, and requested I see the patient in consultation after speaking with the patient's primary care providers    The patient's coronary risk factors include type 2 diabetes mellitus, hypertension, smoking history, and a family history of his father having an MI age 57.    PAST MEDICAL HISTORY  1) Hypertension  2) Type 2 diabetes mellitus  3) Obstructive sleep apnea  4) Obesity  5) Osteoarthritis-status post open reduction internal fixation right ankle  Anxiety    Social History the patient is , 58, has 2 children, and works as a .  He does not have a regular exercises program, limited because of osteoarthritis.    Allergies: Amoxicillin.  Some type of insulin    Habits: There is no history of alcohol abuse.  The patient smokes cigarettes 1 pack/day for 18 years.  He has tried to stop on several occasions    Medications  Metformin 1000 mg at dinner  Losartan 100 mg daily  Hydrochlorothiazide 25 mg daily  Insulin  Simvastatin 20 mg daily    Medications added today: Atenolol 50 mg daily, aspirin 81 daily, as needed nitroglycerin    Review of systems: A 12 point review of systems was performed.  Outside of the issues mentioned in HPI there are no other complaints    Family history: Dad had MI at age 57.        Physical Exam   HR 72 bp 122/70    Constitutional: Awake, alert, cooperative,intelligent  no apparent distress  Lungs: Clear to auscultation bilaterally, no crackles or wheezing  Cardiovascular: Regular rate and rhythm, normal S1 and S2, and 2/6 systolic murmur  Abdomen: Normal bowel sounds, soft, non-distended, non-tender  Skin: No rashes, no cyanosis, no edema  Cranial Nerves 2 through 12 intact, normal insight and judgement, moves all 4 extremities.             Data   I personally reviewed his  stress echocardiogram  Markedly positive stress echo at high risk  LVEF declines from 60% to 35 to 40%, LV chamber size increases, New ST depression in inferolateral leads with 1 mm ST elevation in AVR, with angina, all resolving within 5 minutes in recovery phase  Recent CT scan no pulmonary embolus or aortic dissection  BMP TROPONINs normal      Thank you for allowing me to participate in the care of your patient.      Sincerely,     Jacobo Diez MD     [facility]  cc:   No referring provider defined for this encounter.

## 2023-06-09 NOTE — PROGRESS NOTES
Cardiology Progress Note    High complexity consultation. More than 80 minutes in reviewing stress test on urgent basis, review of records, discussion with primary care provider and coordinating care to arrange coronary angiography and possible revascularization     Assessment & Plan       1.  Coronary Artery Disease : recent onset typical exertional angina. Markedly abnormal stress echocardiogram  2.  Moderate Aortic Stenosis ( JUNIE 1.5cm2/MSG 32 mmHg/DI 0.35)   3.  Type 2 diabetes mellitus  4. Osteoarthritis sp ORIF right ankle  5. Obstructive Sleep Apnea  6. Dyslipidemia  7. Obesity     In view of the patient's markedly abnormal stress echocardiogram and history of progressive angina, in addition to aggressive lifestyle intervention and guideline directed medical therapy, diagnostic angiography with possible revascularization are warranted in the near future.  The patient has moderate aortic stenosis, which may affect our choices of revascularization.      In particular, if the patient has multivessel disease he may need aortic valve replacement along with bypass surgery.  If he has focal coronary disease, we may offer percutaneous intervention, delaying aortic intervention to another time.    Recommendations    1.  Avoid vigorous activity between now and the time of his angiogram.  Should the patient experience any change in symptoms between now and the time of his procedure, we will advise that he go directly to emergency room.  2.  Aspirin 81 daily  3.  Atenolol 50 mg daily  4.  Continue losartan/ hydrochlorothiazide/Simvastatin.  We may consider switching from simvastatin to atorvastatin at the time of his procedure  5.  Mediterranean-style weight loss diet  6.  Immediate cessation of tobacco use  7.  Diagnostic coronary angiography with possible revascularization with consideration of how to manage the patient's moderate aortic stenosis.    I have examined the patient, reviewed the history, medications  and pre procedural tests. I have explained to the patient the risks of death, MI, stroke, hematoma, possible urgent bypass surgery for failed PCI, use of stents, thienopyridine agents, possible peripheral vascular complications, arrhythmia, the use of FFR in clinical decision-making and alternative of medical therapy alone in regards to left heart catheterization, left ventriculography, coronary angiography, and possible percutaneous coronary intervention. The patient voiced understanding and wishes to proceed. The patient has a good right radial pulse, normal ulnar pulse and a normal Avery's sign.     I have provided prescriptions for all medications and discussed his management in detail with Dr. Deal' partner today.      Jacobo Diez MD, MD    History of Present illness  Glen Cho is a 57 year old male who was seen urgently in the Stress Echocardiogram as an outpatient to evaluate a markedly abnormal stress echocardiogram and a history of progressive anigna     Since shortly after his recent right ankle surgery, describes the onset of exercise associated substernal chest discomfort rating to his jaw and arm.  The discomfort does not awaken from sleep, but has occurred in a progressive fashion, precipitated by less activity, and increasing in severity and duration.  Thus far he has had no prolonged episodes of discomfort at rest, but was seen in our emergency room on 5/30/2023, noted to have a normal ECG, normal troponin levels, and normal CT pulmonary angiogram, and was referred for an outpatient stress echocardiogram, performed today.  The patient only walked 3 minutes and 30 seconds before developing typical angina, EKG changes, exercise associated LV chamber dilation, and both anterior and inferior regional wall motion abnormalities.  Although patient symptoms and EKG/echo abnormalities resolved probably in the recovery phase.  Our monitoring staff became quite concerned, and  requested I see the patient in consultation after speaking with the patient's primary care providers    The patient's coronary risk factors include type 2 diabetes mellitus, hypertension, smoking history, and a family history of his father having an MI age 57.    PAST MEDICAL HISTORY  1) Hypertension  2) Type 2 diabetes mellitus  3) Obstructive sleep apnea  4) Obesity  5) Osteoarthritis-status post open reduction internal fixation right ankle  Anxiety    Social History the patient is , 58, has 2 children, and works as a .  He does not have a regular exercises program, limited because of osteoarthritis.    Allergies: Amoxicillin.  Some type of insulin    Habits: There is no history of alcohol abuse.  The patient smokes cigarettes 1 pack/day for 18 years.  He has tried to stop on several occasions    Medications  Metformin 1000 mg at dinner  Losartan 100 mg daily  Hydrochlorothiazide 25 mg daily  Insulin  Simvastatin 20 mg daily    Medications added today: Atenolol 50 mg daily, aspirin 81 daily, as needed nitroglycerin    Review of systems: A 12 point review of systems was performed.  Outside of the issues mentioned in HPI there are no other complaints    Family history: Dad had MI at age 57.        Physical Exam   HR 72 bp 122/70    Constitutional: Awake, alert, cooperative,intelligent  no apparent distress  Lungs: Clear to auscultation bilaterally, no crackles or wheezing  Cardiovascular: Regular rate and rhythm, normal S1 and S2, and 2/6 systolic murmur  Abdomen: Normal bowel sounds, soft, non-distended, non-tender  Skin: No rashes, no cyanosis, no edema  Cranial Nerves 2 through 12 intact, normal insight and judgement, moves all 4 extremities.             Data   I personally reviewed his stress echocardiogram  Markedly positive stress echo at high risk  LVEF declines from 60% to 35 to 40%, LV chamber size increases, New ST depression in inferolateral leads with 1 mm ST elevation in AVR,  with angina, all resolving within 5 minutes in recovery phase  Recent CT scan no pulmonary embolus or aortic dissection  BMP TROPONINs normal

## 2023-06-12 ENCOUNTER — HOSPITAL ENCOUNTER (OUTPATIENT)
Facility: CLINIC | Age: 58
Discharge: HOME | End: 2023-06-12
Admitting: INTERNAL MEDICINE
Payer: COMMERCIAL

## 2023-06-12 ENCOUNTER — PREP FOR PROCEDURE (OUTPATIENT)
Dept: CARDIOLOGY | Facility: CLINIC | Age: 58
End: 2023-06-12

## 2023-06-12 VITALS
WEIGHT: 236.9 LBS | DIASTOLIC BLOOD PRESSURE: 61 MMHG | SYSTOLIC BLOOD PRESSURE: 98 MMHG | TEMPERATURE: 97.1 F | HEART RATE: 50 BPM | OXYGEN SATURATION: 98 % | HEIGHT: 65 IN | RESPIRATION RATE: 18 BRPM | BODY MASS INDEX: 39.47 KG/M2

## 2023-06-12 DIAGNOSIS — I35.0 AS (AORTIC STENOSIS): ICD-10-CM

## 2023-06-12 DIAGNOSIS — E78.5 HYPERLIPIDEMIA LDL GOAL <100: ICD-10-CM

## 2023-06-12 DIAGNOSIS — I25.10 CAD (CORONARY ARTERY DISEASE): Primary | ICD-10-CM

## 2023-06-12 DIAGNOSIS — R94.39 ABNORMAL STRESS TEST: ICD-10-CM

## 2023-06-12 DIAGNOSIS — E78.5 DYSLIPIDEMIA: ICD-10-CM

## 2023-06-12 DIAGNOSIS — I25.10 CORONARY ARTERY DISEASE INVOLVING NATIVE CORONARY ARTERY OF NATIVE HEART, UNSPECIFIED WHETHER ANGINA PRESENT: ICD-10-CM

## 2023-06-12 DIAGNOSIS — I10 HYPERTENSION GOAL BP (BLOOD PRESSURE) < 140/90: ICD-10-CM

## 2023-06-12 DIAGNOSIS — R07.9 CHEST PAIN, UNSPECIFIED TYPE: ICD-10-CM

## 2023-06-12 PROBLEM — Z98.890 STATUS POST CORONARY ANGIOGRAM: Status: ACTIVE | Noted: 2023-06-12

## 2023-06-12 LAB
ABO/RH(D): NORMAL
ALBUMIN SERPL BCG-MCNC: 4.5 G/DL (ref 3.5–5.2)
ALP SERPL-CCNC: 69 U/L (ref 40–129)
ALT SERPL W P-5'-P-CCNC: 25 U/L (ref 10–50)
ANION GAP SERPL CALCULATED.3IONS-SCNC: 13 MMOL/L (ref 7–15)
ANTIBODY SCREEN: NEGATIVE
APTT PPP: 32 SECONDS (ref 22–38)
AST SERPL W P-5'-P-CCNC: 26 U/L (ref 10–50)
BILIRUB DIRECT SERPL-MCNC: <0.2 MG/DL (ref 0–0.3)
BILIRUB SERPL-MCNC: 0.5 MG/DL
BUN SERPL-MCNC: 20.9 MG/DL (ref 8–23)
CALCIUM SERPL-MCNC: 10.4 MG/DL (ref 8.6–10)
CHLORIDE SERPL-SCNC: 96 MMOL/L (ref 98–107)
CREAT SERPL-MCNC: 0.85 MG/DL (ref 0.67–1.17)
DEPRECATED HCO3 PLAS-SCNC: 26 MMOL/L (ref 22–29)
ERYTHROCYTE [DISTWIDTH] IN BLOOD BY AUTOMATED COUNT: 13.6 % (ref 10–15)
GFR SERPL CREATININE-BSD FRML MDRD: >90 ML/MIN/1.73M2
GLUCOSE SERPL-MCNC: 118 MG/DL (ref 70–99)
HBA1C MFR BLD: 6.7 %
HCT VFR BLD AUTO: 42 % (ref 40–53)
HGB BLD-MCNC: 13.7 G/DL (ref 13.3–17.7)
INR PPP: 1.03 (ref 0.85–1.15)
MCH RBC QN AUTO: 28.7 PG (ref 26.5–33)
MCHC RBC AUTO-ENTMCNC: 32.6 G/DL (ref 31.5–36.5)
MCV RBC AUTO: 88 FL (ref 78–100)
PLATELET # BLD AUTO: 350 10E3/UL (ref 150–450)
POTASSIUM SERPL-SCNC: 3.8 MMOL/L (ref 3.4–5.3)
PROT SERPL-MCNC: 7.6 G/DL (ref 6.4–8.3)
RBC # BLD AUTO: 4.77 10E6/UL (ref 4.4–5.9)
SODIUM SERPL-SCNC: 135 MMOL/L (ref 136–145)
SPECIMEN EXPIRATION DATE: NORMAL
WBC # BLD AUTO: 9.5 10E3/UL (ref 4–11)

## 2023-06-12 PROCEDURE — 85027 COMPLETE CBC AUTOMATED: CPT | Performed by: INTERNAL MEDICINE

## 2023-06-12 PROCEDURE — 258N000003 HC RX IP 258 OP 636: Performed by: INTERNAL MEDICINE

## 2023-06-12 PROCEDURE — 86901 BLOOD TYPING SEROLOGIC RH(D): CPT | Performed by: SURGERY

## 2023-06-12 PROCEDURE — 250N000013 HC RX MED GY IP 250 OP 250 PS 637: Performed by: INTERNAL MEDICINE

## 2023-06-12 PROCEDURE — C1894 INTRO/SHEATH, NON-LASER: HCPCS | Performed by: INTERNAL MEDICINE

## 2023-06-12 PROCEDURE — 86850 RBC ANTIBODY SCREEN: CPT | Performed by: SURGERY

## 2023-06-12 PROCEDURE — 999N000184 HC STATISTIC TELEMETRY

## 2023-06-12 PROCEDURE — 99152 MOD SED SAME PHYS/QHP 5/>YRS: CPT | Performed by: INTERNAL MEDICINE

## 2023-06-12 PROCEDURE — C1887 CATHETER, GUIDING: HCPCS | Performed by: INTERNAL MEDICINE

## 2023-06-12 PROCEDURE — 93454 CORONARY ARTERY ANGIO S&I: CPT | Mod: 26 | Performed by: INTERNAL MEDICINE

## 2023-06-12 PROCEDURE — 99152 MOD SED SAME PHYS/QHP 5/>YRS: CPT | Mod: GC | Performed by: INTERNAL MEDICINE

## 2023-06-12 PROCEDURE — 93454 CORONARY ARTERY ANGIO S&I: CPT | Performed by: INTERNAL MEDICINE

## 2023-06-12 PROCEDURE — 82248 BILIRUBIN DIRECT: CPT | Performed by: SURGERY

## 2023-06-12 PROCEDURE — 999N000054 HC STATISTIC EKG NON-CHARGEABLE

## 2023-06-12 PROCEDURE — 250N000009 HC RX 250: Performed by: INTERNAL MEDICINE

## 2023-06-12 PROCEDURE — 85610 PROTHROMBIN TIME: CPT | Performed by: INTERNAL MEDICINE

## 2023-06-12 PROCEDURE — 250N000011 HC RX IP 250 OP 636: Performed by: INTERNAL MEDICINE

## 2023-06-12 PROCEDURE — 99204 OFFICE O/P NEW MOD 45 MIN: CPT | Performed by: SURGERY

## 2023-06-12 PROCEDURE — 83036 HEMOGLOBIN GLYCOSYLATED A1C: CPT | Performed by: SURGERY

## 2023-06-12 PROCEDURE — 85730 THROMBOPLASTIN TIME PARTIAL: CPT | Performed by: INTERNAL MEDICINE

## 2023-06-12 PROCEDURE — C1769 GUIDE WIRE: HCPCS | Performed by: INTERNAL MEDICINE

## 2023-06-12 PROCEDURE — 272N000001 HC OR GENERAL SUPPLY STERILE: Performed by: INTERNAL MEDICINE

## 2023-06-12 PROCEDURE — 80053 COMPREHEN METABOLIC PANEL: CPT | Performed by: INTERNAL MEDICINE

## 2023-06-12 PROCEDURE — 999N000071 HC STATISTIC HEART CATH LAB OR EP LAB

## 2023-06-12 PROCEDURE — 93005 ELECTROCARDIOGRAM TRACING: CPT

## 2023-06-12 PROCEDURE — 36415 COLL VENOUS BLD VENIPUNCTURE: CPT | Performed by: INTERNAL MEDICINE

## 2023-06-12 PROCEDURE — 36591 DRAW BLOOD OFF VENOUS DEVICE: CPT

## 2023-06-12 RX ORDER — LIDOCAINE 40 MG/G
CREAM TOPICAL
Status: DISCONTINUED | OUTPATIENT
Start: 2023-06-12 | End: 2023-06-12 | Stop reason: HOSPADM

## 2023-06-12 RX ORDER — SODIUM CHLORIDE 9 MG/ML
INJECTION, SOLUTION INTRAVENOUS CONTINUOUS
Status: ACTIVE | OUTPATIENT
Start: 2023-06-12 | End: 2023-06-12

## 2023-06-12 RX ORDER — POTASSIUM CHLORIDE 1500 MG/1
20 TABLET, EXTENDED RELEASE ORAL
Status: DISCONTINUED | OUTPATIENT
Start: 2023-06-12 | End: 2023-06-12 | Stop reason: HOSPADM

## 2023-06-12 RX ORDER — SODIUM CHLORIDE 9 MG/ML
INJECTION, SOLUTION INTRAVENOUS CONTINUOUS
Status: DISCONTINUED | OUTPATIENT
Start: 2023-06-12 | End: 2023-06-12 | Stop reason: HOSPADM

## 2023-06-12 RX ORDER — NITROGLYCERIN 5 MG/ML
VIAL (ML) INTRAVENOUS
Status: DISCONTINUED | OUTPATIENT
Start: 2023-06-12 | End: 2023-06-12 | Stop reason: HOSPADM

## 2023-06-12 RX ORDER — ASPIRIN 81 MG/1
243 TABLET, CHEWABLE ORAL ONCE
Status: COMPLETED | OUTPATIENT
Start: 2023-06-12 | End: 2023-06-12

## 2023-06-12 RX ORDER — HEPARIN SODIUM 1000 [USP'U]/ML
INJECTION, SOLUTION INTRAVENOUS; SUBCUTANEOUS
Status: DISCONTINUED | OUTPATIENT
Start: 2023-06-12 | End: 2023-06-12 | Stop reason: HOSPADM

## 2023-06-12 RX ORDER — IOPAMIDOL 755 MG/ML
INJECTION, SOLUTION INTRAVASCULAR
Status: DISCONTINUED | OUTPATIENT
Start: 2023-06-12 | End: 2023-06-12 | Stop reason: HOSPADM

## 2023-06-12 RX ORDER — VERAPAMIL HYDROCHLORIDE 2.5 MG/ML
INJECTION, SOLUTION INTRAVENOUS
Status: DISCONTINUED | OUTPATIENT
Start: 2023-06-12 | End: 2023-06-12 | Stop reason: HOSPADM

## 2023-06-12 RX ORDER — ACETAMINOPHEN 325 MG/1
650 TABLET ORAL EVERY 4 HOURS PRN
Status: DISCONTINUED | OUTPATIENT
Start: 2023-06-12 | End: 2023-06-12 | Stop reason: HOSPADM

## 2023-06-12 RX ORDER — ASPIRIN 325 MG
325 TABLET ORAL ONCE
Status: COMPLETED | OUTPATIENT
Start: 2023-06-12 | End: 2023-06-12

## 2023-06-12 RX ORDER — FENTANYL CITRATE 50 UG/ML
INJECTION, SOLUTION INTRAMUSCULAR; INTRAVENOUS
Status: DISCONTINUED | OUTPATIENT
Start: 2023-06-12 | End: 2023-06-12 | Stop reason: HOSPADM

## 2023-06-12 RX ADMIN — SODIUM CHLORIDE: 9 INJECTION, SOLUTION INTRAVENOUS at 10:04

## 2023-06-12 RX ADMIN — ASPIRIN 325 MG ORAL TABLET 325 MG: 325 PILL ORAL at 10:25

## 2023-06-12 ASSESSMENT — ACTIVITIES OF DAILY LIVING (ADL)
ADLS_ACUITY_SCORE: 35

## 2023-06-12 NOTE — DISCHARGE INSTRUCTIONS
Cardiac Angiogram Discharge Instructions - Radial/Wrist    After you go home:    Have an adult stay with you until tomorrow.  Drink extra fluids for 2 days.  You may resume your normal diet.  No smoking       For 24 hours - due to the sedation you received:  Relax and take it easy.  Do NOT make any important or legal decisions.  Do NOT drive or operate machines at home or at work.  Do NOT drink alcohol.    Care of Wrist Puncture Site:    For the first 24 hrs - check the puncture site every 1-2 hours while awake.  It is normal to have soreness at the puncture site and mild tingling in your hand for up to 3 days.  Remove the bandaid after 24 hours. If there is minor oozing, apply another bandaid and remove it after 12 hours.  You may shower tomorrow.  Do NOT take a bath, or use a hot tub or pool for at least 3 days. Do NOT scrub the site. Do not use lotion or powder near the puncture site.           Activity:        For 2 days:   do not use your hand or arm to support your weight (such as rising from a chair)   do not bend your wrist (such as lifting a garage door).  do not lift more than 5 pounds or exercise your arm (such as tennis, golf or bowling).  Do NOT do any heavy activity such as exercise, lifting, or straining.     Bleeding:    If you start bleeding from the site in your wrist, sit down and press firmly on/above the site for 10 minutes.   Once bleeding stops, keep arm still for 2 hours.   Call [facility] as soon as you can.       Call 911 right away if you have heavy bleeding or bleeding that does not stop.      Medicines:        If you are on Metformin (Glucophage), do not restart it until you have blood tests (within 2 to 3 days after discharge).  After you have your blood drawn, you may restart the Metformin.   Take your medications, including blood thinners, unless your provider tells you not to.    If you have stopped any medicines, check with your provider about when to restart them.    Follow Up  Appointments:    Follow up with [facility] Nurse Practitioner at [facility] of patient preference in 7-10 days.    Call the clinic if:    You have a large or growing hard lump around the site.  The site is red, swollen, hot or tender.  Blood or fluid is draining from the site.  You have chills or a fever greater than 101 F (38 C).  Your arm feels numb, cool or changes color.  You have hives, a rash or unusual itching.  Any questions or concerns.          [facility] [place]:    [phone] [facility] (7 days a week)

## 2023-06-12 NOTE — PROGRESS NOTES
Care Suites Post Procedure Note    Patient Information  Name: Glen Cho  Age: 57 year old    Post Procedure  Time patient returned to Care Suites: 1212  Concerns/abnormal assessment: NA  If abnormal assessment, provider notified: N/A  Plan/Other: post care as ordered.  Right radial site CDI/soft with TR Band with 13 ml air, denies pain, plan bedrest x 2 hours and [facility] consult.    Yanet Mcgarry RN

## 2023-06-12 NOTE — PRE-PROCEDURE
GENERAL PRE-PROCEDURE:   Procedure:  Coronary angiogram +/- PCI  Date/Time:  6/12/2023 10:55 AM    Verbal consent obtained?: Yes    Risks and benefits: Risks, benefits and alternatives were discussed    Consent given by:  Patient  Patient states understanding of procedure being performed: Yes    Patient's understanding of procedure matches consent: Yes    Procedure consent matches procedure scheduled: Yes    Expected level of sedation:  Moderate  Appropriately NPO:  Yes  ASA Class:  3  Mallampati  :  Grade 2- soft palate, base of uvula, tonsillar pillars, and portion of posterior pharyngeal wall visible  Lungs:  Lungs clear with good breath sounds bilaterally  Heart:  Normal heart sounds and rate and systolic murmur  History & Physical reviewed:  History and physical reviewed and no updates needed  Statement of review:  I have reviewed the lab findings, diagnostic data, medications, and the plan for sedation

## 2023-06-12 NOTE — PROGRESS NOTES
Care Suites Discharge Nursing Note    Patient Information  Name: Glen Cho  Age: 57 year old    Discharge Education:  Discharge instructions reviewed: Yes  Additional education/resources provided: NA  Patient/patient representative verbalizes understanding: Yes  Patient discharging on new medications: No  Medication education completed: N/A    Discharge Plans:   Discharge location: home  Discharge ride contacted: Yes  Approximate discharge time: 4826    Discharge Criteria:  Discharge criteria met and vital signs stable: Yes. Right radial site CDI/soft, denies pain, ambulated/voided/ate/PIV pulled.To see [facility].    Patient Belongs:  Patient belongings returned to patient: Yes    Yanet Mcgarry, RN

## 2023-06-12 NOTE — CONSULTS
Consult Date: 06/12/2023    REFERRING CARDIOLOGIST:  Jacobo Diez MD.    REASON FOR CONSULTATION:  Evaluation for aortic valve replacement and coronary artery bypass surgery.    HISTORY OF PRESENT ILLNESS:  The patient is a very pleasant 57-year-old gentleman with type 2 diabetes, obesity, CORINA, dyslipidemia, who was recently found to have moderate aortic valve stenosis with a mean gradient of 32 mmHg and symptoms of exertional dyspnea/angina.  A coronary angiogram was performed today as an outpatient by Dr. Diez that demonstrated significant multivessel coronary artery disease, including significant left main disease.  I was asked to evaluate the patient for aortic valve replacement and concomitant coronary artery bypass grafting.    PAST MEDICAL HISTORY:  Significant for osteoarthritis, chronic kidney disease stage I, hyperlipidemia, hypertension, morbid obesity, CORINA, does not have a CPAP at home, type 2 diabetes, and now aortic stenosis of at least moderate range, and multivessel coronary artery disease as described above.    PAST SURGICAL HISTORY:  He has had a total knee replacement, left arthroplasty.  His left total knee was done just 04/04/2023.  He has had a hernia repair.  He has had an ORIF of a left ankle fracture in 2004.    FAMILY HISTORY:  Noncontributory.    SOCIAL HISTORY:  He is .  He is currently an active smoker.  He drinks alcohol rarely.    ALLERGIES:  INCLUDE LISINOPRIL, AMOXICILLIN, CEFDINIR, AND AMLODIPINE.    CURRENT OUTPATIENT MEDICATIONS:  Reviewed in Epic chart.    REVIEW OF SYSTEMS:  As per HPI.  All other 10-point review of systems are completed and were otherwise negative unless stated above.    PHYSICAL EXAMINATION:  VITAL SIGNS:  All vital signs are stable.  GENERAL:  He appears well, in no acute distress.  HEENT:  Within normal limits.  NECK:  Supple, no lymphadenopathy.  CARDIOVASCULAR:  Regular rate and rhythm, normal S1, S2.  Grade 3/6 systolic murmur at the right  upper sternal border.  LUNGS:  Clear bilaterally.  ABDOMEN:  Soft, nontender, nondistended.  EXTREMITIES:  Negative for edema, cyanosis, or clubbing.  NEUROLOGIC:  A and O x3 with no focal deficits.    PERTINENT LABORATORY STUDIES:  Coronary angiogram performed today demonstrates severe 3-vessel coronary artery disease with 60% proximal RCA disease, 70% distal left main disease, as well as significant diffuse disease involving the LAD.  He had a stress echo done on 2023 that demonstrated preserved LV systolic function, moderate aortic stenosis with a mean gradient of 32 mmHg and a valve area of 1.5 cm2.    IMPRESSION AND PLAN:  The patient is a very pleasant 57-year-old gentleman with multiple medical comorbidities, newly diagnosed severe 3-vessel coronary artery disease, including 70% distal main disease and moderate aortic stenosis.  My recommendation is aortic valve replacement with a mechanical valve and coronary artery bypass grafting.  I explained to him the diagnosis in detail and the reason for recommending the proposed operation.  I went over the risks and benefits of the operation and the potential complications associated with the surgery.  These complications include, but are not strictly limited to infection, bleeding, stroke, postop MI, postoperative arrhythmias, pulmonary or renal complications.  I think overall he is an excellent surgical candidate and I quoted an operative mortality risk of 1%.  He understands and agrees to proceed.  He also understands that if we use a mechanical aortic prosthetic valve, he will have to be on lifelong Coumadin. Thank you very much for this referral.    Carlyn Antony MD        D: 2023   T: 2023   MT: [email or web address]    Name:     NIDHI SWAIN  MRN:      [id number]        Account:      [id number]   :      1965           Consult Date: 2023     Document: [id number]    cc:  Jacobo Diez MD

## 2023-06-12 NOTE — PROGRESS NOTES
Patient met  with Dr. Antony in Care suites . Plan for CABG/ AVR early July . Preoperative testing to include carotid US, Vein mapping. Echo complete, and labs. Patient expresses feeling of overwhelm and wants to take a couple of days before scheduling imaging and surgery. Will follow up as outpatient . Literature and CHG given.   Patient given contact information and instructed to call with questions or concerns.     Jovana Balderas RN  Care Coordinator - [facility] CV Surgery   [facility]  Phone: [phone]  Fax: [phone]  [email or web address]

## 2023-06-12 NOTE — PROGRESS NOTES
Care Suites Admission Nursing Note    Patient Information  Name: Glen Cho  Age: 57 year old  Reason for admission: [facility]  Care Suites arrival time: 0930    Visitor Information  Name: NA     Patient Admission/Assessment   Pre-procedure assessment complete: Yes  If abnormal assessment/labs, provider notified: N/A  NPO: Yes  Medications held per instructions/orders: Yes, glucophage  Consent: deferred  If applicable, pregnancy test status: deferred  Patient oriented to room: Yes  Education/questions answered: Yes  Plan/other: proceed as scheduled    Discharge Planning  Discharge name/phone number: Constance [phone]  Overnight post sedation caregiver: Constance  Discharge location: home    Yanet Mcgarry RN

## 2023-06-15 LAB
ATRIAL RATE - MUSE: 53 BPM
DIASTOLIC BLOOD PRESSURE - MUSE: NORMAL MMHG
INTERPRETATION ECG - MUSE: NORMAL
P AXIS - MUSE: 49 DEGREES
PR INTERVAL - MUSE: 168 MS
QRS DURATION - MUSE: 100 MS
QT - MUSE: 466 MS
QTC - MUSE: 437 MS
R AXIS - MUSE: 59 DEGREES
SYSTOLIC BLOOD PRESSURE - MUSE: NORMAL MMHG
T AXIS - MUSE: 79 DEGREES
VENTRICULAR RATE- MUSE: 53 BPM

## 2023-06-19 ENCOUNTER — OFFICE VISIT (OUTPATIENT)
Dept: CARDIOLOGY | Facility: CLINIC | Age: 58
End: 2023-06-19
Payer: COMMERCIAL

## 2023-06-19 VITALS
DIASTOLIC BLOOD PRESSURE: 76 MMHG | WEIGHT: 237 LBS | BODY MASS INDEX: 39.49 KG/M2 | SYSTOLIC BLOOD PRESSURE: 144 MMHG | OXYGEN SATURATION: 97 % | HEART RATE: 56 BPM | HEIGHT: 65 IN

## 2023-06-19 DIAGNOSIS — I35.0 NONRHEUMATIC AORTIC VALVE STENOSIS: Primary | ICD-10-CM

## 2023-06-19 DIAGNOSIS — I25.10 CORONARY ARTERY DISEASE INVOLVING NATIVE CORONARY ARTERY OF NATIVE HEART, UNSPECIFIED WHETHER ANGINA PRESENT: ICD-10-CM

## 2023-06-19 PROCEDURE — 99213 OFFICE O/P EST LOW 20 MIN: CPT | Performed by: SURGERY

## 2023-06-19 RX ORDER — CHLORHEXIDINE GLUCONATE ORAL RINSE 1.2 MG/ML
10 SOLUTION DENTAL ONCE
Status: CANCELLED | OUTPATIENT
Start: 2023-06-19 | End: 2023-06-19

## 2023-06-19 RX ORDER — FAMOTIDINE 20 MG/1
20 TABLET, FILM COATED ORAL
Status: CANCELLED | OUTPATIENT
Start: 2023-06-19

## 2023-06-19 RX ORDER — ASPIRIN 81 MG/1
81 TABLET, CHEWABLE ORAL
Status: CANCELLED | OUTPATIENT
Start: 2023-06-19

## 2023-06-19 RX ORDER — VANCOMYCIN HYDROCHLORIDE 1.5 G/30ML
1500 INJECTION, POWDER, LYOPHILIZED, FOR SOLUTION INTRAVENOUS
Status: CANCELLED | OUTPATIENT
Start: 2023-06-19

## 2023-06-19 RX ORDER — LIDOCAINE 40 MG/G
CREAM TOPICAL
Status: CANCELLED | OUTPATIENT
Start: 2023-06-19

## 2023-06-19 RX ORDER — ASPIRIN 81 MG/1
162 TABLET, CHEWABLE ORAL
Status: CANCELLED | OUTPATIENT
Start: 2023-06-19

## 2023-06-19 NOTE — PROGRESS NOTES
Service Date: 06/19/2023    HISTORY OF PRESENT ILLNESS:  Mr. Cho is a very pleasant 57-year-old gentleman who I saw in Care Suites last week.  Please refer to my full consultation report dated 06/12/2023.  In brief, he is a patient of Dr. Diez who was recently found to have severe aortic stenosis and significant symptoms of exertional dyspnea and angina.  Coronary angiogram was performed on 06/12 that demonstrated severe multivessel coronary artery disease including left main disease and significant RCA disease.  Given these 2 combination, we recommended surgical AVR with mechanical valve and concomitant coronary artery bypass grafting.  He presents today to answer more questions, along with his wife.  He reports that his anginal symptoms are stable, but he definitely gets them with mild exertion and it stops after resting for a minute or two.    IMPRESSION AND PLAN:  Mr. Cho is a 57-year-old gentleman with a recently diagnosed severe aortic stenosis and severe 3-vessel coronary artery disease.  My recommendation again is mechanical AVR and coronary artery bypass grafting.  Unfortunately, he is actively smoking.  We told him that he should really quit prior to surgery and to diminish his pulmonary risks postoperatively.  He seemed to understand this.  He works in the [employer] business and he just had some time for his knee surgery in April so he is mainly stressed out from that and needs to figure out his disability situation before we can schedule for surgery, but we currently have the second week of July scheduled for him if he is willing and wishing to proceed.  Diagnosis was again explained in detail with him and with his wife alongside.  I went over the risks and benefits and overall mortality risk of 1%.  All questions and concerns were answered to their satisfaction.  It was a pleasure to see Mr. Cho today.  Thank you very much for this referral.      Carlyn Antony MD        D: 06/19/2023   T:  2023   MT: [email or web address]    Name:     NIDHI SWAIN  MRN:      [id number]        Account:      [id number]   :      1965           Service Date: 2023       Document: [id number]

## 2023-06-19 NOTE — PROGRESS NOTES
Met with patient and spouse in consultation with Dr. Antony . Plan for CABG/AVR on July 10th . Preoperative testing to include Echo, mapping, carotids, and dental. Teaching completed and literature/CHG given.   Patient given contact information and instructed to call with questions or concerns.     Jovana Balderas RN  Care Coordinator - [facility] CV Surgery   [facility]  Phone: [phone]  Fax: [phone]  [email or web address]

## 2023-06-20 ENCOUNTER — LAB (OUTPATIENT)
Dept: LAB | Facility: CLINIC | Age: 58
End: 2023-06-20
Attending: SURGERY
Payer: COMMERCIAL

## 2023-06-20 ENCOUNTER — HOSPITAL ENCOUNTER (OUTPATIENT)
Dept: CARDIOLOGY | Facility: CLINIC | Age: 58
Discharge: HOME | End: 2023-06-20
Attending: SURGERY
Payer: COMMERCIAL

## 2023-06-20 DIAGNOSIS — E11.21 TYPE 2 DIABETES MELLITUS WITH DIABETIC NEPHROPATHY, WITH LONG-TERM CURRENT USE OF INSULIN (H): ICD-10-CM

## 2023-06-20 DIAGNOSIS — Z79.4 TYPE 2 DIABETES MELLITUS WITH DIABETIC NEPHROPATHY, WITH LONG-TERM CURRENT USE OF INSULIN (H): ICD-10-CM

## 2023-06-20 DIAGNOSIS — Z01.818 PRE-OP EVALUATION: ICD-10-CM

## 2023-06-20 LAB
ALBUMIN UR-MCNC: NEGATIVE MG/DL
APPEARANCE UR: CLEAR
BILIRUB UR QL STRIP: NEGATIVE
COLOR UR AUTO: ABNORMAL
GLUCOSE UR STRIP-MCNC: NEGATIVE MG/DL
HGB UR QL STRIP: NEGATIVE
KETONES UR STRIP-MCNC: NEGATIVE MG/DL
LEUKOCYTE ESTERASE UR QL STRIP: NEGATIVE
MUCOUS THREADS #/AREA URNS LPF: PRESENT /LPF
NITRATE UR QL: NEGATIVE
PH UR STRIP: 6.5 [PH] (ref 5–7)
RBC URINE: 1 /HPF
SP GR UR STRIP: 1.01 (ref 1–1.03)
SQUAMOUS EPITHELIAL: <1 /HPF
UROBILINOGEN UR STRIP-MCNC: NORMAL MG/DL
WBC URINE: <1 /HPF

## 2023-06-20 PROCEDURE — 93970 EXTREMITY STUDY: CPT | Mod: 26 | Performed by: INTERNAL MEDICINE

## 2023-06-20 PROCEDURE — 93880 EXTRACRANIAL BILAT STUDY: CPT

## 2023-06-20 PROCEDURE — 93880 EXTRACRANIAL BILAT STUDY: CPT | Mod: 26 | Performed by: INTERNAL MEDICINE

## 2023-06-20 PROCEDURE — 93970 EXTREMITY STUDY: CPT

## 2023-06-20 PROCEDURE — 81001 URINALYSIS AUTO W/SCOPE: CPT

## 2023-06-21 RX ORDER — PEN NEEDLE, DIABETIC 32GX 5/32"
NEEDLE, DISPOSABLE MISCELLANEOUS
Qty: 300 EACH | Refills: 0 | Status: SHIPPED | OUTPATIENT
Start: 2023-06-21

## 2023-06-21 NOTE — TELEPHONE ENCOUNTER
Prescription approved per [facility] Refill Protocol.  Amy Street RN, BSN  [facility]

## 2023-06-29 ENCOUNTER — DOCUMENTATION ONLY (OUTPATIENT)
Dept: OTHER | Facility: CLINIC | Age: 58
End: 2023-06-29
Payer: COMMERCIAL

## 2023-06-29 ENCOUNTER — TELEPHONE (OUTPATIENT)
Dept: OTHER | Facility: CLINIC | Age: 58
End: 2023-06-29
Payer: COMMERCIAL

## 2023-06-29 NOTE — TELEPHONE ENCOUNTER
Patient called stating he had to move his Echocardiogram. Dental clearance obtained. Disability paperwork need discussed. Fax number given. Patient states he is having more episodes of chest pain requiring use of NTG, never more than 1 ntg to relieve pain. Will message Dr. Diez et team regarding increased angina. Instructed pt to present to ED if chest pain unrelieved with Ntg. CABG/AVR 7/10.

## 2023-07-05 ENCOUNTER — HOSPITAL ENCOUNTER (OUTPATIENT)
Dept: CARDIOLOGY | Facility: CLINIC | Age: 58
Discharge: HOME | End: 2023-07-05
Attending: SURGERY | Admitting: SURGERY
Payer: COMMERCIAL

## 2023-07-05 LAB — LVEF ECHO: NORMAL

## 2023-07-05 PROCEDURE — 93306 TTE W/DOPPLER COMPLETE: CPT | Mod: 26 | Performed by: INTERNAL MEDICINE

## 2023-07-05 PROCEDURE — 93306 TTE W/DOPPLER COMPLETE: CPT

## 2023-07-09 ENCOUNTER — ANESTHESIA EVENT (OUTPATIENT)
Dept: SURGERY | Facility: CLINIC | Age: 58
End: 2023-07-09
Payer: COMMERCIAL

## 2023-07-10 ENCOUNTER — APPOINTMENT (OUTPATIENT)
Dept: GENERAL RADIOLOGY | Facility: CLINIC | Age: 58
End: 2023-07-10
Attending: PHYSICIAN ASSISTANT
Payer: COMMERCIAL

## 2023-07-10 ENCOUNTER — HOSPITAL ENCOUNTER (INPATIENT)
Facility: CLINIC | Age: 58
LOS: 7 days | Discharge: HOME | End: 2023-07-17
Attending: SURGERY | Admitting: SURGERY
Payer: COMMERCIAL

## 2023-07-10 ENCOUNTER — APPOINTMENT (OUTPATIENT)
Dept: GENERAL RADIOLOGY | Facility: CLINIC | Age: 58
End: 2023-07-10
Attending: SURGERY
Payer: COMMERCIAL

## 2023-07-10 ENCOUNTER — ANESTHESIA (OUTPATIENT)
Dept: SURGERY | Facility: CLINIC | Age: 58
End: 2023-07-10
Payer: COMMERCIAL

## 2023-07-10 DIAGNOSIS — E11.21 TYPE 2 DIABETES MELLITUS WITH DIABETIC NEPHROPATHY, WITH LONG-TERM CURRENT USE OF INSULIN (H): ICD-10-CM

## 2023-07-10 DIAGNOSIS — Z79.4 TYPE 2 DIABETES MELLITUS WITH DIABETIC NEPHROPATHY, WITH LONG-TERM CURRENT USE OF INSULIN (H): ICD-10-CM

## 2023-07-10 DIAGNOSIS — Z95.1 S/P CABG (CORONARY ARTERY BYPASS GRAFT): ICD-10-CM

## 2023-07-10 DIAGNOSIS — Z95.2 S/P AVR (AORTIC VALVE REPLACEMENT): Primary | ICD-10-CM

## 2023-07-10 LAB
ABO/RH(D): NORMAL
ALBUMIN SERPL BCG-MCNC: 3.6 G/DL (ref 3.5–5.2)
ALBUMIN SERPL BCG-MCNC: 4.5 G/DL (ref 3.5–5.2)
ALLEN'S TEST: ABNORMAL
ALP SERPL-CCNC: 58 U/L (ref 40–129)
ALP SERPL-CCNC: 69 U/L (ref 40–129)
ALT SERPL W P-5'-P-CCNC: 21 U/L (ref 0–70)
ALT SERPL W P-5'-P-CCNC: 25 U/L (ref 0–70)
ANION GAP SERPL CALCULATED.3IONS-SCNC: 12 MMOL/L (ref 7–15)
ANION GAP SERPL CALCULATED.3IONS-SCNC: 13 MMOL/L (ref 7–15)
ANION GAP SERPL CALCULATED.3IONS-SCNC: 13 MMOL/L (ref 7–15)
ANION GAP SERPL CALCULATED.3IONS-SCNC: 14 MMOL/L (ref 7–15)
ANTIBODY SCREEN: NEGATIVE
APTT PPP: 30 SECONDS (ref 22–38)
APTT PPP: 50 SECONDS (ref 22–38)
APTT PPP: 58 SECONDS (ref 22–38)
APTT PPP: 68 SECONDS (ref 22–38)
AST SERPL W P-5'-P-CCNC: 16 U/L (ref 0–45)
AST SERPL W P-5'-P-CCNC: 41 U/L (ref 0–45)
BASE EXCESS BLDA CALC-SCNC: -4.3 MMOL/L (ref -9–1.8)
BASE EXCESS BLDA CALC-SCNC: -4.5 MMOL/L (ref -9–1.8)
BASE EXCESS BLDA CALC-SCNC: -6.9 MMOL/L (ref -9–1.8)
BASE EXCESS BLDV CALC-SCNC: -3.5 MMOL/L (ref -7.7–1.9)
BASE EXCESS BLDV CALC-SCNC: -6.9 MMOL/L (ref -7.7–1.9)
BILIRUB SERPL-MCNC: 0.4 MG/DL
BILIRUB SERPL-MCNC: 0.6 MG/DL
BLD PROD TYP BPU: NORMAL
BLD PROD TYP BPU: NORMAL
BLOOD COMPONENT TYPE: NORMAL
BLOOD COMPONENT TYPE: NORMAL
BUN SERPL-MCNC: 18.7 MG/DL (ref 8–23)
BUN SERPL-MCNC: 18.9 MG/DL (ref 8–23)
BUN SERPL-MCNC: 20 MG/DL (ref 8–23)
BUN SERPL-MCNC: 20.9 MG/DL (ref 8–23)
CA-I BLD-MCNC: 4.6 MG/DL (ref 4.4–5.2)
CA-I BLD-MCNC: 5 MG/DL (ref 4.4–5.2)
CALCIUM SERPL-MCNC: 10.4 MG/DL (ref 8.8–10.2)
CALCIUM SERPL-MCNC: 8.6 MG/DL (ref 8.8–10.2)
CALCIUM SERPL-MCNC: 8.7 MG/DL (ref 8.8–10.2)
CALCIUM SERPL-MCNC: 9.6 MG/DL (ref 8.8–10.2)
CHLORIDE SERPL-SCNC: 101 MMOL/L (ref 98–107)
CHLORIDE SERPL-SCNC: 104 MMOL/L (ref 98–107)
CHLORIDE SERPL-SCNC: 106 MMOL/L (ref 98–107)
CHLORIDE SERPL-SCNC: 106 MMOL/L (ref 98–107)
CODING SYSTEM: NORMAL
CODING SYSTEM: NORMAL
CREAT SERPL-MCNC: 0.76 MG/DL (ref 0.67–1.17)
CREAT SERPL-MCNC: 0.8 MG/DL (ref 0.67–1.17)
CREAT SERPL-MCNC: 0.83 MG/DL (ref 0.67–1.17)
CREAT SERPL-MCNC: 0.98 MG/DL (ref 0.67–1.17)
CROSSMATCH: NORMAL
CROSSMATCH: NORMAL
DEPRECATED HCO3 PLAS-SCNC: 19 MMOL/L (ref 22–29)
DEPRECATED HCO3 PLAS-SCNC: 20 MMOL/L (ref 22–29)
DEPRECATED HCO3 PLAS-SCNC: 20 MMOL/L (ref 22–29)
DEPRECATED HCO3 PLAS-SCNC: 22 MMOL/L (ref 22–29)
ERYTHROCYTE [DISTWIDTH] IN BLOOD BY AUTOMATED COUNT: 13.9 % (ref 10–15)
ERYTHROCYTE [DISTWIDTH] IN BLOOD BY AUTOMATED COUNT: 13.9 % (ref 10–15)
ERYTHROCYTE [DISTWIDTH] IN BLOOD BY AUTOMATED COUNT: 14.1 % (ref 10–15)
ERYTHROCYTE [DISTWIDTH] IN BLOOD BY AUTOMATED COUNT: 14.1 % (ref 10–15)
FIBRINOGEN PPP-MCNC: 342 MG/DL (ref 170–490)
FIBRINOGEN PPP-MCNC: 344 MG/DL (ref 170–490)
GFR SERPL CREATININE-BSD FRML MDRD: 88 ML/MIN/1.73M2
GFR SERPL CREATININE-BSD FRML MDRD: >90 ML/MIN/1.73M2
GLUCOSE BLDC GLUCOMTR-MCNC: 169 MG/DL (ref 70–99)
GLUCOSE BLDC GLUCOMTR-MCNC: 194 MG/DL (ref 70–99)
GLUCOSE BLDC GLUCOMTR-MCNC: 195 MG/DL (ref 70–99)
GLUCOSE SERPL-MCNC: 129 MG/DL (ref 70–99)
GLUCOSE SERPL-MCNC: 159 MG/DL (ref 70–99)
GLUCOSE SERPL-MCNC: 179 MG/DL (ref 70–99)
GLUCOSE SERPL-MCNC: 196 MG/DL (ref 70–99)
HCO3 BLD-SCNC: 22 MMOL/L (ref 21–28)
HCO3 BLDV-SCNC: 23 MMOL/L (ref 21–28)
HCO3 BLDV-SCNC: 23 MMOL/L (ref 21–28)
HCT VFR BLD AUTO: 33.7 % (ref 40–53)
HCT VFR BLD AUTO: 34.8 % (ref 40–53)
HCT VFR BLD AUTO: 37.2 % (ref 40–53)
HCT VFR BLD AUTO: 41.3 % (ref 40–53)
HGB BLD-MCNC: 11 G/DL (ref 13.3–17.7)
HGB BLD-MCNC: 11.4 G/DL (ref 13.3–17.7)
HGB BLD-MCNC: 12 G/DL (ref 13.3–17.7)
HGB BLD-MCNC: 14.1 G/DL (ref 13.3–17.7)
INR PPP: 0.98 (ref 0.85–1.15)
INR PPP: 1.12 (ref 0.85–1.15)
INR PPP: 1.2 (ref 0.85–1.15)
INR PPP: 1.27 (ref 0.85–1.15)
ISSUE DATE AND TIME: NORMAL
ISSUE DATE AND TIME: NORMAL
LACTATE SERPL-SCNC: 4.1 MMOL/L (ref 0.7–2)
LACTATE SERPL-SCNC: 4.3 MMOL/L (ref 0.7–2)
LACTATE SERPL-SCNC: 4.6 MMOL/L (ref 0.7–2)
LDH SERPL L TO P-CCNC: 312 U/L (ref 0–250)
MAGNESIUM SERPL-MCNC: 1.8 MG/DL (ref 1.7–2.3)
MAGNESIUM SERPL-MCNC: 1.9 MG/DL (ref 1.7–2.3)
MAGNESIUM SERPL-MCNC: 2.2 MG/DL (ref 1.7–2.3)
MCH RBC QN AUTO: 29.1 PG (ref 26.5–33)
MCH RBC QN AUTO: 29.3 PG (ref 26.5–33)
MCH RBC QN AUTO: 29.3 PG (ref 26.5–33)
MCH RBC QN AUTO: 29.6 PG (ref 26.5–33)
MCHC RBC AUTO-ENTMCNC: 32.3 G/DL (ref 31.5–36.5)
MCHC RBC AUTO-ENTMCNC: 32.6 G/DL (ref 31.5–36.5)
MCHC RBC AUTO-ENTMCNC: 32.8 G/DL (ref 31.5–36.5)
MCHC RBC AUTO-ENTMCNC: 34.1 G/DL (ref 31.5–36.5)
MCV RBC AUTO: 87 FL (ref 78–100)
MCV RBC AUTO: 90 FL (ref 78–100)
MRSA DNA SPEC QL NAA+PROBE: NEGATIVE
O2/TOTAL GAS SETTING VFR VENT: 100 %
O2/TOTAL GAS SETTING VFR VENT: 100 %
O2/TOTAL GAS SETTING VFR VENT: 50 %
OXYHGB MFR BLDV: 70 % (ref 70–75)
OXYHGB MFR BLDV: 73 % (ref 70–75)
PCO2 BLD: 42 MM HG (ref 35–45)
PCO2 BLD: 43 MM HG (ref 35–45)
PCO2 BLD: 58 MM HG (ref 35–45)
PCO2 BLDV: 49 MM HG (ref 40–50)
PCO2 BLDV: 63 MM HG (ref 40–50)
PH BLD: 7.18 [PH] (ref 7.35–7.45)
PH BLD: 7.31 [PH] (ref 7.35–7.45)
PH BLD: 7.32 [PH] (ref 7.35–7.45)
PH BLDV: 7.16 [PH] (ref 7.32–7.43)
PH BLDV: 7.29 [PH] (ref 7.32–7.43)
PHOSPHATE SERPL-MCNC: 2.9 MG/DL (ref 2.5–4.5)
PHOSPHATE SERPL-MCNC: 3.7 MG/DL (ref 2.5–4.5)
PLATELET # BLD AUTO: 205 10E3/UL (ref 150–450)
PLATELET # BLD AUTO: 227 10E3/UL (ref 150–450)
PLATELET # BLD AUTO: 231 10E3/UL (ref 150–450)
PLATELET # BLD AUTO: 276 10E3/UL (ref 150–450)
PO2 BLD: 102 MM HG (ref 80–105)
PO2 BLD: 103 MM HG (ref 80–105)
PO2 BLD: 90 MM HG (ref 80–105)
PO2 BLDV: 42 MM HG (ref 25–47)
PO2 BLDV: 52 MM HG (ref 25–47)
POTASSIUM SERPL-SCNC: 3.7 MMOL/L (ref 3.4–5.3)
POTASSIUM SERPL-SCNC: 3.8 MMOL/L (ref 3.4–5.3)
POTASSIUM SERPL-SCNC: 4.1 MMOL/L (ref 3.4–5.3)
POTASSIUM SERPL-SCNC: 4.3 MMOL/L (ref 3.4–5.3)
PROT SERPL-MCNC: 5.8 G/DL (ref 6.4–8.3)
PROT SERPL-MCNC: 7.6 G/DL (ref 6.4–8.3)
RBC # BLD AUTO: 3.76 10E6/UL (ref 4.4–5.9)
RBC # BLD AUTO: 3.89 10E6/UL (ref 4.4–5.9)
RBC # BLD AUTO: 4.12 10E6/UL (ref 4.4–5.9)
RBC # BLD AUTO: 4.77 10E6/UL (ref 4.4–5.9)
SA TARGET DNA: NEGATIVE
SODIUM SERPL-SCNC: 136 MMOL/L (ref 136–145)
SODIUM SERPL-SCNC: 137 MMOL/L (ref 136–145)
SODIUM SERPL-SCNC: 138 MMOL/L (ref 136–145)
SODIUM SERPL-SCNC: 139 MMOL/L (ref 136–145)
SPECIMEN EXPIRATION DATE: NORMAL
UNIT ABO/RH: NORMAL
UNIT ABO/RH: NORMAL
UNIT NUMBER: NORMAL
UNIT NUMBER: NORMAL
UNIT STATUS: NORMAL
UNIT STATUS: NORMAL
UNIT TYPE ISBT: 6200
UNIT TYPE ISBT: 6200
WBC # BLD AUTO: 13.1 10E3/UL (ref 4–11)
WBC # BLD AUTO: 20.2 10E3/UL (ref 4–11)
WBC # BLD AUTO: 28.5 10E3/UL (ref 4–11)
WBC # BLD AUTO: 28.8 10E3/UL (ref 4–11)

## 2023-07-10 PROCEDURE — 86901 BLOOD TYPING SEROLOGIC RH(D): CPT | Performed by: SURGERY

## 2023-07-10 PROCEDURE — 250N000013 HC RX MED GY IP 250 OP 250 PS 637: Performed by: PHYSICIAN ASSISTANT

## 2023-07-10 PROCEDURE — 82803 BLOOD GASES ANY COMBINATION: CPT | Performed by: SURGERY

## 2023-07-10 PROCEDURE — 250N000009 HC RX 250: Performed by: SURGERY

## 2023-07-10 PROCEDURE — 250N000011 HC RX IP 250 OP 636

## 2023-07-10 PROCEDURE — 85730 THROMBOPLASTIN TIME PARTIAL: CPT | Performed by: PHYSICIAN ASSISTANT

## 2023-07-10 PROCEDURE — 33518 CABG ARTERY-VEIN TWO: CPT | Mod: GC | Performed by: SURGERY

## 2023-07-10 PROCEDURE — P9016 RBC LEUKOCYTES REDUCED: HCPCS | Performed by: SURGERY

## 2023-07-10 PROCEDURE — 250N000025 HC SEVOFLURANE, PER MIN: Performed by: SURGERY

## 2023-07-10 PROCEDURE — 410N000006: Performed by: SURGERY

## 2023-07-10 PROCEDURE — 999N000157 HC STATISTIC RCP TIME EA 10 MIN

## 2023-07-10 PROCEDURE — 250N000009 HC RX 250: Performed by: PHYSICIAN ASSISTANT

## 2023-07-10 PROCEDURE — 250N000011 HC RX IP 250 OP 636: Mod: JZ

## 2023-07-10 PROCEDURE — 250N000011 HC RX IP 250 OP 636: Performed by: ANESTHESIOLOGY

## 2023-07-10 PROCEDURE — 87641 MR-STAPH DNA AMP PROBE: CPT | Performed by: SURGERY

## 2023-07-10 PROCEDURE — 33533 CABG ARTERIAL SINGLE: CPT | Mod: GC | Performed by: SURGERY

## 2023-07-10 PROCEDURE — 250N000009 HC RX 250

## 2023-07-10 PROCEDURE — 258N000003 HC RX IP 258 OP 636

## 2023-07-10 PROCEDURE — 250N000013 HC RX MED GY IP 250 OP 250 PS 637: Performed by: SURGERY

## 2023-07-10 PROCEDURE — 258N000003 HC RX IP 258 OP 636: Performed by: ANESTHESIOLOGY

## 2023-07-10 PROCEDURE — 021109W BYPASS CORONARY ARTERY, TWO ARTERIES FROM AORTA WITH AUTOLOGOUS VENOUS TISSUE, OPEN APPROACH: ICD-10-PCS | Performed by: SURGERY

## 2023-07-10 PROCEDURE — 250N000011 HC RX IP 250 OP 636: Mod: JZ | Performed by: PHYSICIAN ASSISTANT

## 2023-07-10 PROCEDURE — 250N000011 HC RX IP 250 OP 636: Mod: JZ | Performed by: SURGERY

## 2023-07-10 PROCEDURE — 85384 FIBRINOGEN ACTIVITY: CPT | Performed by: SURGERY

## 2023-07-10 PROCEDURE — 86850 RBC ANTIBODY SCREEN: CPT | Performed by: SURGERY

## 2023-07-10 PROCEDURE — 86923 COMPATIBILITY TEST ELECTRIC: CPT | Performed by: SURGERY

## 2023-07-10 PROCEDURE — 3E043XZ INTRODUCTION OF VASOPRESSOR INTO CENTRAL VEIN, PERCUTANEOUS APPROACH: ICD-10-PCS | Performed by: SURGERY

## 2023-07-10 PROCEDURE — 370N000017 HC ANESTHESIA TECHNICAL FEE, PER MIN: Performed by: SURGERY

## 2023-07-10 PROCEDURE — 85610 PROTHROMBIN TIME: CPT | Performed by: SURGERY

## 2023-07-10 PROCEDURE — P9045 ALBUMIN (HUMAN), 5%, 250 ML: HCPCS | Mod: JZ

## 2023-07-10 PROCEDURE — 85027 COMPLETE CBC AUTOMATED: CPT | Performed by: SURGERY

## 2023-07-10 PROCEDURE — 80053 COMPREHEN METABOLIC PANEL: CPT | Performed by: SURGERY

## 2023-07-10 PROCEDURE — 82330 ASSAY OF CALCIUM: CPT | Performed by: PHYSICIAN ASSISTANT

## 2023-07-10 PROCEDURE — 88305 TISSUE EXAM BY PATHOLOGIST: CPT | Mod: 26 | Performed by: PATHOLOGY

## 2023-07-10 PROCEDURE — 82805 BLOOD GASES W/O2 SATURATION: CPT | Performed by: SURGERY

## 2023-07-10 PROCEDURE — 5A1221Z PERFORMANCE OF CARDIAC OUTPUT, CONTINUOUS: ICD-10-PCS | Performed by: SURGERY

## 2023-07-10 PROCEDURE — 258N000003 HC RX IP 258 OP 636: Performed by: SURGERY

## 2023-07-10 PROCEDURE — 83735 ASSAY OF MAGNESIUM: CPT | Performed by: SURGERY

## 2023-07-10 PROCEDURE — 93005 ELECTROCARDIOGRAM TRACING: CPT

## 2023-07-10 PROCEDURE — 250N000024 HC ISOFLURANE, PER MIN: Performed by: SURGERY

## 2023-07-10 PROCEDURE — 82330 ASSAY OF CALCIUM: CPT | Performed by: SURGERY

## 2023-07-10 PROCEDURE — 258N000003 HC RX IP 258 OP 636: Performed by: PHYSICIAN ASSISTANT

## 2023-07-10 PROCEDURE — 74018 RADEX ABDOMEN 1 VIEW: CPT

## 2023-07-10 PROCEDURE — 80048 BASIC METABOLIC PNL TOTAL CA: CPT | Performed by: SURGERY

## 2023-07-10 PROCEDURE — 33405 REPLACEMENT AORTIC VALVE OPN: CPT | Mod: GC | Performed by: SURGERY

## 2023-07-10 PROCEDURE — 83735 ASSAY OF MAGNESIUM: CPT | Performed by: PHYSICIAN ASSISTANT

## 2023-07-10 PROCEDURE — 02100Z9 BYPASS CORONARY ARTERY, ONE ARTERY FROM LEFT INTERNAL MAMMARY, OPEN APPROACH: ICD-10-PCS | Performed by: SURGERY

## 2023-07-10 PROCEDURE — 250N000009 HC RX 250: Performed by: ANESTHESIOLOGY

## 2023-07-10 PROCEDURE — 85730 THROMBOPLASTIN TIME PARTIAL: CPT | Performed by: SURGERY

## 2023-07-10 PROCEDURE — 250N000012 HC RX MED GY IP 250 OP 636 PS 637: Performed by: ANESTHESIOLOGY

## 2023-07-10 PROCEDURE — 410N000005 HC PER-PERFUSION, SH ONLY, 1ST 30 MIN: Performed by: SURGERY

## 2023-07-10 PROCEDURE — 272N000001 HC OR GENERAL SUPPLY STERILE: Performed by: SURGERY

## 2023-07-10 PROCEDURE — C1898 LEAD, PMKR, OTHER THAN TRANS: HCPCS | Performed by: SURGERY

## 2023-07-10 PROCEDURE — 258N000001 HC RX 258: Performed by: SURGERY

## 2023-07-10 PROCEDURE — 99291 CRITICAL CARE FIRST HOUR: CPT | Mod: 24 | Performed by: INTERNAL MEDICINE

## 2023-07-10 PROCEDURE — 84100 ASSAY OF PHOSPHORUS: CPT | Performed by: PHYSICIAN ASSISTANT

## 2023-07-10 PROCEDURE — 83615 LACTATE (LD) (LDH) ENZYME: CPT | Performed by: SURGERY

## 2023-07-10 PROCEDURE — 85610 PROTHROMBIN TIME: CPT | Performed by: PHYSICIAN ASSISTANT

## 2023-07-10 PROCEDURE — 250N000005 HC OR RX SURGIFLO HEMOSTATIC MATRIX 10ML 199102S OPNP: Performed by: SURGERY

## 2023-07-10 PROCEDURE — 85027 COMPLETE CBC AUTOMATED: CPT | Performed by: PHYSICIAN ASSISTANT

## 2023-07-10 PROCEDURE — 33508 ENDOSCOPIC VEIN HARVEST: CPT | Mod: XU | Performed by: SURGERY

## 2023-07-10 PROCEDURE — 84132 ASSAY OF SERUM POTASSIUM: CPT | Performed by: SURGERY

## 2023-07-10 PROCEDURE — 83605 ASSAY OF LACTIC ACID: CPT | Performed by: SURGERY

## 2023-07-10 PROCEDURE — 36415 COLL VENOUS BLD VENIPUNCTURE: CPT | Performed by: SURGERY

## 2023-07-10 PROCEDURE — 84155 ASSAY OF PROTEIN SERUM: CPT | Performed by: PHYSICIAN ASSISTANT

## 2023-07-10 PROCEDURE — 02RF0JZ REPLACEMENT OF AORTIC VALVE WITH SYNTHETIC SUBSTITUTE, OPEN APPROACH: ICD-10-PCS | Performed by: SURGERY

## 2023-07-10 PROCEDURE — 88305 TISSUE EXAM BY PATHOLOGIST: CPT | Mod: TC | Performed by: SURGERY

## 2023-07-10 PROCEDURE — 360N000079 HC SURGERY LEVEL 6, PER MIN: Performed by: SURGERY

## 2023-07-10 PROCEDURE — 94002 VENT MGMT INPAT INIT DAY: CPT

## 2023-07-10 PROCEDURE — 200N000001 HC R&B ICU

## 2023-07-10 PROCEDURE — 06BQ4ZZ EXCISION OF LEFT SAPHENOUS VEIN, PERCUTANEOUS ENDOSCOPIC APPROACH: ICD-10-PCS | Performed by: SURGERY

## 2023-07-10 PROCEDURE — 999N000141 HC STATISTIC PRE-PROCEDURE NURSING ASSESSMENT: Performed by: SURGERY

## 2023-07-10 PROCEDURE — C1889 IMPLANT/INSERT DEVICE, NOC: HCPCS | Performed by: SURGERY

## 2023-07-10 PROCEDURE — P9045 ALBUMIN (HUMAN), 5%, 250 ML: HCPCS

## 2023-07-10 PROCEDURE — 250N000011 HC RX IP 250 OP 636: Performed by: SURGERY

## 2023-07-10 PROCEDURE — 83605 ASSAY OF LACTIC ACID: CPT | Performed by: PHYSICIAN ASSISTANT

## 2023-07-10 PROCEDURE — 999N000065 XR CHEST PORT 1 VIEW

## 2023-07-10 PROCEDURE — 84100 ASSAY OF PHOSPHORUS: CPT | Performed by: SURGERY

## 2023-07-10 PROCEDURE — 99292 CRITICAL CARE ADDL 30 MIN: CPT | Mod: 24 | Performed by: INTERNAL MEDICINE

## 2023-07-10 DEVICE — VALVE AORTIC ON-X ANATOMIC RING 23MM ONXANE-23: Type: IMPLANTABLE DEVICE | Site: AORTA | Status: FUNCTIONAL

## 2023-07-10 RX ORDER — PANTOPRAZOLE SODIUM 40 MG/1
40 TABLET, DELAYED RELEASE ORAL DAILY
Status: DISCONTINUED | OUTPATIENT
Start: 2023-07-10 | End: 2023-07-17 | Stop reason: HOSPADM

## 2023-07-10 RX ORDER — MAGNESIUM HYDROXIDE/ALUMINUM HYDROXICE/SIMETHICONE 120; 1200; 1200 MG/30ML; MG/30ML; MG/30ML
30 SUSPENSION ORAL EVERY 4 HOURS PRN
Status: DISCONTINUED | OUTPATIENT
Start: 2023-07-10 | End: 2023-07-17 | Stop reason: HOSPADM

## 2023-07-10 RX ORDER — LIDOCAINE HYDROCHLORIDE 20 MG/ML
INJECTION, SOLUTION INFILTRATION; PERINEURAL PRN
Status: DISCONTINUED | OUTPATIENT
Start: 2023-07-10 | End: 2023-07-10

## 2023-07-10 RX ORDER — ASPIRIN 81 MG/1
81 TABLET, CHEWABLE ORAL
Status: COMPLETED | OUTPATIENT
Start: 2023-07-10 | End: 2023-07-10

## 2023-07-10 RX ORDER — LIDOCAINE 40 MG/G
CREAM TOPICAL
Status: DISCONTINUED | OUTPATIENT
Start: 2023-07-10 | End: 2023-07-17 | Stop reason: HOSPADM

## 2023-07-10 RX ORDER — PROPOFOL 10 MG/ML
INJECTION, EMULSION INTRAVENOUS CONTINUOUS PRN
Status: DISCONTINUED | OUTPATIENT
Start: 2023-07-10 | End: 2023-07-10

## 2023-07-10 RX ORDER — OXYCODONE HYDROCHLORIDE 5 MG/1
10 TABLET ORAL EVERY 4 HOURS PRN
Status: DISCONTINUED | OUTPATIENT
Start: 2023-07-10 | End: 2023-07-17 | Stop reason: HOSPADM

## 2023-07-10 RX ORDER — POLYETHYLENE GLYCOL 3350 17 G/17G
17 POWDER, FOR SOLUTION ORAL DAILY
Status: DISCONTINUED | OUTPATIENT
Start: 2023-07-11 | End: 2023-07-14

## 2023-07-10 RX ORDER — VECURONIUM BROMIDE 1 MG/ML
INJECTION, POWDER, LYOPHILIZED, FOR SOLUTION INTRAVENOUS PRN
Status: DISCONTINUED | OUTPATIENT
Start: 2023-07-10 | End: 2023-07-10

## 2023-07-10 RX ORDER — NALOXONE HYDROCHLORIDE 0.4 MG/ML
0.2 INJECTION, SOLUTION INTRAMUSCULAR; INTRAVENOUS; SUBCUTANEOUS
Status: DISCONTINUED | OUTPATIENT
Start: 2023-07-10 | End: 2023-07-17 | Stop reason: HOSPADM

## 2023-07-10 RX ORDER — LIDOCAINE 4 G/G
1 PATCH TOPICAL EVERY 24 HOURS
Status: DISCONTINUED | OUTPATIENT
Start: 2023-07-10 | End: 2023-07-17 | Stop reason: HOSPADM

## 2023-07-10 RX ORDER — OXYCODONE HYDROCHLORIDE 5 MG/1
5 TABLET ORAL EVERY 4 HOURS PRN
Status: DISCONTINUED | OUTPATIENT
Start: 2023-07-10 | End: 2023-07-17 | Stop reason: HOSPADM

## 2023-07-10 RX ORDER — HEPARIN SODIUM 1000 [USP'U]/ML
INJECTION, SOLUTION INTRAVENOUS; SUBCUTANEOUS PRN
Status: DISCONTINUED | OUTPATIENT
Start: 2023-07-10 | End: 2023-07-10

## 2023-07-10 RX ORDER — HYDRALAZINE HYDROCHLORIDE 20 MG/ML
10 INJECTION INTRAMUSCULAR; INTRAVENOUS EVERY 30 MIN PRN
Status: DISCONTINUED | OUTPATIENT
Start: 2023-07-10 | End: 2023-07-17 | Stop reason: HOSPADM

## 2023-07-10 RX ORDER — PHENYLEPHRINE HYDROCHLORIDE 10 MG/ML
INJECTION INTRAVENOUS PRN
Status: DISCONTINUED | OUTPATIENT
Start: 2023-07-10 | End: 2023-07-10

## 2023-07-10 RX ORDER — DEXTROSE MONOHYDRATE 100 MG/ML
INJECTION, SOLUTION INTRAVENOUS CONTINUOUS PRN
Status: DISCONTINUED | OUTPATIENT
Start: 2023-07-10 | End: 2023-07-17 | Stop reason: HOSPADM

## 2023-07-10 RX ORDER — HYDROMORPHONE HCL IN WATER/PF 6 MG/30 ML
0.4 PATIENT CONTROLLED ANALGESIA SYRINGE INTRAVENOUS
Status: DISCONTINUED | OUTPATIENT
Start: 2023-07-10 | End: 2023-07-16

## 2023-07-10 RX ORDER — DEXMEDETOMIDINE HYDROCHLORIDE 4 UG/ML
.2-.7 INJECTION, SOLUTION INTRAVENOUS CONTINUOUS
Status: DISCONTINUED | OUTPATIENT
Start: 2023-07-10 | End: 2023-07-12

## 2023-07-10 RX ORDER — ALBUTEROL SULFATE 90 UG/1
6 AEROSOL, METERED RESPIRATORY (INHALATION) EVERY 4 HOURS PRN
Status: DISCONTINUED | OUTPATIENT
Start: 2023-07-10 | End: 2023-07-17 | Stop reason: HOSPADM

## 2023-07-10 RX ORDER — PROTAMINE SULFATE 10 MG/ML
INJECTION, SOLUTION INTRAVENOUS PRN
Status: DISCONTINUED | OUTPATIENT
Start: 2023-07-10 | End: 2023-07-10

## 2023-07-10 RX ORDER — PROCHLORPERAZINE MALEATE 5 MG
10 TABLET ORAL EVERY 6 HOURS PRN
Status: DISCONTINUED | OUTPATIENT
Start: 2023-07-10 | End: 2023-07-17 | Stop reason: HOSPADM

## 2023-07-10 RX ORDER — NALOXONE HYDROCHLORIDE 0.4 MG/ML
0.4 INJECTION, SOLUTION INTRAMUSCULAR; INTRAVENOUS; SUBCUTANEOUS
Status: DISCONTINUED | OUTPATIENT
Start: 2023-07-10 | End: 2023-07-17 | Stop reason: HOSPADM

## 2023-07-10 RX ORDER — ACETAMINOPHEN 325 MG/1
650 TABLET ORAL EVERY 4 HOURS PRN
Status: DISCONTINUED | OUTPATIENT
Start: 2023-07-13 | End: 2023-07-17 | Stop reason: HOSPADM

## 2023-07-10 RX ORDER — NITROGLYCERIN 20 MG/100ML
10-200 INJECTION INTRAVENOUS CONTINUOUS PRN
Status: DISCONTINUED | OUTPATIENT
Start: 2023-07-10 | End: 2023-07-13

## 2023-07-10 RX ORDER — HYDROXYZINE HYDROCHLORIDE 25 MG/1
25 TABLET, FILM COATED ORAL EVERY 6 HOURS PRN
Status: DISCONTINUED | OUTPATIENT
Start: 2023-07-10 | End: 2023-07-17 | Stop reason: HOSPADM

## 2023-07-10 RX ORDER — METHOCARBAMOL 750 MG/1
750 TABLET, FILM COATED ORAL EVERY 6 HOURS PRN
Status: DISCONTINUED | OUTPATIENT
Start: 2023-07-10 | End: 2023-07-17 | Stop reason: HOSPADM

## 2023-07-10 RX ORDER — ONDANSETRON 2 MG/ML
4 INJECTION INTRAMUSCULAR; INTRAVENOUS EVERY 6 HOURS PRN
Status: DISCONTINUED | OUTPATIENT
Start: 2023-07-10 | End: 2023-07-17 | Stop reason: HOSPADM

## 2023-07-10 RX ORDER — LIDOCAINE 40 MG/G
CREAM TOPICAL
Status: DISCONTINUED | OUTPATIENT
Start: 2023-07-10 | End: 2023-07-10 | Stop reason: HOSPADM

## 2023-07-10 RX ORDER — ASPIRIN 81 MG/1
162 TABLET, CHEWABLE ORAL DAILY
Status: DISCONTINUED | OUTPATIENT
Start: 2023-07-10 | End: 2023-07-11

## 2023-07-10 RX ORDER — CLINDAMYCIN PHOSPHATE 900 MG/50ML
900 INJECTION, SOLUTION INTRAVENOUS SEE ADMIN INSTRUCTIONS
Status: DISCONTINUED | OUTPATIENT
Start: 2023-07-10 | End: 2023-07-10 | Stop reason: HOSPADM

## 2023-07-10 RX ORDER — PROPOFOL 10 MG/ML
5-75 INJECTION, EMULSION INTRAVENOUS CONTINUOUS
Status: DISCONTINUED | OUTPATIENT
Start: 2023-07-10 | End: 2023-07-11

## 2023-07-10 RX ORDER — CLINDAMYCIN PHOSPHATE 900 MG/50ML
900 INJECTION, SOLUTION INTRAVENOUS
Status: COMPLETED | OUTPATIENT
Start: 2023-07-10 | End: 2023-07-10

## 2023-07-10 RX ORDER — SODIUM CHLORIDE, SODIUM LACTATE, POTASSIUM CHLORIDE, CALCIUM CHLORIDE 600; 310; 30; 20 MG/100ML; MG/100ML; MG/100ML; MG/100ML
INJECTION, SOLUTION INTRAVENOUS CONTINUOUS PRN
Status: DISCONTINUED | OUTPATIENT
Start: 2023-07-10 | End: 2023-07-10

## 2023-07-10 RX ORDER — DEXTROSE MONOHYDRATE 25 G/50ML
25-50 INJECTION, SOLUTION INTRAVENOUS
Status: DISCONTINUED | OUTPATIENT
Start: 2023-07-10 | End: 2023-07-12

## 2023-07-10 RX ORDER — CHLORHEXIDINE GLUCONATE ORAL RINSE 1.2 MG/ML
15 SOLUTION DENTAL EVERY 12 HOURS
Status: DISCONTINUED | OUTPATIENT
Start: 2023-07-10 | End: 2023-07-11

## 2023-07-10 RX ORDER — CALCIUM GLUCONATE 20 MG/ML
2 INJECTION, SOLUTION INTRAVENOUS
Status: ACTIVE | OUTPATIENT
Start: 2023-07-10 | End: 2023-07-16

## 2023-07-10 RX ORDER — AMOXICILLIN 250 MG
1 CAPSULE ORAL 2 TIMES DAILY
Status: DISCONTINUED | OUTPATIENT
Start: 2023-07-10 | End: 2023-07-14

## 2023-07-10 RX ORDER — CALCIUM GLUCONATE 20 MG/ML
1 INJECTION, SOLUTION INTRAVENOUS
Status: DISPENSED | OUTPATIENT
Start: 2023-07-10 | End: 2023-07-16

## 2023-07-10 RX ORDER — NICOTINE POLACRILEX 4 MG
15-30 LOZENGE BUCCAL
Status: DISCONTINUED | OUTPATIENT
Start: 2023-07-10 | End: 2023-07-12

## 2023-07-10 RX ORDER — FAMOTIDINE 20 MG/1
20 TABLET, FILM COATED ORAL
Status: COMPLETED | OUTPATIENT
Start: 2023-07-10 | End: 2023-07-10

## 2023-07-10 RX ORDER — EPINEPHRINE IN 0.9 % SOD CHLOR 5 MG/250ML
.01-.1 PLASTIC BAG, INJECTION (ML) INTRAVENOUS CONTINUOUS PRN
Status: DISCONTINUED | OUTPATIENT
Start: 2023-07-10 | End: 2023-07-13

## 2023-07-10 RX ORDER — FENTANYL CITRATE 50 UG/ML
INJECTION, SOLUTION INTRAMUSCULAR; INTRAVENOUS PRN
Status: DISCONTINUED | OUTPATIENT
Start: 2023-07-10 | End: 2023-07-10

## 2023-07-10 RX ORDER — SODIUM CHLORIDE, SODIUM LACTATE, POTASSIUM CHLORIDE, CALCIUM CHLORIDE 600; 310; 30; 20 MG/100ML; MG/100ML; MG/100ML; MG/100ML
INJECTION, SOLUTION INTRAVENOUS CONTINUOUS
Status: DISCONTINUED | OUTPATIENT
Start: 2023-07-10 | End: 2023-07-10 | Stop reason: HOSPADM

## 2023-07-10 RX ORDER — HYDROMORPHONE HCL IN WATER/PF 6 MG/30 ML
0.2 PATIENT CONTROLLED ANALGESIA SYRINGE INTRAVENOUS
Status: DISCONTINUED | OUTPATIENT
Start: 2023-07-10 | End: 2023-07-16

## 2023-07-10 RX ORDER — CHLORHEXIDINE GLUCONATE ORAL RINSE 1.2 MG/ML
10 SOLUTION DENTAL ONCE
Status: COMPLETED | OUTPATIENT
Start: 2023-07-10 | End: 2023-07-10

## 2023-07-10 RX ORDER — CALCIUM CARBONATE 500 MG/1
500 TABLET, CHEWABLE ORAL 4 TIMES DAILY PRN
Status: DISCONTINUED | OUTPATIENT
Start: 2023-07-10 | End: 2023-07-17 | Stop reason: HOSPADM

## 2023-07-10 RX ORDER — NOREPINEPHRINE BITARTRATE 0.06 MG/ML
.01-.4 INJECTION, SOLUTION INTRAVENOUS CONTINUOUS PRN
Status: DISCONTINUED | OUTPATIENT
Start: 2023-07-10 | End: 2023-07-13

## 2023-07-10 RX ORDER — NITROGLYCERIN 10 MG/100ML
INJECTION INTRAVENOUS PRN
Status: DISCONTINUED | OUTPATIENT
Start: 2023-07-10 | End: 2023-07-10

## 2023-07-10 RX ORDER — LIDOCAINE HYDROCHLORIDE 10 MG/ML
INJECTION, SOLUTION INFILTRATION; PERINEURAL
Status: COMPLETED | OUTPATIENT
Start: 2023-07-10 | End: 2023-07-10

## 2023-07-10 RX ORDER — PROPOFOL 10 MG/ML
INJECTION, EMULSION INTRAVENOUS PRN
Status: DISCONTINUED | OUTPATIENT
Start: 2023-07-10 | End: 2023-07-10

## 2023-07-10 RX ORDER — CLINDAMYCIN PHOSPHATE 900 MG/50ML
900 INJECTION, SOLUTION INTRAVENOUS EVERY 8 HOURS
Status: COMPLETED | OUTPATIENT
Start: 2023-07-11 | End: 2023-07-11

## 2023-07-10 RX ORDER — ASPIRIN 81 MG/1
162 TABLET, CHEWABLE ORAL
Status: COMPLETED | OUTPATIENT
Start: 2023-07-10 | End: 2023-07-10

## 2023-07-10 RX ORDER — ACETAMINOPHEN 325 MG/1
975 TABLET ORAL EVERY 8 HOURS
Status: COMPLETED | OUTPATIENT
Start: 2023-07-10 | End: 2023-07-13

## 2023-07-10 RX ORDER — BISACODYL 10 MG
10 SUPPOSITORY, RECTAL RECTAL DAILY PRN
Status: DISCONTINUED | OUTPATIENT
Start: 2023-07-10 | End: 2023-07-17 | Stop reason: HOSPADM

## 2023-07-10 RX ORDER — HEPARIN SODIUM 5000 [USP'U]/.5ML
5000 INJECTION, SOLUTION INTRAVENOUS; SUBCUTANEOUS EVERY 8 HOURS
Status: DISCONTINUED | OUTPATIENT
Start: 2023-07-11 | End: 2023-07-13

## 2023-07-10 RX ORDER — ALBUTEROL SULFATE 0.83 MG/ML
2.5 SOLUTION RESPIRATORY (INHALATION)
Status: DISCONTINUED | OUTPATIENT
Start: 2023-07-10 | End: 2023-07-17 | Stop reason: HOSPADM

## 2023-07-10 RX ORDER — SODIUM CHLORIDE 9 MG/ML
INJECTION, SOLUTION INTRAVENOUS CONTINUOUS PRN
Status: DISCONTINUED | OUTPATIENT
Start: 2023-07-10 | End: 2023-07-10

## 2023-07-10 RX ORDER — ONDANSETRON 4 MG/1
4 TABLET, ORALLY DISINTEGRATING ORAL EVERY 6 HOURS PRN
Status: DISCONTINUED | OUTPATIENT
Start: 2023-07-10 | End: 2023-07-17 | Stop reason: HOSPADM

## 2023-07-10 RX ADMIN — Medication 40 MG: at 20:11

## 2023-07-10 RX ADMIN — NOREPINEPHRINE BITARTRATE 8 MCG: 1 INJECTION, SOLUTION, CONCENTRATE INTRAVENOUS at 13:26

## 2023-07-10 RX ADMIN — HYDROMORPHONE HYDROCHLORIDE 0.4 MG: 0.2 INJECTION, SOLUTION INTRAMUSCULAR; INTRAVENOUS; SUBCUTANEOUS at 21:30

## 2023-07-10 RX ADMIN — SUGAMMADEX 200 MG: 100 INJECTION, SOLUTION INTRAVENOUS at 18:25

## 2023-07-10 RX ADMIN — VANCOMYCIN HYDROCHLORIDE 1500 MG: 10 INJECTION, POWDER, LYOPHILIZED, FOR SOLUTION INTRAVENOUS at 10:05

## 2023-07-10 RX ADMIN — NOREPINEPHRINE BITARTRATE 4 MCG: 1 INJECTION, SOLUTION, CONCENTRATE INTRAVENOUS at 17:48

## 2023-07-10 RX ADMIN — FAMOTIDINE 20 MG: 20 TABLET ORAL at 09:40

## 2023-07-10 RX ADMIN — NOREPINEPHRINE BITARTRATE 8 MCG: 1 INJECTION, SOLUTION, CONCENTRATE INTRAVENOUS at 13:33

## 2023-07-10 RX ADMIN — MIDAZOLAM HYDROCHLORIDE 3 MG: 1 INJECTION, SOLUTION INTRAMUSCULAR; INTRAVENOUS at 12:29

## 2023-07-10 RX ADMIN — PHENYLEPHRINE HYDROCHLORIDE 100 MCG: 10 INJECTION INTRAVENOUS at 16:55

## 2023-07-10 RX ADMIN — SODIUM CHLORIDE: 9 INJECTION, SOLUTION INTRAVENOUS at 13:02

## 2023-07-10 RX ADMIN — NOREPINEPHRINE BITARTRATE 8 MCG: 1 INJECTION, SOLUTION, CONCENTRATE INTRAVENOUS at 14:35

## 2023-07-10 RX ADMIN — FENTANYL CITRATE 150 MCG: 50 INJECTION, SOLUTION INTRAMUSCULAR; INTRAVENOUS at 18:11

## 2023-07-10 RX ADMIN — MIDAZOLAM HYDROCHLORIDE 2 MG: 1 INJECTION, SOLUTION INTRAMUSCULAR; INTRAVENOUS at 17:10

## 2023-07-10 RX ADMIN — SODIUM CHLORIDE, POTASSIUM CHLORIDE, SODIUM LACTATE AND CALCIUM CHLORIDE 1000 ML: 600; 310; 30; 20 INJECTION, SOLUTION INTRAVENOUS at 19:20

## 2023-07-10 RX ADMIN — PROPOFOL 30 MCG/KG/MIN: 10 INJECTION, EMULSION INTRAVENOUS at 20:32

## 2023-07-10 RX ADMIN — DEXMEDETOMIDINE HYDROCHLORIDE 0.2 MCG/KG/HR: 400 INJECTION INTRAVENOUS at 22:14

## 2023-07-10 RX ADMIN — INSULIN HUMAN 10 UNITS: 100 INJECTION, SOLUTION PARENTERAL at 16:03

## 2023-07-10 RX ADMIN — VECURONIUM BROMIDE 4 MG: 1 INJECTION, POWDER, LYOPHILIZED, FOR SOLUTION INTRAVENOUS at 15:50

## 2023-07-10 RX ADMIN — NOREPINEPHRINE BITARTRATE 4 MCG: 1 INJECTION, SOLUTION, CONCENTRATE INTRAVENOUS at 12:52

## 2023-07-10 RX ADMIN — PROPOFOL 50 MG: 10 INJECTION, EMULSION INTRAVENOUS at 14:33

## 2023-07-10 RX ADMIN — FENTANYL CITRATE 100 MCG: 50 INJECTION, SOLUTION INTRAMUSCULAR; INTRAVENOUS at 17:16

## 2023-07-10 RX ADMIN — CLINDAMYCIN PHOSPHATE 900 MG: 900 INJECTION, SOLUTION INTRAVENOUS at 10:15

## 2023-07-10 RX ADMIN — NOREPINEPHRINE BITARTRATE 8 MCG: 1 INJECTION, SOLUTION, CONCENTRATE INTRAVENOUS at 13:02

## 2023-07-10 RX ADMIN — VASOPRESSIN 1 UNITS/HR: 20 INJECTION, SOLUTION INTRAMUSCULAR; SUBCUTANEOUS at 23:24

## 2023-07-10 RX ADMIN — FENTANYL CITRATE 250 MCG: 50 INJECTION, SOLUTION INTRAMUSCULAR; INTRAVENOUS at 13:40

## 2023-07-10 RX ADMIN — LIDOCAINE HYDROCHLORIDE 2 ML: 10 INJECTION, SOLUTION INFILTRATION; PERINEURAL at 06:44

## 2023-07-10 RX ADMIN — ONDANSETRON 4 MG: 2 INJECTION INTRAMUSCULAR; INTRAVENOUS at 19:20

## 2023-07-10 RX ADMIN — LIDOCAINE 1 PATCH: 560 PATCH PERCUTANEOUS; TOPICAL; TRANSDERMAL at 20:11

## 2023-07-10 RX ADMIN — AMINOCAPROIC ACID 1 G/HR: 250 INJECTION, SOLUTION INTRAVENOUS at 14:08

## 2023-07-10 RX ADMIN — HYDROMORPHONE HYDROCHLORIDE 0.4 MG: 0.2 INJECTION, SOLUTION INTRAMUSCULAR; INTRAVENOUS; SUBCUTANEOUS at 23:55

## 2023-07-10 RX ADMIN — ACETAMINOPHEN 975 MG: 325 TABLET, FILM COATED ORAL at 20:11

## 2023-07-10 RX ADMIN — ROCURONIUM BROMIDE 50 MG: 50 INJECTION, SOLUTION INTRAVENOUS at 12:41

## 2023-07-10 RX ADMIN — NOREPINEPHRINE BITARTRATE 8 MCG: 1 INJECTION, SOLUTION, CONCENTRATE INTRAVENOUS at 13:14

## 2023-07-10 RX ADMIN — NOREPINEPHRINE BITARTRATE 6 MCG: 1 INJECTION, SOLUTION, CONCENTRATE INTRAVENOUS at 12:40

## 2023-07-10 RX ADMIN — NOREPINEPHRINE BITARTRATE 8 MCG: 1 INJECTION, SOLUTION, CONCENTRATE INTRAVENOUS at 12:59

## 2023-07-10 RX ADMIN — LIDOCAINE HYDROCHLORIDE 100 MG: 20 INJECTION, SOLUTION INFILTRATION; PERINEURAL at 12:40

## 2023-07-10 RX ADMIN — SODIUM CHLORIDE, POTASSIUM CHLORIDE, SODIUM LACTATE AND CALCIUM CHLORIDE 250 ML: 600; 310; 30; 20 INJECTION, SOLUTION INTRAVENOUS at 22:28

## 2023-07-10 RX ADMIN — FENTANYL CITRATE 250 MCG: 50 INJECTION, SOLUTION INTRAMUSCULAR; INTRAVENOUS at 13:36

## 2023-07-10 RX ADMIN — NITROGLYCERIN 40 MCG: 10 INJECTION INTRAVENOUS at 14:24

## 2023-07-10 RX ADMIN — Medication 0.09 MCG/KG/MIN: at 22:01

## 2023-07-10 RX ADMIN — NOREPINEPHRINE BITARTRATE 8 MCG: 1 INJECTION, SOLUTION, CONCENTRATE INTRAVENOUS at 13:29

## 2023-07-10 RX ADMIN — SODIUM CHLORIDE, POTASSIUM CHLORIDE, SODIUM LACTATE AND CALCIUM CHLORIDE: 600; 310; 30; 20 INJECTION, SOLUTION INTRAVENOUS at 09:39

## 2023-07-10 RX ADMIN — VECURONIUM BROMIDE 5 MG: 1 INJECTION, POWDER, LYOPHILIZED, FOR SOLUTION INTRAVENOUS at 14:19

## 2023-07-10 RX ADMIN — PROTAMINE SULFATE 250 MG: 10 INJECTION, SOLUTION INTRAVENOUS at 17:01

## 2023-07-10 RX ADMIN — VANCOMYCIN HYDROCHLORIDE 1500 MG: 10 INJECTION, POWDER, LYOPHILIZED, FOR SOLUTION INTRAVENOUS at 21:44

## 2023-07-10 RX ADMIN — HUMAN INSULIN 2 UNITS/KG/HR: 100 INJECTION, SOLUTION SUBCUTANEOUS at 16:33

## 2023-07-10 RX ADMIN — SODIUM CHLORIDE: 9 INJECTION, SOLUTION INTRAVENOUS at 18:02

## 2023-07-10 RX ADMIN — NITROGLYCERIN 40 MCG: 10 INJECTION INTRAVENOUS at 14:33

## 2023-07-10 RX ADMIN — CHLORHEXIDINE GLUCONATE 15 ML: 1.2 SOLUTION ORAL at 20:11

## 2023-07-10 RX ADMIN — SODIUM CHLORIDE, POTASSIUM CHLORIDE, SODIUM LACTATE AND CALCIUM CHLORIDE: 600; 310; 30; 20 INJECTION, SOLUTION INTRAVENOUS at 13:02

## 2023-07-10 RX ADMIN — VECURONIUM BROMIDE 5 MG: 1 INJECTION, POWDER, LYOPHILIZED, FOR SOLUTION INTRAVENOUS at 13:21

## 2023-07-10 RX ADMIN — ASPIRIN 81 MG 162 MG: 81 TABLET ORAL at 21:31

## 2023-07-10 RX ADMIN — PROPOFOL 20 MG: 10 INJECTION, EMULSION INTRAVENOUS at 14:23

## 2023-07-10 RX ADMIN — MIDAZOLAM 2 MG: 1 INJECTION INTRAMUSCULAR; INTRAVENOUS at 10:25

## 2023-07-10 RX ADMIN — CLINDAMYCIN PHOSPHATE 900 MG: 900 INJECTION, SOLUTION INTRAVENOUS at 23:55

## 2023-07-10 RX ADMIN — CLINDAMYCIN PHOSPHATE 900 MG: 900 INJECTION, SOLUTION INTRAVENOUS at 16:11

## 2023-07-10 RX ADMIN — PROPOFOL 50 MCG/KG/MIN: 10 INJECTION, EMULSION INTRAVENOUS at 17:38

## 2023-07-10 RX ADMIN — ONDANSETRON 4 MG: 2 INJECTION INTRAMUSCULAR; INTRAVENOUS at 23:52

## 2023-07-10 RX ADMIN — SENNOSIDES AND DOCUSATE SODIUM 1 TABLET: 50; 8.6 TABLET ORAL at 20:12

## 2023-07-10 RX ADMIN — PROPOFOL 20 MG: 10 INJECTION, EMULSION INTRAVENOUS at 13:37

## 2023-07-10 RX ADMIN — NOREPINEPHRINE BITARTRATE 0.02 MCG/KG/MIN: 1 INJECTION, SOLUTION, CONCENTRATE INTRAVENOUS at 13:14

## 2023-07-10 RX ADMIN — AMINOCAPROIC ACID 5 G/HR: 250 INJECTION, SOLUTION INTRAVENOUS at 13:08

## 2023-07-10 RX ADMIN — NOREPINEPHRINE BITARTRATE 6 MCG: 1 INJECTION, SOLUTION, CONCENTRATE INTRAVENOUS at 12:56

## 2023-07-10 RX ADMIN — EPINEPHRINE 0.03 MCG/KG/MIN: 1 INJECTION INTRAMUSCULAR; INTRAVENOUS; SUBCUTANEOUS at 16:51

## 2023-07-10 RX ADMIN — PHENYLEPHRINE HYDROCHLORIDE 100 MCG: 10 INJECTION INTRAVENOUS at 17:02

## 2023-07-10 RX ADMIN — METHOCARBAMOL 750 MG: 750 TABLET ORAL at 20:11

## 2023-07-10 RX ADMIN — PROPOFOL 80 MG: 10 INJECTION, EMULSION INTRAVENOUS at 12:40

## 2023-07-10 RX ADMIN — FENTANYL CITRATE 250 MCG: 50 INJECTION, SOLUTION INTRAMUSCULAR; INTRAVENOUS at 12:40

## 2023-07-10 RX ADMIN — HEPARIN SODIUM 28000 UNITS: 1000 INJECTION INTRAVENOUS; SUBCUTANEOUS at 14:12

## 2023-07-10 RX ADMIN — ALBUMIN HUMAN 25 G: 0.05 INJECTION, SOLUTION INTRAVENOUS at 19:03

## 2023-07-10 RX ADMIN — PROPOFOL 30 MG: 10 INJECTION, EMULSION INTRAVENOUS at 14:32

## 2023-07-10 RX ADMIN — NOREPINEPHRINE BITARTRATE 4 MCG: 1 INJECTION, SOLUTION, CONCENTRATE INTRAVENOUS at 17:27

## 2023-07-10 RX ADMIN — CHLORHEXIDINE GLUCONATE 10 ML: 1.2 SOLUTION ORAL at 09:40

## 2023-07-10 RX ADMIN — HYDROXYZINE HYDROCHLORIDE 25 MG: 25 TABLET, FILM COATED ORAL at 20:11

## 2023-07-10 RX ADMIN — PHENYLEPHRINE HYDROCHLORIDE 100 MCG: 10 INJECTION INTRAVENOUS at 16:56

## 2023-07-10 ASSESSMENT — ACTIVITIES OF DAILY LIVING (ADL)
ADLS_ACUITY_SCORE: 18
ADLS_ACUITY_SCORE: 35
ADLS_ACUITY_SCORE: 18

## 2023-07-10 ASSESSMENT — LIFESTYLE VARIABLES: TOBACCO_USE: 1

## 2023-07-10 ASSESSMENT — ENCOUNTER SYMPTOMS: SEIZURES: 0

## 2023-07-10 NOTE — ANESTHESIA PROCEDURE NOTES
Airway       Patient location during procedure: OR       Procedure Start/Stop Times: 7/10/2023 12:43 PM  Staff -        Anesthesiologist:  Jennifer Grimaldo MD       CRNA: Wilber Daniel APRN CRNA       Performed By: CRNA  Consent for Airway        Urgency: elective  Indications and Patient Condition       Indications for airway management: jose maria-procedural       Induction type:intravenous       Mask difficulty assessment: 3 - difficult mask (inadequate, unstable, or two providers) +/- NMBA    Final Airway Details       Final airway type: endotracheal airway       Successful airway: Oral and Single subglottic suction  Endotracheal Airway Details        ETT size (mm): 8.0       Cuffed: yes       Successful intubation technique: video laryngoscopy       VL Blade Size: Terrell 4       Grade View of Cords: 1       Adjucts: stylet       Position: Right       Measured from: lips       Secured at (cm): 23       Bite block used: None    Post intubation assessment        Placement verified by: capnometry, equal breath sounds and chest rise        Number of attempts at approach: 1       Number of other approaches attempted: 0       Secured with: pink tape       Ease of procedure: easy       Dentition: Intact and Unchanged    Medication(s) Administered   Medication Administration Time: 7/10/2023 12:43 PM

## 2023-07-10 NOTE — PROGRESS NOTES
PTA medications updated by Medication Scribe prior to surgery via phone call with patient (last doses completed by Nurse)     Medication history sources: Patient, Surescripts and H&P  In the past week, patient estimated taking medication this percent of the time: Greater than 90%      Significant changes made to the medication list:  None      Additional medication history information:   None    Medication reconciliation completed by provider prior to medication history? No    Time spent in this activity: 30 minutes    The information provided in this note is only as accurate as the sources available at the time of update(s)    Prior to Admission medications    Medication Sig Last Dose Taking? Auth Provider Long Term End Date   aspirin 81 MG EC tablet Take 1 tablet (81 mg) by mouth daily 7/9/2023 at pm Yes Jacobo Diez MD     atenolol (TENORMIN) 50 MG tablet Take 1 tablet (50 mg) by mouth daily 7/9/2023 at pm Yes Jacobo Diez MD Yes    celecoxib (CELEBREX) 200 MG capsule Take 1 capsule (200 mg) by mouth 2 times daily 7/9/2023 at 1500 Yes Mike Deal MD Yes    hydrochlorothiazide (HYDRODIURIL) 25 MG tablet Take 1 tablet (25 mg) by mouth daily 7/9/2023 at pm Yes Mike Deal MD Yes    insulin glargine (LANTUS PEN) 100 UNIT/ML pen Inject 50 Units Subcutaneous At Bedtime  Patient taking differently: Inject 42 Units Subcutaneous At Bedtime 7/9/2023 at pm Yes Mike Deal MD Yes    insulin lispro (HUMALOG KWIKPEN) 100 UNIT/ML (1 unit dial) KWIKPEN Inject 18 Units Subcutaneous 3 times daily (before meals) Unknown at prn Yes Mike Deal MD Yes    losartan (COZAAR) 100 MG tablet Take 1 tablet (100 mg) by mouth daily 7/9/2023 at pm Yes Mike Deal MD Yes    metFORMIN (GLUCOPHAGE XR) 500 MG 24 hr tablet Take 2 tablets (1,000 mg) by mouth daily (with dinner) 7/9/2023 at pm Yes Mike Deal MD Yes    nitroGLYcerin (NITROSTAT) 0.4 MG sublingual tablet For chest pain place 1 tablet under the tongue  every 5 minutes for 3 doses. If symptoms persist 5 minutes after 1st dose call 911. Unknown at prn Yes Jacobo Diez MD Yes    simvastatin (ZOCOR) 20 MG tablet Take 1 tablet (20 mg) by mouth At Bedtime 7/9/2023 at pm Yes Mike Deal MD Yes    BD PEN NEEDLE KENTRELL 2ND GEN 32G X 4 MM miscellaneous USE 1 PEN NEEDLE TIP 3 TIMES DAILY   Mike Deal MD Yes    blood glucose monitoring (FREESTYLE INSULINX SYSTEM) meter device kit Use to test blood sugar 3 times daily or as directed.   Mike Deal MD Yes    blood glucose monitoring (FREESTYLE) lancets Use to test blood sugars 2-3 times daily or as directed.   Mike Deal MD Yes    insulin pen needle (BD KENTRELL U/F) 32G X 4 MM USE 2 PEN NEEDLES DAILY OR AS DIRECTED.   Mike Deal MD Yes    order for DME Equipment being ordered: Verio flex test strips  Patient tests 1 times daily.   Mike Deal MD     order for DME Equipment being ordered: lancets  Uses 4 a day.    Please dispense what insurance covers.   Mike Deal MD     order for DME Equipment being ordered: glucose meter  Please dispense what insurance covers   Mike Deal MD     ORDER FOR DME Equipment being ordered: glucometer    60 lancets and test strips (5 refills) for twice daily testing.   Mike Deal MD       Medication history completed by:    Chaparro Back CPhT  Medication Scribe  [facility]

## 2023-07-10 NOTE — ANESTHESIA PROCEDURE NOTES
Arterial Line Procedure Note    Pre-Procedure   Staff -        Anesthesiologist:  Jennifer Grimaldo MD       Performed By: anesthesiologist       Location: pre-op       Pre-Anesthestic Checklist: patient identified, IV checked, risks and benefits discussed, informed consent, monitors and equipment checked, pre-op evaluation and at physician/surgeon's request  Timeout:       Correct Patient: Yes        Correct Procedure: Yes        Correct Site: Yes        Correct Position: Yes   Line Placement:   This line was placed Pre Induction starting at 7/10/2023 6:44 AM and ending at 7/10/2023 6:47 AM  Procedure   Procedure: arterial line       Diagnosis: MR       Laterality: left       Insertion Site: radial.  Sterile Prep        Skin prep: Chloraprep  Insertion/Injection        Technique: ultrasound guided        1. Ultrasound was used to evaluate the access site.       2. Artery evaluated via ultrasound for patency/adequacy.       3. Using real-time ultrasound the needle/catheter was observed entering the artery/vein.       4. Permanent image was captured and entered into the patient's record.       5. The visualized structures were anatomically normal.       6. There were no apparent abnormal pathologic findings.       Catheter Type/Size: 20 G, 1.75 in/4.5 cm quick cath (integral wire)  Narrative         Secured by: anchor securement device       Tegaderm dressing used.       Complications: None apparent,        Arterial waveform: Yes        IBP within 10% of NIBP: Yes

## 2023-07-10 NOTE — ANESTHESIA PREPROCEDURE EVALUATION
Anesthesia Pre-Procedure Evaluation    Patient: Glen Cho   MRN: [id number] : 1965        Procedure : Procedure(s):  CORONARY ARTERY BYPASS GRAFT, WITH AORTIC VALVE REPLACEMENT          Past Medical History:   Diagnosis Date     Arthritis      Chronic kidney disease      Diabetes (H)      High cholesterol      HTN (hypertension)      Obesity      Sleep apnea     DOES NOT HAVE A CPAP      Past Surgical History:   Procedure Laterality Date     ANKLE SURGERY  2004     R ORIF     ARTHROSCOPY KNEE  2014    Procedure: ARTHROSCOPY KNEE;  ARTHROSCOPY KNEE- RIGHT   SURGEON REQUESTS CHOICE ANETHESIA ;  Surgeon: Sabino Simpson MD;  Location: [facility] OR     CHOLECYSTECTOMY       CV CORONARY ANGIOGRAM N/A 2023    Procedure: Coronary Angiogram;  Surgeon: Jacobo Diez MD;  Location:  [facility]     HERNIORRHAPHY UMBILICAL        Allergies   Allergen Reactions     Amlodipine Besylate      Water retention     Cefdinir      Joint pains     Lisinopril      Water retention     Amoxicillin Rash     White spots      Social History     Tobacco Use     Smoking status: Every Day     Types: Cigarettes     Last attempt to quit: 5/10/2000     Years since quittin.1     Smokeless tobacco: Never   Substance Use Topics     Alcohol use: Never     Alcohol/week: 0.0 standard drinks of alcohol     Comment: rarely      Wt Readings from Last 1 Encounters:   23 107.5 kg (237 lb)        Anesthesia Evaluation            ROS/MED HX  ENT/Pulmonary:     (+) sleep apnea, uses CPAP, tobacco use, Current use,  (-) recent URI   Neurologic:    (-) no seizures, no CVA and no TIA   Cardiovascular:     (+) Dyslipidemia hypertension--CAD angina-with excertion. --AMBRIZ. valvular problems/murmurs type: AS Previous cardiac testing   Echo: Date: 2022 Results:  Interpretation Summary     There is mild (1+) aortic regurgitation.  Moderate valvular aortic stenosis.  The calculated aortic valve area is 1.2cm2.  The  mean AoV pressure gradient is 28mmHg.  The visual ejection fraction is 60-65%.  The left atrium is mild to moderately dilated.  Stress Test: Date: Results:    ECG Reviewed: Date: Results:    Cath: Date: Results:     Mid LM to Ost LAD lesion is 70% stenosed.     Prox RCA lesion is 60% stenosed.     Mid RCA lesion is 20% stenosed.     Dist RCA lesion is 30% stenosed.     2nd Mrg lesion is 30% stenosed.     4th Mrg lesion is 30% stenosed.     Dist Cx lesion is 30% stenosed.     Prox LAD lesion is 30% stenosed.     1st Diag lesion is 50% stenosed.     Mid LAD lesion is 50% stenosed.     Dist LAD lesion is 70% stenosed.     2nd Diag lesion is 40% stenosed.     1. Severe distal LM/ostial LAD disease, otherwise diffuse calcific mild to moderate disease.  2. Moderate aortic stenosis      METS/Exercise Tolerance: 3 - Able to walk 1-2 blocks without stopping    Hematologic:    (-) history of blood clots   Musculoskeletal:   (+) arthritis,     GI/Hepatic:     (+) GERD (Takes tums with his evening meds regularly. Denies symptoms with this regimen. ), Asymptomatic on medication,  (-) liver disease   Renal/Genitourinary:     (+) renal disease, type: CRI,     Endo:     (+) type II DM, Using insulin, Obesity (BMI 39.4),     Psychiatric/Substance Use:       Infectious Disease:    (-) Recent Fever   Malignancy:       Other:            Physical Exam    Airway        Mallampati: I   TM distance: > 3 FB   Neck ROM: full   Mouth opening: > 3 cm    Respiratory Devices and Support         Dental       (+) Minor Abnormalities - some fillings, tiny chips      Cardiovascular          Rhythm and rate: regular and normal     Pulmonary           breath sounds clear to auscultation           OUTSIDE LABS:  CBC:   Lab Results   Component Value Date    WBC 13.1 (H) 07/10/2023    WBC 9.5 06/12/2023    HGB 14.1 07/10/2023    HGB 13.7 06/12/2023    HCT 41.3 07/10/2023    HCT 42.0 06/12/2023     07/10/2023     06/12/2023     BMP:   Lab  Results   Component Value Date     (L) 06/12/2023     05/30/2023    POTASSIUM 3.8 06/12/2023    POTASSIUM 4.3 05/30/2023    CHLORIDE 96 (L) 06/12/2023    CHLORIDE 100 05/30/2023    CO2 26 06/12/2023    CO2 23 05/30/2023    BUN 20.9 06/12/2023    BUN 18.8 05/30/2023    CR 0.85 06/12/2023    CR 0.71 05/30/2023     (H) 06/12/2023     (H) 05/30/2023     COAGS:   Lab Results   Component Value Date    PTT 30 07/10/2023    INR 0.98 07/10/2023     POC:   Lab Results   Component Value Date     (H) 04/12/2015     HEPATIC:   Lab Results   Component Value Date    ALBUMIN 4.5 06/12/2023    PROTTOTAL 7.6 06/12/2023    ALT 25 06/12/2023    AST 26 06/12/2023    ALKPHOS 69 06/12/2023    BILITOTAL 0.5 06/12/2023     OTHER:   Lab Results   Component Value Date    A1C 6.7 (H) 06/12/2023    ARTEMIO 10.4 (H) 06/12/2023    TSH 2.34 09/16/2022    CRP 64.0 (H) 12/28/2015    SED 40 (H) 12/28/2015       Anesthesia Plan    ASA Status:  4   NPO Status:  NPO Appropriate    Anesthesia Type: General.     - Airway: ETT   Induction: Intravenous, Propofol.   Maintenance: Balanced.   Techniques and Equipment:     - Lines/Monitors: 2nd IV, Arterial Line, Central Line, PAC, CVP, ZACH     Consents    Anesthesia Plan(s) and associated risks, benefits, and realistic alternatives discussed. Questions answered and patient/representative(s) expressed understanding.     - Discussed: Risks, Benefits and Alternatives for BOTH SEDATION and the PROCEDURE were discussed     - Discussed with:  Patient         Postoperative Care    Pain management: IV analgesics, Multi-modal analgesia.   PONV prophylaxis: Background Propofol Infusion     Comments:                Jennifer Grimaldo MD

## 2023-07-10 NOTE — BRIEF OP NOTE
[facility]    Brief Operative Note    Pre-operative diagnosis: CAD (coronary artery disease) [I25.10]  AS (aortic stenosis) [I35.0]  Post-operative diagnosis Same as pre-operative diagnosis    Procedure: Procedure(s):  CORONARY ARTERY BYPASS GRAFT x 3 (LEFT INTERNAL MAMMARY ARTERY - LEFT ANTERIOR DESCENDING ARTERY; SAPHENOUS VEIN - OBTUSE MARGINAL ARTERY; SAPHENOUS VEIN TO ACUTE MARGINAL,  WITH ENDOSCOPIC SAPHENOUS VEIN HARVEST OF LEFT LOWER EXTREMITY; AORTIC VALVE REPLACEMENT WITH ON-X  PROSTHETIC HEART VALVE 23MM; ON CARDIOPULMONARY PUMP OXYGENATOR  (INTRAOPERATIVE TRANSESOPHAGEAL ECHOCARDIOGRAM BY ANESTHESIOLOGIST)  Surgeon: Surgeon(s) and Role:     * Carlyn Antony MD - Primary     * Feliz Light PA-C - Assisting     * Dalton Valdez MD - Assisting  Anesthesia: General   Estimated Blood Loss: 500 ml    Drains: Two mediastinal  Specimens:   ID Type Source Tests Collected by Time Destination   1 : Aortic valve Tissue Heart Valve, Aortic SURGICAL PATHOLOGY EXAM Carlyn Antony MD 7/10/2023  4:09 PM    A : stat labs s/p cardiac bypass Blood Line, arterial CBC WITH PLATELETS, BASIC METABOLIC PANEL, FIBRINOGEN ACTIVITY, PARTIAL THROMBOPLASTIN TIME, INR Isabel Khan 7/10/2023  5:10 PM      Findings:   LIMA to LAD, RSVG to rPDA and OM, 23mm OnX mechanical AVR.  Complications: None.  Implants:   Implant Name Type Inv. Item Serial No.  Lot No. LRB No. Used Action   VALVE AORTIC ON-X ANATOMIC RING 23MM ONXANE-23 - [id number] Valve VALVE AORTIC ON-X ANATOMIC RING 23MM ONXANE-23 [id number] [id number]  N/A 1 Implanted     Signed:    Dalton Valdez MD 7/10/2023 at 5:57 PM  Cardiothoracic Surgery Fellow  Pager: [phone]

## 2023-07-10 NOTE — ANESTHESIA POSTPROCEDURE EVALUATION
Patient: Glen Cho    Procedure: Procedure(s):  CORONARY ARTERY BYPASS GRAFT x 3 (LEFT INTERNAL MAMMARY ARTERY - LEFT ANTERIOR DESCENDING ARTERY; SAPHENOUS VEIN - OBTUSE MARGINAL ARTERY; SAPHENOUS VEIN TO ACUTE MARGINAL,  WITH ENDOSCOPIC SAPHENOUS VEIN HARVEST OF LEFT LOWER EXTREMITY; AORTIC VALVE REPLACEMENT WITH ON-X  PROSTHETIC HEART VALVE 23MM; ON CARDIOPULMONARY PUMP OXYGENATOR  (INTRAOPERATIVE TRANSESOPHAGEAL ECHOCARDIOGRAM BY ANESTHESIOLOGIST)       Anesthesia Type:  General    Note:     Postop Pain Control: Uneventful            Sign Out: Well controlled pain   PONV: No   Neuro/Psych: Uneventful            Sign Out: Acceptable/Baseline neuro status   Airway/Respiratory: Uneventful            Sign Out: AIRWAY IN SITU/Resp. Support               Airway in situ/Resp. Support: ETT                 Reason: Planned Pre-op   CV/Hemodynamics: Uneventful            Sign Out: Acceptable CV status; No obvious hypovolemia; No obvious fluid overload   Other NRE: NONE   DID A NON-ROUTINE EVENT OCCUR? No    Event details/Postop Comments:  Blood pressure soft on arrival to ICU. Treated with increasing norepinephrine infusion and 500 ml bolus of 5% albumen    Transported to ICU intubated/ sedated and placed on mechanical ventilation.            Last vitals:  Vitals:    07/10/23 1020 07/10/23 1034 07/10/23 1840   BP: 125/66 102/59    Pulse: 56 55 80   Resp: 16 16    Temp:      SpO2: 95% 96% 98%       Electronically Signed By: Jacobo Gleason MD  July 10, 2023  6:58 PM

## 2023-07-10 NOTE — ANESTHESIA PROCEDURE NOTES
Central Line/PA Catheter Placement    Pre-Procedure   Staff -        Anesthesiologist:  Jennifer Grimaldo MD       Performed By: anesthesiologist       Location: OR       Pre-Anesthestic Checklist: patient identified, IV checked, site marked, risks and benefits discussed, informed consent, monitors and equipment checked, pre-op evaluation and at physician/surgeon's request  Timeout:       Correct Patient: Yes        Correct Procedure: Yes        Correct Site: Yes        Correct Position: Yes        Correct Laterality: Yes   Line Placement:   This line was placed Post Induction    Procedure   Procedure: central line       Diagnosis: MR       Laterality: right       Insertion Site: internal jugular.       Patient Position: Trendelenburg  Sterile Prep        All elements of maximal sterile barrier technique followed       Patient Prep/Sterile Barriers: draped, hand hygiene, gloves , hat , mask , draped, gown, sterile gel and probe cover       Skin prep: Chloraprep  Insertion/Injection        Technique: ultrasound guided        1. Ultrasound was used to evaluate the access site.       2. Vein evaluated via ultrasound for patency/adequacy.       3. Using real-time ultrasound the needle/catheter was observed entering the artery/vein.       4. Permanent image was captured and entered into the patient's record.       5. The visualized structures were anatomically normal.       6. There were no apparent abnormal pathologic findings.       Introducer Type: 9 Fr, 2-lumen MAC        Type: PA/CVC with Introducer       Number of Lumens: double lumen       PA Catheter Type: CCO         Appropriate RV, RA and PA waveforms noted:  Yes            Balloon down at end of the procedure:   Narrative         Secured by: suture       Tegaderm and Biopatch dressing used.       Complications: None apparent,        blood aspirated from all lumens,        All lumens flushed: Yes       Verification method: Ultrasound and ZACH

## 2023-07-10 NOTE — ANESTHESIA CARE TRANSFER NOTE
Patient: Glen Cho    Procedure: Procedure(s):  CORONARY ARTERY BYPASS GRAFT x 3 (LEFT INTERNAL MAMMARY ARTERY - LEFT ANTERIOR DESCENDING ARTERY; SAPHENOUS VEIN - OBTUSE MARGINAL ARTERY; SAPHENOUS VEIN TO ACUTE MARGINAL,  WITH ENDOSCOPIC SAPHENOUS VEIN HARVEST OF LEFT LOWER EXTREMITY; AORTIC VALVE REPLACEMENT WITH ON-X  PROSTHETIC HEART VALVE 23MM; ON CARDIOPULMONARY PUMP OXYGENATOR  (INTRAOPERATIVE TRANSESOPHAGEAL ECHOCARDIOGRAM BY ANESTHESIOLOGIST)       Diagnosis: CAD (coronary artery disease) [I25.10]  AS (aortic stenosis) [I35.0]  Diagnosis Additional Information: No value filed.    Anesthesia Type:   General     Note:    Oropharynx: endotracheal tube in place and oral gastic tube in place  Level of Consciousness: iatrogenic sedation    Level of Supplemental Oxygen (L/min / FiO2): 15  Independent Airway: airway patency not satisfactory and stable  Dentition: dentition unchanged  Vital Signs Stable: post-procedure vital signs reviewed and stable  Report to RN Given: handoff report given  Patient transferred to: ICU  Comments: Patient connected to SpO2, EKG, and arterial blood pressure transport monitors and accompanied by CRNA, anesthesiologist, circulating RN, surgeon (fellow) to ICU room. Patient ventilated by anesthesiologist with ambu via ETT with O2 at 15 liters per minute during transport.     On arrival to ICU, endotracheal tube position unchanged, equal, bilateral breath sounds auscultated in ICU room, patient placed on ICU ventilator by respiratory therapist, teeth and oral mucosa intact and unchanged at handoff of care. At anesthesia handoff of care, clinical monitors and alarms on and functioning, report on patient's clinical status given to ICU RN, report on patient's clinical status given to intensivist, ICU staff questions answered.  ICU Handoff: Call for PAUSE to initiate/utilize ICU HANDOFF, Identified Patient, Identified Responsible Provider, Reviewed the Pertinent Medical History,  Discussed Surgical Course, Reviewed Intra-OP Anesthesia Management and Issues during Anesthesia, Set Expectations for Post Procedure Period and Allowed Opportunity for Questions and Acknowledgement of Understanding      Vitals:  Vitals Value Taken Time   BP     Temp 36.9  C (98.42  F) 07/10/23 1840   Pulse 80 07/10/23 1840   Resp 20 07/10/23 1840   SpO2 98 % 07/10/23 1840   Vitals shown include unvalidated device data.    Electronically Signed By: Isabel Khan  July 10, 2023  6:41 PM

## 2023-07-10 NOTE — CONSULTS
Critical Care  Note      07/10/2023    Name: Glen Cho MRN#: [id number]   Age: 58 year old YOB: 1965     Hsptl Day# 1  ICU DAY # 1    MV DAY # 1           Problem List:   Active Problems:    * No active hospital problems. *         Summary/Hospital Course:     58M hx of T2DM (A1c 6.7 06/2023), MO (BMI 39.77), CORINA, dyslipidemia who presents with multi-vessel CAD and moderate aortic stenosis (gradient 32 mmHg). Underwent aortic valve replacement and CABG x 3      Assessment and plan :     Glen Cho IS a 58 year old male admitted on 7/10/2023 for aortic valve replacement and CABG x 3.   I have personally reviewed the daily labs, imaging studies, cultures and discussed the case with referring physician and consulting physicians.     My assessment and plan by system for this patient is as follows:    Neurology/Psychiatry:   1. Sedation  2. Analgesia  Plan  - Wean sedation as tolerated  - Multi-modal analgesia     Cardiovascular:   1.Hemodynamics - hemodynamically within normal limits on vasopressor support  2.Rhythm - normal sinus  3. S/p aortic valve replacement and CABG x 3  Plan  - SBP < 120  - MAP > 65  - Wean vasopressor support  - Monitor chest tube output    Pulmonary/Ventilator Management:   1. Airway: intubated  2. Oxygenation/ventilation/mechanics  3. Hx of CORINA  Plan  - wean ventilator as tolerated  - extubate to BiPAP -> will likely need QHS CPAP    GI and Nutrition :   1. NPO  Plan  - dietary advancement pending extubation    Renal/Fluids/Electrolytes:   1. Toney  Plan  - discontinue POD#2 pending clinical status per CVTS  - monitor function and electrolytes as needed with replacement per ICU protocols  - generally avoid nephrotoxic agents such as NSAID, IV contrast unless specifically required  - adjust medications as needed for renal clearance  - follow I/O's as appropriate.    Infectious Disease:   1. Afebrile  Plan  - perioperative prophylaxis    Endocrine:   1. Hx of T2DM (A1c 6.7  06/2023)  Plan  - ICU insulin protocol, goal sugar <180    Hematology/Oncology:   1. No acute issues  Plan  - Transfuse PRN for Hgb <7  - reverse coagulopathy PRN    Musculoskeltal:   1. Bedrest  Plan  - activity liberation pending extubation     IV/Access:   1. Venous access - RIJ with swan  2. Arterial access - L radial  Plan  - central access required and necessary    ICU Prophylaxis:   1. DVT: Hep Subq/mechanical  2. VAP: HOB 30 degrees, chlorhexidine rinse  3. Stress Ulcer: PPI  4. Restraints: Nonviolent soft two point restraints required and necessary for patient safety and continued cares and good effect as patient continues to pull at necessary lines, tubes despite education and distraction. Will readdress daily.   5. Wound care  - sternal precautions  6. Feeding - pending extubation  7. Family Update: per CVTS  8. Disposition - ICU    Key goals for next 24 hours:     1. Liberation from ventilator  2. Wean vasopressor support        Medical History:     Past Medical History:   Diagnosis Date     Arthritis      Chronic kidney disease      Diabetes (H)      High cholesterol      HTN (hypertension)      Obesity      Sleep apnea     DOES NOT HAVE A CPAP     Past Surgical History:   Procedure Laterality Date     ANKLE SURGERY  02/01/2004     R ORIF     ARTHROSCOPY KNEE  03/26/2014    Procedure: ARTHROSCOPY KNEE;  ARTHROSCOPY KNEE- RIGHT   SURGEON REQUESTS CHOICE ANETHESIA ;  Surgeon: Sabino Simpson MD;  Location: [facility] OR     CHOLECYSTECTOMY       CV CORONARY ANGIOGRAM N/A 06/12/2023    Procedure: Coronary Angiogram;  Surgeon: Jacobo Diez MD;  Location:  [facility]     HERNIORRHAPHY UMBILICAL       Social History     Socioeconomic History     Marital status:      Spouse name: Not on file     Number of children: 2     Years of education: Not on file     Highest education level: Not on file   Occupational History     Employer: [employer]   Tobacco Use     Smoking status: Every Day      Types: Cigarettes     Last attempt to quit: 5/10/2000     Years since quittin.1     Smokeless tobacco: Never   Vaping Use     Vaping Use: Never used   Substance and Sexual Activity     Alcohol use: Never     Alcohol/week: 0.0 standard drinks of alcohol     Comment: rarely     Drug use: No     Sexual activity: Yes   Other Topics Concern     Parent/sibling w/ CABG, MI or angioplasty before 65F 55M? No   Social History Narrative     Not on file     Social Determinants of Health     Financial Resource Strain: Low Risk  (2022)    Overall Financial Resource Strain (CARDIA)      Difficulty of Paying Living Expenses: Not very hard   Food Insecurity: No Food Insecurity (2022)    Hunger Vital Sign      Worried About Running Out of Food in the Last Year: Never true      Ran Out of Food in the Last Year: Never true   Transportation Needs: No Transportation Needs (2022)    PRAPARE - Transportation      Lack of Transportation (Medical): No      Lack of Transportation (Non-Medical): No   Physical Activity: Inactive (2022)    Exercise Vital Sign      Days of Exercise per Week: 0 days      Minutes of Exercise per Session: 0 min   Stress: No Stress Concern Present (2022)    Citizen of the [place] of Occupational Health - Occupational Stress Questionnaire      Feeling of Stress : Only a little   Social Connections: Socially Isolated (2022)    Social Connection and Isolation Panel [NHANES]      Frequency of Communication with Friends and Family: Twice a week      Frequency of Social Gatherings with Friends and Family: Never      Attends Hindu Services: Never      Active Member of Clubs or Organizations: No      Attends Club or Organization Meetings: Not on file      Marital Status:    Intimate Partner Violence: Unknown (2020)    Humiliation, Afraid, Rape, and Kick questionnaire      Fear of Current or Ex-Partner: No      Emotionally Abused: Not on file      Physically Abused: Not on file       Sexually Abused: Not on file   Housing Stability: Low Risk  (9/19/2022)    Housing Stability Vital Sign      Unable to Pay for Housing in the Last Year: No      Number of Places Lived in the Last Year: 1      Unstable Housing in the Last Year: No        Allergies   Allergen Reactions     Amlodipine Besylate      Water retention     Cefdinir      Joint pains     Lisinopril      Water retention     Amoxicillin Rash     White spots              Key Medications:       clindamycin  900 mg Intravenous See Admin Instructions     heparin (porcine) 10,000 Units, magnesium sulfate 1 g, mannitol 25 % 12.5 g, sodium bicarbonate 50 mEq in sodium chloride 0.9 % 239.5 mL PUMP PRIME solution   PERFUSION On Call to OR     heparin (porcine) 30,000 Units in sodium chloride 0.9 % 1,030 mL PERFUSION solution   PERFUSION On Call to OR     lidocaine 2 % 200 mg, magnesium sulfate 1 g, mannitol 25 % 12.5 g, sodium bicarbonate 20 mEq CARDIOPLEGIA (Hot Shot) solution   PERFUSION On Call to OR     potassium chloride 11.5 mEq, mannitol 25 % 7.25 g, magnesium sulfate 1.16 g, sodium bicarbonate 14.5 mEq in sodium chloride 0.9 % CARDIOPLEGIA solution   PERFUSION On Call to OR     potassium chloride 60 mEq, mannitol 25 % 7.5 g, magnesium sulfate 1.2 g, sodium bicarbonate 15 mEq in sodium chloride 0.9 % CARDIOPLEGIA solution   PERFUSION On Call to OR     sodium chloride (PF)  3 mL Intracatheter Q8H       insulin regular       lactated ringers 10 mL/hr at 07/10/23 0939        [place] Med[facility]  No current facility-administered medications on file prior to encounter.  aspirin 81 MG EC tablet, Take 1 tablet (81 mg) by mouth daily  atenolol (TENORMIN) 50 MG tablet, Take 1 tablet (50 mg) by mouth daily  celecoxib (CELEBREX) 200 MG capsule, Take 1 capsule (200 mg) by mouth 2 times daily  hydrochlorothiazide (HYDRODIURIL) 25 MG tablet, Take 1 tablet (25 mg) by mouth daily  insulin glargine (LANTUS PEN) 100 UNIT/ML pen, Inject 50 Units Subcutaneous At Bedtime  (Patient taking differently: Inject 42 Units Subcutaneous At Bedtime)  insulin lispro (HUMALOG KWIKPEN) 100 UNIT/ML (1 unit dial) KWIKPEN, Inject 18 Units Subcutaneous 3 times daily (before meals)  losartan (COZAAR) 100 MG tablet, Take 1 tablet (100 mg) by mouth daily  metFORMIN (GLUCOPHAGE XR) 500 MG 24 hr tablet, Take 2 tablets (1,000 mg) by mouth daily (with dinner)  nitroGLYcerin (NITROSTAT) 0.4 MG sublingual tablet, For chest pain place 1 tablet under the tongue every 5 minutes for 3 doses. If symptoms persist 5 minutes after 1st dose call 911.  simvastatin (ZOCOR) 20 MG tablet, Take 1 tablet (20 mg) by mouth At Bedtime  blood glucose monitoring (FREESTYLE INSULINX SYSTEM) meter device kit, Use to test blood sugar 3 times daily or as directed.  blood glucose monitoring (FREESTYLE) lancets, Use to test blood sugars 2-3 times daily or as directed.  insulin pen needle (BD KENTRELL U/F) 32G X 4 MM, USE 2 PEN NEEDLES DAILY OR AS DIRECTED.  order for DME, Equipment being ordered: Verio flex test strips  Patient tests 1 times daily.  order for DME, Equipment being ordered: lancets  Uses 4 a day.    Please dispense what insurance covers.  order for DME, Equipment being ordered: glucose meter  Please dispense what insurance covers  ORDER FOR DME, Equipment being ordered: glucometer    60 lancets and test strips (5 refills) for twice daily testing.           Physical Examination:   Temp:  [97.6  F (36.4  C)] 97.6  F (36.4  C)  Pulse:  [55-56] 55  Resp:  [16-18] 16  BP: (102-125)/(59-75) 102/59  SpO2:  [94 %-96 %] 96 %  No intake or output data in the 24 hours ending 07/10/23 1223  Wt Readings from Last 4 Encounters:   07/10/23 108.4 kg (239 lb)   06/19/23 107.5 kg (237 lb)   06/12/23 107.5 kg (236 lb 14.4 oz)   03/20/23 109 kg (240 lb 3.2 oz)        Resp: 16    No lab results found in last 7 days.    GEN: no acute distress   HEENT: head ncat, sclera anicteric, trachea midline   PULM: unlabored synchronous with vent, clear  anteriorly; chest tubes with dark sanguinous effluent  CV/COR: normal sinus on monitor  ABD: soft nontender, no mass  EXT:  warm  NEURO: sedated  SKIN: no obvious rash  LINES: clean, dry intact         Data:   All data and imaging reviewed     ROUTINE ICU LABS (Last four results)  CMP  Recent Labs   Lab 07/10/23  0853      POTASSIUM 4.3   CHLORIDE 101   CO2 22   ANIONGAP 14   *   BUN 20.0   CR 0.76   GFRESTIMATED >90   ARTEMIO 10.4*   MAG 1.8   PROTTOTAL 7.6   ALBUMIN 4.5   BILITOTAL 0.4   ALKPHOS 69   AST 16   ALT 25     CBC  Recent Labs   Lab 07/10/23  0853   WBC 13.1*   RBC 4.77   HGB 14.1   HCT 41.3   MCV 87   MCH 29.6   MCHC 34.1   RDW 13.9        INR  Recent Labs   Lab 07/10/23  0853   INR 0.98     Arterial Blood GasNo lab results found in last 7 days.    All cultures:  No results for input(s): CULT in the last 168 hours.  No results found for this or any previous visit (from the past 24 hour(s)).    Patient seen and discussed with Dr. Kenneth Carmona MD  Surgical Critical Care Fellow    Billing: This patient is critically ill: Yes. Total critical care time today 38 min.

## 2023-07-11 ENCOUNTER — APPOINTMENT (OUTPATIENT)
Dept: GENERAL RADIOLOGY | Facility: CLINIC | Age: 58
End: 2023-07-11
Attending: PHYSICIAN ASSISTANT
Payer: COMMERCIAL

## 2023-07-11 LAB
ALBUMIN SERPL BCG-MCNC: 3.7 G/DL (ref 3.5–5.2)
ALBUMIN SERPL BCG-MCNC: 4 G/DL (ref 3.5–5.2)
ALBUMIN UR-MCNC: ABNORMAL G/DL
ALLEN'S TEST: ABNORMAL
ALP SERPL-CCNC: 38 U/L (ref 40–129)
ALP SERPL-CCNC: 46 U/L (ref 40–129)
ALT SERPL W P-5'-P-CCNC: 18 U/L (ref 0–70)
ALT SERPL W P-5'-P-CCNC: 22 U/L (ref 0–70)
ANION GAP SERPL CALCULATED.3IONS-SCNC: 14 MMOL/L (ref 7–15)
APPEARANCE UR: CLEAR
AST SERPL W P-5'-P-CCNC: 52 U/L (ref 0–45)
AST SERPL W P-5'-P-CCNC: 59 U/L (ref 0–45)
BASE EXCESS BLDA CALC-SCNC: -0.1 MMOL/L (ref -9–1.8)
BASE EXCESS BLDA CALC-SCNC: -0.5 MMOL/L (ref -9–1.8)
BASE EXCESS BLDA CALC-SCNC: -4.5 MMOL/L (ref -9–1.8)
BASE EXCESS BLDA CALC-SCNC: 0.9 MMOL/L (ref -9–1.8)
BASE EXCESS BLDV CALC-SCNC: -2.4 MMOL/L (ref -7.7–1.9)
BASE EXCESS BLDV CALC-SCNC: 0.3 MMOL/L (ref -7.7–1.9)
BILIRUB DIRECT SERPL-MCNC: <0.2 MG/DL (ref 0–0.3)
BILIRUB SERPL-MCNC: 0.2 MG/DL
BILIRUB SERPL-MCNC: 0.3 MG/DL
BILIRUB UR QL STRIP: ABNORMAL
BUN SERPL-MCNC: 22.5 MG/DL (ref 8–23)
CA-I BLD-MCNC: 4.3 MG/DL (ref 4.4–5.2)
CA-I BLD-MCNC: 4.6 MG/DL (ref 4.4–5.2)
CA-I BLD-MCNC: 4.7 MG/DL (ref 4.4–5.2)
CALCIUM SERPL-MCNC: 8.6 MG/DL (ref 8.8–10.2)
CHLORIDE SERPL-SCNC: 104 MMOL/L (ref 98–107)
COLOR UR AUTO: ABNORMAL
CREAT SERPL-MCNC: 0.98 MG/DL (ref 0.67–1.17)
DEPRECATED HCO3 PLAS-SCNC: 19 MMOL/L (ref 22–29)
ERYTHROCYTE [DISTWIDTH] IN BLOOD BY AUTOMATED COUNT: 13.9 % (ref 10–15)
GFR SERPL CREATININE-BSD FRML MDRD: 88 ML/MIN/1.73M2
GLUCOSE BLDC GLUCOMTR-MCNC: 112 MG/DL (ref 70–99)
GLUCOSE BLDC GLUCOMTR-MCNC: 120 MG/DL (ref 70–99)
GLUCOSE BLDC GLUCOMTR-MCNC: 125 MG/DL (ref 70–99)
GLUCOSE BLDC GLUCOMTR-MCNC: 133 MG/DL (ref 70–99)
GLUCOSE BLDC GLUCOMTR-MCNC: 134 MG/DL (ref 70–99)
GLUCOSE BLDC GLUCOMTR-MCNC: 136 MG/DL (ref 70–99)
GLUCOSE BLDC GLUCOMTR-MCNC: 144 MG/DL (ref 70–99)
GLUCOSE BLDC GLUCOMTR-MCNC: 145 MG/DL (ref 70–99)
GLUCOSE BLDC GLUCOMTR-MCNC: 169 MG/DL (ref 70–99)
GLUCOSE BLDC GLUCOMTR-MCNC: 169 MG/DL (ref 70–99)
GLUCOSE BLDC GLUCOMTR-MCNC: 173 MG/DL (ref 70–99)
GLUCOSE BLDC GLUCOMTR-MCNC: 173 MG/DL (ref 70–99)
GLUCOSE BLDC GLUCOMTR-MCNC: 188 MG/DL (ref 70–99)
GLUCOSE BLDC GLUCOMTR-MCNC: 195 MG/DL (ref 70–99)
GLUCOSE SERPL-MCNC: 186 MG/DL (ref 70–99)
GLUCOSE UR STRIP-MCNC: ABNORMAL MG/DL
HCO3 BLD-SCNC: 21 MMOL/L (ref 21–28)
HCO3 BLD-SCNC: 25 MMOL/L (ref 21–28)
HCO3 BLD-SCNC: 25 MMOL/L (ref 21–28)
HCO3 BLD-SCNC: 26 MMOL/L (ref 21–28)
HCO3 BLDV-SCNC: 24 MMOL/L (ref 21–28)
HCO3 BLDV-SCNC: 26 MMOL/L (ref 21–28)
HCT VFR BLD AUTO: 35.1 % (ref 40–53)
HGB BLD-MCNC: 11.6 G/DL (ref 13.3–17.7)
HGB UR QL STRIP: ABNORMAL
INR PPP: 1.25 (ref 0.85–1.15)
KETONES UR STRIP-MCNC: ABNORMAL MG/DL
LACTATE SERPL-SCNC: 1.4 MMOL/L (ref 0.7–2)
LACTATE SERPL-SCNC: 1.4 MMOL/L (ref 0.7–2)
LACTATE SERPL-SCNC: 2 MMOL/L (ref 0.7–2)
LACTATE SERPL-SCNC: 4.3 MMOL/L (ref 0.7–2)
LEUKOCYTE ESTERASE UR QL STRIP: ABNORMAL
MAGNESIUM SERPL-MCNC: 1.8 MG/DL (ref 1.7–2.3)
MAGNESIUM SERPL-MCNC: 1.9 MG/DL (ref 1.7–2.3)
MCH RBC QN AUTO: 29.4 PG (ref 26.5–33)
MCHC RBC AUTO-ENTMCNC: 33 G/DL (ref 31.5–36.5)
MCV RBC AUTO: 89 FL (ref 78–100)
MUCOUS THREADS #/AREA URNS LPF: PRESENT /LPF
NITRATE UR QL: ABNORMAL
O2/TOTAL GAS SETTING VFR VENT: 40 %
O2/TOTAL GAS SETTING VFR VENT: 50 %
OXYHGB MFR BLDV: 48 % (ref 70–75)
OXYHGB MFR BLDV: 59 % (ref 70–75)
PCO2 BLD: 41 MM HG (ref 35–45)
PCO2 BLD: 41 MM HG (ref 35–45)
PCO2 BLD: 42 MM HG (ref 35–45)
PCO2 BLD: 42 MM HG (ref 35–45)
PCO2 BLDV: 46 MM HG (ref 40–50)
PCO2 BLDV: 48 MM HG (ref 40–50)
PH BLD: 7.32 [PH] (ref 7.35–7.45)
PH BLD: 7.38 [PH] (ref 7.35–7.45)
PH BLD: 7.39 [PH] (ref 7.35–7.45)
PH BLD: 7.41 [PH] (ref 7.35–7.45)
PH BLDV: 7.32 [PH] (ref 7.32–7.43)
PH BLDV: 7.35 [PH] (ref 7.32–7.43)
PH UR STRIP: ABNORMAL [PH]
PHOSPHATE SERPL-MCNC: 3.9 MG/DL (ref 2.5–4.5)
PHOSPHATE SERPL-MCNC: 4.2 MG/DL (ref 2.5–4.5)
PLATELET # BLD AUTO: 226 10E3/UL (ref 150–450)
PO2 BLD: 107 MM HG (ref 80–105)
PO2 BLD: 64 MM HG (ref 80–105)
PO2 BLD: 65 MM HG (ref 80–105)
PO2 BLD: 67 MM HG (ref 80–105)
PO2 BLDV: 27 MM HG (ref 25–47)
PO2 BLDV: 33 MM HG (ref 25–47)
POTASSIUM SERPL-SCNC: 4.3 MMOL/L (ref 3.4–5.3)
PROT SERPL-MCNC: 5.8 G/DL (ref 6.4–8.3)
PROT SERPL-MCNC: 5.8 G/DL (ref 6.4–8.3)
RBC # BLD AUTO: 3.94 10E6/UL (ref 4.4–5.9)
RBC URINE: 6 /HPF
SODIUM SERPL-SCNC: 137 MMOL/L (ref 136–145)
SP GR UR STRIP: ABNORMAL
UROBILINOGEN UR STRIP-MCNC: ABNORMAL MG/DL
WBC # BLD AUTO: 16.1 10E3/UL (ref 4–11)
WBC URINE: 0 /HPF

## 2023-07-11 PROCEDURE — 84100 ASSAY OF PHOSPHORUS: CPT | Performed by: PHYSICIAN ASSISTANT

## 2023-07-11 PROCEDURE — 82330 ASSAY OF CALCIUM: CPT | Performed by: PHYSICIAN ASSISTANT

## 2023-07-11 PROCEDURE — 99291 CRITICAL CARE FIRST HOUR: CPT | Mod: 24 | Performed by: INTERNAL MEDICINE

## 2023-07-11 PROCEDURE — 83605 ASSAY OF LACTIC ACID: CPT | Performed by: SURGERY

## 2023-07-11 PROCEDURE — P9045 ALBUMIN (HUMAN), 5%, 250 ML: HCPCS | Mod: JZ | Performed by: INTERNAL MEDICINE

## 2023-07-11 PROCEDURE — 82805 BLOOD GASES W/O2 SATURATION: CPT | Performed by: SURGERY

## 2023-07-11 PROCEDURE — 83735 ASSAY OF MAGNESIUM: CPT | Performed by: PHYSICIAN ASSISTANT

## 2023-07-11 PROCEDURE — 250N000012 HC RX MED GY IP 250 OP 636 PS 637: Performed by: SURGERY

## 2023-07-11 PROCEDURE — 82805 BLOOD GASES W/O2 SATURATION: CPT | Performed by: PHYSICIAN ASSISTANT

## 2023-07-11 PROCEDURE — 84100 ASSAY OF PHOSPHORUS: CPT | Performed by: INTERNAL MEDICINE

## 2023-07-11 PROCEDURE — 250N000013 HC RX MED GY IP 250 OP 250 PS 637: Performed by: SURGERY

## 2023-07-11 PROCEDURE — 258N000003 HC RX IP 258 OP 636: Performed by: SURGERY

## 2023-07-11 PROCEDURE — 82803 BLOOD GASES ANY COMBINATION: CPT | Performed by: SURGERY

## 2023-07-11 PROCEDURE — 200N000001 HC R&B ICU

## 2023-07-11 PROCEDURE — 82803 BLOOD GASES ANY COMBINATION: CPT | Performed by: INTERNAL MEDICINE

## 2023-07-11 PROCEDURE — 87040 BLOOD CULTURE FOR BACTERIA: CPT | Performed by: INTERNAL MEDICINE

## 2023-07-11 PROCEDURE — 250N000009 HC RX 250: Performed by: PHYSICIAN ASSISTANT

## 2023-07-11 PROCEDURE — 83735 ASSAY OF MAGNESIUM: CPT | Performed by: INTERNAL MEDICINE

## 2023-07-11 PROCEDURE — 258N000003 HC RX IP 258 OP 636: Performed by: PHYSICIAN ASSISTANT

## 2023-07-11 PROCEDURE — 250N000013 HC RX MED GY IP 250 OP 250 PS 637: Performed by: PHYSICIAN ASSISTANT

## 2023-07-11 PROCEDURE — 81001 URINALYSIS AUTO W/SCOPE: CPT | Performed by: INTERNAL MEDICINE

## 2023-07-11 PROCEDURE — 71045 X-RAY EXAM CHEST 1 VIEW: CPT

## 2023-07-11 PROCEDURE — 82248 BILIRUBIN DIRECT: CPT | Performed by: PHYSICIAN ASSISTANT

## 2023-07-11 PROCEDURE — 94003 VENT MGMT INPAT SUBQ DAY: CPT

## 2023-07-11 PROCEDURE — 85610 PROTHROMBIN TIME: CPT | Performed by: PHYSICIAN ASSISTANT

## 2023-07-11 PROCEDURE — 36415 COLL VENOUS BLD VENIPUNCTURE: CPT | Performed by: INTERNAL MEDICINE

## 2023-07-11 PROCEDURE — 999N000157 HC STATISTIC RCP TIME EA 10 MIN

## 2023-07-11 PROCEDURE — 250N000009 HC RX 250: Performed by: INTERNAL MEDICINE

## 2023-07-11 PROCEDURE — P9045 ALBUMIN (HUMAN), 5%, 250 ML: HCPCS | Mod: JZ | Performed by: PHYSICIAN ASSISTANT

## 2023-07-11 PROCEDURE — 93005 ELECTROCARDIOGRAM TRACING: CPT

## 2023-07-11 PROCEDURE — 250N000011 HC RX IP 250 OP 636: Mod: JZ | Performed by: PHYSICIAN ASSISTANT

## 2023-07-11 PROCEDURE — 258N000003 HC RX IP 258 OP 636: Performed by: INTERNAL MEDICINE

## 2023-07-11 PROCEDURE — 250N000013 HC RX MED GY IP 250 OP 250 PS 637: Performed by: INTERNAL MEDICINE

## 2023-07-11 PROCEDURE — 250N000011 HC RX IP 250 OP 636: Mod: JZ | Performed by: SURGERY

## 2023-07-11 PROCEDURE — 250N000011 HC RX IP 250 OP 636: Mod: JZ | Performed by: INTERNAL MEDICINE

## 2023-07-11 PROCEDURE — 85018 HEMOGLOBIN: CPT | Performed by: INTERNAL MEDICINE

## 2023-07-11 PROCEDURE — 82330 ASSAY OF CALCIUM: CPT | Performed by: INTERNAL MEDICINE

## 2023-07-11 RX ORDER — MAGNESIUM SULFATE HEPTAHYDRATE 40 MG/ML
2 INJECTION, SOLUTION INTRAVENOUS ONCE
Status: COMPLETED | OUTPATIENT
Start: 2023-07-11 | End: 2023-07-11

## 2023-07-11 RX ORDER — SODIUM CHLORIDE, SODIUM LACTATE, POTASSIUM CHLORIDE, CALCIUM CHLORIDE 600; 310; 30; 20 MG/100ML; MG/100ML; MG/100ML; MG/100ML
INJECTION, SOLUTION INTRAVENOUS CONTINUOUS
Status: DISCONTINUED | OUTPATIENT
Start: 2023-07-11 | End: 2023-07-17 | Stop reason: HOSPADM

## 2023-07-11 RX ORDER — ASPIRIN 81 MG/1
81 TABLET, CHEWABLE ORAL DAILY
Status: DISCONTINUED | OUTPATIENT
Start: 2023-07-12 | End: 2023-07-17 | Stop reason: HOSPADM

## 2023-07-11 RX ORDER — WARFARIN SODIUM 5 MG/1
5 TABLET ORAL
Status: COMPLETED | OUTPATIENT
Start: 2023-07-11 | End: 2023-07-11

## 2023-07-11 RX ORDER — NICOTINE 21 MG/24HR
1 PATCH, TRANSDERMAL 24 HOURS TRANSDERMAL DAILY
Status: DISCONTINUED | OUTPATIENT
Start: 2023-07-11 | End: 2023-07-17 | Stop reason: HOSPADM

## 2023-07-11 RX ORDER — ATORVASTATIN CALCIUM 40 MG/1
40 TABLET, FILM COATED ORAL EVERY EVENING
Status: DISCONTINUED | OUTPATIENT
Start: 2023-07-11 | End: 2023-07-17 | Stop reason: HOSPADM

## 2023-07-11 RX ADMIN — LIDOCAINE 1 PATCH: 560 PATCH PERCUTANEOUS; TOPICAL; TRANSDERMAL at 20:06

## 2023-07-11 RX ADMIN — HYDROXOCOBALAMIN 5 G: 5 INJECTION, POWDER, LYOPHILIZED, FOR SOLUTION INTRAVENOUS at 04:14

## 2023-07-11 RX ADMIN — ALBUMIN HUMAN 25 G: 0.05 INJECTION, SOLUTION INTRAVENOUS at 03:17

## 2023-07-11 RX ADMIN — VASOPRESSIN 2.5 UNITS/HR: 20 INJECTION, SOLUTION INTRAMUSCULAR; SUBCUTANEOUS at 06:10

## 2023-07-11 RX ADMIN — SODIUM CHLORIDE 3 UNITS/HR: 9 INJECTION, SOLUTION INTRAVENOUS at 09:24

## 2023-07-11 RX ADMIN — ACETAMINOPHEN 975 MG: 325 TABLET, FILM COATED ORAL at 13:03

## 2023-07-11 RX ADMIN — METHOCARBAMOL 750 MG: 750 TABLET ORAL at 11:02

## 2023-07-11 RX ADMIN — SODIUM CHLORIDE 4 UNITS/HR: 9 INJECTION, SOLUTION INTRAVENOUS at 01:27

## 2023-07-11 RX ADMIN — PROPOFOL 50 MCG/KG/MIN: 10 INJECTION, EMULSION INTRAVENOUS at 07:14

## 2023-07-11 RX ADMIN — EPINEPHRINE 0.04 MCG/KG/MIN: 1 INJECTION INTRAMUSCULAR; INTRAVENOUS; SUBCUTANEOUS at 23:28

## 2023-07-11 RX ADMIN — POLYETHYLENE GLYCOL 3350 17 G: 17 POWDER, FOR SOLUTION ORAL at 08:19

## 2023-07-11 RX ADMIN — OXYCODONE HYDROCHLORIDE 5 MG: 5 TABLET ORAL at 08:21

## 2023-07-11 RX ADMIN — CHLORHEXIDINE GLUCONATE 15 ML: 1.2 SOLUTION ORAL at 08:20

## 2023-07-11 RX ADMIN — SENNOSIDES AND DOCUSATE SODIUM 1 TABLET: 50; 8.6 TABLET ORAL at 08:20

## 2023-07-11 RX ADMIN — PROPOFOL 50 MCG/KG/MIN: 10 INJECTION, EMULSION INTRAVENOUS at 04:13

## 2023-07-11 RX ADMIN — SODIUM CHLORIDE 4 UNITS/HR: 9 INJECTION, SOLUTION INTRAVENOUS at 16:28

## 2023-07-11 RX ADMIN — EPINEPHRINE 0.07 MCG/KG/MIN: 1 INJECTION INTRAMUSCULAR; INTRAVENOUS; SUBCUTANEOUS at 05:28

## 2023-07-11 RX ADMIN — SODIUM CHLORIDE, POTASSIUM CHLORIDE, SODIUM LACTATE AND CALCIUM CHLORIDE 250 ML: 600; 310; 30; 20 INJECTION, SOLUTION INTRAVENOUS at 00:01

## 2023-07-11 RX ADMIN — HEPARIN SODIUM 5000 UNITS: 5000 INJECTION, SOLUTION INTRAVENOUS; SUBCUTANEOUS at 20:06

## 2023-07-11 RX ADMIN — NICOTINE 1 PATCH: 14 PATCH, EXTENDED RELEASE TRANSDERMAL at 20:48

## 2023-07-11 RX ADMIN — CLINDAMYCIN PHOSPHATE 900 MG: 900 INJECTION, SOLUTION INTRAVENOUS at 10:02

## 2023-07-11 RX ADMIN — SENNOSIDES AND DOCUSATE SODIUM 1 TABLET: 50; 8.6 TABLET ORAL at 20:49

## 2023-07-11 RX ADMIN — ATORVASTATIN CALCIUM 40 MG: 40 TABLET, FILM COATED ORAL at 20:05

## 2023-07-11 RX ADMIN — HYDROMORPHONE HYDROCHLORIDE 0.2 MG: 0.2 INJECTION, SOLUTION INTRAMUSCULAR; INTRAVENOUS; SUBCUTANEOUS at 16:27

## 2023-07-11 RX ADMIN — ACETAMINOPHEN 975 MG: 325 TABLET, FILM COATED ORAL at 20:05

## 2023-07-11 RX ADMIN — PROPOFOL 45 MCG/KG/MIN: 10 INJECTION, EMULSION INTRAVENOUS at 01:03

## 2023-07-11 RX ADMIN — ACETAMINOPHEN 975 MG: 325 TABLET, FILM COATED ORAL at 03:21

## 2023-07-11 RX ADMIN — CLINDAMYCIN PHOSPHATE 900 MG: 900 INJECTION, SOLUTION INTRAVENOUS at 16:00

## 2023-07-11 RX ADMIN — VASOPRESSIN 2.5 UNITS/HR: 20 INJECTION, SOLUTION INTRAMUSCULAR; SUBCUTANEOUS at 15:10

## 2023-07-11 RX ADMIN — DEXMEDETOMIDINE HYDROCHLORIDE 0.4 MCG/KG/HR: 400 INJECTION INTRAVENOUS at 17:41

## 2023-07-11 RX ADMIN — HEPARIN SODIUM 5000 UNITS: 5000 INJECTION, SOLUTION INTRAVENOUS; SUBCUTANEOUS at 12:23

## 2023-07-11 RX ADMIN — HYDROMORPHONE HYDROCHLORIDE 0.2 MG: 0.2 INJECTION, SOLUTION INTRAMUSCULAR; INTRAVENOUS; SUBCUTANEOUS at 11:30

## 2023-07-11 RX ADMIN — CALCIUM GLUCONATE 1 G: 20 INJECTION, SOLUTION INTRAVENOUS at 12:26

## 2023-07-11 RX ADMIN — ALBUMIN HUMAN 25 G: 0.05 INJECTION, SOLUTION INTRAVENOUS at 08:11

## 2023-07-11 RX ADMIN — HYDROMORPHONE HYDROCHLORIDE 0.2 MG: 0.2 INJECTION, SOLUTION INTRAMUSCULAR; INTRAVENOUS; SUBCUTANEOUS at 20:06

## 2023-07-11 RX ADMIN — Medication 40 MG: at 08:20

## 2023-07-11 RX ADMIN — VANCOMYCIN HYDROCHLORIDE 1500 MG: 10 INJECTION, POWDER, LYOPHILIZED, FOR SOLUTION INTRAVENOUS at 10:39

## 2023-07-11 RX ADMIN — HYDROXYZINE HYDROCHLORIDE 25 MG: 25 TABLET, FILM COATED ORAL at 03:21

## 2023-07-11 RX ADMIN — DEXMEDETOMIDINE HYDROCHLORIDE 0.2 MCG/KG/HR: 400 INJECTION INTRAVENOUS at 08:46

## 2023-07-11 RX ADMIN — HYDROMORPHONE HYDROCHLORIDE 0.4 MG: 0.2 INJECTION, SOLUTION INTRAMUSCULAR; INTRAVENOUS; SUBCUTANEOUS at 08:46

## 2023-07-11 RX ADMIN — HYDROMORPHONE HYDROCHLORIDE 0.4 MG: 0.2 INJECTION, SOLUTION INTRAMUSCULAR; INTRAVENOUS; SUBCUTANEOUS at 23:45

## 2023-07-11 RX ADMIN — WARFARIN SODIUM 5 MG: 5 TABLET ORAL at 17:52

## 2023-07-11 RX ADMIN — MAGNESIUM SULFATE HEPTAHYDRATE 2 G: 40 INJECTION, SOLUTION INTRAVENOUS at 14:37

## 2023-07-11 RX ADMIN — ASPIRIN 81 MG 162 MG: 81 TABLET ORAL at 13:06

## 2023-07-11 RX ADMIN — SODIUM CHLORIDE 4 UNITS/HR: 9 INJECTION, SOLUTION INTRAVENOUS at 23:08

## 2023-07-11 RX ADMIN — METHOCARBAMOL 750 MG: 750 TABLET ORAL at 03:21

## 2023-07-11 ASSESSMENT — ACTIVITIES OF DAILY LIVING (ADL)
ADLS_ACUITY_SCORE: 26

## 2023-07-11 NOTE — OP NOTE
Procedure Date: 07/10/2023    REFERRING CARDIOLOGIST:  Jacobo Diez MD    PREOPERATIVE DIAGNOSIS:  Severe 3-vessel coronary artery disease, moderate aortic stenosis.    POSTOPERATIVE DIAGNOSIS:  Severe 3-vessel coronary artery disease, moderate aortic stenosis.    SURGEON:  Carlyn Antony MD    ASSISTANT:  Dalton Valdez MD and EMANUEL Alexander    NAME OF OPERATION:  Aortic valve replacement with a 23 mm On-X mechanical valve, coronary artery bypass grafting x3 with left internal mammary artery to the distal left anterior descending, reverse saphenous vein graft to the acute marginal branch of the right coronary artery (RCA), reverse saphenous vein graft to the obtuse marginal artery, endoscopic vein harvest from the left lower extremity, intraoperative transesophageal echocardiogram (ZACH).    ANESTHESIA:  General endotracheal.    INDICATIONS FOR PROCEDURE:  The patient is a very pleasant 58-year-old gentleman who was recently referred to me for moderate aortic valve stenosis and severe 3-vessel coronary artery disease including significant left main disease.  He was taken to the operating room today for an aortic valve replacement with a mechanical valve and coronary artery bypass grafting.    OPERATIVE FINDINGS:  The patient had an overall normal LV systolic size and function.  His aortic valve was at least moderately stenotic and heavily calcified and with a trileaflet valve.  His left internal mammary artery was good quality conduit with good flow, measuring 2.5 mm in diameter.  The left greater saphenous vein was a good quality conduit as well measuring 3-4 mm in diameter.  Overall, his coronary arteries were diffusely and significantly calcified.  In fact, the only place we could graft the RCA system was on a large acute marginal branch.  It had minimal disease.  The probe size was 1.5 mm.  The obtuse marginal artery was very calcified as well, but we were able to graft it very proximally near the AV  groove.  It was a large vessel.  The probe size was 1.5 mm.  The LAD was grafted very distally as that was the only soft spot we could land the LIMA graft that was distal to the 70% lesion.  The probe size was 1.5 mm as well.    DESCRIPTION OF THE PROCEDURE:  After informed consent was obtained, the patient was brought down to the operating room and was placed on the OR table in a supine position.  Intravenous and intra-arterial lines were begun.  While monitoring his blood pressure and EKG tracing, he was anesthetized and intubated using a single lumen endotracheal tube.  His entire chest, abdomen, both groins, and legs were prepped down to the toes using multiple layers of DuraPrep.  He was draped in a sterile field.  A [place]-Melissa catheter was also placed.  A median sternotomy was performed, and in the meantime, the left greater saphenous vein was harvested endoscopically.  The left internal mammary artery was taken down.  Prior to clipping the LIMA distally, the patient was fully heparinized.  The sternal edges were retracted laterally, and the pericardium was opened to suspend the heart in a pericardial cradle.  The ascending aorta and the right atrial appendage were cannulated.  A retrograde cardioplegia catheter was placed into the coronary sinus without difficulty.  An antegrade annulus/aortic root vent was placed in the ascending aorta as well.  After appropriate ACT level was achieved, cardiopulmonary bypass was established and the patient was kept normothermic during the entire operation.  Carbon dioxide was flooded into the chest to prevent air embolism.  An LV vent was placed via the right superior pulmonary vein.  The aorta was then crossclamped and antegrade cold blood cardioplegia was given to fully arrest the heart.  The patient went into good diastolic arrest without any LV distention.  Following this, intermittent retrograde cardioplegia doses were given on average of every 15 minutes for myocardial  protection while the aorta was crossclamped.    First, the distal coronary anastomoses were performed.  The 1st coronary vessel grafted was the acute marginal artery.  Conduit used was a saphenous vein.  This anastomosis was performed in an end-to-side fashion using running 7-0 Prolene.  Next, the obtuse marginal artery was grafted.  Another saphenous vein was used as a conduit.  This anastomosis was performed in an end-to-side fashion using running 7-0 Prolene.  Next, the LIMA to distal LAD anastomosis was performed.  This was done in an end-to-side fashion as well using running 7-0 Prolene.  This anastomosis was protected by tacking the LIMA pedicle down to the epicardium using interrupted 6-0 Prolene x2.  Next, a transverse aortotomy was made, and the aortic valve was exposed.  Findings are noted above.  All 3 leaflets were excised and the annulus was meticulously debrided and irrigated out.  The annulus was sized to a 23 mm On-X mechanical valve.  Annular sutures with using horizontal mattress sutures of 2-0 Ethibond with subannular pledgets.  The valve was brought to the field and all sutures were brought through the sewing ring and the valve seated down without difficulty.  All sutures were tied down securely using the Cor-Knot device.  We tested the mechanical valve leaflets and they opened and closed freely.  The aortotomy was then closed in 2 layers of running 4-0 Prolene.  Next, two 4 mm aortotomies were created on the ascending aorta to perform the 2 proximal vein anastomoses in an end-to-side fashion using running 6-0 Prolene.  Retrograde hot shot was given and the aortic crossclamp was removed.  Aortic crossclamp time was 122 minutes.  The left ventricle and the ascending aorta were continuously vented to deair the heart.  The patient was weaned off cardiopulmonary bypass with low-dose epinephrine and Tomas-Synephrine drips.  LV function was good.  The heart was adequately de-aired.  The prosthetic  mechanical valve was seated down well with no paravalvular leak.  Total cardiopulmonary bypass time was 139 minutes.  Once the patient remained stable off bypass, the venous cannula was removed and protamine was given.  The aortic cannula was subsequently removed as well.  Temporary ventricular pacer wire was placed in the RV muscle.  The 32-Maltese angled and straight chest tubes were placed in the mediastinum as well.  These were all brought out percutaneously below the sternotomy incision and secured to the skin using 2-0 Ethibond.  The right pleural space was slightly opened.  The mediastinum was irrigated with antibiotic saline and hemostasis was achieved.  The sternum was reapproximated using multiple interrupted single and double wires.  The incision was closed in layers of running Vicryl suture.  The skin was closed using 3-0 Vicryl and sealed using Dermabond.    There were no intraoperative complications and the patient tolerated the operation well.  No blood products were given intraoperatively.  All sponge counts, needle counts, and instrument counts were correct x3 at the end of the operation.    ESTIMATED BLOOD LOSS:  Unknown.    SPECIMEN REMOVED:  Aortic valve leaflets.    The patient was brought to the ICU in hemodynamically stable condition and remained intubated.    Carlyn Antony MD        D: 07/10/2023   T: 07/10/2023   MT:     Name:     NIDHI SWAIN  MRN:      [id number]        Account:        [id number]   :      1965           Procedure Date: 07/10/2023     Document: [id number]

## 2023-07-11 NOTE — PHARMACY-ANTICOAGULATION SERVICE
Clinical Pharmacy - Warfarin Dosing Consult     Pharmacy has been consulted to manage this patient s warfarin therapy.  Indication: Mechanical Aortic Valve Replacement  Therapy Goal: INR 2-3  Warfarin Prior to Admission: No    INR   Date Value Ref Range Status   07/10/2023 1.12 0.85 - 1.15 Final   07/10/2023 1.20 (H) 0.85 - 1.15 Final       Recommend warfarin 5 mg today.  Pharmacy will monitor Glen Cho daily and order warfarin doses to achieve specified goal.      Please contact pharmacy as soon as possible if the warfarin needs to be held for a procedure or if the warfarin goals change.

## 2023-07-11 NOTE — PROGRESS NOTES
[facility]  Cardiovascular and Thoracic Surgery Daily Note      Assessment and Plan  POD # 1 s/p aortic valve replacement with a 23 mm On-X mechanical valve, coronary artery bypass grafting x3 with left internal mammary artery to the distal left anterior descending, reverse saphenous vein graft to the acute marginal branch of the right coronary artery (RCA), reverse saphenous vein graft to the obtuse marginal artery, endoscopic vein harvest from the left lower extremity, intraoperative transesophageal echocardiogram (ZACH) on 07/10/2023 with Dr. Carlyn Antony    - CVS: Pre-op TTE with preserved biventricular function. Profound postop vasoplegic shock, improving. Cyanokit POD0. CI preserved, wean epi to CI greater than 2.0, continue NE and vaso to MAP greater than 65. Lactic acidosis, improving, volume resuscitate as needed. Start warfarin to INR goal 2-3 x 3 months (through 10/12), then decrease to 1.5-2.0 thereafter with OnX mechanical valve. Aspirin 81 mg daily, change PTA simvastatin to atorvastatin 40 mg daily. Chest tubes: output 630, no air leak, keep today. TPW: back up rate 50      - Resp: Postop mechanical ventilation. Wean to extubate today. IS, pulmonary toilet post extubation    - Neuro: Precedex for sedation.    - Renal: No history of significant renal disease. Cr stable WNL. Trend BMP. Diuretic/volume as above.   Recent Labs   Lab 07/11/23  0311 07/10/23  2252 07/10/23  1834   CR 0.98 0.98 0.80       - GI: -BM, continue bowel regimen    - : Toney in place, continue today    - Endo: DMT2, well controlled. Continue insulin infusion x 48 hours per protocol.    Hemoglobin A1C   Date Value Ref Range Status   06/12/2023 6.7 (H) <5.7 % Final     Comment:     Normal <5.7%   Prediabetes 5.7-6.4%    Diabetes 6.5% or higher     Note: Adopted from ADA consensus guidelines.   06/15/2021 7.3 (H) 0 - 5.6 % Final     Comment:     Normal <5.7% Prediabetes 5.7-6.4%  Diabetes 6.5% or higher - adopted  from ADA   consensus guidelines.          - FEN: Replace electrolytes as needed. ADAT post extubation when passing flatus    - ID: Postop leukocytosis, improving. Tmax 101.5.  WBC downtrending. Periop abx in process. Trend CBC. Blood and urine cultures obtained 07/11 due to persistent fevers with vasodilatory shock   Recent Labs   Lab 07/11/23  0311 07/10/23  2252 07/10/23  1834   WBC 16.1* 20.2* 28.8*       - Heme: Acute blood loss anemia and due to surgery. Hgb and PLT stabl. Trend CBC, transfuse PRN.   Recent Labs   Lab 07/11/23  0311 07/10/23  2252 07/10/23  1834   HGB 11.6* 11.4* 12.0*    227 231       - Proph: SCD, subcutaneous heparin, PPI    - Dispo: ICU      Interval History  Pressor requirements improved following Cyanokit last night. Agressively volume resuscitated. Remained intubated overnight.       Medications    acetaminophen  975 mg Oral Q8H     aspirin  162 mg Oral or NG Tube Daily     chlorhexidine  15 mL Mouth/Throat Q12H     clindamycin  900 mg Intravenous Q8H     heparin ANTICOAGULANT  5,000 Units Subcutaneous Q8H     Lidocaine  1 patch Transdermal Q24H     lidocaine   Transdermal Q8H FANTA     pantoprazole  40 mg Oral or NG Tube Daily    Or     pantoprazole  40 mg Oral Daily     polyethylene glycol  17 g Oral Daily     senna-docusate  1 tablet Oral BID     sodium chloride (PF)  3 mL Intracatheter Q8H     vancomycin  1,500 mg Intravenous Q12H     [START ON 7/13/2023] acetaminophen, albuterol, albuterol, alum & mag hydroxide-simethicone, bisacodyl, calcium carbonate, calcium gluconate, calcium gluconate, calcium gluconate, dextrose, glucose **OR** dextrose **OR** glucagon, EPINEPHrine, hydrALAZINE, HYDROmorphone **OR** HYDROmorphone, hydrOXYzine, lidocaine 4%, lidocaine (buffered or not buffered), magnesium hydroxide, propofol **AND** propofol **AND** CK total **AND** Triglycerides **AND** - MEDICATION INSTRUCTIONS - **AND** Notify Physician, methocarbamol, naloxone **OR** naloxone **OR**  "naloxone **OR** naloxone, nitroGLYcerin, norepinephrine, ondansetron **OR** ondansetron, oxyCODONE **OR** oxyCODONE, prochlorperazine **OR** prochlorperazine, BETA BLOCKER NOT PRESCRIBED, sodium chloride (PF)      Physical Exam  Vitals were reviewed  Blood pressure 111/59, pulse 73, temperature (!) 101.3  F (38.5  C), resp. rate 10, height 1.651 m (5' 5\"), weight 112.1 kg (247 lb 2.2 oz), SpO2 95 %.  Rhythm: NSR    Lungs: diminished bases    Cardiovascular: rrr, no m/r/g    Abdomen: soft, NT, ND, +BS    Extremeties: warm, no LE edema    Incision: CDI    CT: serosang output 630 mL, no air leak    Weight:   Vitals:    07/10/23 0945 07/11/23 0545   Weight: 108.4 kg (239 lb) 112.1 kg (247 lb 2.2 oz)         Data  Recent Labs   Lab 07/11/23  0757 07/11/23  0604 07/11/23  0332 07/11/23  0311 07/11/23  0034 07/10/23  2252 07/10/23  2148 07/10/23  2141 07/10/23  1841 07/10/23  1834 07/10/23  1710   WBC  --   --   --  16.1*  --  20.2*  --   --   --  28.8* 28.5*   HGB  --   --   --  11.6*  --  11.4*  --   --   --  12.0* 11.0*   MCV  --   --   --  89  --  90  --   --   --  90 90   PLT  --   --   --  226  --  227  --   --   --  231 205   INR  --   --   --   --   --   --   --  1.12  --  1.20* 1.27*   NA  --   --   --  137  --  138  --   --   --  139 136   POTASSIUM  --   --   --  4.3  --  3.8  --   --   --  3.7 4.1   CHLORIDE  --   --   --  104  --  106  --   --   --  106 104   CO2  --   --   --  19*  --  19*  --   --   --  20* 20*   BUN  --   --   --  22.5  --  20.9  --   --   --  18.7 18.9   CR  --   --   --  0.98  --  0.98  --   --   --  0.80 0.83   ANIONGAP  --   --   --  14  --  13  --   --   --  13 12   ARTEMIO  --   --   --  8.6*  --  8.6*  --   --   --  8.7* 9.6   * 144* 173* 186*   < > 159*   < >  --    < > 196* 179*   ALBUMIN  --   --   --  3.7  --   --   --   --   --  3.6  --    PROTTOTAL  --   --   --  5.8*  --   --   --   --   --  5.8*  --    BILITOTAL  --   --   --  0.3  --   --   --   --   --  0.6  --  "   ALKPHOS  --   --   --  46  --   --   --   --   --  58  --    ALT  --   --   --  22  --   --   --   --   --  21  --    AST  --   --   --  59*  --   --   --   --   --  41  --     < > = values in this interval not displayed.       Imaging:  Recent Results (from the past 24 hour(s))   XR Chest Port 1 View    Narrative    EXAM: XR CHEST PORT 1 VIEW  LOCATION: [facility]  DATE: 7/10/2023    INDICATION: Post Op CVTS Surgery  COMPARISON: None.      Impression    IMPRESSION: SG catheter is projected over the main pulmonary artery. ETT is 2.3 cm above the glenna. Left chest tubes appear to be in good position with no carina pneumothorax. NG tube distal tip is not included on this study, however the side port does   appear to be below the EG junction. There is partial loss of the normally seen hemidiaphragms consistent with slight infiltrate and/or atelectasis in both lower lobes. Moderate hypertrophic changes of the spine.   XR Abdomen Port 1 View    Narrative    EXAM: XR ABDOMEN PORT 1 VIEW  LOCATION: [facility]  DATE: 7/10/2023    INDICATION: Check NG OG tube placement  COMPARISON: None.      Impression    IMPRESSION: The nasogastric tube tip and proximal side port are in the gastric lumen. Pulmonary artery catheter. Mediastinal drain. Median sternotomy. Left basilar atelectasis. The visualized portions of the bowel gas pattern are nonobstructive.    XR Chest Port 1 View    Narrative    EXAM: XR CHEST PORT 1 VIEW  LOCATION: [facility]  DATE: 7/11/2023    INDICATION: Post Op CVTS Surgery  COMPARISON: 07/10/2023.    FINDINGS: ET tube and NG tube in place. Right IJ pulmonary artery catheter. Sternal wires and mediastinal clips. Heart valve prosthesis. Mediastinal drain and left chest tube. There is no pneumothorax. The heart is enlarged. There is no pulmonary edema.   Mild bibasilar infiltrates are improving. No new or worsening disease.       Impression    IMPRESSION: No pneumothorax.         Patient seen and discussed with Dr. Sudarshan Reynoso PA-C  Cardiothoracic Surgery  Pager [phone]

## 2023-07-11 NOTE — PROGRESS NOTES
-Patient is persistently hypotensive. ON three pressers with low SVR.  S/p 2 lt of volume resucitation. CI of 3.3  - Discussed with Cv surgery. Will give a cyanokit.  Stable Hb.    50-60 ml of bloody output through the chest tube.     Kenneth Thacker MD

## 2023-07-11 NOTE — PROGRESS NOTES
Extubation Note    Successful completion of SBT (Yes or No):yes  Extubation time:1810    Patient assessment:  Lung sounds:diminished  Stridor Present (Yes or No): No  Patient tolerance: tolerating well    Oxygen device:  Bipap 14/8 14, 50%    Plan:Continue to monitor  AUSTIN GADAPHY, RRT on 7/11/2023 at 6:51 PM

## 2023-07-11 NOTE — PLAN OF CARE
Neuro:Following simple commands. PERRL. Having pain in throat from ET tube.     Last NIHSS score: NA    Cardiovascular: Tele: SR with occasional PACs. Epinpehrine and vasopressin to maintain MAP > 65. SBP goal < 120. Pulses palpable, weak tibial.     Pulmonary: Lungs clear, diminished in bases. Extubated this evenign to bipap at 1810. Bipap settings 14/8 50% fiO2. Chest tubes with ~300 mls out for shift    : Urine output adequate.     GI: NPO while bipap on. Swallow screen when able.      Skin: Incisions without drainage. Wound care with wound cleanser. Otherwise intadt.    Mobility: Needs to dangle and get to chair.     IV: right internal jugular cordis with swan. Left PIV. Left radial arterial line.     Wife at bedside and supportive. Wife sent home with patient valve card and patient wallet.

## 2023-07-11 NOTE — CONSULTS
CARDIAC SURGERY NUTRITION CONSULT    Received standing order to assess and educate patient.    Will follow and complete assessment once patient is extubated and/or is transferred to medical unit.    Patient will receive nutrition education during the Outpatient Cardiac Rehab Program (nutrition classes/dietitian counseling).    Evangelina Sr RD, LD, [facility]   Clinical Dietitian - [facility]

## 2023-07-11 NOTE — PLAN OF CARE
Neuro: Intubated, sedated on Propofol, with lightening of sedation patient fallow commands, PERRL,   CV: SR, SBP <120, and MAP > 65,BP run soft, Levophed, Vasopressin and  Epi continuous.to maitrain MAP > 65. Patient is vaso plegic after the surgery, medication titrated  and IVF boluses were given.  Total amount of IV fluid boluses that patient received 500 ml 25 g 5% albumin x 2 ( total of 1 L albumin) and 1500 ml LR bolus. At 2300 patient BP dropped, Intensivist and CVS notified,  LR bolus was given and Vasopressin titratable ordered. Additional Cyanokit was given..   Pulmonary: LSC, small amount of oral and inline desertion.  GI/: OG in place, to LIS, Toney in place with adequate UOP. CT x 2 in place.  Lines: L radial A line, R internal jugular introduser and CVC, L PIV x 1.  Gtt: Propofol, Levophed, vasopressin and Epi continuous.  Plan: Continue to titrate Vasopressors and propofol down.  Family: Wife Constance update over the phone this morning.

## 2023-07-11 NOTE — PROGRESS NOTES
Critical Care  Note      07/11/2023    Name: Glen Cho MRN#: [id number]   Age: 58 year old YOB: 1965     Hsptl Day# 2  ICU DAY # 2    MV DAY # 2           Problem List:   Principal Problem:    S/P CABG (coronary artery bypass graft)         Summary/Hospital Course:     H\Glen Cho is a 58 year old male with PMHx of T2DM with last A1C from 06/2023 of 6.7, morbid obesity with BMI of 39.77, CORINA, and dyslipidemia who presents with multi-vessel CAD and moderate aortic stenosis (gradient 32 mm Hg). S/p CABG x3 and aortic valve replacement on 7/10.      Assessment and plan :     Glen Cho IS a 58 year old male admitted on 7/10/2023 for aortic valve replacement and CABG x 3.   I have personally reviewed the daily labs, imaging studies, cultures and discussed the case with referring physician and consulting physicians.   My assessment and plan by system for this patient is as follows:    Neurology/Psychiatry:   #Sedation   #Analgesia   #Hx of anxiety   Had been on propofol. Plan to wean off Propofol and add Precedex given that patient is intermittently getting anxious. This would also help with pressures.   - Wean sedation as tolerated  - Multi-modal analgesia     Cardiovascular:   #Cardiogenic Shock  #S/p aortic valve replacement and CABG x 3  Patient with cardiogenic shock overnight requiring pressor support x3 to maintain MAP goals. Continued to have low SVR and MAP not significantly improve with fluid resuscitation. [place] julisa actually showing adequate CI at 3.3 s/p high dose Hydroxocobalamin to manage likely refractory vasoplegia, which patient responded to well. Patient continues to maintain adequate CI and MAP >70 while weaning off his pressor requirements. Patient continues to have moderate bloody output from chest tube.   - SBP < 120  - MAP > 65  - Wean vasopressor support  - Monitor chest tube output    Pulmonary/Ventilator Management:   #Intubated   Intubated post procedure. Current vent  settings 24/500/40/8. RSBI ~20's on these settings. Plan for pressure support trial and possible extubation later today if he remains hemodynamically stable.   - Wean ventilator as tolerated  - Extubate to BiPAP -> will likely need QHS CPAP    GI and Nutrition :   1. NPO  - Advance diet as tolerated post extubation     Renal/Fluids/Electrolytes:   #Hypercalcemia, resolved     #Powell in place   Good UOP and stable Cr. Of 0.98. Plan to keep powell until POD#2 pending clinical status per CVTS.    - Monitor function and electrolytes as needed with replacement per ICU protocols  - Generally avoid nephrotoxic agents such as NSAID, IV contrast unless specifically required  - Adjust medications as needed for renal clearance  - Follow I/O's as appropriate.    Infectious Disease:   #Febrile   Patient with persistent new fever in the 101 overnight. Lactate has normalized to 1.2. White count trending down, 28>20>16. Unclear if this is stress response or infection.   - UA with reflex   - Blood cultures   - Continue to monitor     Endocrine:   #Hx of T2DM  Last A1c 6.7 on 06/2023. Blood glucose stable in the 140's-190's.   - ICU insulin protocol, goal sugar <180    Hematology/Oncology:   1. No acute issues  Plan  - Transfuse PRN for Hgb <7  - reverse coagulopathy PRN    Musculoskeltal:   1. Bedrest  Plan  - Activity liberation pending extubation     IV/Access:   1. Venous access - RIJ with swan  2. Arterial access - L radial  Plan  - central access required and necessary    ICU Prophylaxis:   1. DVT: Hep Subq/mechanical  2. VAP: HOB 30 degrees, chlorhexidine rinse  3. Stress Ulcer: PPI  4. Restraints: Nonviolent soft two point restraints required and necessary for patient safety and continued cares and good effect as patient continues to pull at necessary lines, tubes despite education and distraction. Will readdress daily.   5. Wound care  - sternal precautions  6. Feeding - pending extubation  7. Family Update: per CVTS  8.  Disposition - ICU    Key goals for next 24 hours:   1. Likely extubation   2. Wean vasopressor support        Medical History:     Past Medical History:   Diagnosis Date     Arthritis      Chronic kidney disease      Diabetes (H)      High cholesterol      HTN (hypertension)      Obesity      Sleep apnea     DOES NOT HAVE A CPAP     Past Surgical History:   Procedure Laterality Date     ANKLE SURGERY  2004     R ORIF     ARTHROSCOPY KNEE  2014    Procedure: ARTHROSCOPY KNEE;  ARTHROSCOPY KNEE- RIGHT   SURGEON REQUESTS CHOICE ANETHESIA ;  Surgeon: Sabino Simpson MD;  Location: [facility] OR     CHOLECYSTECTOMY       CV CORONARY ANGIOGRAM N/A 2023    Procedure: Coronary Angiogram;  Surgeon: Jacobo Diez MD;  Location:  [facility]     HERNIORRHAPHY UMBILICAL       Social History     Socioeconomic History     Marital status:      Spouse name: Not on file     Number of children: 2     Years of education: Not on file     Highest education level: Not on file   Occupational History     Employer: [employer]   Tobacco Use     Smoking status: Every Day     Types: Cigarettes     Last attempt to quit: 5/10/2000     Years since quittin.1     Smokeless tobacco: Never   Vaping Use     Vaping Use: Never used   Substance and Sexual Activity     Alcohol use: Never     Alcohol/week: 0.0 standard drinks of alcohol     Comment: rarely     Drug use: No     Sexual activity: Yes   Other Topics Concern     Parent/sibling w/ CABG, MI or angioplasty before 65F 55M? No   Social History Narrative     Not on file     Social Determinants of Health     Financial Resource Strain: Low Risk  (2022)    Overall Financial Resource Strain (CARDIA)      Difficulty of Paying Living Expenses: Not very hard   Food Insecurity: No Food Insecurity (2022)    Hunger Vital Sign      Worried About Running Out of Food in the Last Year: Never true      Ran Out of Food in the Last Year: Never true    Transportation Needs: No Transportation Needs (9/19/2022)    PRAPARE - Transportation      Lack of Transportation (Medical): No      Lack of Transportation (Non-Medical): No   Physical Activity: Inactive (9/19/2022)    Exercise Vital Sign      Days of Exercise per Week: 0 days      Minutes of Exercise per Session: 0 min   Stress: No Stress Concern Present (9/19/2022)    [place] [employer] - Occupational Stress Questionnaire      Feeling of Stress : Only a little   Social Connections: Socially Isolated (9/19/2022)    Social Connection and Isolation Panel [NHANES]      Frequency of Communication with Friends and Family: Twice a week      Frequency of Social Gatherings with Friends and Family: Never      Attends Episcopal Services: Never      Active Member of Clubs or Organizations: No      Attends Club or Organization Meetings: Not on file      Marital Status:    Intimate Partner Violence: Unknown (5/6/2020)    Humiliation, Afraid, Rape, and Kick questionnaire      Fear of Current or Ex-Partner: No      Emotionally Abused: Not on file      Physically Abused: Not on file      Sexually Abused: Not on file   Housing Stability: Low Risk  (9/19/2022)    Housing Stability Vital Sign      Unable to Pay for Housing in the Last Year: No      Number of Places Lived in the Last Year: 1      Unstable Housing in the Last Year: No        Allergies   Allergen Reactions     Amlodipine Besylate      Water retention     Cefdinir      Joint pains     Lisinopril      Water retention     Amoxicillin Rash     White spots              Key Medications:       acetaminophen  975 mg Oral Q8H     albumin human  25 g Intravenous Once     aspirin  162 mg Oral or NG Tube Daily     chlorhexidine  15 mL Mouth/Throat Q12H     clindamycin  900 mg Intravenous Q8H     heparin ANTICOAGULANT  5,000 Units Subcutaneous Q8H     Lidocaine  1 patch Transdermal Q24H     lidocaine   Transdermal Q8H FANTA     pantoprazole  40 mg Oral  or NG Tube Daily    Or     pantoprazole  40 mg Oral Daily     polyethylene glycol  17 g Oral Daily     senna-docusate  1 tablet Oral BID     sodium chloride (PF)  3 mL Intracatheter Q8H     vancomycin  1,500 mg Intravenous Q12H       dexmedetomidine Stopped (07/10/23 2225)     dextrose       EPINEPHrine 0.07 mcg/kg/min (07/11/23 0600)     insulin regular 2 Units/hr (07/11/23 0604)     propofol 50 mcg/kg/min (07/11/23 0714)    And     - MEDICATION INSTRUCTIONS -       nitroGLYcerin       norepinephrine 0.03 mcg/kg/min (07/11/23 0615)     BETA BLOCKER NOT PRESCRIBED       vasopressin 2.5 Units/hr (07/11/23 0610)        Home Meds  No current facility-administered medications on file prior to encounter.  aspirin 81 MG EC tablet, Take 1 tablet (81 mg) by mouth daily  atenolol (TENORMIN) 50 MG tablet, Take 1 tablet (50 mg) by mouth daily  celecoxib (CELEBREX) 200 MG capsule, Take 1 capsule (200 mg) by mouth 2 times daily  hydrochlorothiazide (HYDRODIURIL) 25 MG tablet, Take 1 tablet (25 mg) by mouth daily  insulin glargine (LANTUS PEN) 100 UNIT/ML pen, Inject 50 Units Subcutaneous At Bedtime (Patient taking differently: Inject 42 Units Subcutaneous At Bedtime)  insulin lispro (HUMALOG KWIKPEN) 100 UNIT/ML (1 unit dial) KWIKPEN, Inject 18 Units Subcutaneous 3 times daily (before meals)  losartan (COZAAR) 100 MG tablet, Take 1 tablet (100 mg) by mouth daily  metFORMIN (GLUCOPHAGE XR) 500 MG 24 hr tablet, Take 2 tablets (1,000 mg) by mouth daily (with dinner)  nitroGLYcerin (NITROSTAT) 0.4 MG sublingual tablet, For chest pain place 1 tablet under the tongue every 5 minutes for 3 doses. If symptoms persist 5 minutes after 1st dose call 911.  simvastatin (ZOCOR) 20 MG tablet, Take 1 tablet (20 mg) by mouth At Bedtime  blood glucose monitoring (FREESTYLE INSULINX SYSTEM) meter device kit, Use to test blood sugar 3 times daily or as directed.  blood glucose monitoring (FREESTYLE) lancets, Use to test blood sugars 2-3 times daily  or as directed.  insulin pen needle (BD KENTRELL U/F) 32G X 4 MM, USE 2 PEN NEEDLES DAILY OR AS DIRECTED.  order for DME, Equipment being ordered: Verio flex test strips  Patient tests 1 times daily.  order for DME, Equipment being ordered: lancets  Uses 4 a day.    Please dispense what insurance covers.  order for DME, Equipment being ordered: glucose meter  Please dispense what insurance covers  ORDER FOR DME, Equipment being ordered: glucometer    60 lancets and test strips (5 refills) for twice daily testing.           Physical Examination:   Temp:  [97.6  F (36.4  C)-101.5  F (38.6  C)] 101.3  F (38.5  C)  Pulse:  [55-90] 68  Resp:  [8-39] 10  BP: ()/(50-83) 111/59  MAP:  [46 mmHg-108 mmHg] 81 mmHg  Arterial Line BP: ()/(34-95) 137/59  FiO2 (%):  [50 %-80 %] 50 %  SpO2:  [92 %-100 %] 96 %  No intake or output data in the 24 hours ending 07/10/23 1223  Wt Readings from Last 4 Encounters:   07/11/23 112.1 kg (247 lb 2.2 oz)   06/19/23 107.5 kg (237 lb)   06/12/23 107.5 kg (236 lb 14.4 oz)   03/20/23 109 kg (240 lb 3.2 oz)     Arterial Line BP: ()/(34-95) 137/59  MAP:  [46 mmHg-108 mmHg] 81 mmHg  BP - Mean:  [61-97] 78  CVP:  [4 mmHg-13 mmHg] 7 mmHg  SVO2:  [64 %-75 %] 64 %  Vent Mode: CMV/AC  (Continuous Mandatory Ventilation/ Assist Control)  FiO2 (%): 50 %  Resp Rate (Set): 24 breaths/min  Tidal Volume (Set, mL): 500 mL  PEEP (cm H2O): 8 cmH2O  Resp: 10    Recent Labs   Lab 07/11/23  0313 07/10/23  2252 07/10/23  2248 07/10/23  2142 07/10/23  1838 07/10/23  1836   PH 7.32*  --  7.32* 7.31*  --  7.18*   PCO2 41  --  43 42  --  58*   PO2 107*  --  102 90  --  103   HCO3 21  --  22 22  --  22   O2PER 50 50 50 50   < > 100    < > = values in this interval not displayed.     GEN: No acute distress   HEENT: Head ncat  PULM: Unlabored synchronous with vent, clear anteriorly; chest tubes with dark sanguinous output   CV/COR: RRR  ABD: Soft, no mass  EXT:  Warm, trace edema in BLE  NEURO: sedated  SKIN: no  obvious rash  LINES: clean, dry intact         Data:   All data and imaging reviewed     ROUTINE ICU LABS (Last four results)  CMP  Recent Labs   Lab 07/11/23  0604 07/11/23  0332 07/11/23  0311 07/11/23  0213 07/11/23  0034 07/10/23  2252 07/10/23  1841 07/10/23  1834 07/10/23  1710 07/10/23  0853   NA  --   --  137  --   --  138  --  139 136 137   POTASSIUM  --   --  4.3  --   --  3.8  --  3.7 4.1 4.3   CHLORIDE  --   --  104  --   --  106  --  106 104 101   CO2  --   --  19*  --   --  19*  --  20* 20* 22   ANIONGAP  --   --  14  --   --  13  --  13 12 14   * 173* 186* 195*   < > 159*   < > 196* 179* 129*   BUN  --   --  22.5  --   --  20.9  --  18.7 18.9 20.0   CR  --   --  0.98  --   --  0.98  --  0.80 0.83 0.76   GFRESTIMATED  --   --  88  --   --  88  --  >90 >90 >90   ARTEMIO  --   --  8.6*  --   --  8.6*  --  8.7* 9.6 10.4*   MAG  --   --  1.9  --   --  1.9  --  2.2  --  1.8   PHOS  --   --  4.2  --   --  2.9  --  3.7  --   --    PROTTOTAL  --   --  5.8*  --   --   --   --  5.8*  --  7.6   ALBUMIN  --   --  3.7  --   --   --   --  3.6  --  4.5   BILITOTAL  --   --  0.3  --   --   --   --  0.6  --  0.4   ALKPHOS  --   --  46  --   --   --   --  58  --  69   AST  --   --  59*  --   --   --   --  41  --  16   ALT  --   --  22  --   --   --   --  21  --  25    < > = values in this interval not displayed.     CBC  Recent Labs   Lab 07/11/23  0311 07/10/23  2252 07/10/23  1834 07/10/23  1710   WBC 16.1* 20.2* 28.8* 28.5*   RBC 3.94* 3.89* 4.12* 3.76*   HGB 11.6* 11.4* 12.0* 11.0*   HCT 35.1* 34.8* 37.2* 33.7*   MCV 89 90 90 90   MCH 29.4 29.3 29.1 29.3   MCHC 33.0 32.8 32.3 32.6   RDW 13.9 14.1 14.1 13.9    227 231 205     INR  Recent Labs   Lab 07/10/23  2141 07/10/23  1834 07/10/23  1710 07/10/23  0853   INR 1.12 1.20* 1.27* 0.98     Arterial Blood Gas  Recent Labs   Lab 07/11/23  0313 07/10/23  2252 07/10/23  2248 07/10/23  2142 07/10/23  1838 07/10/23  1836   PH 7.32*  --  7.32* 7.31*  --  7.18*    PCO2 41  --  43 42  --  58*   PO2 107*  --  102 90  --  103   HCO3 21  --  22 22  --  22   O2PER 50 50 50 50   < > 100    < > = values in this interval not displayed.       All cultures:  No results for input(s): CULT in the last 168 hours.  No results found for this or any previous visit (from the past 24 hour(s)).    Lizz ANGLEHelder Swanson   Critical Care, MS4  07/11/23 7:49 AM     [facility]  CRITICAL CARE STAFF NOTE    I am managing these acute and ongoing critical issues resulting in critical condition that impairs one or more vital organ systems, incur a high probability of imminent or life-threatening deterioration in the patient's condition and providing frequent personal assessment and manipulation of medications and life support equipment.   58M hx of T2DM (A1c 6.7 06/2023), MO (BMI 39.77), CORINA, dyslipidemia who presents with multi-vessel CAD and moderate aortic stenosis (gradient 32 mmHg). Underwent aortic valve replacement and CABG x 3  Overnight, rising vasopressors ( on epi, norepi, and vasopressin). Given  cyanokit with response and decreased vasopressors.  Overnight cardiac POCUS with appropriate VTI and EF; reported normal EF post-op.  This morning, weaning off propofol.  CVP elevated.  Stopped epinephrine with appropriate CI and CO, but drop in MVO2 and rapid rise in Norepi while stable vasopressin dosage.   Restarted on Epi with decreasing Norepi again.  Lactic acid resolved. UOP stable and appropriate.     1. Shock- cardiogenic and circulatory. Continue Epi ggt (now lower dosage, but CI, MVO2 responded to epi being restarted), try to wean norepi, continue vasopressin.   2. Acute hypoxemic respiratory failure requiring mechanical ventilation. Will try to wean PEEP.       ICU Interventions: ventilator management, parenteral medications necessitating continuous monitoring including vasoactive medications, medication for heart rhythm control, insulin infusion, and/or sedative/opiates  and  interpretation of lab values, cardiac output, cxr, pulse oximetry, blood gases, and/or information/data stored in computers    The patient was seen and examined with the resident/fellow/TRAMAINE and/or medical student.  We have discussed the patient in detail and I agree with the findings, assessment, and plan as documented when this note was cosigned on this day. The plan was formulated in conjunction with pharmacy, ICU nurses, and respiratory therapist. I have evaluated all laboratory values and imaging studies for the past 24 hours. I have reviewed all the consults that have been ordered and are active for this patient.      Critical Care Time: 60 min.  I spent this time (excluding procedures) personally providing and directing critical care services at the bedside and on the critical care unit.      Liuns Hancock MD   of Medicine, Pulmonary/CC  Pager: [phone]

## 2023-07-12 ENCOUNTER — APPOINTMENT (OUTPATIENT)
Dept: GENERAL RADIOLOGY | Facility: CLINIC | Age: 58
End: 2023-07-12
Attending: PHYSICIAN ASSISTANT
Payer: COMMERCIAL

## 2023-07-12 ENCOUNTER — APPOINTMENT (OUTPATIENT)
Dept: OCCUPATIONAL THERAPY | Facility: CLINIC | Age: 58
End: 2023-07-12
Attending: PHYSICIAN ASSISTANT
Payer: COMMERCIAL

## 2023-07-12 LAB
ALBUMIN SERPL BCG-MCNC: 3.7 G/DL (ref 3.5–5.2)
ALP SERPL-CCNC: 37 U/L (ref 40–129)
ALT SERPL W P-5'-P-CCNC: 18 U/L (ref 0–70)
ANION GAP SERPL CALCULATED.3IONS-SCNC: 8 MMOL/L (ref 7–15)
AST SERPL W P-5'-P-CCNC: 40 U/L (ref 0–45)
ATRIAL RATE - MUSE: 69 BPM
ATRIAL RATE - MUSE: 80 BPM
BASE EXCESS BLDA CALC-SCNC: -0.5 MMOL/L (ref -9.6–2)
BASE EXCESS BLDA CALC-SCNC: -1.3 MMOL/L (ref -9.6–2)
BASE EXCESS BLDA CALC-SCNC: -2.3 MMOL/L (ref -9.6–2)
BASE EXCESS BLDA CALC-SCNC: -2.8 MMOL/L (ref -9.6–2)
BASE EXCESS BLDA CALC-SCNC: -3.7 MMOL/L (ref -9.6–2)
BASE EXCESS BLDA CALC-SCNC: 0.1 MMOL/L (ref -9.6–2)
BASE EXCESS BLDA CALC-SCNC: 0.1 MMOL/L (ref -9.6–2)
BASE EXCESS BLDV CALC-SCNC: 1.5 MMOL/L (ref -8.1–1.9)
BILIRUB SERPL-MCNC: 0.8 MG/DL
BUN SERPL-MCNC: 22.2 MG/DL (ref 8–23)
CA-I BLD-MCNC: 4.5 MG/DL (ref 4.4–5.2)
CA-I BLD-MCNC: 4.6 MG/DL (ref 4.4–5.2)
CA-I BLD-MCNC: 4.8 MG/DL (ref 4.4–5.2)
CA-I BLD-MCNC: 5 MG/DL (ref 4.4–5.2)
CA-I BLD-MCNC: 5.4 MG/DL (ref 4.4–5.2)
CALCIUM SERPL-MCNC: 8.6 MG/DL (ref 8.8–10.2)
CHLORIDE SERPL-SCNC: 106 MMOL/L (ref 98–107)
CREAT SERPL-MCNC: 0.64 MG/DL (ref 0.67–1.17)
DEPRECATED HCO3 PLAS-SCNC: 24 MMOL/L (ref 22–29)
DIASTOLIC BLOOD PRESSURE - MUSE: NORMAL MMHG
DIASTOLIC BLOOD PRESSURE - MUSE: NORMAL MMHG
ERYTHROCYTE [DISTWIDTH] IN BLOOD BY AUTOMATED COUNT: 14.3 % (ref 10–15)
GFR SERPL CREATININE-BSD FRML MDRD: >90 ML/MIN/1.73M2
GLUCOSE BLD-MCNC: 114 MG/DL (ref 70–99)
GLUCOSE BLD-MCNC: 132 MG/DL (ref 70–99)
GLUCOSE BLD-MCNC: 133 MG/DL (ref 70–99)
GLUCOSE BLD-MCNC: 168 MG/DL (ref 70–99)
GLUCOSE BLD-MCNC: 172 MG/DL (ref 70–99)
GLUCOSE BLD-MCNC: 174 MG/DL (ref 70–99)
GLUCOSE BLD-MCNC: 198 MG/DL (ref 70–99)
GLUCOSE BLD-MCNC: 208 MG/DL (ref 70–99)
GLUCOSE BLDC GLUCOMTR-MCNC: 101 MG/DL (ref 70–99)
GLUCOSE BLDC GLUCOMTR-MCNC: 111 MG/DL (ref 70–99)
GLUCOSE BLDC GLUCOMTR-MCNC: 112 MG/DL (ref 70–99)
GLUCOSE BLDC GLUCOMTR-MCNC: 114 MG/DL (ref 70–99)
GLUCOSE BLDC GLUCOMTR-MCNC: 123 MG/DL (ref 70–99)
GLUCOSE BLDC GLUCOMTR-MCNC: 167 MG/DL (ref 70–99)
GLUCOSE BLDC GLUCOMTR-MCNC: 96 MG/DL (ref 70–99)
GLUCOSE SERPL-MCNC: 99 MG/DL (ref 70–99)
HCO3 BLDA-SCNC: 23 MMOL/L (ref 21–28)
HCO3 BLDA-SCNC: 23 MMOL/L (ref 21–28)
HCO3 BLDA-SCNC: 24 MMOL/L (ref 21–28)
HCO3 BLDA-SCNC: 24 MMOL/L (ref 21–28)
HCO3 BLDA-SCNC: 25 MMOL/L (ref 21–28)
HCO3 BLDA-SCNC: 25 MMOL/L (ref 21–28)
HCO3 BLDA-SCNC: 27 MMOL/L (ref 21–28)
HCO3 BLDV-SCNC: 28 MMOL/L (ref 21–28)
HCT VFR BLD AUTO: 26.4 % (ref 40–53)
HGB BLD-MCNC: 10.6 G/DL (ref 13.3–17.7)
HGB BLD-MCNC: 10.7 G/DL (ref 13.3–17.7)
HGB BLD-MCNC: 11 G/DL (ref 13.3–17.7)
HGB BLD-MCNC: 11.1 G/DL (ref 13.3–17.7)
HGB BLD-MCNC: 11.2 G/DL (ref 13.3–17.7)
HGB BLD-MCNC: 11.2 G/DL (ref 13.3–17.7)
HGB BLD-MCNC: 11.4 G/DL (ref 13.3–17.7)
HGB BLD-MCNC: 13 G/DL (ref 13.3–17.7)
HGB BLD-MCNC: 8.6 G/DL (ref 13.3–17.7)
INR PPP: 1.26 (ref 0.85–1.15)
INTERPRETATION ECG - MUSE: NORMAL
INTERPRETATION ECG - MUSE: NORMAL
LACTATE BLD-SCNC: 1 MMOL/L
LACTATE BLD-SCNC: 1 MMOL/L
LACTATE BLD-SCNC: 1.1 MMOL/L
LACTATE BLD-SCNC: 1.2 MMOL/L
LACTATE BLD-SCNC: 1.3 MMOL/L
LACTATE BLD-SCNC: 1.4 MMOL/L
LACTATE BLD-SCNC: 2.3 MMOL/L
LACTATE BLD-SCNC: 2.8 MMOL/L
LACTATE SERPL-SCNC: 0.9 MMOL/L (ref 0.7–2)
LACTATE SERPL-SCNC: 1 MMOL/L (ref 0.7–2)
LACTATE SERPL-SCNC: 1 MMOL/L (ref 0.7–2)
MAGNESIUM SERPL-MCNC: 2.1 MG/DL (ref 1.7–2.3)
MCH RBC QN AUTO: 29.4 PG (ref 26.5–33)
MCHC RBC AUTO-ENTMCNC: 32.6 G/DL (ref 31.5–36.5)
MCV RBC AUTO: 90 FL (ref 78–100)
O2/TOTAL GAS SETTING VFR VENT: 100 %
O2/TOTAL GAS SETTING VFR VENT: 100 %
O2/TOTAL GAS SETTING VFR VENT: 80 %
P AXIS - MUSE: 46 DEGREES
P AXIS - MUSE: 58 DEGREES
PATH REPORT.COMMENTS IMP SPEC: NORMAL
PATH REPORT.COMMENTS IMP SPEC: NORMAL
PATH REPORT.FINAL DX SPEC: NORMAL
PATH REPORT.GROSS SPEC: NORMAL
PATH REPORT.MICROSCOPIC SPEC OTHER STN: NORMAL
PATH REPORT.RELEVANT HX SPEC: NORMAL
PCO2 BLDA: 42 MM HG (ref 35–45)
PCO2 BLDA: 43 MM HG (ref 35–45)
PCO2 BLDA: 44 MM HG (ref 35–45)
PCO2 BLDA: 45 MM HG (ref 35–45)
PCO2 BLDA: 48 MM HG (ref 35–45)
PCO2 BLDA: 49 MM HG (ref 35–45)
PCO2 BLDA: 52 MM HG (ref 35–45)
PCO2 BLDV: 54 MM HG (ref 40–50)
PH BLDA: 7.28 [PH] (ref 7.35–7.45)
PH BLDA: 7.3 [PH] (ref 7.35–7.45)
PH BLDA: 7.32 [PH] (ref 7.35–7.45)
PH BLDA: 7.35 [PH] (ref 7.35–7.45)
PH BLDA: 7.36 [PH] (ref 7.35–7.45)
PH BLDA: 7.36 [PH] (ref 7.35–7.45)
PH BLDA: 7.39 [PH] (ref 7.35–7.45)
PH BLDV: 7.33 [PH] (ref 7.32–7.43)
PHOSPHATE SERPL-MCNC: 1.9 MG/DL (ref 2.5–4.5)
PHOSPHATE SERPL-MCNC: 2.2 MG/DL (ref 2.5–4.5)
PHOTO IMAGE: NORMAL
PLAT MORPH BLD: NORMAL
PLATELET # BLD AUTO: 117 10E3/UL (ref 150–450)
PO2 BLDA: 258 MM HG (ref 80–105)
PO2 BLDA: 260 MM HG (ref 80–105)
PO2 BLDA: 274 MM HG (ref 80–105)
PO2 BLDA: 274 MM HG (ref 80–105)
PO2 BLDA: 291 MM HG (ref 80–105)
PO2 BLDA: 291 MM HG (ref 80–105)
PO2 BLDA: 60 MM HG (ref 80–105)
PO2 BLDV: 43 MM HG (ref 25–47)
POTASSIUM BLD-SCNC: 4 MMOL/L (ref 3.5–5)
POTASSIUM BLD-SCNC: 4 MMOL/L (ref 3.5–5)
POTASSIUM BLD-SCNC: 4.2 MMOL/L (ref 3.5–5)
POTASSIUM BLD-SCNC: 4.6 MMOL/L (ref 3.5–5)
POTASSIUM BLD-SCNC: 4.6 MMOL/L (ref 3.5–5)
POTASSIUM BLD-SCNC: 5 MMOL/L (ref 3.5–5)
POTASSIUM BLD-SCNC: 5 MMOL/L (ref 3.5–5)
POTASSIUM BLD-SCNC: 5.5 MMOL/L (ref 3.5–5)
POTASSIUM SERPL-SCNC: 3.9 MMOL/L (ref 3.4–5.3)
PR INTERVAL - MUSE: 142 MS
PR INTERVAL - MUSE: 176 MS
PROT SERPL-MCNC: 5.7 G/DL (ref 6.4–8.3)
QRS DURATION - MUSE: 88 MS
QRS DURATION - MUSE: 96 MS
QT - MUSE: 424 MS
QT - MUSE: 494 MS
QTC - MUSE: 489 MS
QTC - MUSE: 529 MS
R AXIS - MUSE: 61 DEGREES
R AXIS - MUSE: 70 DEGREES
RBC # BLD AUTO: 2.93 10E6/UL (ref 4.4–5.9)
RBC MORPH BLD: NORMAL
SODIUM BLD-SCNC: 138 MMOL/L (ref 133–144)
SODIUM BLD-SCNC: 139 MMOL/L (ref 133–144)
SODIUM BLD-SCNC: 140 MMOL/L (ref 133–144)
SODIUM SERPL-SCNC: 138 MMOL/L (ref 136–145)
SYSTOLIC BLOOD PRESSURE - MUSE: NORMAL MMHG
SYSTOLIC BLOOD PRESSURE - MUSE: NORMAL MMHG
T AXIS - MUSE: 46 DEGREES
T AXIS - MUSE: 63 DEGREES
VENTRICULAR RATE- MUSE: 69 BPM
VENTRICULAR RATE- MUSE: 80 BPM
WBC # BLD AUTO: 8.5 10E3/UL (ref 4–11)

## 2023-07-12 PROCEDURE — 250N000011 HC RX IP 250 OP 636: Mod: JZ | Performed by: SURGERY

## 2023-07-12 PROCEDURE — 94660 CPAP INITIATION&MGMT: CPT

## 2023-07-12 PROCEDURE — 82330 ASSAY OF CALCIUM: CPT | Performed by: PHYSICIAN ASSISTANT

## 2023-07-12 PROCEDURE — 83605 ASSAY OF LACTIC ACID: CPT | Performed by: SURGERY

## 2023-07-12 PROCEDURE — 36415 COLL VENOUS BLD VENIPUNCTURE: CPT | Performed by: SURGERY

## 2023-07-12 PROCEDURE — 84100 ASSAY OF PHOSPHORUS: CPT | Performed by: SURGERY

## 2023-07-12 PROCEDURE — 250N000013 HC RX MED GY IP 250 OP 250 PS 637: Performed by: INTERNAL MEDICINE

## 2023-07-12 PROCEDURE — 250N000013 HC RX MED GY IP 250 OP 250 PS 637: Performed by: SURGERY

## 2023-07-12 PROCEDURE — 200N000001 HC R&B ICU

## 2023-07-12 PROCEDURE — 999N000157 HC STATISTIC RCP TIME EA 10 MIN

## 2023-07-12 PROCEDURE — 258N000003 HC RX IP 258 OP 636: Performed by: SURGERY

## 2023-07-12 PROCEDURE — 71045 X-RAY EXAM CHEST 1 VIEW: CPT

## 2023-07-12 PROCEDURE — 85610 PROTHROMBIN TIME: CPT | Performed by: PHYSICIAN ASSISTANT

## 2023-07-12 PROCEDURE — 85027 COMPLETE CBC AUTOMATED: CPT | Performed by: PHYSICIAN ASSISTANT

## 2023-07-12 PROCEDURE — 250N000009 HC RX 250: Performed by: SURGERY

## 2023-07-12 PROCEDURE — 250N000013 HC RX MED GY IP 250 OP 250 PS 637: Performed by: PHYSICIAN ASSISTANT

## 2023-07-12 PROCEDURE — 97530 THERAPEUTIC ACTIVITIES: CPT | Mod: GO | Performed by: OCCUPATIONAL THERAPIST

## 2023-07-12 PROCEDURE — 97166 OT EVAL MOD COMPLEX 45 MIN: CPT | Mod: GO | Performed by: OCCUPATIONAL THERAPIST

## 2023-07-12 PROCEDURE — 84100 ASSAY OF PHOSPHORUS: CPT | Performed by: INTERNAL MEDICINE

## 2023-07-12 PROCEDURE — 80053 COMPREHEN METABOLIC PANEL: CPT | Performed by: PHYSICIAN ASSISTANT

## 2023-07-12 PROCEDURE — 97535 SELF CARE MNGMENT TRAINING: CPT | Mod: GO | Performed by: OCCUPATIONAL THERAPIST

## 2023-07-12 PROCEDURE — 250N000011 HC RX IP 250 OP 636: Mod: JZ | Performed by: PHYSICIAN ASSISTANT

## 2023-07-12 PROCEDURE — 83735 ASSAY OF MAGNESIUM: CPT | Performed by: INTERNAL MEDICINE

## 2023-07-12 RX ORDER — NICOTINE POLACRILEX 4 MG
15-30 LOZENGE BUCCAL
Status: DISCONTINUED | OUTPATIENT
Start: 2023-07-12 | End: 2023-07-12

## 2023-07-12 RX ORDER — DEXTROSE MONOHYDRATE 25 G/50ML
25-50 INJECTION, SOLUTION INTRAVENOUS
Status: DISCONTINUED | OUTPATIENT
Start: 2023-07-12 | End: 2023-07-17 | Stop reason: HOSPADM

## 2023-07-12 RX ORDER — NICOTINE POLACRILEX 4 MG
15-30 LOZENGE BUCCAL
Status: DISCONTINUED | OUTPATIENT
Start: 2023-07-12 | End: 2023-07-17 | Stop reason: HOSPADM

## 2023-07-12 RX ORDER — DEXTROSE MONOHYDRATE 25 G/50ML
25-50 INJECTION, SOLUTION INTRAVENOUS
Status: DISCONTINUED | OUTPATIENT
Start: 2023-07-12 | End: 2023-07-12

## 2023-07-12 RX ORDER — FUROSEMIDE 10 MG/ML
20 INJECTION INTRAMUSCULAR; INTRAVENOUS 2 TIMES DAILY
Status: DISCONTINUED | OUTPATIENT
Start: 2023-07-12 | End: 2023-07-14

## 2023-07-12 RX ORDER — ATORVASTATIN CALCIUM 40 MG/1
40 TABLET, FILM COATED ORAL EVERY EVENING
Status: DISCONTINUED | OUTPATIENT
Start: 2023-07-12 | End: 2023-07-12

## 2023-07-12 RX ORDER — WARFARIN SODIUM 5 MG/1
5 TABLET ORAL
Status: COMPLETED | OUTPATIENT
Start: 2023-07-12 | End: 2023-07-12

## 2023-07-12 RX ADMIN — SENNOSIDES AND DOCUSATE SODIUM 1 TABLET: 50; 8.6 TABLET ORAL at 08:09

## 2023-07-12 RX ADMIN — POTASSIUM & SODIUM PHOSPHATES POWDER PACK 280-160-250 MG 1 PACKET: 280-160-250 PACK at 22:00

## 2023-07-12 RX ADMIN — OXYCODONE HYDROCHLORIDE 10 MG: 5 TABLET ORAL at 11:49

## 2023-07-12 RX ADMIN — MELATONIN TAB 3 MG 10 MG: 3 TAB at 01:14

## 2023-07-12 RX ADMIN — PANTOPRAZOLE SODIUM 40 MG: 40 TABLET, DELAYED RELEASE ORAL at 08:09

## 2023-07-12 RX ADMIN — FUROSEMIDE 20 MG: 10 INJECTION, SOLUTION INTRAMUSCULAR; INTRAVENOUS at 20:20

## 2023-07-12 RX ADMIN — HYDRALAZINE HYDROCHLORIDE 10 MG: 20 INJECTION INTRAMUSCULAR; INTRAVENOUS at 21:04

## 2023-07-12 RX ADMIN — VASOPRESSIN 2.5 UNITS/HR: 20 INJECTION, SOLUTION INTRAMUSCULAR; SUBCUTANEOUS at 00:04

## 2023-07-12 RX ADMIN — HEPARIN SODIUM 5000 UNITS: 5000 INJECTION, SOLUTION INTRAVENOUS; SUBCUTANEOUS at 20:22

## 2023-07-12 RX ADMIN — ASPIRIN 81 MG 81 MG: 81 TABLET ORAL at 08:10

## 2023-07-12 RX ADMIN — SODIUM PHOSPHATE, MONOBASIC, MONOHYDRATE AND SODIUM PHOSPHATE, DIBASIC, ANHYDROUS 15 MMOL: 142; 276 INJECTION, SOLUTION INTRAVENOUS at 06:00

## 2023-07-12 RX ADMIN — SENNOSIDES AND DOCUSATE SODIUM 1 TABLET: 50; 8.6 TABLET ORAL at 20:21

## 2023-07-12 RX ADMIN — HYDROMORPHONE HYDROCHLORIDE 0.4 MG: 0.2 INJECTION, SOLUTION INTRAMUSCULAR; INTRAVENOUS; SUBCUTANEOUS at 03:38

## 2023-07-12 RX ADMIN — ACETAMINOPHEN 975 MG: 325 TABLET, FILM COATED ORAL at 03:38

## 2023-07-12 RX ADMIN — SODIUM PHOSPHATE, MONOBASIC, MONOHYDRATE AND SODIUM PHOSPHATE, DIBASIC, ANHYDROUS 15 MMOL: 142; 276 INJECTION, SOLUTION INTRAVENOUS at 08:11

## 2023-07-12 RX ADMIN — OXYCODONE HYDROCHLORIDE 5 MG: 5 TABLET ORAL at 08:40

## 2023-07-12 RX ADMIN — HEPARIN SODIUM 5000 UNITS: 5000 INJECTION, SOLUTION INTRAVENOUS; SUBCUTANEOUS at 03:37

## 2023-07-12 RX ADMIN — OXYCODONE HYDROCHLORIDE 10 MG: 5 TABLET ORAL at 16:03

## 2023-07-12 RX ADMIN — POLYETHYLENE GLYCOL 3350 17 G: 17 POWDER, FOR SOLUTION ORAL at 08:09

## 2023-07-12 RX ADMIN — METHOCARBAMOL 750 MG: 750 TABLET ORAL at 16:04

## 2023-07-12 RX ADMIN — FUROSEMIDE 20 MG: 10 INJECTION, SOLUTION INTRAMUSCULAR; INTRAVENOUS at 08:44

## 2023-07-12 RX ADMIN — HYDROXYZINE HYDROCHLORIDE 25 MG: 25 TABLET, FILM COATED ORAL at 02:44

## 2023-07-12 RX ADMIN — OXYCODONE HYDROCHLORIDE 5 MG: 5 TABLET ORAL at 08:10

## 2023-07-12 RX ADMIN — METHOCARBAMOL 750 MG: 750 TABLET ORAL at 22:00

## 2023-07-12 RX ADMIN — ACETAMINOPHEN 975 MG: 325 TABLET, FILM COATED ORAL at 11:49

## 2023-07-12 RX ADMIN — WARFARIN SODIUM 5 MG: 5 TABLET ORAL at 17:42

## 2023-07-12 RX ADMIN — POTASSIUM & SODIUM PHOSPHATES POWDER PACK 280-160-250 MG 1 PACKET: 280-160-250 PACK at 20:21

## 2023-07-12 RX ADMIN — METHOCARBAMOL 750 MG: 750 TABLET ORAL at 10:17

## 2023-07-12 RX ADMIN — ACETAMINOPHEN 975 MG: 325 TABLET, FILM COATED ORAL at 20:21

## 2023-07-12 RX ADMIN — NICOTINE 1 PATCH: 14 PATCH, EXTENDED RELEASE TRANSDERMAL at 08:12

## 2023-07-12 RX ADMIN — HEPARIN SODIUM 5000 UNITS: 5000 INJECTION, SOLUTION INTRAVENOUS; SUBCUTANEOUS at 11:49

## 2023-07-12 RX ADMIN — HYDRALAZINE HYDROCHLORIDE 10 MG: 20 INJECTION INTRAMUSCULAR; INTRAVENOUS at 11:04

## 2023-07-12 RX ADMIN — ATORVASTATIN CALCIUM 40 MG: 40 TABLET, FILM COATED ORAL at 20:33

## 2023-07-12 RX ADMIN — OXYCODONE HYDROCHLORIDE 10 MG: 5 TABLET ORAL at 20:21

## 2023-07-12 RX ADMIN — POTASSIUM & SODIUM PHOSPHATES POWDER PACK 280-160-250 MG 1 PACKET: 280-160-250 PACK at 16:18

## 2023-07-12 ASSESSMENT — ACTIVITIES OF DAILY LIVING (ADL)
ADLS_ACUITY_SCORE: 25
ADLS_ACUITY_SCORE: 25
DEPENDENT_IADLS:: INDEPENDENT
ADLS_ACUITY_SCORE: 25
ADLS_ACUITY_SCORE: 26
ADLS_ACUITY_SCORE: 25
ADLS_ACUITY_SCORE: 26
ADLS_ACUITY_SCORE: 26
ADLS_ACUITY_SCORE: 25
ADLS_ACUITY_SCORE: 26
PREVIOUS_RESPONSIBILITIES: WORK;DRIVING;FINANCES;MEDICATION MANAGEMENT;YARDWORK;SHOPPING;LAUNDRY;HOUSEKEEPING;MEAL PREP
ADLS_ACUITY_SCORE: 25

## 2023-07-12 NOTE — PROGRESS NOTES
[facility]  Cardiovascular and Thoracic Surgery Daily Note      Assessment and Plan  POD # 2 s/p aortic valve replacement with a 23 mm On-X mechanical valve, coronary artery bypass grafting x3 with left internal mammary artery to the distal left anterior descending, reverse saphenous vein graft to the acute marginal branch of the right coronary artery (RCA), reverse saphenous vein graft to the obtuse marginal artery, endoscopic vein harvest from the left lower extremity, intraoperative transesophageal echocardiogram (ZACH) on 07/10/2023 with Dr. Carlyn Antony    - CVS: Pre-op TTE with preserved biventricular function. Profound postop vasoplegic shock, improving. Cyanokit POD0. CI preserved, off gtt's Lactic acidosis resolved, start diuresis today. Start warfarin to INR goal 2-3 x 3 months (through 10/12), then decrease to 1.5-2.0 thereafter with OnX mechanical valve. Aspirin 81 mg daily, change PTA simvastatin to atorvastatin 40 mg daily. Chest tubes: output 530<630, no air leak, keep today. Will remove wires this afternoon if remains stable.       - Resp: Postop mechanical ventilation. Wean to extubate today. IS, pulmonary toilet post extubation    - Neuro: Grossly intact, pain controlled on current regimen    - Renal: No history of significant renal disease. Cr stable WNL. Trend BMP. Diuretic/volume as above.   Recent Labs   Lab 07/12/23  0423 07/11/23  0311 07/10/23  2252   CR 0.64* 0.98 0.98       - GI: -BM, continue bowel regimen    - : Remove Toney later this afternoon, purple urine due to cyano kit for coagulopathy    - Endo: DMT2, well controlled. Transition to sliding scale insulin   Hemoglobin A1C   Date Value Ref Range Status   06/12/2023 6.7 (H) <5.7 % Final     Comment:     Normal <5.7%   Prediabetes 5.7-6.4%    Diabetes 6.5% or higher     Note: Adopted from ADA consensus guidelines.   06/15/2021 7.3 (H) 0 - 5.6 % Final     Comment:     Normal <5.7% Prediabetes 5.7-6.4%  Diabetes 6.5%  or higher - adopted from ADA   consensus guidelines.          - FEN: Replace electrolytes as needed. ADAT    - ID: Postop leukocytosis, improving. Tmax Temp (24hrs), Av.6  F (37.6  C), Min:88.3  F (31.3  C), Max:100.6  F (38.1  C).  WBC downtrending. Periop abx in process. Trend CBC. Blood and urine cultures obtained  due to persistent fevers with vasodilatory shock   Recent Labs   Lab 23  0423 23  0311 07/10/23  2252   WBC 8.5 16.1* 20.2*       - Heme: Acute blood loss anemia and due to surgery. Hgb and PLT stabl. Trend CBC, transfuse PRN. Coumadin for mechanical valve, INR: 1.26  Recent Labs   Lab 23  0423 231 07/10/23  2252   HGB 8.6* 11.6* 11.4*   * 226 227       - Proph: SCD, subcutaneous heparin, PPI    - Dispo: ICU      Interval History  Sitting up in chair, pain mostly controlled, breathing stable, tolerating clears thus far      Medications    acetaminophen  975 mg Oral Q8H     aspirin  81 mg Oral or NG Tube Daily     atorvastatin  40 mg Oral QPM     furosemide  20 mg Intravenous BID     heparin ANTICOAGULANT  5,000 Units Subcutaneous Q8H     Lidocaine  1 patch Transdermal Q24H     lidocaine   Transdermal Q8H FANTA     nicotine  1 patch Transdermal Daily     nicotine   Transdermal Q8H     pantoprazole  40 mg Oral or NG Tube Daily    Or     pantoprazole  40 mg Oral Daily     polyethylene glycol  17 g Oral Daily     senna-docusate  1 tablet Oral BID     sodium chloride (PF)  3 mL Intracatheter Q8H     sodium phosphate  15 mmol Intravenous Q2H     Warfarin Therapy Reminder  1 each Oral See Admin Instructions     [START ON 2023] acetaminophen, albuterol, albuterol, alum & mag hydroxide-simethicone, bisacodyl, calcium carbonate, calcium gluconate, calcium gluconate, calcium gluconate, dextrose, glucose **OR** dextrose **OR** glucagon, EPINEPHrine, hydrALAZINE, HYDROmorphone **OR** HYDROmorphone, hydrOXYzine, lidocaine 4%, lidocaine (buffered or not buffered),  "magnesium hydroxide, melatonin, methocarbamol, naloxone **OR** naloxone **OR** naloxone **OR** naloxone, nitroGLYcerin, norepinephrine, ondansetron **OR** ondansetron, oxyCODONE **OR** oxyCODONE, prochlorperazine **OR** prochlorperazine, BETA BLOCKER NOT PRESCRIBED, sodium chloride (PF)      Physical Exam  Vitals were reviewed  Blood pressure 118/78, pulse 73, temperature 99  F (37.2  C), resp. rate 28, height 1.651 m (5' 5\"), weight 113.6 kg (250 lb 7.1 oz), SpO2 96 %.  Rhythm: NSR    Lungs: diminished bases    Cardiovascular: rrr, no m/r/g    Abdomen: soft, NT, ND, +BS    Extremeties: warm, no LE edema    Incision: CDI    CT: serosang output 530<630 mL, no air leak    Weight:   Vitals:    07/10/23 0945 07/11/23 0545 07/12/23 0515   Weight: 108.4 kg (239 lb) 112.1 kg (247 lb 2.2 oz) 113.6 kg (250 lb 7.1 oz)         Data  Recent Labs   Lab 07/12/23  0825 07/12/23  0604 07/12/23  0423 07/11/23  1409 07/11/23  1403 07/11/23  1230 07/11/23  1050 07/11/23  0332 07/11/23  0311 07/11/23  0034 07/10/23  2252 07/10/23  2148 07/10/23  2141   WBC  --   --  8.5  --   --   --   --   --  16.1*  --  20.2*  --   --    HGB  --   --  8.6*  --   --   --   --   --  11.6*  --  11.4*  --   --    MCV  --   --  90  --   --   --   --   --  89  --  90  --   --    PLT  --   --  117*  --   --   --   --   --  226  --  227  --   --    INR  --   --  1.26*  --  1.25*  --   --   --   --   --   --   --  1.12   NA  --   --  138  --   --   --   --   --  137  --  138  --   --    POTASSIUM  --   --  3.9  --   --   --   --   --  4.3  --  3.8  --   --    CHLORIDE  --   --  106  --   --   --   --   --  104  --  106  --   --    CO2  --   --  24  --   --   --   --   --  19*  --  19*  --   --    BUN  --   --  22.2  --   --   --   --   --  22.5  --  20.9  --   --    CR  --   --  0.64*  --   --   --   --   --  0.98  --  0.98  --   --    ANIONGAP  --   --  8  --   --   --   --   --  14  --  13  --   --    ARTEMIO  --   --  8.6*  --   --   --   --   --  8.6*  --  " 8.6*  --   --    * 111* 99   < >  --    < >  --    < > 186*   < > 159*   < >  --    ALBUMIN  --   --  3.7  --   --   --  4.0  --  3.7  --   --   --   --    PROTTOTAL  --   --  5.7*  --   --   --  5.8*  --  5.8*  --   --   --   --    BILITOTAL  --   --  0.8  --   --   --  0.2  --  0.3  --   --   --   --    ALKPHOS  --   --  37*  --   --   --  38*  --  46  --   --   --   --    ALT  --   --  18  --   --   --  18  --  22  --   --   --   --    AST  --   --  40  --   --   --  52*  --  59*  --   --   --   --     < > = values in this interval not displayed.       Imaging:  Recent Results (from the past 24 hour(s))   XR Chest Port 1 View    Narrative    EXAM: XR CHEST PORT 1 VIEW  LOCATION: [facility]  DATE: 7/12/2023    INDICATION: s p cabg avr, eval edema infiltrate  COMPARISON: 07/11/2023.    FINDINGS: Sternal wires and mediastinal clips. Right IJ pulmonary artery catheter. Mediastinal drain and left chest tube. No pneumothorax. The heart is enlarged. There is no pulmonary edema. Bibasilar pulmonary infiltrates have increased. Degenerative   disease in the spine.      Impression    IMPRESSION: No pneumothorax. Increasing bibasilar infiltrates.         Patient seen and discussed with Dr. Sudarshan Vega PANANCY  Cardiothoracic Surgery  Pager [phone]

## 2023-07-12 NOTE — PROGRESS NOTES
SPIRITUAL HEALTH SERVICES Progress Note  [facility] ICU    Saw pt Glen Cho per family/friend referral.      Patient/Family Understanding of Illness and Goals of Care - Glen reflected on the sequence of a previous surgery this year and his subsequent journey leading to his present procedure/hospitalization. His recent experience with post-op care and recovery is shaping his expectations for the months ahead.     Distress and Loss- Pt described feeling weary and acknowledged that three months of recovery is challenging to envision.    Strengths, Coping, and Resources - Pt was grateful for his spouse and children, and notes that they are a source of motivation and support. He also noted that he is close with his siblings and family.    Meaning, Beliefs, and Spirituality - Glen noted that he finds his sense of spiritual encouragement and meaning mostly through community--being connected with other people.He declined prayer at this time, but described appreciating knowing that others were thinking about him and had prompted a visit.    Plan of Care - Pt asserted a follow up visit wasn't necessary, as he expects to discharge soon. SH described ongoing availability and welcomes requests from pt, staff or family, otherwise will follow per LOS.     Neo Alonzo, MDiv, ACPE Certified Educator   & Clinical Pastoral Educator

## 2023-07-12 NOTE — PLAN OF CARE
[facility] Intensive Care Unit   Nursing Note                                                       Neuro:  PERRL.  Alert and oriented x 4.  Restless.  Moves all extremities.  Follows commands.  Cardiovascular:  RRR.  Hemodynamically stable on Vaso and Epi drips.  Pulmonary:  Tolerating BiPAP.  Oxygen saturation > 92.  LS clear.  GI/:  Adequate UOP.  Urine remains purple from cyanokit.  Endocrine: Glucose well controlled on IV insulin.  Skin:  Intact except incisional areas.  Protective mepilex applied to sacrum.  Restraints:  Not in use or necessary.  AM labs noted; electrolytes replaced as indicated.  Pt wakeful most of the night; slept poorly despite meds.  Was up to chair for one hour without issues.  Continue to monitor closely.    Recent Labs   Lab 07/11/23  2019 07/11/23  1707 07/11/23  1543 07/11/23  1155 07/11/23  0803 07/11/23  0313   PH 7.41 7.39 7.38  --   --  7.32*   PCO2 41 42 42  --   --  41   PO2 65* 67* 64*  --   --  107*   HCO3 26 25 25  --   --  21   O2PER 50 40 40 40   < > 50    < > = values in this interval not displayed.         ROUTINE IP LABS (Last four results)  BMP  Recent Labs   Lab 07/12/23  0604 07/12/23  0423 07/12/23  0422 07/12/23  0052 07/11/23  0332 07/11/23  0311 07/11/23  0034 07/10/23  2252 07/10/23  1841 07/10/23  1834   NA  --  138  --   --   --  137  --  138  --  139   POTASSIUM  --  3.9  --   --   --  4.3  --  3.8  --  3.7   CHLORIDE  --  106  --   --   --  104  --  106  --  106   ARTEMIO  --  8.6*  --   --   --  8.6*  --  8.6*  --  8.7*   CO2  --  24  --   --   --  19*  --  19*  --  20*   BUN  --  22.2  --   --   --  22.5  --  20.9  --  18.7   CR  --  0.64*  --   --   --  0.98  --  0.98  --  0.80   * 99 96 112*   < > 186*   < > 159*   < > 196*    < > = values in this interval not displayed.     CBC  Recent Labs   Lab 07/12/23  0423 07/11/23  0311 07/10/23  2252 07/10/23  1834   WBC 8.5 16.1* 20.2* 28.8*   RBC 2.93* 3.94* 3.89* 4.12*   HGB 8.6* 11.6* 11.4*  12.0*   HCT 26.4* 35.1* 34.8* 37.2*   MCV 90 89 90 90   MCH 29.4 29.4 29.3 29.1   MCHC 32.6 33.0 32.8 32.3   RDW 14.3 13.9 14.1 14.1   * 226 227 231     INR  Recent Labs   Lab 07/12/23  0423 07/11/23  1403 07/10/23  2141 07/10/23  1834   INR 1.26* 1.25* 1.12 1.20*     PTT  Recent Labs   Lab 07/10/23  2141 07/10/23  1834 07/10/23  1710 07/10/23  0853   PTT 68* 58* 50* 30     LACTIC ACID  Lactic Acid (mmol/L)   Date Value   07/12/2023 1.0   07/12/2023 0.9   07/12/2023 1.0   07/11/2023 1.4         Intake/Output Summary (Last 24 hours) at 7/12/2023 0632  Last data filed at 7/12/2023 0600  Gross per 24 hour   Intake 2956.51 ml   Output 1795 ml   Net 1161.51 ml       Amari Noel MSN RN PHN CCRN  [facility]  Intensive Care Unit

## 2023-07-12 NOTE — CONSULTS
Care Management Initial Consult    General Information  Assessment completed with: Glen Massey  Type of CM/SW Visit: Initial Assessment    Primary Care Provider verified and updated as needed: Yes   Readmission within the last 30 days: no previous admission in last 30 days      Reason for Consult: discharge planning  Advance Care Planning: Advance Care Planning Reviewed: no concerns identified          Communication Assessment  Patient's communication style: spoken language (English or Bilingual)    Hearing Difficulty or Deaf: no   Wear Glasses or Blind: no    Cognitive  Cognitive/Neuro/Behavioral: WDL  Level of Consciousness: alert  Arousal Level: opens eyes spontaneously  Orientation: oriented x 4  Mood/Behavior: behavior appropriate to situation  Best Language: 0 - No aphasia  Speech: clear, spontaneous, logical    Living Environment:   People in home: spouse  Constance  Current living Arrangements: house      Able to return to prior arrangements: yes       Family/Social Support:  Care provided by: self  Provides care for: no one  Marital Status:   Wife  Constance       Description of Support System: Supportive, Involved    Support Assessment: Adequate family and caregiver support    Current Resources:   Patient receiving home care services: No     Community Resources: None  Equipment currently used at home: none  Supplies currently used at home: None    Employment/Financial:  Employment Status: employed full-time        Financial Concerns: No concerns identified   Referral to Financial Worker: No       Does the patient's insurance plan have a 3 day qualifying hospital stay waiver?  Yes   Will the waiver be used for post-acute placement? No    Lifestyle & Psychosocial Needs:  Social Determinants of Health     Tobacco Use: High Risk (7/11/2023)    Patient History      Smoking Tobacco Use: Every Day      Smokeless Tobacco Use: Never      Passive Exposure: Not on file   Alcohol Use: Not At Risk (9/19/2022)     AUDIT-C      Frequency of Alcohol Consumption: Monthly or less      Average Number of Drinks: 1 or 2      Frequency of Binge Drinking: Never   Financial Resource Strain: Low Risk  (9/19/2022)    Overall Financial Resource Strain (CARDIA)      Difficulty of Paying Living Expenses: Not very hard   Food Insecurity: No Food Insecurity (9/19/2022)    Hunger Vital Sign      Worried About Running Out of Food in the Last Year: Never true      Ran Out of Food in the Last Year: Never true   Transportation Needs: No Transportation Needs (9/19/2022)    PRAPARE - Transportation      Lack of Transportation (Medical): No      Lack of Transportation (Non-Medical): No   Physical Activity: Inactive (9/19/2022)    Exercise Vital Sign      Days of Exercise per Week: 0 days      Minutes of Exercise per Session: 0 min   Stress: No Stress Concern Present (9/19/2022)    [employer] - Occupational Stress Questionnaire      Feeling of Stress : Only a little   Social Connections: Socially Isolated (9/19/2022)    Social Connection and Isolation Panel [NHANES]      Frequency of Communication with Friends and Family: Twice a week      Frequency of Social Gatherings with Friends and Family: Never      Attends Zoroastrian Services: Never      Active Member of Clubs or Organizations: No      Attends Club or Organization Meetings: Not on file      Marital Status:    Intimate Partner Violence: Unknown (5/6/2020)    Humiliation, Afraid, Rape, and Kick questionnaire      Fear of Current or Ex-Partner: No      Emotionally Abused: Not on file      Physically Abused: Not on file      Sexually Abused: Not on file   Depression: Not at risk (3/20/2023)    PHQ-2      PHQ-2 Score: 0   Housing Stability: Low Risk  (9/19/2022)    Housing Stability Vital Sign      Unable to Pay for Housing in the Last Year: No      Number of Places Lived in the Last Year: 1      Unstable Housing in the Last Year: No       Functional  Status:  Prior to admission patient needed assistance:   Dependent ADLs:: Independent  Dependent IADLs:: Independent  Assesssment of Functional Status: Not at baseline with mobility, Needs assistance with transportation    Mental Health Status:          Chemical Dependency Status:                Values/Beliefs:  Spiritual, Cultural Beliefs, [employer] Practices, Values that affect care: no               Additional Information:  Writer met with Pt in room and introduced self and role in discharge planning. Pt lives in his house with his Wife-Constance. Pt does all his own cares and is still working and driving. Pt uses no devices or services. Writer talked to Pt about the options of discharge and would like to go home with outpatient therapies if that is what is recommended. Wife will assist with any needs and pickup at discharge.      Care Coordinator will continue to follow for discharge needs.         Rachell Forrest RN, BSN, Care Coordinator

## 2023-07-12 NOTE — PROGRESS NOTES
07/12/23 1007   Appointment Info   Signing Clinician's Name / Credentials (OT) Yulisa Wilcox OTR/L   Rehab Comments (OT) Initial Evaluation   Living Environment   People in Home spouse   Current Living Arrangements house  (split entry)   Home Accessibility stairs to enter home;stairs within home   Number of Stairs, Main Entrance 1   Number of Stairs, Within Home, Primary eight   Transportation Anticipated family or friend will provide   Self-Care   Regular Exercise No   Equipment Currently Used at Home none   Fall history within last six months no   Instrumental Activities of Daily Living (IADL)   Previous Responsibilities work;driving;finances;medication management;yardwork;shopping;laundry;housekeeping;meal prep   IADL Comments 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   General Information   Onset of Illness/Injury or Date of Surgery 07/10/23   Referring Physician Marva Reynoso, TARIQ   Patient/Family Therapy Goal Statement (OT) home with wife A   Additional Occupational Profile Info/Pertinent History of Current Problem POD # 2 s/p aortic valve replacement with a 23 mm On-X mechanical valve, coronary artery bypass grafting x3 with left internal mammary artery to the distal left anterior descending, reverse saphenous vein graft to the acute marginal branch of the right coronary artery (RCA), reverse saphenous vein graft to the obtuse marginal artery, endoscopic vein harvest from the left lower extremity, intraoperative transesophageal echocardiogram (ZACH) on 07/10/2023 with Dr. Carlyn Antony   Existing Precautions/Restrictions fall;cardiac;sternal;oxygen therapy device and L/min  (8 L O2 oxymizer, CT's and catheter, neck IV swan julisa)   Cognitive Status Examination   Orientation Status orientation to person, place and time   Affect/Mental Status (Cognitive) WFL   Follows Commands WFL   Visual Perception   Visual Impairment/Limitations corrective lenses full-time   Pain Assessment    Patient Currently in Pain   (7/10 sternal pain)   Range of Motion Comprehensive   Comment, General Range of Motion B UE AROM WFL   Strength Comprehensive (MMT)   Comment, General Manual Muscle Testing (MMT) Assessment weakness all over   Bed Mobility   Scooting/Bridging [place] (Bed Mobility) maximum assist (25% patient effort);2 person assist   Sit-Supine [place] (Bed Mobility) moderate assist (50% patient effort);2 person assist   Transfer Skill: Bed to Chair/Chair to Bed   Bed-Chair [place] (Transfers) minimum assist (75% patient effort);1 person to manage equipment   Sit-Stand Transfer   Sit-Stand [place] (Transfers) minimum assist (75% patient effort);1 person to manage equipment   Upper Body Dressing Assessment/Training   [place] Level (Upper Body Dressing) moderate assist (50% patient effort)   Lower Body Dressing Assessment/Training   [place] Level (Lower Body Dressing) moderate assist (50% patient effort)   Clinical Impression   Criteria for Skilled Therapeutic Interventions Met (OT) Yes, treatment indicated   OT Diagnosis impaired I with ADL's and functional mobility   OT Problem List-Impairments impacting ADL problems related to;activity tolerance impaired;balance;pain;post-surgical precautions   Assessment of Occupational Performance 3-5 Performance Deficits   Identified Performance Deficits impaired I with dressing, toileting, showering, working, driving, shopping, mowing lawn, etc   Planned Therapy Interventions (OT) ADL retraining;transfer training;home program guidelines;progressive activity/exercise;risk factor education   Clinical Decision Making Complexity (OT) moderate complexity   Risk & Benefits of therapy have been explained evaluation/treatment results reviewed;care plan/treatment goals reviewed;risks/benefits reviewed;current/potential barriers reviewed;participants voiced agreement with care plan;participants included;patient   OT Total Evaluation Time   OT  Eval, Moderate Complexity Minutes (19632) 10   OT Goals   Therapy Frequency (OT) 2 times/day   OT Predicted Duration/Target Date for Goal Attainment 07/17/23   OT Goals Upper Body Dressing;Lower Body Dressing;Bed Mobility;Toilet Transfer/Toileting;Cardiac Phase 1   OT: Upper Body Dressing Modified independent;within precautions   OT: Lower Body Dressing Modified independent;using adaptive equipment;within precautions   OT: Bed Mobility Modified independent;supine to/from sitting;rolling;within precautions   OT: Toilet Transfer/Toileting Modified independent;toilet transfer;cleaning and garment management;using adaptive equipment;within precautions   OT: Understanding of cardiac education to maximize quality of life, condition management, and health outcomes Patient;Verbalize  (post CV surgery ed)   OT: Perform aerobic activity with stable cardiovascular response continuous;15 minutes;ambulation;treadmill   OT: Functional/aerobic ambulation tolerance with stable cardiovascular response in order to return to home and community environment Modified independent;Independent;Greater than 300 feet  (with AD as needed)   OT: Navigation of stairs simulating home set up with stable cardiovascular response in order to return to home and community environment Modified independent;Independent;Greater than 10 stairs  (7-15 stairs)   Self-Care/Home Management   Self-Care/Home Mgmt/ADL, Compensatory, Meal Prep Minutes (21840) 10   Treatment Detail/Skilled Intervention OT: initiated ed in post CV surgery info, see below   Therapeutic Procedures/Exercise   Therapeutic Procedure: strength, endurance, ROM, flexibillity minutes (44944) 5   Symptoms Noted During/After Treatment fatigue;increased pain   Treatment Detail/Skilled Intervention OT: pt completed 1 standing CVC march in place ex 10 reps with CGA for safety, pt fatigued from being in chair and ready to lay down.   Treatment Time Includes (CR Only) Monitoring of vital signs (see  vital signs flowsheet for details)   Therapeutic Activities   Therapeutic Activity Minutes (66706) 13   Symptoms noted during/after treatment fatigue;increased pain   Treatment Detail/Skilled Intervention OT: sit <> stand with CGA/ min A, transfers to EOB with CGA/MIN A for safety with cues for tech, sit to supine with MOD A x 2 with cues for log roll tech and increased sternal pain cues for PLB to A with decreasing pain. prior to mobility pt reports 7/10 pain. max A x2 to scoot to HOB.   Calisthenics   Type March in place   Workload standing 10 reps   Effort Scale 5   Symptoms Fatigue;Incisional pain   Cardiovascular Response Normal   Vital Signs Details seev sf low sheet   Cardiac Education   Education Provided Outpatient Cardiac Rehab;Precautions   Education Packet Given to Patient No   All Patient Education Handouts Reviewed with Patient and/or Family No   OT Discharge Planning   OT Plan OT plan: bed mobility, functional transfers, timed ambulation when ready, maybe ww?, bring CV sx handouts and start ed   OT Discharge Recommendation (DC Rec) home with assist;home with outpatient cardiac rehab   OT Rationale for DC Rec Prior to admit, pt I with all ADl/IADL's and works full time as a manager at [employer]. pt currently limited due to sternal pain and precautions, impaired activity tolerance, balance, and strength. anticipate will be able to return home with cont'd therapy and medical mgmt pt will be able to return home with family A with ADL.IADl's as needed, may require A with dressing, showering, community mobility, mowing lawn, shopping, etc. and OP CR at [facility] for monitored progressive exericse and risk factor ed and modfication   OT Brief overview of current status sit <> stand and transfer to EOB with CGA/ min A x 1-2, sit to supine with mod A x 2, dressing with mod A. vitals appeared stable.   Total Session Time   Timed Code Treatment Minutes 28   Total Session Time (sum of timed and untimed  services) 38

## 2023-07-13 ENCOUNTER — APPOINTMENT (OUTPATIENT)
Dept: OCCUPATIONAL THERAPY | Facility: CLINIC | Age: 58
End: 2023-07-13
Attending: SURGERY
Payer: COMMERCIAL

## 2023-07-13 LAB
ALBUMIN SERPL BCG-MCNC: 3.7 G/DL (ref 3.5–5.2)
ALP SERPL-CCNC: 52 U/L (ref 40–129)
ALT SERPL W P-5'-P-CCNC: 20 U/L (ref 0–70)
ANION GAP SERPL CALCULATED.3IONS-SCNC: 12 MMOL/L (ref 7–15)
AST SERPL W P-5'-P-CCNC: 30 U/L (ref 0–45)
BILIRUB SERPL-MCNC: 0.6 MG/DL
BUN SERPL-MCNC: 21.2 MG/DL (ref 8–23)
CA-I BLD-MCNC: 4.5 MG/DL (ref 4.4–5.2)
CALCIUM SERPL-MCNC: 8.6 MG/DL (ref 8.8–10.2)
CHLORIDE SERPL-SCNC: 99 MMOL/L (ref 98–107)
CREAT SERPL-MCNC: 0.69 MG/DL (ref 0.67–1.17)
DEPRECATED HCO3 PLAS-SCNC: 24 MMOL/L (ref 22–29)
ERYTHROCYTE [DISTWIDTH] IN BLOOD BY AUTOMATED COUNT: 14.3 % (ref 10–15)
ERYTHROCYTE [DISTWIDTH] IN BLOOD BY AUTOMATED COUNT: 14.4 % (ref 10–15)
GFR SERPL CREATININE-BSD FRML MDRD: >90 ML/MIN/1.73M2
GLUCOSE BLDC GLUCOMTR-MCNC: 135 MG/DL (ref 70–99)
GLUCOSE BLDC GLUCOMTR-MCNC: 140 MG/DL (ref 70–99)
GLUCOSE BLDC GLUCOMTR-MCNC: 169 MG/DL (ref 70–99)
GLUCOSE BLDC GLUCOMTR-MCNC: 178 MG/DL (ref 70–99)
GLUCOSE SERPL-MCNC: 142 MG/DL (ref 70–99)
HCT VFR BLD AUTO: 28.1 % (ref 40–53)
HCT VFR BLD AUTO: 30.3 % (ref 40–53)
HGB BLD-MCNC: 9.1 G/DL (ref 13.3–17.7)
HGB BLD-MCNC: 9.8 G/DL (ref 13.3–17.7)
INR PPP: 1.15 (ref 0.85–1.15)
MAGNESIUM SERPL-MCNC: 1.9 MG/DL (ref 1.7–2.3)
MCH RBC QN AUTO: 29 PG (ref 26.5–33)
MCH RBC QN AUTO: 29 PG (ref 26.5–33)
MCHC RBC AUTO-ENTMCNC: 32.3 G/DL (ref 31.5–36.5)
MCHC RBC AUTO-ENTMCNC: 32.4 G/DL (ref 31.5–36.5)
MCV RBC AUTO: 90 FL (ref 78–100)
MCV RBC AUTO: 90 FL (ref 78–100)
PHOSPHATE SERPL-MCNC: 1.9 MG/DL (ref 2.5–4.5)
PHOSPHATE SERPL-MCNC: 2.8 MG/DL (ref 2.5–4.5)
PLATELET # BLD AUTO: 145 10E3/UL (ref 150–450)
PLATELET # BLD AUTO: 175 10E3/UL (ref 150–450)
POTASSIUM SERPL-SCNC: 3.8 MMOL/L (ref 3.4–5.3)
PROT SERPL-MCNC: 6.1 G/DL (ref 6.4–8.3)
RBC # BLD AUTO: 3.14 10E6/UL (ref 4.4–5.9)
RBC # BLD AUTO: 3.38 10E6/UL (ref 4.4–5.9)
SODIUM SERPL-SCNC: 135 MMOL/L (ref 136–145)
UFH PPP CHRO-ACNC: <0.1 IU/ML
WBC # BLD AUTO: 10.5 10E3/UL (ref 4–11)
WBC # BLD AUTO: 12.4 10E3/UL (ref 4–11)

## 2023-07-13 PROCEDURE — 250N000013 HC RX MED GY IP 250 OP 250 PS 637: Performed by: PHYSICIAN ASSISTANT

## 2023-07-13 PROCEDURE — 94799 UNLISTED PULMONARY SVC/PX: CPT

## 2023-07-13 PROCEDURE — 84100 ASSAY OF PHOSPHORUS: CPT | Performed by: PHYSICIAN ASSISTANT

## 2023-07-13 PROCEDURE — 85610 PROTHROMBIN TIME: CPT | Performed by: PHYSICIAN ASSISTANT

## 2023-07-13 PROCEDURE — 36415 COLL VENOUS BLD VENIPUNCTURE: CPT | Performed by: SURGERY

## 2023-07-13 PROCEDURE — 93005 ELECTROCARDIOGRAM TRACING: CPT

## 2023-07-13 PROCEDURE — 250N000011 HC RX IP 250 OP 636: Mod: JZ | Performed by: PHYSICIAN ASSISTANT

## 2023-07-13 PROCEDURE — 250N000013 HC RX MED GY IP 250 OP 250 PS 637: Performed by: SURGERY

## 2023-07-13 PROCEDURE — 97530 THERAPEUTIC ACTIVITIES: CPT | Mod: GO | Performed by: OCCUPATIONAL THERAPIST

## 2023-07-13 PROCEDURE — 250N000009 HC RX 250: Performed by: SURGERY

## 2023-07-13 PROCEDURE — 258N000003 HC RX IP 258 OP 636: Performed by: SURGERY

## 2023-07-13 PROCEDURE — 83735 ASSAY OF MAGNESIUM: CPT | Performed by: PHYSICIAN ASSISTANT

## 2023-07-13 PROCEDURE — 250N000011 HC RX IP 250 OP 636: Mod: JZ | Performed by: SURGERY

## 2023-07-13 PROCEDURE — 97110 THERAPEUTIC EXERCISES: CPT | Mod: GO | Performed by: OCCUPATIONAL THERAPIST

## 2023-07-13 PROCEDURE — 999N000157 HC STATISTIC RCP TIME EA 10 MIN

## 2023-07-13 PROCEDURE — 36415 COLL VENOUS BLD VENIPUNCTURE: CPT | Performed by: PHYSICIAN ASSISTANT

## 2023-07-13 PROCEDURE — 82330 ASSAY OF CALCIUM: CPT | Performed by: PHYSICIAN ASSISTANT

## 2023-07-13 PROCEDURE — 93010 ELECTROCARDIOGRAM REPORT: CPT | Performed by: INTERNAL MEDICINE

## 2023-07-13 PROCEDURE — 85027 COMPLETE CBC AUTOMATED: CPT | Performed by: PHYSICIAN ASSISTANT

## 2023-07-13 PROCEDURE — 120N000001 HC R&B MED SURG/OB

## 2023-07-13 PROCEDURE — 84100 ASSAY OF PHOSPHORUS: CPT | Performed by: SURGERY

## 2023-07-13 PROCEDURE — 85014 HEMATOCRIT: CPT | Performed by: PHYSICIAN ASSISTANT

## 2023-07-13 PROCEDURE — 85520 HEPARIN ASSAY: CPT | Performed by: SURGERY

## 2023-07-13 PROCEDURE — 80053 COMPREHEN METABOLIC PANEL: CPT | Performed by: PHYSICIAN ASSISTANT

## 2023-07-13 PROCEDURE — 250N000012 HC RX MED GY IP 250 OP 636 PS 637: Performed by: PHYSICIAN ASSISTANT

## 2023-07-13 RX ORDER — MAGNESIUM SULFATE HEPTAHYDRATE 40 MG/ML
2 INJECTION, SOLUTION INTRAVENOUS ONCE
Status: COMPLETED | OUTPATIENT
Start: 2023-07-13 | End: 2023-07-13

## 2023-07-13 RX ORDER — POTASSIUM CHLORIDE 1.5 G/1.58G
20 POWDER, FOR SOLUTION ORAL ONCE
Status: COMPLETED | OUTPATIENT
Start: 2023-07-13 | End: 2023-07-13

## 2023-07-13 RX ORDER — FUROSEMIDE 10 MG/ML
40 INJECTION INTRAMUSCULAR; INTRAVENOUS ONCE
Status: COMPLETED | OUTPATIENT
Start: 2023-07-13 | End: 2023-07-13

## 2023-07-13 RX ORDER — AMIODARONE HYDROCHLORIDE 200 MG/1
400 TABLET ORAL 2 TIMES DAILY
Status: DISCONTINUED | OUTPATIENT
Start: 2023-07-13 | End: 2023-07-16

## 2023-07-13 RX ORDER — HEPARIN SODIUM 10000 [USP'U]/100ML
0-5000 INJECTION, SOLUTION INTRAVENOUS CONTINUOUS
Status: DISCONTINUED | OUTPATIENT
Start: 2023-07-13 | End: 2023-07-17

## 2023-07-13 RX ORDER — WARFARIN SODIUM 3 MG/1
6 TABLET ORAL
Status: COMPLETED | OUTPATIENT
Start: 2023-07-13 | End: 2023-07-13

## 2023-07-13 RX ADMIN — OXYCODONE HYDROCHLORIDE 10 MG: 5 TABLET ORAL at 17:33

## 2023-07-13 RX ADMIN — HEPARIN SODIUM 1200 UNITS/HR: 10000 INJECTION, SOLUTION INTRAVENOUS at 11:51

## 2023-07-13 RX ADMIN — OXYCODONE HYDROCHLORIDE 10 MG: 5 TABLET ORAL at 12:23

## 2023-07-13 RX ADMIN — HEPARIN SODIUM 5000 UNITS: 5000 INJECTION, SOLUTION INTRAVENOUS; SUBCUTANEOUS at 03:05

## 2023-07-13 RX ADMIN — FUROSEMIDE 20 MG: 10 INJECTION, SOLUTION INTRAMUSCULAR; INTRAVENOUS at 17:37

## 2023-07-13 RX ADMIN — SODIUM PHOSPHATE, MONOBASIC, MONOHYDRATE AND SODIUM PHOSPHATE, DIBASIC, ANHYDROUS 15 MMOL: 142; 276 INJECTION, SOLUTION INTRAVENOUS at 09:00

## 2023-07-13 RX ADMIN — OXYCODONE HYDROCHLORIDE 10 MG: 5 TABLET ORAL at 04:42

## 2023-07-13 RX ADMIN — HYDROMORPHONE HYDROCHLORIDE 0.2 MG: 0.2 INJECTION, SOLUTION INTRAMUSCULAR; INTRAVENOUS; SUBCUTANEOUS at 00:15

## 2023-07-13 RX ADMIN — INSULIN ASPART 1 UNITS: 100 INJECTION, SOLUTION INTRAVENOUS; SUBCUTANEOUS at 08:26

## 2023-07-13 RX ADMIN — SENNOSIDES AND DOCUSATE SODIUM 1 TABLET: 50; 8.6 TABLET ORAL at 20:54

## 2023-07-13 RX ADMIN — SENNOSIDES AND DOCUSATE SODIUM 1 TABLET: 50; 8.6 TABLET ORAL at 08:18

## 2023-07-13 RX ADMIN — HEPARIN SODIUM 1500 UNITS/HR: 10000 INJECTION, SOLUTION INTRAVENOUS at 20:14

## 2023-07-13 RX ADMIN — ACETAMINOPHEN 650 MG: 325 TABLET, FILM COATED ORAL at 17:33

## 2023-07-13 RX ADMIN — ACETAMINOPHEN 650 MG: 325 TABLET, FILM COATED ORAL at 21:53

## 2023-07-13 RX ADMIN — HYDRALAZINE HYDROCHLORIDE 10 MG: 20 INJECTION INTRAMUSCULAR; INTRAVENOUS at 02:23

## 2023-07-13 RX ADMIN — HYDRALAZINE HYDROCHLORIDE 10 MG: 20 INJECTION INTRAMUSCULAR; INTRAVENOUS at 03:05

## 2023-07-13 RX ADMIN — ACETAMINOPHEN 975 MG: 325 TABLET, FILM COATED ORAL at 11:56

## 2023-07-13 RX ADMIN — AMIODARONE HYDROCHLORIDE 400 MG: 200 TABLET ORAL at 20:54

## 2023-07-13 RX ADMIN — ACETAMINOPHEN 975 MG: 325 TABLET, FILM COATED ORAL at 03:05

## 2023-07-13 RX ADMIN — NICOTINE 1 PATCH: 14 PATCH, EXTENDED RELEASE TRANSDERMAL at 08:18

## 2023-07-13 RX ADMIN — ATORVASTATIN CALCIUM 40 MG: 40 TABLET, FILM COATED ORAL at 20:54

## 2023-07-13 RX ADMIN — POLYETHYLENE GLYCOL 3350 17 G: 17 POWDER, FOR SOLUTION ORAL at 08:14

## 2023-07-13 RX ADMIN — INSULIN ASPART 1 UNITS: 100 INJECTION, SOLUTION INTRAVENOUS; SUBCUTANEOUS at 18:37

## 2023-07-13 RX ADMIN — FUROSEMIDE 20 MG: 10 INJECTION, SOLUTION INTRAMUSCULAR; INTRAVENOUS at 08:28

## 2023-07-13 RX ADMIN — AMIODARONE HYDROCHLORIDE 400 MG: 200 TABLET ORAL at 08:18

## 2023-07-13 RX ADMIN — POTASSIUM CHLORIDE 20 MEQ: 1.5 POWDER, FOR SOLUTION ORAL at 06:52

## 2023-07-13 RX ADMIN — METOPROLOL TARTRATE 12.5 MG: 25 TABLET, FILM COATED ORAL at 20:54

## 2023-07-13 RX ADMIN — MAGNESIUM SULFATE HEPTAHYDRATE 2 G: 40 INJECTION, SOLUTION INTRAVENOUS at 06:52

## 2023-07-13 RX ADMIN — FUROSEMIDE 40 MG: 10 INJECTION, SOLUTION INTRAVENOUS at 08:28

## 2023-07-13 RX ADMIN — LIDOCAINE 1 PATCH: 560 PATCH PERCUTANEOUS; TOPICAL; TRANSDERMAL at 08:15

## 2023-07-13 RX ADMIN — OXYCODONE HYDROCHLORIDE 10 MG: 5 TABLET ORAL at 00:11

## 2023-07-13 RX ADMIN — ASPIRIN 81 MG 81 MG: 81 TABLET ORAL at 08:18

## 2023-07-13 RX ADMIN — PANTOPRAZOLE SODIUM 40 MG: 40 TABLET, DELAYED RELEASE ORAL at 08:18

## 2023-07-13 RX ADMIN — METOPROLOL TARTRATE 12.5 MG: 25 TABLET, FILM COATED ORAL at 09:35

## 2023-07-13 RX ADMIN — METHOCARBAMOL 750 MG: 750 TABLET ORAL at 04:42

## 2023-07-13 RX ADMIN — OXYCODONE HYDROCHLORIDE 10 MG: 5 TABLET ORAL at 21:52

## 2023-07-13 RX ADMIN — METHOCARBAMOL 750 MG: 750 TABLET ORAL at 21:00

## 2023-07-13 RX ADMIN — WARFARIN SODIUM 6 MG: 3 TABLET ORAL at 17:34

## 2023-07-13 RX ADMIN — SODIUM PHOSPHATE, MONOBASIC, MONOHYDRATE AND SODIUM PHOSPHATE, DIBASIC, ANHYDROUS 15 MMOL: 142; 276 INJECTION, SOLUTION INTRAVENOUS at 06:52

## 2023-07-13 RX ADMIN — OXYCODONE HYDROCHLORIDE 10 MG: 5 TABLET ORAL at 08:27

## 2023-07-13 ASSESSMENT — ACTIVITIES OF DAILY LIVING (ADL)
ADLS_ACUITY_SCORE: 25

## 2023-07-13 NOTE — PROGRESS NOTES
Patient alert and oriented. Neuro intact. Pain controlled with oxy 10mg. He was in afib this AM and converted to SR at 1000. BP WNL. Chest tubes and wires taken out today. On a heparin drip. IS to 1000, a lot of encouragement needed. Oxygen weaned down to 2L NC from HFNC 50% 35L. Chest and leg incisions look good. Good urine output, red/purple colored from cyano kit. Report called to [facility].

## 2023-07-13 NOTE — PROGRESS NOTES
On HFNC overnight. FiO2 45%, 30 lpm with SpO2 >96%. Skin clean dry intact. Lung sounds diminished bilaterally.    Will continue to monitor.  Deejay Nicholas, RT

## 2023-07-13 NOTE — PROVIDER NOTIFICATION
Pt in Afib w/CVR, verified via 12 lead EKG. BP stable, on heparin subcutaneous bridging to coumadin. Slight sob at rest, fio2 increased slightly via HFNC. Dr Larson informed. Instructed to monitor for reasons above.

## 2023-07-13 NOTE — PLAN OF CARE
Goal Outcome Evaluation: Pt rested for short intervals t/o noc. Cont to be dyspnic w/exertion, diaphoretic et hot. Sats/HR remain stable w/activity. Pt up to bsc several times to attempt to have BM. Bowel meds ordered. Did void s/p catheter removal. Urine remains blue/purple in color r/t cyano kit. Lungs with crackles. Performs incentive spitrometry et ez pap independantly. 600cc uop fm 20mg lasix. Pt has difficulty voiding via urinal r/t anatomy. Unable to place external cath for same reason. Pt very activity intolerant. Pt  receiving prn oxy/robaxin for pain. Increasing c/o chest tube pain. Did encourage pt to take prn dilaudid x 1. Appears component of internal anxiety factoring into pain. But, pt denies et denies need for alternate medication. Replacing all electrolytes as ordered. Pt now in controlled afib-see provider notification note. Wife, Constance, home for the noc. Participatory, et asks appropriate questions. Report to day RN. Cont current care.

## 2023-07-13 NOTE — PROGRESS NOTES
[facility]  Cardiovascular and Thoracic Surgery Daily Note      Assessment and Plan  POD # 3 s/p aortic valve replacement with a 23 mm On-X mechanical valve, coronary artery bypass grafting x3 with left internal mammary artery to the distal left anterior descending, reverse saphenous vein graft to the acute marginal branch of the right coronary artery (RCA), reverse saphenous vein graft to the obtuse marginal artery, endoscopic vein harvest from the left lower extremity, intraoperative transesophageal echocardiogram (ZACH) on 07/10/2023 with Dr. Carlyn Antony    - CVS: Pre-op TTE with preserved biventricular function. Profound postop vasoplegic shock, improving. Cyanokit POD0. Lactic acidosis resolved. Continue warfarin to INR goal 2-3 x 3 months (through 10/12), then decrease to 1.5-2.0 thereafter with OnX mechanical valve. Aspirin 81 mg daily, change PTA simvastatin to atorvastatin 40 mg daily. Chest tubes: output 300<530<630, no air leak, will remove wires/tubes this am, coumadin at noon today, start heparin gtt, went into a.fib CVR start oral amio, liberalize blood pressure parameters to 140 mmHg    - Resp: Extubated within 24 hours. IS, pulmonary toilet, respiratory status variable, on HFNC, hopeful with CT removal that he will obtain greater lung volumes     - Neuro: Grossly intact, pain controlled on current regimen    - Renal: No history of significant renal disease. Cr stable WNL. Trend BMP. Diuretic/volume as above.   Recent Labs   Lab 07/13/23  0459 07/12/23  0423 07/11/23  0311   CR 0.69 0.64* 0.98       - GI: -BM, continue bowel regimen    - : Voiding on own    - Endo: DMT2, well controlled. Sliding scale insulin   Hemoglobin A1C   Date Value Ref Range Status   06/12/2023 6.7 (H) <5.7 % Final     Comment:     Normal <5.7%   Prediabetes 5.7-6.4%    Diabetes 6.5% or higher     Note: Adopted from ADA consensus guidelines.   06/15/2021 7.3 (H) 0 - 5.6 % Final     Comment:     Normal <5.7%  Prediabetes 5.7-6.4%  Diabetes 6.5% or higher - adopted from ADA   consensus guidelines.          - FEN: Replace electrolytes as needed. ADAT    - ID: Postop leukocytosis, improving. Tmax Temp (24hrs), Av.9  F (37.2  C), Min:97.9  F (36.6  C), Max:99.8  F (37.7  C). Periop abx completed. Trend CBC. Blood and urine cultures obtained  due to persistent fevers with vasodilatory shock   Recent Labs   Lab 23  0459 23  0423 23  0311   WBC 10.5 8.5 16.1*       - Heme: Acute blood loss anemia and due to surgery. Hgb and PLT stabl. Trend CBC, transfuse PRN. Coumadin for mechanical valve, INR: 1.15<1.26, give early today and start heparin gtt  Recent Labs   Lab 23  0459 23  0423 23  0311   HGB 9.1* 8.6* 11.6*   * 117* 226       - Proph: SCD, subcutaneous heparin, PPI    - Dispo: ICU      Interval History  Restless overnight, trouble urinating, had BM, respiratory issues, went into a.fib      Medications    acetaminophen  975 mg Oral Q8H     amiodarone  400 mg Oral BID     aspirin  81 mg Oral or NG Tube Daily     atorvastatin  40 mg Oral QPM     furosemide  20 mg Intravenous BID     heparin ANTICOAGULANT  5,000 Units Subcutaneous Q8H     insulin aspart  1-7 Units Subcutaneous TID AC     insulin aspart  1-5 Units Subcutaneous At Bedtime     Lidocaine  1 patch Transdermal Q24H     lidocaine   Transdermal Q8H FANTA     magnesium sulfate  2 g Intravenous Once     nicotine  1 patch Transdermal Daily     nicotine   Transdermal Q8H     pantoprazole  40 mg Oral or NG Tube Daily    Or     pantoprazole  40 mg Oral Daily     polyethylene glycol  17 g Oral Daily     senna-docusate  1 tablet Oral BID     sodium chloride (PF)  3 mL Intracatheter Q8H     sodium phosphate  15 mmol Intravenous Q2H     Warfarin Therapy Reminder  1 each Oral See Admin Instructions     acetaminophen, albuterol, albuterol, alum & mag hydroxide-simethicone, bisacodyl, calcium carbonate, calcium gluconate, calcium  "gluconate, calcium gluconate, dextrose, glucose **OR** dextrose **OR** glucagon, hydrALAZINE, HYDROmorphone **OR** HYDROmorphone, hydrOXYzine, lidocaine 4%, lidocaine (buffered or not buffered), magnesium hydroxide, melatonin, methocarbamol, naloxone **OR** naloxone **OR** naloxone **OR** naloxone, ondansetron **OR** ondansetron, oxyCODONE **OR** oxyCODONE, prochlorperazine **OR** prochlorperazine, BETA BLOCKER NOT PRESCRIBED, sodium chloride (PF)      Physical Exam  Vitals were reviewed  Blood pressure 108/70, pulse 90, temperature 99  F (37.2  C), temperature source Axillary, resp. rate 23, height 1.651 m (5' 5\"), weight 113.4 kg (250 lb), SpO2 97 %.  Rhythm: NSR    Lungs: diminished bases    Cardiovascular: rrr, no m/r/g    Abdomen: soft, NT, ND, +BS    Extremeties: warm, no LE edema    Incision: CDI    CT: serosang output 300<530<630 mL, no air leak    Weight:   Vitals:    07/10/23 0945 07/11/23 0545 07/12/23 0515 07/13/23 0430   Weight: 108.4 kg (239 lb) 112.1 kg (247 lb 2.2 oz) 113.6 kg (250 lb 7.1 oz) 113.4 kg (250 lb)         Data  Recent Labs   Lab 07/13/23  0459 07/12/23  2210 07/12/23  1716 07/12/23  0604 07/12/23  0423 07/11/23  1409 07/11/23  1403 07/11/23  0332 07/11/23  0311   WBC 10.5  --   --   --  8.5  --   --   --  16.1*   HGB 9.1*  --   --   --  8.6*  --   --   --  11.6*   MCV 90  --   --   --  90  --   --   --  89   *  --   --   --  117*  --   --   --  226   INR 1.15  --   --   --  1.26*  --  1.25*  --   --    *  --   --   --  138  --   --   --  137   POTASSIUM 3.8  --   --   --  3.9  --   --   --  4.3   CHLORIDE 99  --   --   --  106  --   --   --  104   CO2 24  --   --   --  24  --   --   --  19*   BUN 21.2  --   --   --  22.2  --   --   --  22.5   CR 0.69  --   --   --  0.64*  --   --   --  0.98   ANIONGAP 12  --   --   --  8  --   --   --  14   ARTEMIO 8.6*  --   --   --  8.6*  --   --   --  8.6*   * 167* 114*   < > 99   < >  --    < > 186*   ALBUMIN 3.7  --   --   --  3.7  " --   --    < > 3.7   PROTTOTAL 6.1*  --   --   --  5.7*  --   --    < > 5.8*   BILITOTAL 0.6  --   --   --  0.8  --   --    < > 0.3   ALKPHOS 52  --   --   --  37*  --   --    < > 46   ALT 20  --   --   --  18  --   --    < > 22   AST 30  --   --   --  40  --   --    < > 59*    < > = values in this interval not displayed.       Imaging:  No results found for this or any previous visit (from the past 24 hour(s)).      Patient seen and discussed with Dr. Sudarshan Vega PA-C  Cardiothoracic Surgery  Pager [phone]

## 2023-07-14 ENCOUNTER — APPOINTMENT (OUTPATIENT)
Dept: OCCUPATIONAL THERAPY | Facility: CLINIC | Age: 58
End: 2023-07-14
Attending: SURGERY
Payer: COMMERCIAL

## 2023-07-14 ENCOUNTER — APPOINTMENT (OUTPATIENT)
Dept: GENERAL RADIOLOGY | Facility: CLINIC | Age: 58
End: 2023-07-14
Attending: PHYSICIAN ASSISTANT
Payer: COMMERCIAL

## 2023-07-14 LAB
ALBUMIN SERPL BCG-MCNC: 3.6 G/DL (ref 3.5–5.2)
ALP SERPL-CCNC: 58 U/L (ref 40–129)
ALT SERPL W P-5'-P-CCNC: 29 U/L (ref 0–70)
ANION GAP SERPL CALCULATED.3IONS-SCNC: 9 MMOL/L (ref 7–15)
AST SERPL W P-5'-P-CCNC: 24 U/L (ref 0–45)
ATRIAL RATE - MUSE: 115 BPM
BILIRUB SERPL-MCNC: 0.6 MG/DL
BUN SERPL-MCNC: 23.1 MG/DL (ref 8–23)
CA-I BLD-MCNC: 4.3 MG/DL (ref 4.4–5.2)
CA-I BLD-MCNC: 4.4 MG/DL (ref 4.4–5.2)
CA-I BLD-MCNC: 4.4 MG/DL (ref 4.4–5.2)
CALCIUM SERPL-MCNC: 8.5 MG/DL (ref 8.8–10.2)
CHLORIDE SERPL-SCNC: 98 MMOL/L (ref 98–107)
CREAT SERPL-MCNC: 0.65 MG/DL (ref 0.67–1.17)
DEPRECATED HCO3 PLAS-SCNC: 28 MMOL/L (ref 22–29)
DIASTOLIC BLOOD PRESSURE - MUSE: NORMAL MMHG
ERYTHROCYTE [DISTWIDTH] IN BLOOD BY AUTOMATED COUNT: 14.2 % (ref 10–15)
GFR SERPL CREATININE-BSD FRML MDRD: >90 ML/MIN/1.73M2
GLUCOSE BLDC GLUCOMTR-MCNC: 141 MG/DL (ref 70–99)
GLUCOSE BLDC GLUCOMTR-MCNC: 145 MG/DL (ref 70–99)
GLUCOSE BLDC GLUCOMTR-MCNC: 170 MG/DL (ref 70–99)
GLUCOSE BLDC GLUCOMTR-MCNC: 171 MG/DL (ref 70–99)
GLUCOSE SERPL-MCNC: 131 MG/DL (ref 70–99)
HCT VFR BLD AUTO: 26.3 % (ref 40–53)
HGB BLD-MCNC: 8.6 G/DL (ref 13.3–17.7)
INR PPP: 1.12 (ref 0.85–1.15)
INTERPRETATION ECG - MUSE: NORMAL
MAGNESIUM SERPL-MCNC: 2.2 MG/DL (ref 1.7–2.3)
MCH RBC QN AUTO: 29.1 PG (ref 26.5–33)
MCHC RBC AUTO-ENTMCNC: 32.7 G/DL (ref 31.5–36.5)
MCV RBC AUTO: 89 FL (ref 78–100)
P AXIS - MUSE: NORMAL DEGREES
PHOSPHATE SERPL-MCNC: 2.3 MG/DL (ref 2.5–4.5)
PLATELET # BLD AUTO: 174 10E3/UL (ref 150–450)
POTASSIUM SERPL-SCNC: 3.6 MMOL/L (ref 3.4–5.3)
PR INTERVAL - MUSE: NORMAL MS
PROT SERPL-MCNC: 6.2 G/DL (ref 6.4–8.3)
QRS DURATION - MUSE: 104 MS
QT - MUSE: 386 MS
QTC - MUSE: 485 MS
R AXIS - MUSE: 52 DEGREES
RBC # BLD AUTO: 2.96 10E6/UL (ref 4.4–5.9)
SODIUM SERPL-SCNC: 135 MMOL/L (ref 136–145)
SYSTOLIC BLOOD PRESSURE - MUSE: NORMAL MMHG
T AXIS - MUSE: -19 DEGREES
UFH PPP CHRO-ACNC: 0.13 IU/ML
UFH PPP CHRO-ACNC: 0.16 IU/ML
UFH PPP CHRO-ACNC: 0.18 IU/ML
UFH PPP CHRO-ACNC: 0.28 IU/ML
VENTRICULAR RATE- MUSE: 95 BPM
WBC # BLD AUTO: 9.6 10E3/UL (ref 4–11)

## 2023-07-14 PROCEDURE — 36415 COLL VENOUS BLD VENIPUNCTURE: CPT | Performed by: PHYSICIAN ASSISTANT

## 2023-07-14 PROCEDURE — 82330 ASSAY OF CALCIUM: CPT | Performed by: SURGERY

## 2023-07-14 PROCEDURE — 97110 THERAPEUTIC EXERCISES: CPT | Mod: GO | Performed by: OCCUPATIONAL THERAPIST

## 2023-07-14 PROCEDURE — 250N000013 HC RX MED GY IP 250 OP 250 PS 637: Performed by: SURGERY

## 2023-07-14 PROCEDURE — 85520 HEPARIN ASSAY: CPT | Performed by: SURGERY

## 2023-07-14 PROCEDURE — 84100 ASSAY OF PHOSPHORUS: CPT | Performed by: PHYSICIAN ASSISTANT

## 2023-07-14 PROCEDURE — 250N000013 HC RX MED GY IP 250 OP 250 PS 637: Performed by: PHYSICIAN ASSISTANT

## 2023-07-14 PROCEDURE — 999N000065 XR CHEST 2 VIEWS

## 2023-07-14 PROCEDURE — 83735 ASSAY OF MAGNESIUM: CPT | Performed by: PHYSICIAN ASSISTANT

## 2023-07-14 PROCEDURE — 85027 COMPLETE CBC AUTOMATED: CPT | Performed by: PHYSICIAN ASSISTANT

## 2023-07-14 PROCEDURE — 85610 PROTHROMBIN TIME: CPT | Performed by: PHYSICIAN ASSISTANT

## 2023-07-14 PROCEDURE — 82330 ASSAY OF CALCIUM: CPT | Performed by: PHYSICIAN ASSISTANT

## 2023-07-14 PROCEDURE — 80053 COMPREHEN METABOLIC PANEL: CPT | Performed by: PHYSICIAN ASSISTANT

## 2023-07-14 PROCEDURE — 250N000011 HC RX IP 250 OP 636: Mod: JZ | Performed by: PHYSICIAN ASSISTANT

## 2023-07-14 PROCEDURE — 120N000001 HC R&B MED SURG/OB

## 2023-07-14 PROCEDURE — 36415 COLL VENOUS BLD VENIPUNCTURE: CPT | Performed by: SURGERY

## 2023-07-14 RX ORDER — POLYETHYLENE GLYCOL 3350 17 G/17G
17 POWDER, FOR SOLUTION ORAL 2 TIMES DAILY
Status: DISCONTINUED | OUTPATIENT
Start: 2023-07-14 | End: 2023-07-16

## 2023-07-14 RX ORDER — WARFARIN SODIUM 7.5 MG/1
7.5 TABLET ORAL
Status: DISCONTINUED | OUTPATIENT
Start: 2023-07-14 | End: 2023-07-14

## 2023-07-14 RX ORDER — AMOXICILLIN 250 MG
2 CAPSULE ORAL 2 TIMES DAILY
Status: DISCONTINUED | OUTPATIENT
Start: 2023-07-14 | End: 2023-07-16

## 2023-07-14 RX ORDER — WARFARIN SODIUM 5 MG/1
10 TABLET ORAL ONCE
Status: COMPLETED | OUTPATIENT
Start: 2023-07-14 | End: 2023-07-14

## 2023-07-14 RX ORDER — METOLAZONE 5 MG/1
5 TABLET ORAL ONCE
Status: COMPLETED | OUTPATIENT
Start: 2023-07-14 | End: 2023-07-14

## 2023-07-14 RX ORDER — POTASSIUM CHLORIDE 1500 MG/1
20 TABLET, EXTENDED RELEASE ORAL ONCE
Status: COMPLETED | OUTPATIENT
Start: 2023-07-14 | End: 2023-07-14

## 2023-07-14 RX ADMIN — HEPARIN SODIUM 1500 UNITS/HR: 10000 INJECTION, SOLUTION INTRAVENOUS at 01:09

## 2023-07-14 RX ADMIN — ACETAMINOPHEN 650 MG: 325 TABLET, FILM COATED ORAL at 05:56

## 2023-07-14 RX ADMIN — ASPIRIN 81 MG 81 MG: 81 TABLET ORAL at 09:18

## 2023-07-14 RX ADMIN — POTASSIUM & SODIUM PHOSPHATES POWDER PACK 280-160-250 MG 1 PACKET: 280-160-250 PACK at 13:17

## 2023-07-14 RX ADMIN — POTASSIUM & SODIUM PHOSPHATES POWDER PACK 280-160-250 MG 1 PACKET: 280-160-250 PACK at 17:42

## 2023-07-14 RX ADMIN — AMIODARONE HYDROCHLORIDE 400 MG: 200 TABLET ORAL at 20:31

## 2023-07-14 RX ADMIN — METOPROLOL TARTRATE 12.5 MG: 25 TABLET, FILM COATED ORAL at 09:18

## 2023-07-14 RX ADMIN — OXYCODONE HYDROCHLORIDE 10 MG: 5 TABLET ORAL at 09:17

## 2023-07-14 RX ADMIN — CALCIUM GLUCONATE 1 G: 20 INJECTION, SOLUTION INTRAVENOUS at 10:20

## 2023-07-14 RX ADMIN — CALCIUM GLUCONATE 1 G: 20 INJECTION, SOLUTION INTRAVENOUS at 17:36

## 2023-07-14 RX ADMIN — ATORVASTATIN CALCIUM 40 MG: 40 TABLET, FILM COATED ORAL at 20:32

## 2023-07-14 RX ADMIN — OXYCODONE HYDROCHLORIDE 10 MG: 5 TABLET ORAL at 13:17

## 2023-07-14 RX ADMIN — METHOCARBAMOL 750 MG: 750 TABLET ORAL at 23:19

## 2023-07-14 RX ADMIN — PANTOPRAZOLE SODIUM 40 MG: 40 TABLET, DELAYED RELEASE ORAL at 09:18

## 2023-07-14 RX ADMIN — HEPARIN SODIUM 1650 UNITS/HR: 10000 INJECTION, SOLUTION INTRAVENOUS at 15:38

## 2023-07-14 RX ADMIN — AMIODARONE HYDROCHLORIDE 400 MG: 200 TABLET ORAL at 09:17

## 2023-07-14 RX ADMIN — POTASSIUM & SODIUM PHOSPHATES POWDER PACK 280-160-250 MG 1 PACKET: 280-160-250 PACK at 09:17

## 2023-07-14 RX ADMIN — POLYETHYLENE GLYCOL 3350 17 G: 17 POWDER, FOR SOLUTION ORAL at 20:35

## 2023-07-14 RX ADMIN — LIDOCAINE 1 PATCH: 560 PATCH PERCUTANEOUS; TOPICAL; TRANSDERMAL at 09:16

## 2023-07-14 RX ADMIN — INSULIN ASPART 1 UNITS: 100 INJECTION, SOLUTION INTRAVENOUS; SUBCUTANEOUS at 13:09

## 2023-07-14 RX ADMIN — POTASSIUM CHLORIDE 20 MEQ: 1500 TABLET, EXTENDED RELEASE ORAL at 09:18

## 2023-07-14 RX ADMIN — WARFARIN SODIUM 10 MG: 5 TABLET ORAL at 13:22

## 2023-07-14 RX ADMIN — INSULIN ASPART 1 UNITS: 100 INJECTION, SOLUTION INTRAVENOUS; SUBCUTANEOUS at 09:28

## 2023-07-14 RX ADMIN — ACETAMINOPHEN 650 MG: 325 TABLET, FILM COATED ORAL at 21:37

## 2023-07-14 RX ADMIN — SENNOSIDES AND DOCUSATE SODIUM 1 TABLET: 50; 8.6 TABLET ORAL at 09:17

## 2023-07-14 RX ADMIN — METHOCARBAMOL 750 MG: 750 TABLET ORAL at 09:18

## 2023-07-14 RX ADMIN — POLYETHYLENE GLYCOL 3350 17 G: 17 POWDER, FOR SOLUTION ORAL at 09:16

## 2023-07-14 RX ADMIN — OXYCODONE HYDROCHLORIDE 10 MG: 5 TABLET ORAL at 17:34

## 2023-07-14 RX ADMIN — NICOTINE 1 PATCH: 14 PATCH, EXTENDED RELEASE TRANSDERMAL at 09:16

## 2023-07-14 RX ADMIN — OXYCODONE HYDROCHLORIDE 10 MG: 5 TABLET ORAL at 21:36

## 2023-07-14 RX ADMIN — INSULIN ASPART 1 UNITS: 100 INJECTION, SOLUTION INTRAVENOUS; SUBCUTANEOUS at 17:40

## 2023-07-14 RX ADMIN — METOPROLOL TARTRATE 12.5 MG: 25 TABLET, FILM COATED ORAL at 20:31

## 2023-07-14 RX ADMIN — ACETAMINOPHEN 650 MG: 325 TABLET, FILM COATED ORAL at 14:37

## 2023-07-14 RX ADMIN — FUROSEMIDE 20 MG: 10 INJECTION, SOLUTION INTRAMUSCULAR; INTRAVENOUS at 09:18

## 2023-07-14 RX ADMIN — OXYCODONE HYDROCHLORIDE 10 MG: 5 TABLET ORAL at 02:48

## 2023-07-14 RX ADMIN — METOLAZONE 5 MG: 5 TABLET ORAL at 15:24

## 2023-07-14 RX ADMIN — SENNOSIDES AND DOCUSATE SODIUM 2 TABLET: 50; 8.6 TABLET ORAL at 20:31

## 2023-07-14 RX ADMIN — HYDROMORPHONE HYDROCHLORIDE 0.2 MG: 0.2 INJECTION, SOLUTION INTRAMUSCULAR; INTRAVENOUS; SUBCUTANEOUS at 05:56

## 2023-07-14 RX ADMIN — METHOCARBAMOL 750 MG: 750 TABLET ORAL at 17:34

## 2023-07-14 ASSESSMENT — ACTIVITIES OF DAILY LIVING (ADL)
ADLS_ACUITY_SCORE: 25
ADLS_ACUITY_SCORE: 26
ADLS_ACUITY_SCORE: 25
ADLS_ACUITY_SCORE: 26
ADLS_ACUITY_SCORE: 25
ADLS_ACUITY_SCORE: 26
ADLS_ACUITY_SCORE: 25
ADLS_ACUITY_SCORE: 25

## 2023-07-14 NOTE — PLAN OF CARE
Goal Outcome Evaluation:  DATE & TIME: 07/14/2023, [id number]   Cognitive Concerns/ Orientation : A & O times four  BEHAVIOR & AGGRESSION TOOL COLOR: calm  ABNL VS/O2: VSS, room air  MOBILITY: stand by assist, ambulated with cardiac rehab  PAIN MANAGMENT: Incisional pain, meds per order  DIET: Regular  BOWEL/BLADDER: Voids per urinal, and urine is still red it is not blood, it is medication related  ABNL LAB/BG: Ca+4.3, replacement per PRN done, plan to recheck lab, hgb=8.6,   Phosphorus 2.3, replacement done, plan to recheck level AM, JZN=395/  DRAIN/DEVICES: PIV infusing  TELEMETRY RHYTHM: Sinus with 1st degree AVB  SKIN: Incision  TESTS/PROCEDURES:   D/C DATE: plan discharge once INR therapeutic   OTHER IMPORTANT INFO: POD-4 CABG times 3, AVR.   On heparin gtt, bolus and increase rated done per protocol.   Plan to recheck hep 10 A = 9:44 PM. Received coumadin this shift. INR goal 2-3 Plan .  Continue to monitor.

## 2023-07-14 NOTE — PLAN OF CARE
Date & Time: 7/14 4752-9683  Diagnosis: CAD  Procedures: 7/10 - CABGx3, AVR  Orientation/Cognitive: AOx4  VS/O2: VSS, 3L O2 NC (weaning)  Mobility: SBA  Diet: Regular  Pain Management: PRN oxycodone, tylenol, robaxin, scheduled lidocaine patch  Bowel & Bladder: BM 7/13, continent  Skin: Sternal incision, harvest sites - CDI  Abnormal Labs: Na 135, phos 2.3 (replacing), , WBC 12.4, Hgb 9.8, hep 10a 0.16 (recheck @ 1500), K 3.6 (replaced, recheck in AM), Ca ionized 4.4 (replacing, recheck @ 1530), BUN 23.1, Cr 0.65  Tele: NSR  IV Access/Drips/Fluids: L PIV heparin @ 1650u/hr  Drains: NA  Tests: Chest xray   Consults: CV surgery  Discharge Plan: Pending  Other: Nicotine patch on L shoulder

## 2023-07-14 NOTE — PROGRESS NOTES
[facility]  Cardiovascular and Thoracic Surgery Daily Note      Assessment and Plan  POD # 4 s/p aortic valve replacement with a 23 mm On-X mechanical valve, coronary artery bypass grafting x3 with left internal mammary artery to the distal left anterior descending, reverse saphenous vein graft to the acute marginal branch of the right coronary artery (RCA), reverse saphenous vein graft to the obtuse marginal artery, endoscopic vein harvest from the left lower extremity, intraoperative transesophageal echocardiogram (ZACH) on 07/10/2023 with Dr. Carlyn Antony    - CVS: Pre-op TTE with preserved biventricular function. Profound postop vasoplegic shock, improving. Cyanokit POD0. Lactic acidosis resolved. Continue warfarin to INR goal 2-3 x 3 months (through 10/12), then decrease to 1.5-2.0 thereafter with OnX mechanical valve. Aspirin 81 mg daily, change PTA simvastatin to atorvastatin 40 mg daily. Chest tubes: na, coumadin at noon today, start heparin gtt, went into a.fib CVR start oral amio, liberalize blood pressure parameters to 140 mmHg, lasix 20 mg IVP this am then metolazone. Reassess daily.     - Resp: Extubated within 24 hours. IS, pulmonary toilet, respiratory status variable, on NC, wean as able    - Neuro: Grossly intact, pain controlled on current regimen    - Renal: No history of significant renal disease. Cr stable WNL. Trend BMP. Diuretic/volume as above.   Recent Labs   Lab 07/14/23  0819 07/13/23  0459 07/12/23  0423   CR 0.65* 0.69 0.64*       - GI: -BM, continue bowel regimen    - : Voiding on own    - Endo: DMT2, well controlled. Sliding scale insulin   Hemoglobin A1C   Date Value Ref Range Status   06/12/2023 6.7 (H) <5.7 % Final     Comment:     Normal <5.7%   Prediabetes 5.7-6.4%    Diabetes 6.5% or higher     Note: Adopted from ADA consensus guidelines.   06/15/2021 7.3 (H) 0 - 5.6 % Final     Comment:     Normal <5.7% Prediabetes 5.7-6.4%  Diabetes 6.5% or higher - adopted  Patient in for Well child check. Last visit for [facility] 7/2017. Will reschedule for 7/2018. from ADA   consensus guidelines.          - FEN: Replace electrolytes as needed. ADAT    - ID: Postop leukocytosis, improving. Tmax Temp (24hrs), Av  F (37.2  C), Min:98.4  F (36.9  C), Max:99.4  F (37.4  C). Periop abx completed. Trend CBC. Blood and urine cultures obtained  due to persistent fevers with vasodilatory shock   Recent Labs   Lab 23  0819 23  0909 23  0459   WBC 9.6 12.4* 10.5       - Heme: Acute blood loss anemia and due to surgery. Hgb and PLT stabl. Trend CBC, transfuse PRN. Coumadin for mechanical valve, INR: 1.12<1.15<1.26, give early today and inc dose and start heparin gtt  Recent Labs   Lab 23  0909 23  0459   HGB 8.6* 9.8* 9.1*    175 145*       - Proph: SCD, subcutaneous heparin, PPI    - Dispo: [facility], continue therapies, home 2-3 days once INR therapeutic     Interval History  Sitting up in chair, breathing stable although still requiring O2, pain controlled, improved since tube removal, tolerating diet, working with rehab      Medications    amiodarone  400 mg Oral BID     aspirin  81 mg Oral or NG Tube Daily     atorvastatin  40 mg Oral QPM     furosemide  20 mg Intravenous BID     heparin ANTICOAGULANT Loading dose  3,000 Units Intravenous Once     insulin aspart  1-7 Units Subcutaneous TID AC     insulin aspart  1-5 Units Subcutaneous At Bedtime     Lidocaine  1 patch Transdermal Q24H     lidocaine   Transdermal Q8H [facility]     metoprolol tartrate  12.5 mg Oral BID     nicotine  1 patch Transdermal Daily     nicotine   Transdermal Q8H     pantoprazole  40 mg Oral or NG Tube Daily    Or     pantoprazole  40 mg Oral Daily     polyethylene glycol  17 g Oral Daily     potassium & sodium phosphates  1 packet Oral or Feeding Tube Q4H     potassium chloride  20 mEq Oral Once     senna-docusate  1 tablet Oral BID     sodium chloride (PF)  3 mL Intracatheter Q8H     warfarin ANTICOAGULANT  7.5 mg Oral ONCE at 18:00     Warfarin Therapy  "Reminder  1 each Oral See Admin Instructions     acetaminophen, albuterol, albuterol, alum & mag hydroxide-simethicone, bisacodyl, calcium carbonate, calcium gluconate, calcium gluconate, calcium gluconate, dextrose, glucose **OR** dextrose **OR** glucagon, hydrALAZINE, HYDROmorphone **OR** HYDROmorphone, hydrOXYzine, lidocaine 4%, lidocaine (buffered or not buffered), magnesium hydroxide, melatonin, methocarbamol, naloxone **OR** naloxone **OR** naloxone **OR** naloxone, ondansetron **OR** ondansetron, oxyCODONE **OR** oxyCODONE, prochlorperazine **OR** prochlorperazine, BETA BLOCKER NOT PRESCRIBED, sodium chloride (PF)      Physical Exam  Vitals were reviewed  Blood pressure 112/72, pulse 77, temperature 99.4  F (37.4  C), temperature source Oral, resp. rate 16, height 1.651 m (5' 5\"), weight 113.4 kg (250 lb), SpO2 98 %.  Rhythm: NSR    Lungs: diminished bases    Cardiovascular: rrr, no m/r/g    Abdomen: soft, NT, ND, +BS    Extremeties: warm, trace LE edema    Incision: CDI    CT: na    Weight:   Vitals:    07/10/23 0945 07/11/23 0545 07/12/23 0515 07/13/23 0430   Weight: 108.4 kg (239 lb) 112.1 kg (247 lb 2.2 oz) 113.6 kg (250 lb 7.1 oz) 113.4 kg (250 lb)         Data  Recent Labs   Lab 07/14/23  0824 07/14/23  0819 07/13/23  2328 07/13/23  1146 07/13/23  0909 07/13/23  0813 07/13/23  0459 07/12/23  0604 07/12/23  0423   WBC  --  9.6  --   --  12.4*  --  10.5  --  8.5   HGB  --  8.6*  --   --  9.8*  --  9.1*  --  8.6*   MCV  --  89  --   --  90  --  90  --  90   PLT  --  174  --   --  175  --  145*  --  117*   INR  --  1.12  --   --   --   --  1.15  --  1.26*   NA  --  135*  --   --   --   --  135*  --  138   POTASSIUM  --  3.6  --   --   --   --  3.8  --  3.9   CHLORIDE  --  98  --   --   --   --  99  --  106   CO2  --  28  --   --   --   --  24  --  24   BUN  --  23.1*  --   --   --   --  21.2  --  22.2   CR  --  0.65*  --   --   --   --  0.69  --  0.64*   ANIONGAP  --  9  --   --   --   --  12  --  8   ARTEMIO  " --  8.5*  --   --   --   --  8.6*  --  8.6*   * 131* 135*   < >  --    < > 142*   < > 99   ALBUMIN  --  3.6  --   --   --   --  3.7  --  3.7   PROTTOTAL  --  6.2*  --   --   --   --  6.1*  --  5.7*   BILITOTAL  --  0.6  --   --   --   --  0.6  --  0.8   ALKPHOS  --  58  --   --   --   --  52  --  37*   ALT  --  29  --   --   --   --  20  --  18   AST  --  24  --   --   --   --  30  --  40    < > = values in this interval not displayed.       Imaging:  No results found for this or any previous visit (from the past 24 hour(s)).      Patient seen and discussed with Dr. Arpan Vega PAJanethC  Cardiothoracic Surgery  Pager [phone]

## 2023-07-14 NOTE — PLAN OF CARE
"[id number]    Pt arrived from ICU and settled ~1500 today  A&O x4. VSS on 2L O2 NC. Tele SR. Up in chair since arrival- up with assist x1 per report.   LS diminished, pulmonary toilet encouraged.   BS+, BM this morning.  Voiding- pt informed writer he wants to talk to a male provider tomorrow, when asking if writer could try to assist with a question he expressed concerns related to difficulty urinating without \"making a mess all over\"... suspect some possible scrotal edema making difficult for pt to use urinal, pt did not want writer to assess at this time.   Incisions WDL  CT site with dressing, faint drainage marked.  Oxycodone 10mg +tylenol given once for pain with improvement.    "

## 2023-07-14 NOTE — PLAN OF CARE
A&O x4. VSS on 5L O2 bled into home CPAP. Pt. SpO2 dropping down into low to mid 80s with activity and while sleeping. Tele SR.  assist x1 GB/W dyspnea on exertion noted.   BS+, BM this evening.  Voiding-in urinal at bedside (uriune continues to be purple/red r/t after effects of cyanokit)   Incisions WDL(midline sternal, old CT, LLE ankle, knee, and groin harvest sites)  CT site with dressing, faint drainage marked-no change.  Oxycodone 10mg x2 +tylenol x2, robaxin x1 and IV dilaudid x1 given for pain with improvement.  PIV to L hand hep gtt infusing at 1500units/hr   Hep XA @ 1851- <0.10 6,000unit bolus given and rate increased by 300 units. Hep XA @ 0300- 0.28 in range and redraw in for 0815

## 2023-07-14 NOTE — PROGRESS NOTES
"NUTRITION ASSESSMENT      REASON FOR ASSESSMENT:  Cardiac Surgery Nutrition Consult    CURRENT DIET / INTAKE:  Regular diet     Patient reports appetite has been \"fine\"  Last night son brought in [place] subs which he ate about ~50% of his sub when he'd usually finish it   For lunch today has soup and pudding   Today he asks some questions about CHO controlled and heart healthy diet which were discussed verbally with writer   Interested in trying Glucerna while IP     Recent Labs   Lab 07/14/23  1142 07/14/23  0824 07/14/23  0819 07/13/23  2328 07/13/23  1745 07/13/23  1146   * 145* 131* 135* 178* 140*     Lab Results   Component Value Date    A1C 6.7 06/12/2023    A1C 7.8 03/20/2023    A1C 7.2 09/16/2022    A1C 7.3 06/15/2021    A1C 6.3 10/16/2020    A1C 6.0 08/01/2019    A1C 5.9 02/05/2019    A1C 6.4 06/04/2018       ANTHROPOMETRICS:   Ht: 5' 5\"  Wt: 105.4 kg (239 lbs) 7/10  BMI: 40 kg/m^2  IBW: 61.8 kg   Weight Status: Obesity Grade III BMI >40  %IBW: 175%    MALNUTRITION:  Patient does not meet two of the following criteria necessary for diagnosing malnutrition:  significant weight loss, reduced intake, subcutaneous fat loss, muscle loss or fluid retention. Nutrition Focused Physical Assessment (NFPA) not appropriate at this time.    NUTRITION DIAGNOSIS:   Increased nutrient needs (protein/calories) related to increased demand for healing s/p cardiac surgery as evidenced by decreased intake post-op and need for nutrition supplements    INTERVENTIONS:    Nutrition Prescription:  Regular diet  Consider CHO controlled diet pending BGM     Implementation:  Nutrition Education (Content):  Discussed the importance of adequate nutrition post-op for healing and energy, emphasizing protein foods, and encouraged patient to order a protein food at each meal.    Goals:  Patient to consume ~75% at meals in the next 3 - 5 days    Follow Up/Monitoring (InPatient):  Food and Fluid intake -  Monitor for " adequacy    Follow Up/Monitoring (OutPatient):  Patient will participate in out-patient cardiac rehab and attend nutrition classes during the program      Denise Frank RD, LD  Clinical Dietitian   [facility]  Pager [phone]

## 2023-07-15 ENCOUNTER — APPOINTMENT (OUTPATIENT)
Dept: OCCUPATIONAL THERAPY | Facility: CLINIC | Age: 58
End: 2023-07-15
Attending: SURGERY
Payer: COMMERCIAL

## 2023-07-15 LAB
ALBUMIN SERPL BCG-MCNC: 3.7 G/DL (ref 3.5–5.2)
ALP SERPL-CCNC: 71 U/L (ref 40–129)
ALT SERPL W P-5'-P-CCNC: 40 U/L (ref 0–70)
ANION GAP SERPL CALCULATED.3IONS-SCNC: 10 MMOL/L (ref 7–15)
AST SERPL W P-5'-P-CCNC: 32 U/L (ref 0–45)
BILIRUB SERPL-MCNC: 0.5 MG/DL
BUN SERPL-MCNC: 15.9 MG/DL (ref 8–23)
CA-I BLD-MCNC: 4.7 MG/DL (ref 4.4–5.2)
CALCIUM SERPL-MCNC: 9.2 MG/DL (ref 8.8–10.2)
CHLORIDE SERPL-SCNC: 95 MMOL/L (ref 98–107)
CREAT SERPL-MCNC: 0.68 MG/DL (ref 0.67–1.17)
DEPRECATED HCO3 PLAS-SCNC: 29 MMOL/L (ref 22–29)
ERYTHROCYTE [DISTWIDTH] IN BLOOD BY AUTOMATED COUNT: 13.9 % (ref 10–15)
GFR SERPL CREATININE-BSD FRML MDRD: >90 ML/MIN/1.73M2
GLUCOSE BLDC GLUCOMTR-MCNC: 110 MG/DL (ref 70–99)
GLUCOSE BLDC GLUCOMTR-MCNC: 162 MG/DL (ref 70–99)
GLUCOSE BLDC GLUCOMTR-MCNC: 194 MG/DL (ref 70–99)
GLUCOSE SERPL-MCNC: 142 MG/DL (ref 70–99)
HCT VFR BLD AUTO: 26.5 % (ref 40–53)
HGB BLD-MCNC: 8.6 G/DL (ref 13.3–17.7)
INR PPP: 1.25 (ref 0.85–1.15)
MAGNESIUM SERPL-MCNC: 2 MG/DL (ref 1.7–2.3)
MCH RBC QN AUTO: 29 PG (ref 26.5–33)
MCHC RBC AUTO-ENTMCNC: 32.5 G/DL (ref 31.5–36.5)
MCV RBC AUTO: 89 FL (ref 78–100)
PHOSPHATE SERPL-MCNC: 3.4 MG/DL (ref 2.5–4.5)
PLATELET # BLD AUTO: 221 10E3/UL (ref 150–450)
POTASSIUM SERPL-SCNC: 3.6 MMOL/L (ref 3.4–5.3)
PROT SERPL-MCNC: 6.6 G/DL (ref 6.4–8.3)
RBC # BLD AUTO: 2.97 10E6/UL (ref 4.4–5.9)
SODIUM SERPL-SCNC: 134 MMOL/L (ref 136–145)
UFH PPP CHRO-ACNC: 0.25 IU/ML
UFH PPP CHRO-ACNC: 0.39 IU/ML
WBC # BLD AUTO: 9.9 10E3/UL (ref 4–11)

## 2023-07-15 PROCEDURE — 85520 HEPARIN ASSAY: CPT | Performed by: SURGERY

## 2023-07-15 PROCEDURE — 250N000013 HC RX MED GY IP 250 OP 250 PS 637: Performed by: SURGERY

## 2023-07-15 PROCEDURE — 120N000001 HC R&B MED SURG/OB

## 2023-07-15 PROCEDURE — 85027 COMPLETE CBC AUTOMATED: CPT | Performed by: PHYSICIAN ASSISTANT

## 2023-07-15 PROCEDURE — 94799 UNLISTED PULMONARY SVC/PX: CPT

## 2023-07-15 PROCEDURE — 82330 ASSAY OF CALCIUM: CPT | Performed by: PHYSICIAN ASSISTANT

## 2023-07-15 PROCEDURE — 80053 COMPREHEN METABOLIC PANEL: CPT | Performed by: PHYSICIAN ASSISTANT

## 2023-07-15 PROCEDURE — 250N000013 HC RX MED GY IP 250 OP 250 PS 637: Performed by: PHYSICIAN ASSISTANT

## 2023-07-15 PROCEDURE — 250N000011 HC RX IP 250 OP 636: Performed by: PHYSICIAN ASSISTANT

## 2023-07-15 PROCEDURE — 84100 ASSAY OF PHOSPHORUS: CPT | Performed by: PHYSICIAN ASSISTANT

## 2023-07-15 PROCEDURE — 250N000011 HC RX IP 250 OP 636: Mod: JZ | Performed by: PHYSICIAN ASSISTANT

## 2023-07-15 PROCEDURE — 85610 PROTHROMBIN TIME: CPT | Performed by: PHYSICIAN ASSISTANT

## 2023-07-15 PROCEDURE — 97110 THERAPEUTIC EXERCISES: CPT | Mod: GO | Performed by: OCCUPATIONAL THERAPIST

## 2023-07-15 PROCEDURE — 36415 COLL VENOUS BLD VENIPUNCTURE: CPT | Performed by: SURGERY

## 2023-07-15 PROCEDURE — 83735 ASSAY OF MAGNESIUM: CPT | Performed by: PHYSICIAN ASSISTANT

## 2023-07-15 PROCEDURE — 36415 COLL VENOUS BLD VENIPUNCTURE: CPT | Performed by: PHYSICIAN ASSISTANT

## 2023-07-15 RX ORDER — FUROSEMIDE 10 MG/ML
40 INJECTION INTRAMUSCULAR; INTRAVENOUS 2 TIMES DAILY
Status: DISCONTINUED | OUTPATIENT
Start: 2023-07-15 | End: 2023-07-17 | Stop reason: HOSPADM

## 2023-07-15 RX ORDER — METOLAZONE 2.5 MG/1
5 TABLET ORAL ONCE
Status: COMPLETED | OUTPATIENT
Start: 2023-07-15 | End: 2023-07-15

## 2023-07-15 RX ORDER — MAGNESIUM OXIDE 400 MG/1
400 TABLET ORAL EVERY 4 HOURS
Status: COMPLETED | OUTPATIENT
Start: 2023-07-15 | End: 2023-07-15

## 2023-07-15 RX ORDER — POTASSIUM CHLORIDE 1500 MG/1
20 TABLET, EXTENDED RELEASE ORAL ONCE
Status: COMPLETED | OUTPATIENT
Start: 2023-07-15 | End: 2023-07-15

## 2023-07-15 RX ADMIN — NICOTINE 1 PATCH: 14 PATCH, EXTENDED RELEASE TRANSDERMAL at 09:31

## 2023-07-15 RX ADMIN — METOPROLOL TARTRATE 12.5 MG: 25 TABLET, FILM COATED ORAL at 20:46

## 2023-07-15 RX ADMIN — ACETAMINOPHEN 650 MG: 325 TABLET, FILM COATED ORAL at 06:33

## 2023-07-15 RX ADMIN — HEPARIN SODIUM 1950 UNITS/HR: 10000 INJECTION, SOLUTION INTRAVENOUS at 02:59

## 2023-07-15 RX ADMIN — HEPARIN SODIUM 1950 UNITS/HR: 10000 INJECTION, SOLUTION INTRAVENOUS at 01:02

## 2023-07-15 RX ADMIN — LIDOCAINE 1 PATCH: 560 PATCH PERCUTANEOUS; TOPICAL; TRANSDERMAL at 08:27

## 2023-07-15 RX ADMIN — PANTOPRAZOLE SODIUM 40 MG: 40 TABLET, DELAYED RELEASE ORAL at 08:32

## 2023-07-15 RX ADMIN — FUROSEMIDE 40 MG: 10 INJECTION, SOLUTION INTRAMUSCULAR; INTRAVENOUS at 20:50

## 2023-07-15 RX ADMIN — METOLAZONE 5 MG: 2.5 TABLET ORAL at 14:46

## 2023-07-15 RX ADMIN — AMIODARONE HYDROCHLORIDE 400 MG: 200 TABLET ORAL at 20:46

## 2023-07-15 RX ADMIN — SENNOSIDES AND DOCUSATE SODIUM 2 TABLET: 50; 8.6 TABLET ORAL at 20:46

## 2023-07-15 RX ADMIN — HEPARIN SODIUM 1950 UNITS/HR: 10000 INJECTION, SOLUTION INTRAVENOUS at 16:20

## 2023-07-15 RX ADMIN — Medication 400 MG: at 08:32

## 2023-07-15 RX ADMIN — CALCIUM GLUCONATE 1 G: 20 INJECTION, SOLUTION INTRAVENOUS at 01:04

## 2023-07-15 RX ADMIN — INSULIN ASPART 1 UNITS: 100 INJECTION, SOLUTION INTRAVENOUS; SUBCUTANEOUS at 08:34

## 2023-07-15 RX ADMIN — ACETAMINOPHEN 650 MG: 325 TABLET, FILM COATED ORAL at 20:57

## 2023-07-15 RX ADMIN — POTASSIUM CHLORIDE 20 MEQ: 1500 TABLET, EXTENDED RELEASE ORAL at 08:32

## 2023-07-15 RX ADMIN — AMIODARONE HYDROCHLORIDE 400 MG: 200 TABLET ORAL at 08:33

## 2023-07-15 RX ADMIN — METHOCARBAMOL 750 MG: 750 TABLET ORAL at 16:06

## 2023-07-15 RX ADMIN — Medication 400 MG: at 12:19

## 2023-07-15 RX ADMIN — OXYCODONE HYDROCHLORIDE 5 MG: 5 TABLET ORAL at 01:48

## 2023-07-15 RX ADMIN — METHOCARBAMOL 750 MG: 750 TABLET ORAL at 08:37

## 2023-07-15 RX ADMIN — OXYCODONE HYDROCHLORIDE 5 MG: 5 TABLET ORAL at 12:18

## 2023-07-15 RX ADMIN — OXYCODONE HYDROCHLORIDE 5 MG: 5 TABLET ORAL at 23:28

## 2023-07-15 RX ADMIN — WARFARIN SODIUM 12.5 MG: 7.5 TABLET ORAL at 13:37

## 2023-07-15 RX ADMIN — ACETAMINOPHEN 650 MG: 325 TABLET, FILM COATED ORAL at 14:46

## 2023-07-15 RX ADMIN — ACETAMINOPHEN 650 MG: 325 TABLET, FILM COATED ORAL at 01:48

## 2023-07-15 RX ADMIN — ASPIRIN 81 MG 81 MG: 81 TABLET ORAL at 08:33

## 2023-07-15 RX ADMIN — SENNOSIDES AND DOCUSATE SODIUM 2 TABLET: 50; 8.6 TABLET ORAL at 08:32

## 2023-07-15 RX ADMIN — OXYCODONE HYDROCHLORIDE 5 MG: 5 TABLET ORAL at 06:32

## 2023-07-15 RX ADMIN — ATORVASTATIN CALCIUM 40 MG: 40 TABLET, FILM COATED ORAL at 20:46

## 2023-07-15 RX ADMIN — OXYCODONE HYDROCHLORIDE 5 MG: 5 TABLET ORAL at 19:44

## 2023-07-15 RX ADMIN — METHOCARBAMOL 750 MG: 750 TABLET ORAL at 23:28

## 2023-07-15 ASSESSMENT — ACTIVITIES OF DAILY LIVING (ADL)
ADLS_ACUITY_SCORE: 26
ADLS_ACUITY_SCORE: 22
ADLS_ACUITY_SCORE: 26
ADLS_ACUITY_SCORE: 22
ADLS_ACUITY_SCORE: 22
ADLS_ACUITY_SCORE: 26
ADLS_ACUITY_SCORE: 26

## 2023-07-15 NOTE — PLAN OF CARE
A&O x 4.  VSS on CPAP overnight, 3LNC while awake. Up SBA. Regular diet, tolerating well. PIV in LUE, infusing heparin at 1950u/hr. Sternal incision SANTA, WDL.  CT sites dressing, CDI. Lungs diminished. Pain managed with PRN oxycodone, Tylenol and Robaxin. Voiding in urinal, red urine. +BM, +BS. Continue plan of care.

## 2023-07-15 NOTE — PLAN OF CARE
Goal Outcome Evaluation:    A&O x 4.  VSS on CPAP overnight, 1LNC while awake. Up SBA. Regular diet, tolerating well. PIV in LUE, infusing heparin at 1950u/hr w/hep10a at am, also on coumadin. INR 1.25; got day dose of coumadin 12.5mg at 1pm. Sternal incision SANTA, WDL.  CT sites dressing, CDI. took shower. Lungs diminished. Pain managed with PRN robaxin, Tylenol, lidocane patch and 5mg oxycodone. nicotine patch r deltoid. Lasix IV. Voiding in urinal, pink urine (color 2/2 med). +BMX3, +BS. Home 2-3 days once INR therapeutic. Continue plan of care.

## 2023-07-15 NOTE — PROGRESS NOTES
[facility]  Cardiovascular and Thoracic Surgery Daily Note      Assessment and Plan  POD # 5 s/p aortic valve replacement with a 23 mm On-X mechanical valve, coronary artery bypass grafting x3 with left internal mammary artery to the distal left anterior descending, reverse saphenous vein graft to the acute marginal branch of the right coronary artery (RCA), reverse saphenous vein graft to the obtuse marginal artery, endoscopic vein harvest from the left lower extremity, intraoperative transesophageal echocardiogram (ZACH) on 07/10/2023 with Dr. Carlyn Antony    - CVS: Pre-op TTE with preserved biventricular function. Profound postop vasoplegic shock, improving. Cyanokit POD0. Lactic acidosis resolved. Continue warfarin to INR goal 2-3 x 3 months (through 10/12), then decrease to 1.5-2.0 thereafter with OnX mechanical valve. Aspirin 81 mg daily, change PTA simvastatin to atorvastatin 40 mg daily. Chest tubes: na, coumadin at noon today, continue heparin gtt, went into a.fib CVR start oral amio, liberalize blood pressure parameters to 140 mmHg    - Resp: Extubated within 24 hours. IS, pulmonary toilet, respiratory status variable, on NC, wean as able    - Neuro: Grossly intact, pain controlled on current regimen    - Renal: No history of significant renal disease. Cr stable WNL. Trend BMP. Diuretic/volume as above.   Recent Labs   Lab 07/15/23  0550 07/14/23  0819 07/13/23  0459   CR 0.68 0.65* 0.69       - GI: -BM, continue bowel regimen    - : Voiding on own    - Endo: DMT2, well controlled. Sliding scale insulin   Hemoglobin A1C   Date Value Ref Range Status   06/12/2023 6.7 (H) <5.7 % Final     Comment:     Normal <5.7%   Prediabetes 5.7-6.4%    Diabetes 6.5% or higher     Note: Adopted from ADA consensus guidelines.   06/15/2021 7.3 (H) 0 - 5.6 % Final     Comment:     Normal <5.7% Prediabetes 5.7-6.4%  Diabetes 6.5% or higher - adopted from ADA   consensus guidelines.          - FEN: Replace  electrolytes as needed. ADAT    - ID: Postop leukocytosis, improving. Tmax Temp (24hrs), Av.3  F (36.8  C), Min:97.9  F (36.6  C), Max:98.8  F (37.1  C). Periop abx completed. Trend CBC. Blood and urine cultures obtained  due to persistent fevers with vasodilatory shock   Recent Labs   Lab 07/15/23  0550 23  0819 23  0909   WBC 9.9 9.6 12.4*       - Heme: Acute blood loss anemia and due to surgery. Hgb and PLT stabl. Trend CBC, transfuse PRN. Coumadin for mechanical valve, INR: 1.26<1.12<1.15<1.26, give early today and inc dose and start heparin gtt  Recent Labs   Lab 07/15/23  0550 23  0819 23  0909   HGB 8.6* 8.6* 9.8*    174 175       - Proph: SCD, subcutaneous heparin, PPI    - Dispo: [facility], continue therapies, home 2-3 days once INR therapeutic     Interval History  Sitting up in chair, breathing stable, tolerating diet, working with rehab, awaiting therapeutic INR, giving 12.5 mg early today, continue heparin gtt      Medications    amiodarone  400 mg Oral BID     aspirin  81 mg Oral or NG Tube Daily     atorvastatin  40 mg Oral QPM     insulin aspart  1-7 Units Subcutaneous TID AC     insulin aspart  1-5 Units Subcutaneous At Bedtime     Lidocaine  1 patch Transdermal Q24H     lidocaine   Transdermal Q8H FANTA     metoprolol tartrate  12.5 mg Oral BID     nicotine  1 patch Transdermal Daily     nicotine   Transdermal Q8H     pantoprazole  40 mg Oral or NG Tube Daily    Or     pantoprazole  40 mg Oral Daily     polyethylene glycol  17 g Oral BID     senna-docusate  2 tablet Oral BID     sodium chloride (PF)  3 mL Intracatheter Q8H     Warfarin Therapy Reminder  1 each Oral See Admin Instructions     acetaminophen, albuterol, albuterol, alum & mag hydroxide-simethicone, bisacodyl, calcium carbonate, calcium gluconate, calcium gluconate, calcium gluconate, dextrose, glucose **OR** dextrose **OR** glucagon, hydrALAZINE, HYDROmorphone **OR** HYDROmorphone, hydrOXYzine,  "lidocaine 4%, lidocaine (buffered or not buffered), magnesium hydroxide, melatonin, methocarbamol, naloxone **OR** naloxone **OR** naloxone **OR** naloxone, ondansetron **OR** ondansetron, oxyCODONE **OR** oxyCODONE, prochlorperazine **OR** prochlorperazine, BETA BLOCKER NOT PRESCRIBED, sodium chloride (PF)      Physical Exam  Vitals were reviewed  Blood pressure 108/52, pulse 78, temperature 98.7  F (37.1  C), temperature source Oral, resp. rate 16, height 1.651 m (5' 5\"), weight 113.3 kg (249 lb 12.8 oz), SpO2 96 %.  Rhythm: NSR    Lungs: diminished bases    Cardiovascular: rrr, no m/r/g    Abdomen: soft, NT, ND, +BS    Extremeties: warm, trace LE edema    Incision: CDI    CT: na    Weight:   Vitals:    07/11/23 0545 07/12/23 0515 07/13/23 0430 07/14/23 0946   Weight: 112.1 kg (247 lb 2.2 oz) 113.6 kg (250 lb 7.1 oz) 113.4 kg (250 lb) 113.2 kg (249 lb 8 oz)    07/15/23 0641   Weight: 113.3 kg (249 lb 12.8 oz)         Data  Recent Labs   Lab 07/15/23  0550 07/14/23  2128 07/14/23  1721 07/14/23  0824 07/14/23  0819 07/13/23  1146 07/13/23  0909 07/13/23  0813 07/13/23  0459   WBC 9.9  --   --   --  9.6  --  12.4*  --  10.5   HGB 8.6*  --   --   --  8.6*  --  9.8*  --  9.1*   MCV 89  --   --   --  89  --  90  --  90     --   --   --  174  --  175  --  145*   INR 1.25*  --   --   --  1.12  --   --   --  1.15   *  --   --   --  135*  --   --   --  135*   POTASSIUM 3.6  --   --   --  3.6  --   --   --  3.8   CHLORIDE 95*  --   --   --  98  --   --   --  99   CO2 29  --   --   --  28  --   --   --  24   BUN 15.9  --   --   --  23.1*  --   --   --  21.2   CR 0.68  --   --   --  0.65*  --   --   --  0.69   ANIONGAP 10  --   --   --  9  --   --   --  12   ARTEMIO 9.2  --   --   --  8.5*  --   --   --  8.6*   * 171* 141*   < > 131*   < >  --    < > 142*   ALBUMIN 3.7  --   --   --  3.6  --   --   --  3.7   PROTTOTAL 6.6  --   --   --  6.2*  --   --   --  6.1*   BILITOTAL 0.5  --   --   --  0.6  --   --   --  " 0.6   ALKPHOS 71  --   --   --  58  --   --   --  52   ALT 40  --   --   --  29  --   --   --  20   AST 32  --   --   --  24  --   --   --  30    < > = values in this interval not displayed.       Imaging:  No results found for this or any previous visit (from the past 24 hour(s)).      Patient seen and discussed with Dr. Jeffrey Vega PA-C  Cardiothoracic Surgery  Pager [phone]

## 2023-07-16 ENCOUNTER — APPOINTMENT (OUTPATIENT)
Dept: OCCUPATIONAL THERAPY | Facility: CLINIC | Age: 58
End: 2023-07-16
Attending: SURGERY
Payer: COMMERCIAL

## 2023-07-16 LAB
ALBUMIN SERPL BCG-MCNC: 3.8 G/DL (ref 3.5–5.2)
ALP SERPL-CCNC: 71 U/L (ref 40–129)
ALT SERPL W P-5'-P-CCNC: 40 U/L (ref 0–70)
ANION GAP SERPL CALCULATED.3IONS-SCNC: 12 MMOL/L (ref 7–15)
AST SERPL W P-5'-P-CCNC: 26 U/L (ref 0–45)
BACTERIA BLD CULT: NO GROWTH
BACTERIA BLD CULT: NO GROWTH
BILIRUB SERPL-MCNC: 0.5 MG/DL
BUN SERPL-MCNC: 13.2 MG/DL (ref 8–23)
CA-I BLD-MCNC: 4.6 MG/DL (ref 4.4–5.2)
CALCIUM SERPL-MCNC: 9.3 MG/DL (ref 8.8–10.2)
CHLORIDE SERPL-SCNC: 94 MMOL/L (ref 98–107)
CREAT SERPL-MCNC: 0.72 MG/DL (ref 0.67–1.17)
DEPRECATED HCO3 PLAS-SCNC: 28 MMOL/L (ref 22–29)
ERYTHROCYTE [DISTWIDTH] IN BLOOD BY AUTOMATED COUNT: 13.6 % (ref 10–15)
GFR SERPL CREATININE-BSD FRML MDRD: >90 ML/MIN/1.73M2
GLUCOSE BLDC GLUCOMTR-MCNC: 136 MG/DL (ref 70–99)
GLUCOSE BLDC GLUCOMTR-MCNC: 137 MG/DL (ref 70–99)
GLUCOSE BLDC GLUCOMTR-MCNC: 144 MG/DL (ref 70–99)
GLUCOSE SERPL-MCNC: 135 MG/DL (ref 70–99)
HCT VFR BLD AUTO: 26.3 % (ref 40–53)
HGB BLD-MCNC: 8.7 G/DL (ref 13.3–17.7)
INR PPP: 1.5 (ref 0.85–1.15)
MAGNESIUM SERPL-MCNC: 1.9 MG/DL (ref 1.7–2.3)
MCH RBC QN AUTO: 28.9 PG (ref 26.5–33)
MCHC RBC AUTO-ENTMCNC: 33.1 G/DL (ref 31.5–36.5)
MCV RBC AUTO: 87 FL (ref 78–100)
PHOSPHATE SERPL-MCNC: 3.7 MG/DL (ref 2.5–4.5)
PLATELET # BLD AUTO: 266 10E3/UL (ref 150–450)
POTASSIUM SERPL-SCNC: 3.7 MMOL/L (ref 3.4–5.3)
PROT SERPL-MCNC: 6.6 G/DL (ref 6.4–8.3)
RBC # BLD AUTO: 3.01 10E6/UL (ref 4.4–5.9)
SODIUM SERPL-SCNC: 134 MMOL/L (ref 136–145)
UFH PPP CHRO-ACNC: 0.31 IU/ML
WBC # BLD AUTO: 11 10E3/UL (ref 4–11)

## 2023-07-16 PROCEDURE — 93005 ELECTROCARDIOGRAM TRACING: CPT

## 2023-07-16 PROCEDURE — 80053 COMPREHEN METABOLIC PANEL: CPT | Performed by: PHYSICIAN ASSISTANT

## 2023-07-16 PROCEDURE — 82330 ASSAY OF CALCIUM: CPT | Performed by: PHYSICIAN ASSISTANT

## 2023-07-16 PROCEDURE — 250N000013 HC RX MED GY IP 250 OP 250 PS 637: Performed by: PHYSICIAN ASSISTANT

## 2023-07-16 PROCEDURE — 250N000013 HC RX MED GY IP 250 OP 250 PS 637: Performed by: SURGERY

## 2023-07-16 PROCEDURE — 250N000011 HC RX IP 250 OP 636: Mod: JZ | Performed by: PHYSICIAN ASSISTANT

## 2023-07-16 PROCEDURE — 36415 COLL VENOUS BLD VENIPUNCTURE: CPT | Performed by: PHYSICIAN ASSISTANT

## 2023-07-16 PROCEDURE — 85610 PROTHROMBIN TIME: CPT | Performed by: PHYSICIAN ASSISTANT

## 2023-07-16 PROCEDURE — 97110 THERAPEUTIC EXERCISES: CPT | Mod: GO | Performed by: OCCUPATIONAL THERAPIST

## 2023-07-16 PROCEDURE — 85520 HEPARIN ASSAY: CPT | Performed by: SURGERY

## 2023-07-16 PROCEDURE — 94799 UNLISTED PULMONARY SVC/PX: CPT

## 2023-07-16 PROCEDURE — 85027 COMPLETE CBC AUTOMATED: CPT | Performed by: PHYSICIAN ASSISTANT

## 2023-07-16 PROCEDURE — 83735 ASSAY OF MAGNESIUM: CPT | Performed by: PHYSICIAN ASSISTANT

## 2023-07-16 PROCEDURE — 120N000001 HC R&B MED SURG/OB

## 2023-07-16 PROCEDURE — 84100 ASSAY OF PHOSPHORUS: CPT | Performed by: PHYSICIAN ASSISTANT

## 2023-07-16 PROCEDURE — 93010 ELECTROCARDIOGRAM REPORT: CPT | Performed by: INTERNAL MEDICINE

## 2023-07-16 RX ORDER — POTASSIUM CHLORIDE 1500 MG/1
20 TABLET, EXTENDED RELEASE ORAL ONCE
Status: COMPLETED | OUTPATIENT
Start: 2023-07-16 | End: 2023-07-16

## 2023-07-16 RX ORDER — AMOXICILLIN 250 MG
1 CAPSULE ORAL 2 TIMES DAILY
Status: DISCONTINUED | OUTPATIENT
Start: 2023-07-16 | End: 2023-07-17 | Stop reason: HOSPADM

## 2023-07-16 RX ORDER — MAGNESIUM OXIDE 400 MG/1
400 TABLET ORAL EVERY 4 HOURS
Status: COMPLETED | OUTPATIENT
Start: 2023-07-16 | End: 2023-07-16

## 2023-07-16 RX ORDER — POLYETHYLENE GLYCOL 3350 17 G/17G
17 POWDER, FOR SOLUTION ORAL DAILY
Status: DISCONTINUED | OUTPATIENT
Start: 2023-07-16 | End: 2023-07-17 | Stop reason: HOSPADM

## 2023-07-16 RX ADMIN — HEPARIN SODIUM 1950 UNITS/HR: 10000 INJECTION, SOLUTION INTRAVENOUS at 05:56

## 2023-07-16 RX ADMIN — ACETAMINOPHEN 650 MG: 325 TABLET, FILM COATED ORAL at 14:32

## 2023-07-16 RX ADMIN — OXYCODONE HYDROCHLORIDE 10 MG: 5 TABLET ORAL at 03:53

## 2023-07-16 RX ADMIN — ATORVASTATIN CALCIUM 40 MG: 40 TABLET, FILM COATED ORAL at 20:38

## 2023-07-16 RX ADMIN — OXYCODONE HYDROCHLORIDE 5 MG: 5 TABLET ORAL at 17:03

## 2023-07-16 RX ADMIN — SENNOSIDES AND DOCUSATE SODIUM 1 TABLET: 50; 8.6 TABLET ORAL at 20:38

## 2023-07-16 RX ADMIN — SENNOSIDES AND DOCUSATE SODIUM 1 TABLET: 50; 8.6 TABLET ORAL at 08:01

## 2023-07-16 RX ADMIN — METOPROLOL TARTRATE 12.5 MG: 25 TABLET, FILM COATED ORAL at 08:01

## 2023-07-16 RX ADMIN — FUROSEMIDE 40 MG: 10 INJECTION, SOLUTION INTRAMUSCULAR; INTRAVENOUS at 08:01

## 2023-07-16 RX ADMIN — ASPIRIN 81 MG 81 MG: 81 TABLET ORAL at 08:01

## 2023-07-16 RX ADMIN — Medication 400 MG: at 12:01

## 2023-07-16 RX ADMIN — FUROSEMIDE 40 MG: 10 INJECTION, SOLUTION INTRAMUSCULAR; INTRAVENOUS at 20:38

## 2023-07-16 RX ADMIN — METHOCARBAMOL 750 MG: 750 TABLET ORAL at 22:25

## 2023-07-16 RX ADMIN — OXYCODONE HYDROCHLORIDE 5 MG: 5 TABLET ORAL at 22:24

## 2023-07-16 RX ADMIN — ACETAMINOPHEN 650 MG: 325 TABLET, FILM COATED ORAL at 21:00

## 2023-07-16 RX ADMIN — Medication 400 MG: at 08:01

## 2023-07-16 RX ADMIN — AMIODARONE HYDROCHLORIDE 400 MG: 200 TABLET ORAL at 08:01

## 2023-07-16 RX ADMIN — POTASSIUM CHLORIDE 20 MEQ: 1500 TABLET, EXTENDED RELEASE ORAL at 08:01

## 2023-07-16 RX ADMIN — PANTOPRAZOLE SODIUM 40 MG: 40 TABLET, DELAYED RELEASE ORAL at 08:01

## 2023-07-16 RX ADMIN — WARFARIN SODIUM 12.5 MG: 7.5 TABLET ORAL at 12:01

## 2023-07-16 RX ADMIN — LIDOCAINE 1 PATCH: 560 PATCH PERCUTANEOUS; TOPICAL; TRANSDERMAL at 08:03

## 2023-07-16 RX ADMIN — HEPARIN SODIUM 1950 UNITS/HR: 10000 INJECTION, SOLUTION INTRAVENOUS at 17:35

## 2023-07-16 RX ADMIN — METHOCARBAMOL 750 MG: 750 TABLET ORAL at 15:34

## 2023-07-16 RX ADMIN — NICOTINE 1 PATCH: 14 PATCH, EXTENDED RELEASE TRANSDERMAL at 08:03

## 2023-07-16 RX ADMIN — ACETAMINOPHEN 650 MG: 325 TABLET, FILM COATED ORAL at 08:01

## 2023-07-16 ASSESSMENT — ACTIVITIES OF DAILY LIVING (ADL)
ADLS_ACUITY_SCORE: 22
ADLS_ACUITY_SCORE: 20
ADLS_ACUITY_SCORE: 20
ADLS_ACUITY_SCORE: 22
ADLS_ACUITY_SCORE: 20
ADLS_ACUITY_SCORE: 22
ADLS_ACUITY_SCORE: 22

## 2023-07-16 NOTE — PLAN OF CARE
[id number]  Pleasant gentlemen. Pt aox4, SBA, 1LNC, CPAP at night and full code. Pt denies chest pain and SOB. Pt 5 day post op cabg. Pt complains of chest soreness for activity throughout the day. Treated with tylenol. Heparin gtt 1950, next xa in am. [place] urine out pt. Side effect of B12 medication.   Plan: continue to monitor

## 2023-07-16 NOTE — PLAN OF CARE
Goal Outcome Evaluation:    A&O x 4.Tele: SR 1AVB then at 1030a afib CVR (felt slightly lightheaded and short episode of heart palpitations) and converted back to SR at 1117a. Amiodarone and metoprolol stopped per CV team. VSS on CPAP overnight, RA  while awake. Up SBA, ambX2 in halls and up in the chairX3. Regular diet, tolerating well. PIV in LUE, infusing heparin at 1950u/hr w/hep10a at am, also on coumadin. INR 1.5; got day dose of coumadin 12.5mg at noon per CV team. Sternal incision SANTA, WDL.  CT sites dressing. chest incisional pain managed with PRN robaxin, Tylenol, oxycodone, lidocane patch. nicotine patch L deltoid. Lasix IV. Voiding in urinal, pink urine (color 2/2 med). +BM, +BS. Home 1-2 days once INR therapeutic. Continue plan of care.

## 2023-07-16 NOTE — PLAN OF CARE
Vital signs stable on home CPAP overnight. Alert and oriented. Lung sounds diminished with mild dyspnea on exertion. Bowel sounds active, flatus present. Chest tube dressing clean dry and intact. Pain controlled with oxycodone and robaxin. Up stand by assist in room. Tolerating diet. CMS/neuros intact. Voiding adequately to urinal (light pink still). Heparin gtt continued at 1950 with recheck tomorrow am. Tele normal sinus.

## 2023-07-16 NOTE — PROGRESS NOTES
[facility]  Cardiovascular and Thoracic Surgery Daily Note      Assessment and Plan  POD # 6 s/p aortic valve replacement with a 23 mm On-X mechanical valve, coronary artery bypass grafting x3 with left internal mammary artery to the distal left anterior descending, reverse saphenous vein graft to the acute marginal branch of the right coronary artery (RCA), reverse saphenous vein graft to the obtuse marginal artery, endoscopic vein harvest from the left lower extremity, intraoperative transesophageal echocardiogram (ZACH) on 07/10/2023 with Dr. Carlyn Antony    - CVS: Pre-op TTE with preserved biventricular function. Profound postop vasoplegic shock, resolved. Cyanokit POD0. Lactic acidosis resolved. Continue warfarin to INR goal 2-3 x 3 months (through 10/12), then decrease to 1.5-2.0 thereafter with OnX mechanical valve. Aspirin 81 mg daily, change PTA simvastatin to atorvastatin 40 mg daily. Chest tubes: na, repeat coumadin at noon today, continue heparin gtt, went into a.fib CVR start oral amio     - Resp: Extubated within 24 hours. IS, pulmonary toilet, respiratory status variable, on NC, wean as able    - Neuro: Grossly intact, pain controlled on current regimen    - Renal: No history of significant renal disease. Cr stable WNL. Trend BMP. Diuretic/volume as above.   Recent Labs   Lab 07/16/23  0539 07/15/23  0550 07/14/23  0819   CR 0.72 0.68 0.65*       - GI: +BM, continue bowel regimen    - : Voiding on own    - Endo: DMT2, well controlled. Sliding scale insulin   Hemoglobin A1C   Date Value Ref Range Status   06/12/2023 6.7 (H) <5.7 % Final     Comment:     Normal <5.7%   Prediabetes 5.7-6.4%    Diabetes 6.5% or higher     Note: Adopted from ADA consensus guidelines.   06/15/2021 7.3 (H) 0 - 5.6 % Final     Comment:     Normal <5.7% Prediabetes 5.7-6.4%  Diabetes 6.5% or higher - adopted from ADA   consensus guidelines.          - FEN: Replace electrolytes as needed. ADAT    - ID:  Postop leukocytosis, improving. Temp (24hrs), Av.4  F (36.9  C), Min:97.9  F (36.6  C), Max:98.9  F (37.2  C). Periop abx completed. Trend CBC. Blood and urine cultures obtained  due to persistent fevers with vasodilatory shock   Recent Labs   Lab 23  0539 07/15/23  0550 23  0819   WBC 11.0 9.9 9.6       - Heme: Acute blood loss anemia and due to surgery. Hgb and PLT stabl. Trend CBC, transfuse PRN. Coumadin for mechanical valve, INR: 1.50<1.26<1.12<1.15<1.26, give early today and inc dose and continue heparin gtt  Recent Labs   Lab 23  0539 07/15/23  0550 23  0819   HGB 8.7* 8.6* 8.6*    221 174       - Proph: SCD, subcutaneous heparin, PPI    - Dispo: [facility], continue therapies, home 1-2 days once INR therapeutic     Interval History  Sitting up in chair, breathing stable, tolerating diet, working with rehab, unable to sleep much-offered sleep aids      Medications    amiodarone  400 mg Oral BID     aspirin  81 mg Oral or NG Tube Daily     atorvastatin  40 mg Oral QPM     furosemide  40 mg Intravenous BID     insulin aspart  1-7 Units Subcutaneous TID AC     insulin aspart  1-5 Units Subcutaneous At Bedtime     Lidocaine  1 patch Transdermal Q24H     lidocaine   Transdermal Q8H FANTA     metoprolol tartrate  12.5 mg Oral BID     nicotine  1 patch Transdermal Daily     nicotine   Transdermal Q8H     pantoprazole  40 mg Oral or NG Tube Daily    Or     pantoprazole  40 mg Oral Daily     polyethylene glycol  17 g Oral BID     senna-docusate  2 tablet Oral BID     sodium chloride (PF)  3 mL Intracatheter Q8H     Warfarin Therapy Reminder  1 each Oral See Admin Instructions     acetaminophen, albuterol, albuterol, alum & mag hydroxide-simethicone, bisacodyl, calcium carbonate, calcium gluconate, calcium gluconate, calcium gluconate, dextrose, glucose **OR** dextrose **OR** glucagon, hydrALAZINE, HYDROmorphone **OR** HYDROmorphone, hydrOXYzine, lidocaine 4%, lidocaine (buffered or not  "buffered), magnesium hydroxide, melatonin, methocarbamol, naloxone **OR** naloxone **OR** naloxone **OR** naloxone, ondansetron **OR** ondansetron, oxyCODONE **OR** oxyCODONE, prochlorperazine **OR** prochlorperazine, BETA BLOCKER NOT PRESCRIBED, sodium chloride (PF)      Physical Exam  Vitals were reviewed  Blood pressure 104/59, pulse 66, temperature 98.1  F (36.7  C), temperature source Oral, resp. rate 16, height 1.651 m (5' 5\"), weight 110.9 kg (244 lb 9.6 oz), SpO2 96 %.  Rhythm: NSR    Lungs: course    Cardiovascular: rrr, no m/r/g    Abdomen: soft, NT, ND, +BS    Extremeties: warm, trace LE edema    Incision: CDI    CT: na    Weight:   Vitals:    07/12/23 0515 07/13/23 0430 07/14/23 0946 07/15/23 0641   Weight: 113.6 kg (250 lb 7.1 oz) 113.4 kg (250 lb) 113.2 kg (249 lb 8 oz) 113.3 kg (249 lb 12.8 oz)    07/16/23 0500   Weight: 110.9 kg (244 lb 9.6 oz)         Data  Recent Labs   Lab 07/16/23  0539 07/15/23  2100 07/15/23  1749 07/15/23  1417 07/15/23  0550 07/14/23  0824 07/14/23  0819   WBC 11.0  --   --   --  9.9  --  9.6   HGB 8.7*  --   --   --  8.6*  --  8.6*   MCV 87  --   --   --  89  --  89     --   --   --  221  --  174   INR 1.50*  --   --   --  1.25*  --  1.12   *  --   --   --  134*  --  135*   POTASSIUM 3.7  --   --   --  3.6  --  3.6   CHLORIDE 94*  --   --   --  95*  --  98   CO2 28  --   --   --  29  --  28   BUN 13.2  --   --   --  15.9  --  23.1*   CR 0.72  --   --   --  0.68  --  0.65*   ANIONGAP 12  --   --   --  10  --  9   ARTEMIO 9.3  --   --   --  9.2  --  8.5*   * 162* 194*   < > 142*   < > 131*   ALBUMIN 3.8  --   --   --  3.7  --  3.6   PROTTOTAL 6.6  --   --   --  6.6  --  6.2*   BILITOTAL 0.5  --   --   --  0.5  --  0.6   ALKPHOS 71  --   --   --  71  --  58   ALT 40  --   --   --  40  --  29   AST 26  --   --   --  32  --  24    < > = values in this interval not displayed.       Imaging:  No results found for this or any previous visit (from the past 24 " hour(s)).      Patient seen and discussed with Dr. Jeffrey Vega PA-C  Cardiothoracic Surgery  Pager [phone]

## 2023-07-17 ENCOUNTER — APPOINTMENT (OUTPATIENT)
Dept: CARDIOLOGY | Facility: CLINIC | Age: 58
End: 2023-07-17
Attending: PHYSICIAN ASSISTANT
Payer: COMMERCIAL

## 2023-07-17 ENCOUNTER — TELEPHONE (OUTPATIENT)
Dept: CARDIOLOGY | Facility: CLINIC | Age: 58
End: 2023-07-17
Payer: COMMERCIAL

## 2023-07-17 ENCOUNTER — APPOINTMENT (OUTPATIENT)
Dept: OCCUPATIONAL THERAPY | Facility: CLINIC | Age: 58
End: 2023-07-17
Attending: SURGERY
Payer: COMMERCIAL

## 2023-07-17 VITALS
DIASTOLIC BLOOD PRESSURE: 70 MMHG | RESPIRATION RATE: 18 BRPM | OXYGEN SATURATION: 96 % | SYSTOLIC BLOOD PRESSURE: 142 MMHG | HEIGHT: 65 IN | HEART RATE: 71 BPM | BODY MASS INDEX: 39.34 KG/M2 | WEIGHT: 236.11 LBS | TEMPERATURE: 98.6 F

## 2023-07-17 PROBLEM — E87.70 FLUID OVERLOAD: Status: ACTIVE | Noted: 2023-07-17

## 2023-07-17 PROBLEM — Z79.01 ANTICOAGULATED: Status: ACTIVE | Noted: 2023-07-17

## 2023-07-17 PROBLEM — Z95.2 S/P AVR (AORTIC VALVE REPLACEMENT): Status: ACTIVE | Noted: 2023-07-17

## 2023-07-17 LAB
ALBUMIN SERPL BCG-MCNC: 3.6 G/DL (ref 3.5–5.2)
ALP SERPL-CCNC: 68 U/L (ref 40–129)
ALT SERPL W P-5'-P-CCNC: 38 U/L (ref 0–70)
ANION GAP SERPL CALCULATED.3IONS-SCNC: 12 MMOL/L (ref 7–15)
AST SERPL W P-5'-P-CCNC: 25 U/L (ref 0–45)
BILIRUB SERPL-MCNC: 0.4 MG/DL
BUN SERPL-MCNC: 14.3 MG/DL (ref 8–23)
CALCIUM SERPL-MCNC: 9.4 MG/DL (ref 8.8–10.2)
CHLORIDE SERPL-SCNC: 96 MMOL/L (ref 98–107)
CREAT SERPL-MCNC: 0.88 MG/DL (ref 0.67–1.17)
DEPRECATED HCO3 PLAS-SCNC: 28 MMOL/L (ref 22–29)
ERYTHROCYTE [DISTWIDTH] IN BLOOD BY AUTOMATED COUNT: 14.2 % (ref 10–15)
GFR SERPL CREATININE-BSD FRML MDRD: >90 ML/MIN/1.73M2
GLUCOSE BLDC GLUCOMTR-MCNC: 142 MG/DL (ref 70–99)
GLUCOSE BLDC GLUCOMTR-MCNC: 147 MG/DL (ref 70–99)
GLUCOSE SERPL-MCNC: 133 MG/DL (ref 70–99)
HCT VFR BLD AUTO: 26.7 % (ref 40–53)
HGB BLD-MCNC: 8.8 G/DL (ref 13.3–17.7)
INR PPP: 1.74 (ref 0.85–1.15)
LVEF ECHO: NORMAL
MAGNESIUM SERPL-MCNC: 2 MG/DL (ref 1.7–2.3)
MCH RBC QN AUTO: 28.8 PG (ref 26.5–33)
MCHC RBC AUTO-ENTMCNC: 33 G/DL (ref 31.5–36.5)
MCV RBC AUTO: 87 FL (ref 78–100)
PHOSPHATE SERPL-MCNC: 4.5 MG/DL (ref 2.5–4.5)
PLATELET # BLD AUTO: 321 10E3/UL (ref 150–450)
POTASSIUM SERPL-SCNC: 4.1 MMOL/L (ref 3.4–5.3)
PROT SERPL-MCNC: 6.5 G/DL (ref 6.4–8.3)
RBC # BLD AUTO: 3.06 10E6/UL (ref 4.4–5.9)
SODIUM SERPL-SCNC: 136 MMOL/L (ref 136–145)
UFH PPP CHRO-ACNC: 0.37 IU/ML
WBC # BLD AUTO: 12.4 10E3/UL (ref 4–11)

## 2023-07-17 PROCEDURE — 250N000011 HC RX IP 250 OP 636: Mod: JZ | Performed by: PHYSICIAN ASSISTANT

## 2023-07-17 PROCEDURE — 255N000002 HC RX 255 OP 636: Performed by: SURGERY

## 2023-07-17 PROCEDURE — 93010 ELECTROCARDIOGRAM REPORT: CPT | Performed by: INTERNAL MEDICINE

## 2023-07-17 PROCEDURE — 85610 PROTHROMBIN TIME: CPT | Performed by: PHYSICIAN ASSISTANT

## 2023-07-17 PROCEDURE — 85520 HEPARIN ASSAY: CPT | Performed by: SURGERY

## 2023-07-17 PROCEDURE — 97110 THERAPEUTIC EXERCISES: CPT | Mod: GO

## 2023-07-17 PROCEDURE — 93321 DOPPLER ECHO F-UP/LMTD STD: CPT

## 2023-07-17 PROCEDURE — 93005 ELECTROCARDIOGRAM TRACING: CPT

## 2023-07-17 PROCEDURE — 80053 COMPREHEN METABOLIC PANEL: CPT | Performed by: PHYSICIAN ASSISTANT

## 2023-07-17 PROCEDURE — 250N000013 HC RX MED GY IP 250 OP 250 PS 637: Performed by: PHYSICIAN ASSISTANT

## 2023-07-17 PROCEDURE — 83735 ASSAY OF MAGNESIUM: CPT | Performed by: PHYSICIAN ASSISTANT

## 2023-07-17 PROCEDURE — 999N000208 ECHOCARDIOGRAM LIMITED

## 2023-07-17 PROCEDURE — 93321 DOPPLER ECHO F-UP/LMTD STD: CPT | Mod: 26 | Performed by: INTERNAL MEDICINE

## 2023-07-17 PROCEDURE — 97535 SELF CARE MNGMENT TRAINING: CPT | Mod: GO

## 2023-07-17 PROCEDURE — 85027 COMPLETE CBC AUTOMATED: CPT | Performed by: PHYSICIAN ASSISTANT

## 2023-07-17 PROCEDURE — 36415 COLL VENOUS BLD VENIPUNCTURE: CPT | Performed by: SURGERY

## 2023-07-17 PROCEDURE — 93325 DOPPLER ECHO COLOR FLOW MAPG: CPT | Mod: 26 | Performed by: INTERNAL MEDICINE

## 2023-07-17 PROCEDURE — 93308 TTE F-UP OR LMTD: CPT | Mod: 26 | Performed by: INTERNAL MEDICINE

## 2023-07-17 PROCEDURE — 250N000013 HC RX MED GY IP 250 OP 250 PS 637: Performed by: SURGERY

## 2023-07-17 PROCEDURE — 84100 ASSAY OF PHOSPHORUS: CPT | Performed by: PHYSICIAN ASSISTANT

## 2023-07-17 RX ORDER — METOPROLOL TARTRATE 25 MG/1
12.5 TABLET, FILM COATED ORAL 2 TIMES DAILY
Qty: 30 TABLET | Refills: 3 | Status: SHIPPED | OUTPATIENT
Start: 2023-07-17 | End: 2023-08-18

## 2023-07-17 RX ORDER — OXYCODONE HYDROCHLORIDE 5 MG/1
5 TABLET ORAL EVERY 6 HOURS PRN
Qty: 30 TABLET | Refills: 0 | Status: SHIPPED | OUTPATIENT
Start: 2023-07-17 | End: 2023-07-27

## 2023-07-17 RX ORDER — WARFARIN SODIUM 7.5 MG/1
15 TABLET ORAL ONCE
Status: COMPLETED | OUTPATIENT
Start: 2023-07-17 | End: 2023-07-17

## 2023-07-17 RX ORDER — MAGNESIUM OXIDE 400 MG/1
400 TABLET ORAL EVERY 4 HOURS
Status: COMPLETED | OUTPATIENT
Start: 2023-07-17 | End: 2023-07-17

## 2023-07-17 RX ORDER — ACETAMINOPHEN 325 MG/1
650 TABLET ORAL EVERY 4 HOURS PRN
Qty: 30 TABLET | Refills: 1 | Status: SHIPPED | OUTPATIENT
Start: 2023-07-17

## 2023-07-17 RX ORDER — METHOCARBAMOL 750 MG/1
750 TABLET, FILM COATED ORAL EVERY 6 HOURS PRN
Qty: 30 TABLET | Refills: 3 | Status: SHIPPED | OUTPATIENT
Start: 2023-07-17 | End: 2023-08-18

## 2023-07-17 RX ORDER — INSULIN LISPRO 100 [IU]/ML
1 INJECTION, SOLUTION INTRAVENOUS; SUBCUTANEOUS
Qty: 15 ML | Refills: 11 | Status: SHIPPED | OUTPATIENT
Start: 2023-07-17 | End: 2024-03-21

## 2023-07-17 RX ORDER — ATORVASTATIN CALCIUM 40 MG/1
40 TABLET, FILM COATED ORAL EVERY EVENING
Qty: 30 TABLET | Refills: 3 | Status: SHIPPED | OUTPATIENT
Start: 2023-07-17 | End: 2023-09-12

## 2023-07-17 RX ORDER — POLYETHYLENE GLYCOL 3350 17 G/17G
17 POWDER, FOR SOLUTION ORAL 2 TIMES DAILY
Qty: 510 G | Refills: 0 | Status: SHIPPED | OUTPATIENT
Start: 2023-07-17 | End: 2023-08-18

## 2023-07-17 RX ORDER — WARFARIN SODIUM 5 MG/1
5 TABLET ORAL DAILY
Qty: 60 TABLET | Refills: 3 | Status: SHIPPED | OUTPATIENT
Start: 2023-07-17 | End: 2023-08-09

## 2023-07-17 RX ORDER — FAMOTIDINE 20 MG/1
20 TABLET, FILM COATED ORAL 2 TIMES DAILY
Qty: 28 TABLET | Refills: 0 | Status: SHIPPED | OUTPATIENT
Start: 2023-07-17 | End: 2023-07-31

## 2023-07-17 RX ORDER — WARFARIN SODIUM 7.5 MG/1
15 TABLET ORAL
Status: DISCONTINUED | OUTPATIENT
Start: 2023-07-17 | End: 2023-07-17

## 2023-07-17 RX ORDER — AMOXICILLIN 250 MG
2 CAPSULE ORAL 2 TIMES DAILY
Qty: 30 TABLET | Refills: 0 | Status: SHIPPED | OUTPATIENT
Start: 2023-07-17 | End: 2023-08-18

## 2023-07-17 RX ADMIN — PANTOPRAZOLE SODIUM 40 MG: 40 TABLET, DELAYED RELEASE ORAL at 08:53

## 2023-07-17 RX ADMIN — OXYCODONE HYDROCHLORIDE 5 MG: 5 TABLET ORAL at 08:53

## 2023-07-17 RX ADMIN — NICOTINE 1 PATCH: 14 PATCH, EXTENDED RELEASE TRANSDERMAL at 09:49

## 2023-07-17 RX ADMIN — HUMAN ALBUMIN MICROSPHERES AND PERFLUTREN 9 ML: 10; .22 INJECTION, SOLUTION INTRAVENOUS at 15:53

## 2023-07-17 RX ADMIN — Medication 400 MG: at 08:52

## 2023-07-17 RX ADMIN — POLYETHYLENE GLYCOL 3350 17 G: 17 POWDER, FOR SOLUTION ORAL at 10:26

## 2023-07-17 RX ADMIN — INSULIN ASPART 1 UNITS: 100 INJECTION, SOLUTION INTRAVENOUS; SUBCUTANEOUS at 08:58

## 2023-07-17 RX ADMIN — ACETAMINOPHEN 650 MG: 325 TABLET, FILM COATED ORAL at 02:41

## 2023-07-17 RX ADMIN — ACETAMINOPHEN 650 MG: 325 TABLET, FILM COATED ORAL at 14:35

## 2023-07-17 RX ADMIN — METOPROLOL TARTRATE 12.5 MG: 25 TABLET, FILM COATED ORAL at 08:52

## 2023-07-17 RX ADMIN — FUROSEMIDE 40 MG: 10 INJECTION, SOLUTION INTRAMUSCULAR; INTRAVENOUS at 09:50

## 2023-07-17 RX ADMIN — Medication 400 MG: at 12:15

## 2023-07-17 RX ADMIN — OXYCODONE HYDROCHLORIDE 5 MG: 5 TABLET ORAL at 02:41

## 2023-07-17 RX ADMIN — ACETAMINOPHEN 650 MG: 325 TABLET, FILM COATED ORAL at 08:52

## 2023-07-17 RX ADMIN — WARFARIN SODIUM 15 MG: 7.5 TABLET ORAL at 12:14

## 2023-07-17 RX ADMIN — HEPARIN SODIUM 1950 UNITS/HR: 10000 INJECTION, SOLUTION INTRAVENOUS at 06:17

## 2023-07-17 RX ADMIN — SENNOSIDES AND DOCUSATE SODIUM 1 TABLET: 50; 8.6 TABLET ORAL at 08:52

## 2023-07-17 RX ADMIN — OXYCODONE HYDROCHLORIDE 5 MG: 5 TABLET ORAL at 14:34

## 2023-07-17 RX ADMIN — INSULIN ASPART 1 UNITS: 100 INJECTION, SOLUTION INTRAVENOUS; SUBCUTANEOUS at 12:16

## 2023-07-17 RX ADMIN — LIDOCAINE 1 PATCH: 560 PATCH PERCUTANEOUS; TOPICAL; TRANSDERMAL at 09:49

## 2023-07-17 RX ADMIN — ASPIRIN 81 MG 81 MG: 81 TABLET ORAL at 08:53

## 2023-07-17 ASSESSMENT — ACTIVITIES OF DAILY LIVING (ADL)
ADLS_ACUITY_SCORE: 20

## 2023-07-17 NOTE — PROGRESS NOTES
CV Surgery    S: Sitting up in chair, breathing stable, tolerating diet, working with rehab, wanting to go home    O: B/P: 110/62, T: 97.8, P: 75, R: 16  General: NAD  Heart: s1/s2, no m/r/g  Lungs: course  Abd: s/nt/nd  Sternum: cdi  Extremities: no LE edema     Lab Results   Component Value Date    WBC 12.4 07/17/2023    WBC 5.2 04/12/2015     Lab Results   Component Value Date    RBC 3.06 07/17/2023    RBC 4.87 04/12/2015     Lab Results   Component Value Date    HGB 8.8 07/17/2023    HGB 14.4 04/12/2015     Lab Results   Component Value Date    HCT 26.7 07/17/2023    HCT 41.8 04/12/2015     No components found for: MCT  Lab Results   Component Value Date    MCV 87 07/17/2023    MCV 86 04/12/2015     Lab Results   Component Value Date    MCH 28.8 07/17/2023    MCH 29.6 04/12/2015     Lab Results   Component Value Date    MCHC 33.0 07/17/2023    MCHC 34.4 04/12/2015     Lab Results   Component Value Date    RDW 14.2 07/17/2023    RDW 12.4 04/12/2015     Lab Results   Component Value Date     07/17/2023     04/12/2015       Last Basic Metabolic Panel:  Lab Results   Component Value Date     07/17/2023     10/16/2020      Lab Results   Component Value Date    POTASSIUM 4.1 07/17/2023    POTASSIUM 4.0 07/10/2023    POTASSIUM 4.2 10/16/2020     Lab Results   Component Value Date    CHLORIDE 96 07/17/2023    CHLORIDE 104 10/16/2020     Lab Results   Component Value Date    ARTEMIO 9.4 07/17/2023    ARTEMIO 9.8 10/16/2020     Lab Results   Component Value Date    CO2 28 07/17/2023    CO2 23 10/16/2020     Lab Results   Component Value Date    BUN 14.3 07/17/2023    BUN 15 10/16/2020     Lab Results   Component Value Date    CR 0.88 07/17/2023    CR 0.78 10/16/2020     Lab Results   Component Value Date     07/17/2023     07/17/2023     10/16/2020       A/P: POD# 7 s/p aortic valve replacement with a 23 mm On-X mechanical valve, coronary artery bypass grafting x3 with left internal  mammary artery to the distal left anterior descending, reverse saphenous vein graft to the acute marginal branch of the right coronary artery (RCA), reverse saphenous vein graft to the obtuse marginal artery, endoscopic vein harvest from the left lower extremity, intraoperative transesophageal echocardiogram (ZACH) on 07/10/2023 with Dr. Carlyn Antony    Seen and discussed with Dr. Arpan Vega, [facility]  Pager [phone]

## 2023-07-17 NOTE — PLAN OF CARE
VSS on RA. Home CPAP at night.  Incisional pain controlled with PRN oxy ,robxin and tylenol. Tele. NSR 1*AVB. LS diminished, IS encouraged. A&Ox4.neuro intact.Up SBA , sternal precaution.Sternal incision ,SANTA. WNL. Old CT sites dressing CDI. Left harvest site WNL. 2+edema BLE.IV lasix with adequate urine output (urine still light pink).No BM this shift. +flatus. Sleep quality is poor,overall slept better tonight. Nicotine patch to L Deltoid. PIV infusing heparin gtt at 1950u/hr.Hep10A re-check at AM.  Plan to discharge home once INR is therapeutic.

## 2023-07-17 NOTE — PLAN OF CARE
A&O, able to communicate needs. VSS on RA. Tele SR with 1st degree AVB.  LS clear, diminished bases. Pulmonary toilet encouraged.  BS+,  No BM today despite miralax/senna- discussed home dosing. Voiding.  Incisions WDL  Had a shower this morning.  Pain well managed with PRN tylenol + oxycodone     AVS reviewed.   All meds reviewed.  Questions answered. Belongings with pt who was discharged home with his wife.

## 2023-07-17 NOTE — DISCHARGE INSTRUCTIONS
AFTER YOU GO HOME FROM YOUR HEART SURGERY    You had a sternotomy, avoid lifting, pushing, or pulling anything greater than ten pounds for 8 weeks after surgery and then less than 20-30 pounds for an additional 6 weeks. Do not reach backwards or use arms to push out of chair. Do not let people pull on your arms to assist with standing. Avoid twisting or reaching too far across your body.  Avoid strenuous activities such as bowling, vacuuming, raking, shoveling, golf or tennis for 12 weeks after your surgery. Splint your chest incision by hugging a pillow or bringing your arms across your chest when coughing or sneezing. Please try to sleep on your back for the first 4-6 weeks to avoid extra stress on your sternum (breastbone) while it is healing.     No driving for 4 weeks after surgery or while on pain medication.      Shower or wash your incisions daily with antibacterial soap and water (or as instructed), pat dry. Keep wound clean and dry, showers are okay after discharge, but don't let spray hit directly on incision. No baths or swimming for 6 weeks and/or until incisions are completely healed over. Cover chest tube sites with gauze until they stop draining, then leave open to air. It is normal for chest tube sites to drain fluid for up to 2-3 weeks after surgery. It is not normal to have drainage from other incisions.   Watch for signs of infection: increased redness, tenderness, warmth or any drainage from sternum incision.  Also a temperature > 100.5 F or chills. Call your surgeon or primary care provider's office immediately.     Exercise is very important in your recovery. Please follow the guidelines set up for you in your cardiac rehab classes at the hospital. If outpatient cardiac rehab was ordered for you, we highly recommend you participate. If you have problems arranging your cardiac rehab, please call [phone] for all locations, with the exception of [place], please call [phone] and [place]  [facility], please call [phone].    Avoid sitting for prolonged periods of time, try to walk every hour during the day. If you have a leg incision, elevate your leg often when you are not walking.    Check your weight when you get home from the hospital and continue to check it daily through your recovery for at least a month. If you notice a weight gain of 2-3 pounds in a week, notify your primary care physician, cardiologist or surgeon.    Bowel activity may be slow after surgery. If necessary, you may take an over the counter laxative such as Milk of Magnesia or Miralax. You may have stool softeners prescribed (docusate sodium, Senokot). We recommend using stool softeners while using narcotics for pain (oxycodone/percocet, hydrocodone/vicodin, hydromorphone/dilaudid).      Wean OFF of narcotics (oxycodone, dilaudid, hydrocodone) as soon as possible. You should continue taking acetaminophen as long as you have any surgical pain as the first choice for pain control and add narcotics as necessary for pain to be tolerable.      DENTAL VISITS AFTER SURGERY  If you have had your heart valve repaired or replaced, we do not recommend having any dental work done for 6 months and you will need to take an antibiotic prior to dental visits from now on.  Please notify your dentist before any procedure for the proper treatment needed. The antibiotic is taken by mouth one hour prior to visit. This includes routine cleanings.   You can sometimes hear a mechanical valve \"clicking,\" this is normal and not a sign of something wrong.     DO NOT SMOKE.  IF YOU NEED HELP QUITTING, PLEASE TALK WITH YOUR CARDIOLOGIST OR PRIMARY DOCTOR.    You are on a blood thinner, follow the instructions you were given in the hospital and DO NOT SKIP this medication. Try and take it the same time everyday. Your primary care physician or coumadin clinic will manage the dosing. INR goal is 2-3 for 3 months then 1.5-2.5 for life after.      REGARDING " PRESCRIPTION REFILLS.  If you need a refill on your pain medication contact us to discuss your pain and a possible one time refill.   All other medications will be adjusted, discontinued and re-filled by your primary care physician and/or your cardiologist as they were prior to your surgery. We have given you enough for one to three month with possibly one refill. You may have refills available to you for some of your medications through the [facility]. If you would like your refills sent to a different a different pharmacy, please contact your preferred pharmacy and let them know that you have prescriptions at [facility] that you would like transferred.    POST-OPERATIVE CLINIC VISITS  You have a follow up visit with CV Surgery Advance Care Practitioners as scheduled at the Heart Clinic at [facility] in [place].  You will then return to the care of your primary provider and your cardiologist. Future medication refills should come from your PCP or Cardiologist.   You should see your primary care provider in 1-2 weeks after discharge.   Follow up with your cardiologist as scheduled.   If you do not hear from a  in 7 days, please call [phone] and request to be seen with a general cardiologist or someone that you have seen in the past.     SURGICAL QUESTIONS  Please call our nurse coordinators, Mela and Jovana, at [phone] with questions or concerns related to surgery. For other non-urgent questions and requests, our nurse coordinators can also be reached via email; Mela at [email or web address] and Jovana at [email or web address]    On weekends or after hours, please call [phone] and ask the  to page the Cardiothoracic Surgeon on-call.      Thank you,    Your Cardiothoracic Surgery Team

## 2023-07-17 NOTE — PROGRESS NOTES
Post op instructions reviewed with patient.Plan discharge to home today with outpatient cardiac rehab. All questions addressed. Will follow along.

## 2023-07-17 NOTE — DISCHARGE SUMMARY
"Discharge Summary    Glen Cho MRN# [id number]   YOB: 1965 Age: 58 year old     Date of Admission:  7/10/2023  Date of Discharge:  7/17/2023  Admitting Physician:  Carlyn Antony MD  Discharge Physician:  Carlyn Antony,*  Discharging Service:  Cardiovascular and Thoracic Surgery     Home clinic: [facility]  Primary Provider: Mike Deal          Admission Diagnoses:   CAD (coronary artery disease) [I25.10]  AS (aortic stenosis) [I35.0]  S/P CABG (coronary artery bypass graft) [Z95.1]          Discharge Diagnosis:   Patient Active Problem List   Diagnosis     Anxiety     Rosacea     Hypertension goal BP (blood pressure) < 140/90     Heart murmur     Tear of lateral cartilage or meniscus of knee, current     Tubular adenoma of colon     CKD (chronic kidney disease) stage 1, GFR 90 ml/min or greater     Morbid obesity (H)     Hyperlipidemia LDL goal <100     Type 2 diabetes mellitus with diabetic nephropathy, with long-term current use of insulin (H)     CORINA (obstructive sleep apnea)     Dyslipidemia     Abnormal stress test     Status post coronary angiogram     Chest pain, unspecified type     Coronary artery disease involving native coronary artery of native heart, unspecified whether angina present     S/P CABG (coronary artery bypass graft)-LIMA>LAD, VG>RCA, VG>OM     S/P AVR (aortic valve replacement)-23 mm On-X valve     Fluid overload                Discharge Disposition:   Discharged to home           Condition on Discharge:   Discharge condition: Stable   Discharge vitals: Blood pressure 133/66, pulse 77, temperature 97.9  F (36.6  C), temperature source Oral, resp. rate 16, height 1.651 m (5' 5\"), weight 107.1 kg (236 lb 1.8 oz), SpO2 93 %.     Code status on discharge: Full Code           Procedures:   On 7/10/23 Mr Cho successfully underwent Aortic valve replacement with a 23 mm On-X mechanical valve, coronary artery bypass grafting x3 with left " internal mammary artery to the distal left anterior descending, reverse saphenous vein graft to the acute marginal branch of the right coronary artery (RCA), reverse saphenous vein graft to the obtuse marginal artery, endoscopic vein harvest from the left lower extremity, intraoperative transesophageal echocardiogram (ZACH) by Dr. Carlyn Antony.          Medications Prior to Admission:     Facility-Administered Medications Prior to Admission   Medication Dose Route Frequency Provider Last Rate Last Admin     [DISCONTINUED] lidocaine 1 % injection 3 mL  3 mL   Desmond Varela MD   3 mL at 04/28/21 1714     [DISCONTINUED] lidocaine 1 % injection 3 mL  3 mL   Desmond Varela MD   3 mL at 04/28/21 1714     [DISCONTINUED] ropivacaine (NAROPIN) injection 3 mL  3 mL   Desmond Varela MD   3 mL at 04/28/21 1714     [DISCONTINUED] ropivacaine (NAROPIN) injection 3 mL  3 mL   Desmond Varela MD   3 mL at 04/28/21 1714     [DISCONTINUED] triamcinolone (KENALOG-40) injection 40 mg  40 mg   Desmond Varela MD   40 mg at 04/28/21 1714     [DISCONTINUED] triamcinolone (KENALOG-40) injection 40 mg  40 mg   Desmond Varela MD   40 mg at 04/28/21 1714     Medications Prior to Admission   Medication Sig Dispense Refill Last Dose     aspirin 81 MG EC tablet Take 1 tablet (81 mg) by mouth daily 90 tablet 3 7/9/2023 at pm     metFORMIN (GLUCOPHAGE XR) 500 MG 24 hr tablet Take 2 tablets (1,000 mg) by mouth daily (with dinner) 180 tablet 3 7/9/2023 at pm     BD PEN NEEDLE KENTRELL 2ND GEN 32G X 4 MM miscellaneous USE 1 PEN NEEDLE TIP 3 TIMES DAILY 300 each 0      blood glucose monitoring (FREESTYLE INSULINX SYSTEM) meter device kit Use to test blood sugar 3 times daily or as directed. 1 kit 0      blood glucose monitoring (FREESTYLE) lancets Use to test blood sugars 2-3 times daily or as directed. 1 Box prn      insulin pen needle (BD KENTRELL U/F) 32G X 4 MM USE 2 PEN NEEDLES DAILY OR AS DIRECTED. 200 each 0       order for DME Equipment being ordered: Verio flex test strips  Patient tests 1 times daily. 100 Device 3      order for DME Equipment being ordered: lancets  Uses 4 a day.    Please dispense what insurance covers. 400 each 3      order for DME Equipment being ordered: glucose meter  Please dispense what insurance covers 1 each 0      ORDER FOR DME Equipment being ordered: glucometer    60 lancets and test strips (5 refills) for twice daily testing. 1 each 0      [DISCONTINUED] atenolol (TENORMIN) 50 MG tablet Take 1 tablet (50 mg) by mouth daily 90 tablet 1 7/9/2023 at pm     [DISCONTINUED] celecoxib (CELEBREX) 200 MG capsule Take 1 capsule (200 mg) by mouth 2 times daily 60 capsule 5 7/9/2023 at 1500     [DISCONTINUED] hydrochlorothiazide (HYDRODIURIL) 25 MG tablet Take 1 tablet (25 mg) by mouth daily 90 tablet 3 7/9/2023 at pm     [DISCONTINUED] insulin glargine (LANTUS PEN) 100 UNIT/ML pen Inject 50 Units Subcutaneous At Bedtime (Patient taking differently: Inject 42 Units Subcutaneous At Bedtime) 15 mL 11 7/9/2023 at pm     [DISCONTINUED] insulin lispro (HUMALOG KWIKPEN) 100 UNIT/ML (1 unit dial) KWIKPEN Inject 18 Units Subcutaneous 3 times daily (before meals) 15 mL 11 Unknown at prn     [DISCONTINUED] losartan (COZAAR) 100 MG tablet Take 1 tablet (100 mg) by mouth daily 90 tablet 3 7/9/2023 at pm     [DISCONTINUED] nitroGLYcerin (NITROSTAT) 0.4 MG sublingual tablet For chest pain place 1 tablet under the tongue every 5 minutes for 3 doses. If symptoms persist 5 minutes after 1st dose call 911. 25 tablet 2 Unknown at prn     [DISCONTINUED] simvastatin (ZOCOR) 20 MG tablet Take 1 tablet (20 mg) by mouth At Bedtime 90 tablet 3 7/9/2023 at pm             Discharge Medications:     Current Discharge Medication List      START taking these medications    Details   acetaminophen (TYLENOL) 325 MG tablet Take 2 tablets (650 mg) by mouth every 4 hours as needed for other or mild pain  Qty: 30 tablet, Refills: 1     Associated Diagnoses: S/P AVR (aortic valve replacement)      atorvastatin (LIPITOR) 40 MG tablet Take 1 tablet (40 mg) by mouth every evening  Qty: 30 tablet, Refills: 3    Associated Diagnoses: S/P AVR (aortic valve replacement)      famotidine (PEPCID) 20 MG tablet Take 1 tablet (20 mg) by mouth 2 times daily for 14 days  Qty: 28 tablet, Refills: 0    Associated Diagnoses: S/P AVR (aortic valve replacement)      methocarbamol (ROBAXIN) 750 MG tablet Take 1 tablet (750 mg) by mouth every 6 hours as needed for muscle spasms  Qty: 30 tablet, Refills: 3    Associated Diagnoses: S/P AVR (aortic valve replacement)      metoprolol tartrate (LOPRESSOR) 25 MG tablet Take 0.5 tablets (12.5 mg) by mouth 2 times daily  Qty: 30 tablet, Refills: 3    Associated Diagnoses: S/P AVR (aortic valve replacement)      oxyCODONE (ROXICODONE) 5 MG tablet Take 1 tablet (5 mg) by mouth every 6 hours as needed for moderate pain  Qty: 30 tablet, Refills: 0    Associated Diagnoses: S/P AVR (aortic valve replacement)      polyethylene glycol (MIRALAX) 17 GM/Dose powder Take 17 g by mouth 2 times daily  Qty: 510 g, Refills: 0    Associated Diagnoses: S/P AVR (aortic valve replacement)      senna-docusate (SENOKOT-S/PERICOLACE) 8.6-50 MG tablet Take 2 tablets by mouth 2 times daily  Qty: 30 tablet, Refills: 0    Associated Diagnoses: S/P AVR (aortic valve replacement)      warfarin ANTICOAGULANT (COUMADIN) 5 MG tablet Take 1 tablet (5 mg) by mouth daily  Qty: 60 tablet, Refills: 3    Comments: Take as directed by INR clinic  Associated Diagnoses: S/P AVR (aortic valve replacement)         CONTINUE these medications which have CHANGED    Details   insulin glargine (LANTUS PEN) 100 UNIT/ML pen Inject 5 Units Subcutaneous At Bedtime  Qty: 15 mL, Refills: 11    Comments: If Lantus is not covered by insurance, may substitute Basaglar or Semglee or other insulin glargine product per insurance preference at same dose and frequency.    Associated  Diagnoses: Type 2 diabetes mellitus with diabetic nephropathy, with long-term current use of insulin (H)      insulin lispro (HUMALOG KWIKPEN) 100 UNIT/ML (1 unit dial) KWIKPEN Inject 1 Units Subcutaneous 3 times daily (before meals)  Qty: 15 mL, Refills: 11    Associated Diagnoses: Type 2 diabetes mellitus with diabetic nephropathy, with long-term current use of insulin (H)         CONTINUE these medications which have NOT CHANGED    Details   aspirin 81 MG EC tablet Take 1 tablet (81 mg) by mouth daily  Qty: 90 tablet, Refills: 3    Associated Diagnoses: Chest pain, unspecified type; Hypertension goal BP (blood pressure) < 140/90; Hyperlipidemia LDL goal <100; Abnormal stress test      metFORMIN (GLUCOPHAGE XR) 500 MG 24 hr tablet Take 2 tablets (1,000 mg) by mouth daily (with dinner)  Qty: 180 tablet, Refills: 3    Associated Diagnoses: Type 2 diabetes mellitus with diabetic nephropathy, with long-term current use of insulin (H)      !! BD PEN NEEDLE KENTRELL 2ND GEN 32G X 4 MM miscellaneous USE 1 PEN NEEDLE TIP 3 TIMES DAILY  Qty: 300 each, Refills: 0    Comments: DX Code Needed  .  Associated Diagnoses: Type 2 diabetes mellitus with diabetic nephropathy, with long-term current use of insulin (H)      blood glucose monitoring (FREESTYLE INSULINX SYSTEM) meter device kit Use to test blood sugar 3 times daily or as directed.  Qty: 1 kit, Refills: 0    Associated Diagnoses: Type 2 diabetes, HbA1c goal < 7% (H)      blood glucose monitoring (FREESTYLE) lancets Use to test blood sugars 2-3 times daily or as directed.  Qty: 1 Box, Refills: prn    Associated Diagnoses: Type 2 diabetes mellitus with diabetic nephropathy (H)      !! insulin pen needle (BD KENTRELL U/F) 32G X 4 MM USE 2 PEN NEEDLES DAILY OR AS DIRECTED.  Qty: 200 each, Refills: 0    Associated Diagnoses: Type 2 diabetes mellitus with diabetic nephropathy, with long-term current use of insulin (H)      !! order for DME Equipment being ordered: Verio flex test  strips  Patient tests 1 times daily.  Qty: 100 Device, Refills: 3    Associated Diagnoses: Type 2 diabetes mellitus with diabetic nephropathy, with long-term current use of insulin (H)      !! order for DME Equipment being ordered: lancets  Uses 4 a day.    Please dispense what insurance covers.  Qty: 400 each, Refills: 3    Associated Diagnoses: Type 2 diabetes, HbA1c goal < 7% (H)      !! order for DME Equipment being ordered: glucose meter  Please dispense what insurance covers  Qty: 1 each, Refills: 0    Associated Diagnoses: Type 2 diabetes, HbA1c goal < 7% (H)      !! ORDER FOR DME Equipment being ordered: glucometer    60 lancets and test strips (5 refills) for twice daily testing.  Qty: 1 each, Refills: 0    Associated Diagnoses: Type 2 diabetes, HbA1c goal < 7% (H)       !! - Potential duplicate medications found. Please discuss with provider.      STOP taking these medications       atenolol (TENORMIN) 50 MG tablet Comments:   Reason for Stopping:         celecoxib (CELEBREX) 200 MG capsule Comments:   Reason for Stopping:         hydrochlorothiazide (HYDRODIURIL) 25 MG tablet Comments:   Reason for Stopping:         losartan (COZAAR) 100 MG tablet Comments:   Reason for Stopping:         nitroGLYcerin (NITROSTAT) 0.4 MG sublingual tablet Comments:   Reason for Stopping:         simvastatin (ZOCOR) 20 MG tablet Comments:   Reason for Stopping:                     Consultations:   Nutrition, Intensivist             Brief History of Illness:   The patient is a very pleasant 58-year-old gentleman who was recently referred to me for moderate aortic valve stenosis and severe 3-vessel coronary artery disease including significant left main disease.  He was taken to the operating room today for aortic valve replacement with a mechanical valve and a coronary artery bypass grafting.          Hospital Course:   On 7/10/23 Mr Cho successfully underwent Aortic valve replacement with a 23 mm On-X mechanical valve,  coronary artery bypass grafting x3 with left internal mammary artery to the distal left anterior descending, reverse saphenous vein graft to the acute marginal branch of the right coronary artery (RCA), reverse saphenous vein graft to the obtuse marginal artery, endoscopic vein harvest from the left lower extremity, intraoperative transesophageal echocardiogram (ZACH) by Dr. Carlyn Antony.  Was extubated within 24 hours of surgery. Once he was weaned off hemodynamic stabilizing gtt's transferred to the surgical floor.  Had some transient hyperglycemia treated with an insulin gtt, transitioned to sliding scale insulin and has no need at discharge.  Had some fluid overload treated with diuretic medication. At discharge he is 1 kg below his pre-op weight and is not sent with diuretics.   Heparin gtt was started on pod#3, coumadin started pod#1. Discharged when INR increasing.   He went into a.fib CVR was started on amiodarone with metoprolol. His QT was 496 and stopped along with HR 60. Amiodarone was stopped and 12.5 mg coumadin was started. He progressed well with cardiac rehab. He is discharged to home on pod# 7 with the help of their family.              Significant Results:   Lab Results   Component Value Date    WBC 12.4 07/17/2023    WBC 5.2 04/12/2015     Lab Results   Component Value Date    RBC 3.06 07/17/2023    RBC 4.87 04/12/2015     Lab Results   Component Value Date    HGB 8.8 07/17/2023    HGB 14.4 04/12/2015     Lab Results   Component Value Date    HCT 26.7 07/17/2023    HCT 41.8 04/12/2015     No components found for: MCT  Lab Results   Component Value Date    MCV 87 07/17/2023    MCV 86 04/12/2015     Lab Results   Component Value Date    MCH 28.8 07/17/2023    MCH 29.6 04/12/2015     Lab Results   Component Value Date    MCHC 33.0 07/17/2023    MCHC 34.4 04/12/2015     Lab Results   Component Value Date    RDW 14.2 07/17/2023    RDW 12.4 04/12/2015     Lab Results   Component Value Date      07/17/2023     04/12/2015       Last Basic Metabolic Panel:  Lab Results   Component Value Date     07/17/2023     10/16/2020      Lab Results   Component Value Date    POTASSIUM 4.1 07/17/2023    POTASSIUM 4.0 07/10/2023    POTASSIUM 4.2 10/16/2020     Lab Results   Component Value Date    CHLORIDE 96 07/17/2023    CHLORIDE 104 10/16/2020     Lab Results   Component Value Date    ARTEMIO 9.4 07/17/2023    ARTEMIO 9.8 10/16/2020     Lab Results   Component Value Date    CO2 28 07/17/2023    CO2 23 10/16/2020     Lab Results   Component Value Date    BUN 14.3 07/17/2023    BUN 15 10/16/2020     Lab Results   Component Value Date    CR 0.88 07/17/2023    CR 0.78 10/16/2020     Lab Results   Component Value Date     07/17/2023     07/17/2023     10/16/2020                  Pending Results:   None           Discharge Instructions and Follow-Up:   Discharge diet: Regular   Discharge activity: Daily weights: Call if weight gain 2-3 lbs over 24 hours or if greater than 5 lbs in 1 week.  No lifting more than 10 lbs for 8-12 weeks.  No driving for 1 month.  Call for pain medication refill.  Call for temperature greater than 101.0  Daily shower with antibacterial soap.   Discharge follow-up: Follow-up and recommended labs and tests     *Follow up primary care provider in 7-10 days after discharge in order to review your medication, vital signs, obtain any necessary lab work your doctor may want, and to update them on your hospitalization and medical issues.   *Follow up with Kelsey/Mercy/Feliz/Marva Chris with Dr Antony, heart surgeon, at [facility] at [facility] [address] on 7/31/23 at 2:45 PM. If any questions or concerns call [phone].  You will see us once at this visit and then if everything is going well you will not need to see us again.  You will follow long term with your cardiologist.   *Follow up with Dr Diez, cardiologist, on 10/27/23 at 9am. This is  who you will follow with long term about your heart issues. [phone].         Outpatient therapy: Cardiac rehab   Home Care agency: None    Supplies and equipment: None   Lines and drains: None    Wound care: Wash incision daily with antibacterial soap   Other instructions: None

## 2023-07-18 ENCOUNTER — TELEPHONE (OUTPATIENT)
Dept: ANTICOAGULATION | Facility: CLINIC | Age: 58
End: 2023-07-18
Payer: COMMERCIAL

## 2023-07-18 ENCOUNTER — PATIENT OUTREACH (OUTPATIENT)
Dept: CARE COORDINATION | Facility: CLINIC | Age: 58
End: 2023-07-18
Payer: COMMERCIAL

## 2023-07-18 ENCOUNTER — DOCUMENTATION ONLY (OUTPATIENT)
Dept: ANTICOAGULATION | Facility: CLINIC | Age: 58
End: 2023-07-18
Payer: COMMERCIAL

## 2023-07-18 DIAGNOSIS — Z95.2 S/P AVR (AORTIC VALVE REPLACEMENT): ICD-10-CM

## 2023-07-18 DIAGNOSIS — Z95.2 S/P AVR (AORTIC VALVE REPLACEMENT): Primary | ICD-10-CM

## 2023-07-18 DIAGNOSIS — Z95.1 S/P CABG (CORONARY ARTERY BYPASS GRAFT): Primary | ICD-10-CM

## 2023-07-18 LAB
ATRIAL RATE - MUSE: 312 BPM
ATRIAL RATE - MUSE: 70 BPM
DIASTOLIC BLOOD PRESSURE - MUSE: NORMAL MMHG
DIASTOLIC BLOOD PRESSURE - MUSE: NORMAL MMHG
INTERPRETATION ECG - MUSE: NORMAL
INTERPRETATION ECG - MUSE: NORMAL
P AXIS - MUSE: 35 DEGREES
P AXIS - MUSE: NORMAL DEGREES
PR INTERVAL - MUSE: 218 MS
PR INTERVAL - MUSE: NORMAL MS
QRS DURATION - MUSE: 100 MS
QRS DURATION - MUSE: 108 MS
QT - MUSE: 460 MS
QT - MUSE: 492 MS
QTC - MUSE: 495 MS
QTC - MUSE: 496 MS
R AXIS - MUSE: 58 DEGREES
R AXIS - MUSE: 76 DEGREES
SYSTOLIC BLOOD PRESSURE - MUSE: NORMAL MMHG
SYSTOLIC BLOOD PRESSURE - MUSE: NORMAL MMHG
T AXIS - MUSE: 25 DEGREES
T AXIS - MUSE: 26 DEGREES
VENTRICULAR RATE- MUSE: 61 BPM
VENTRICULAR RATE- MUSE: 70 BPM

## 2023-07-18 NOTE — PROGRESS NOTES
Clinic Care Coordination Contact  Community Health Worker Initial Outreach    Patient accepts CC: No, Pt declined needs for extra support.     Clinic Care Coordination Contact  [facility]: Post-Discharge Note  SITUATION                                                      Admission:    Admission Date: 07/10/23   Reason for Admission: CAD (coronary artery disease) (I25.10)  AS (aortic stenosis) (I35.0)  S/P CABG (coronary artery bypass graft) (Z95.1)  Discharge:   Discharge Date: 07/17/23  Discharge Diagnosis: CAD (coronary artery disease) (I25.10)  AS (aortic stenosis) (I35.0)  S/P CABG (coronary artery bypass graft) (Z95.1)    BACKGROUND                                                      Per hospital discharge summary and inpatient provider notes:    The patient is a very pleasant 58-year-old gentleman who was recently referred to me for moderate aortic valve stenosis and severe 3-vessel coronary artery disease including significant left main disease.  He was taken to the operating room today for aortic valve replacement with a mechanical valve and a coronary artery bypass grafting    ASSESSMENT           Discharge Assessment  How are you doing now that you are home?: doing well  How are your symptoms? (Red Flag symptoms escalate to triage hotline per guidelines): Improved  Do you feel your condition is stable enough to be safe at home until your provider visit?: Yes  Does the patient have their discharge instructions? : Yes  Does the patient have questions regarding their discharge instructions? : No  Were you started on any new medications or were there changes to any of your previous medications? : Yes  Does the patient have all of their medications?: Yes  Do you have questions regarding any of your medications? : No  Do you have all of your needed medical supplies or equipment (DME)?  (i.e. oxygen tank, CPAP, cane, etc.): Yes  Discharge follow-up appointment scheduled within 14 calendar days? :  Yes  Discharge Follow Up Appointment Date: 08/17/23  Discharge Follow Up Appointment Scheduled with?: Primary Care Provider  Post-op ([facility]A Only)  If the patient had a surgery or procedure, do they have any questions for a nurse?: No     PLAN                                                      Outpatient Plan:     Discharge follow-up: Follow-up and recommended labs and tests     *Follow up primary care provider in 7-10 days after discharge in order to review your medication, vital signs, obtain any necessary lab work your doctor may want, and to update them on your hospitalization and medical issues.   *Follow up with Kelsey/Mercy/Feliz/Marva CASTELLANOS-Luis with Dr Antony, heart surgeon, at [facility] at [facility] [address] on 7/31/23 at 2:45 PM. If any questions or concerns call [phone].  You will see us once at this visit and then if everything is going well you will not need to see us again.  You will follow long term with your cardiologist.   *Follow up with Dr Diez, cardiologist, on 10/27/23 at 9am. This is who you will follow with long term about your heart issues. [phone].           Outpatient therapy: Cardiac rehab     Future Appointments   Date Time Provider Department Center   7/19/2023  1:45 PM CS LAB CSLABR CS   7/25/2023  2:15 PM 3, Rh Cardiac Rehab [facility] RID   7/31/2023  2:45 PM [facility] CARDIOTHORACIC SURGERY, EMANUEL ZIMMERMAN   8/17/2023  9:30 AM [facility]   10/27/2023  9:00 AM Jacobo Diez MD [facility] PSA CLIN     For any urgent concerns, please contact our 24 hour nurse triage line: [phone] ([phone])       EBONI Mitchell  [phone]  Connected Care Resource [facility]

## 2023-07-18 NOTE — PLAN OF CARE
Occupational Therapy Discharge Summary    Reason for therapy discharge:    Discharged to home with outpatient therapy.    Progress towards therapy goal(s). See goals on Care Plan in [facility] electronic health record for goal details.  Goals partially met.  Barriers to achieving goals:   discharge from facility.    Therapy recommendation(s):    Continued therapy is recommended.  Rationale/Recommendations:  outpatient cardiac rehab to increase activity tolerance.

## 2023-07-18 NOTE — TELEPHONE ENCOUNTER
ANTICOAGULATION  MANAGEMENT: Discharge Review    Glen Cho chart reviewed for anticoagulation continuity of care    Hospital Admission on 7/10-7/17/23 for CABG and AVR with On-X.    Discharge disposition: Home    Results:    Recent labs: (last 7 days)     07/12/23  0423 07/13/23  0459 07/13/23  1851 07/14/23  0152 07/14/23  0819 07/14/23  1515 07/14/23  2308 07/15/23  0550 07/15/23  1314 07/16/23  0539 07/17/23  0554   INR 1.26* 1.15  --   --  1.12  --   --  1.25*  --  1.50* 1.74*   AAUFH  --   --  <0.10 0.28 0.16 0.13 0.18 0.39 0.25 0.31 0.37     Anticoagulation inpatient management:     Warfarin started on POD #1=7/11/23    Anticoagulation discharge instructions:     Warfarin dosing: decrease dose to 5mg daily   Bridging: No   INR goal change: Yes: NEW start--2.0-3.0 x 3 months then 1.5-2.5 for lifetime      Medication changes affecting anticoagulation: Yes: Tylenol 650mg 4 times daily as needed     Additional factors affecting anticoagulation: Yes: post-op inflammation; new start on warfarin      PLAN     Sent anticoagulation referral order to referring provider for updated orders with new INR goal    Recommended follow up is scheduled    Anticoagulation Calendar updated    Iram Lubin RN

## 2023-07-18 NOTE — PROGRESS NOTES
ANTICOAGULATION CLINIC REFERRAL RENEWAL REQUEST       An annual renewal order is required for all patients referred to [facility].?  Please review and sign the pended referral order for Glen Cho.       ANTICOAGULATION SUMMARY      Warfarin indication(s)   Mechanical AVR    Mechanical heart valve present?  Mechanical AVR- Bileaflet- On X       Current goal range   INR: 2.0-3.0 x 3 months     Goal appropriate for indication? Goal INR 2-3, standard for indication(s) above     Time in Therapeutic Range (TTR)  (Goal > 60%) N/A-new start     Office visit with referring provider's group within last year yes on 03/20/2023       Iram Lubin RN  [facility]

## 2023-07-19 ENCOUNTER — LAB (OUTPATIENT)
Dept: LAB | Facility: CLINIC | Age: 58
End: 2023-07-19
Payer: COMMERCIAL

## 2023-07-19 ENCOUNTER — MYC MEDICAL ADVICE (OUTPATIENT)
Dept: ANTICOAGULATION | Facility: CLINIC | Age: 58
End: 2023-07-19

## 2023-07-19 ENCOUNTER — TELEPHONE (OUTPATIENT)
Dept: ANTICOAGULATION | Facility: CLINIC | Age: 58
End: 2023-07-19

## 2023-07-19 DIAGNOSIS — Z95.1 S/P CABG (CORONARY ARTERY BYPASS GRAFT): Primary | ICD-10-CM

## 2023-07-19 DIAGNOSIS — Z95.2 S/P AVR (AORTIC VALVE REPLACEMENT): ICD-10-CM

## 2023-07-19 DIAGNOSIS — Z95.1 S/P CABG (CORONARY ARTERY BYPASS GRAFT): ICD-10-CM

## 2023-07-19 LAB — INR PPP: 2.05 (ref 0.85–1.15)

## 2023-07-19 PROCEDURE — 85610 PROTHROMBIN TIME: CPT

## 2023-07-19 PROCEDURE — 36415 COLL VENOUS BLD VENIPUNCTURE: CPT

## 2023-07-19 NOTE — TELEPHONE ENCOUNTER
I called patient to inform him that the venous INR would not be resulted until 7/20/23. Patient reported that he took 12.5mg on 7/18/23 at 6pm, he would like to take warfarin at 10pm with his other evening medications, I informed him that is ok. Per discharge summary, patient was supposed to take 15mg last night.  I discontinued the venous INR order and placed standing POC order.      I will document new enroll teaching with INR result on 7/20/23.    [facility] Nina set maintenance dose--see calendar.    Patient requested dosing for tonight be sent to My Chart--done.    Iram Lubin RN  Anticoagulation Clinic

## 2023-07-20 ENCOUNTER — TELEPHONE (OUTPATIENT)
Dept: CARDIOLOGY | Facility: CLINIC | Age: 58
End: 2023-07-20
Payer: COMMERCIAL

## 2023-07-20 ENCOUNTER — ANTICOAGULATION THERAPY VISIT (OUTPATIENT)
Dept: ANTICOAGULATION | Facility: CLINIC | Age: 58
End: 2023-07-20
Payer: COMMERCIAL

## 2023-07-20 DIAGNOSIS — Z95.1 S/P CABG (CORONARY ARTERY BYPASS GRAFT): ICD-10-CM

## 2023-07-20 DIAGNOSIS — Z95.2 S/P AVR (AORTIC VALVE REPLACEMENT): Primary | ICD-10-CM

## 2023-07-20 NOTE — PROGRESS NOTES
"ANTICOAGULATION  MANAGEMENT: NEW REFERRAL      SUBJECTIVE/OBJECTIVE     Glen Cho, a 58 year old male  is newly referred to [facility].    Anticoagulation:    Previously on warfarin: No, new to anticoagulation  Warfarin initiation date (approximate): 23   Indication(s): Mechanical AVR   Goal Range: 2.0-3.0 x 3 months, then 1.5-2.5 for lifetime   Anticoagulation Bridge/Overlap: No   Referring provider: from inpatient provider; ambulatory responsible provider from primary or specialty care needed.  Request sent to PCP Dr. Deal to oversee management.    General Dietary/Social Hx:    Typical vitamin K intake: moderate; consistent --patient reports eating 4-6 servings per week, often eats iceberg lettuce, he is aware this contains little to no vitamin K.    Other dietary considerations: None     Social History:   Social History     Tobacco Use     Smoking status: Every Day     Types: Cigarettes     Last attempt to quit: 5/10/2000     Years since quittin.2     Smokeless tobacco: Never   Vaping Use     Vaping Use: Never used   Substance Use Topics     Alcohol use: Never     Alcohol/week: 0.0 standard drinks of alcohol     Comment: rarely     Drug use: No       In the past 2 weeks, patient estimates taking medications as instructed % of time: 100%    Results:        Recent labs: (last 7 days)     23  1345   INR 2.05*       Wt Readings from Last 2 Encounters:   23 107.1 kg (236 lb 1.8 oz)   23 107.5 kg (237 lb)      Estimated body mass index is 39.29 kg/m  as calculated from the following:    Height as of 7/10/23: 1.651 m (5' 5\").    Weight as of 23: 107.1 kg (236 lb 1.8 oz).  Lab Results   Component Value Date    AST 25 2023     Lab Results   Component Value Date    CR 0.88 2023     Estimated Creatinine Clearance: 100.6 mL/min (based on SCr of 0.88 mg/dL).    ASSESSMENT       2.0-3.0 x 3 months then 1.5-2.5 lifetime thereafter  Establishing " initial warfarin maintenance dose (on warfarin < 30 days)     Starting warfarin dose adjustment recommended per [facility] Nina, see calendar    PLAN     Dosing Instructions: Increase your warfarin dose (6.5% change) with INR in 5 days       Summary  As of 7/20/2023    Full warfarin instructions:  10 mg every Wed, Sat; 12.5 mg all other days   Next INR check:  7/24/2023             Education provided:     Please call back if any changes to your diet, medications or how you've been taking warfarin    Taking warfarin: purpose of warfarin and how it works, take warfarin at same time each day; preferably in the evening, prescribed tablet strength and color and importance of following [facility] instructions vs instructions on the prescription bottle    Goal range and lab monitoring: goal range and significance of current result, Importance of therapeutic range, Importance of following up at instructed interval and frequency of lab work when starting warfarin and importance of following up when instructed (extends after stability established)    Dietary considerations: importance of consistent vitamin K intake, impact of vitamin K foods on INR and vitamin K content of foods    Symptom monitoring: monitoring for bleeding signs and symptoms, monitoring for clotting signs and symptoms, monitoring for stroke signs and symptoms, when to seek medical attention/emergency care and travel related clotting risk and prevention    Importance of notifying anticoagulation clinic for: changes in medications; a sooner lab recheck maybe needed and upcoming surgeries and procedures 2 weeks in advance    Contact [phone]  with any changes, questions or concerns.     Education still needed:     Symptom monitoring: if you hit your head or have a bad fall seek emergency care    Importance of notifying anticoagulation clinic for: diarrhea, nausea/vomiting, reduced intake, cold/flu, and/or infections; a sooner lab recheck maybe needed      Telephone call  with Glen who verbalizes understanding and agrees to plan    Lab visit scheduled    Standing orders placed in Epic: Point of Care INR (Lab 5000)    Plan made with [facility] Pharmacist Kathy Lubin, RN  Anticoagulation Clinic  7/20/2023

## 2023-07-20 NOTE — TELEPHONE ENCOUNTER
"Cardiovascular Surgery  [facility]    DISCHARGE FOLLOW UP PHONE CALL      POST OP MONITORING  How is your pain on a 0-10 scale, how are you managing your pain? Pain is well controlled except for nerve sensation around the incision site especially when he wears a shirt. Down to 3.5 tabs of oxycodone. Discussed weaning and using tylenol and methocarbamol more consistently.    ACTIVITY  How is your activity tolerance? Getting at least 30 minutes per per day  Are you using your incentive spirometer? Yes, 1500ml. Discussed when to call for respiratory issues.   Are you still doing sternal precautions? Per patient he has been grating cheese and leaning on his elbows when he is sitting.  Do you hear any clicking when you are moving or taking a deep breath? He heard a pop near his clavicle when he was leaning. Had a long detailed discussion about sternal precautions and the risks involved with not following them. Patient verbalizes understanding.    Are you weighing yourself daily? 236 lbs at discharge, yesterday was 233, today was also 233 lbs.   Are you monitoring your blood pressure and heart rate? Ordered his blood pressure cuff which should be there today.    SIGNS AND SYMPTOMS OF INFECTION    Incisions are healing well per patient.    ASSISTANCE  Do you have someone at home to assist you with your daily activities? Spouse    MEDICATIONS  Is someone helping you to set up your medications? Spuse  Do you have any questions about your medications? No, but he was initially confused about \"Jantoven\" he didn't realize it was coumadin or warfarin.    Are you on a blood thinner? Coumadin,   Patient is with in range and has another appointment on Monday.    FOLLOW UP  You are scheduled to see your primary care physician on 8/24  You are scheduled to see our surgery advanced practive provider for post operative follow up on 7/31.  You are scheduled for cardiac rehab on 7/25.  You are scheduled to see your " cardiologist on 10/27, plan to add TRAMAINE appointment request sent to .      CONTACT INFORMATION  Please feel free to call us with any questions or symptoms that are concerning for you at [phone]. If it is after 4:00 in the afternoon, or a weekend please call [phone] and ask for the on call specialist. We want to do everything we can to help prevent you needing to return to the hospital, so please do not hesitate to call us.      TRISHA JENKINS RN  Care Coordinator - [facility] CV Surgery   [facility]  [phone]

## 2023-07-21 ENCOUNTER — TELEPHONE (OUTPATIENT)
Dept: CARDIOLOGY | Facility: CLINIC | Age: 58
End: 2023-07-21
Payer: COMMERCIAL

## 2023-07-24 ENCOUNTER — ANTICOAGULATION THERAPY VISIT (OUTPATIENT)
Dept: ANTICOAGULATION | Facility: CLINIC | Age: 58
End: 2023-07-24

## 2023-07-24 ENCOUNTER — LAB (OUTPATIENT)
Dept: LAB | Facility: CLINIC | Age: 58
End: 2023-07-24
Payer: COMMERCIAL

## 2023-07-24 DIAGNOSIS — Z95.1 S/P CABG (CORONARY ARTERY BYPASS GRAFT): ICD-10-CM

## 2023-07-24 DIAGNOSIS — Z95.2 S/P AVR (AORTIC VALVE REPLACEMENT): ICD-10-CM

## 2023-07-24 DIAGNOSIS — Z95.2 S/P AVR (AORTIC VALVE REPLACEMENT): Primary | ICD-10-CM

## 2023-07-24 LAB — INR BLD: 2.7 (ref 0.9–1.1)

## 2023-07-24 PROCEDURE — 36416 COLLJ CAPILLARY BLOOD SPEC: CPT

## 2023-07-24 PROCEDURE — 85610 PROTHROMBIN TIME: CPT

## 2023-07-24 NOTE — PROGRESS NOTES
ANTICOAGULATION MANAGEMENT     Glen Cho 58 year old male is on warfarin with therapeutic INR result. (Goal INR 2.0-3.0)    Recent labs: (last 7 days)     07/24/23  1340   INR 2.7*       ASSESSMENT     Warfarin Lab Questionnaire    Warfarin Doses Last 7 Days--Patient confirmed taking warfarin maintenance dose as listed              7/24/2023   Warfarin Lab Questionnaire   Missed doses within past 14 days? No   Changes in diet or alcohol within past 14 days? No   Medication changes since last result? Yes, per medication list, Pepcid 7/17-7/31/23   Please list: since surgery meds added--patient states he was referring to pain medications but no new medications recently.   Injuries or illness since last result? No   New shortness of breath, severe headaches or sudden changes in vision since last result? No   Abnormal bleeding since last result? No   Upcoming surgery, procedure? No     Previous result: Therapeutic last visit; previously outside of goal range  Additional findings: None       PLAN     Recommended plan for no diet, medication or health factor changes affecting INR     Dosing Instructions: Continue your current warfarin dose with next INR in 3 days       Summary  As of 7/24/2023      Full warfarin instructions:  10 mg every Wed, Sat; 12.5 mg all other days   Next INR check:  7/28/2023               Telephone call with Glen who agrees to plan and repeated back plan correctly    Lab visit scheduled    Education provided:   None required    Plan made with [facility] Pharmacist Nina Lubin, RN  Anticoagulation Clinic  7/24/2023    _______________________________________________________________________     Anticoagulation Episode Summary       Current INR goal:  2.0-3.0   TTR:  --   Target end date:  Indefinite   Send INR reminders to:  [facility]    Indications    S/P AVR (aortic valve replacement)-23 mm On-X valve [Z95.2]  S/P CABG (coronary artery bypass  graft)-LIMA>LAD  VG>RCA  VG>OM [Z95.1]             Comments:               Anticoagulation Care Providers       Provider Role Specialty Phone number    Kelsey Vega PA-C Referring Cardiovascular & Thoracic Surgery [phone]    Mike Deal MD Referring Internal Medicine - Pediatrics [phone]

## 2023-07-25 ENCOUNTER — HOSPITAL ENCOUNTER (OUTPATIENT)
Dept: CARDIAC REHAB | Facility: CLINIC | Age: 58
Discharge: HOME | End: 2023-07-25
Attending: PHYSICIAN ASSISTANT
Payer: COMMERCIAL

## 2023-07-25 ENCOUNTER — MEDICAL CORRESPONDENCE (OUTPATIENT)
Dept: CARDIOLOGY | Facility: CLINIC | Age: 58
End: 2023-07-25
Payer: COMMERCIAL

## 2023-07-25 ENCOUNTER — NURSE TRIAGE (OUTPATIENT)
Dept: NURSING | Facility: CLINIC | Age: 58
End: 2023-07-25
Payer: COMMERCIAL

## 2023-07-25 DIAGNOSIS — G89.18 ACUTE POST-OPERATIVE PAIN: Primary | ICD-10-CM

## 2023-07-25 DIAGNOSIS — Z95.2 S/P AVR (AORTIC VALVE REPLACEMENT): ICD-10-CM

## 2023-07-25 DIAGNOSIS — Z95.1 S/P CABG (CORONARY ARTERY BYPASS GRAFT): ICD-10-CM

## 2023-07-25 PROCEDURE — 93798 PHYS/QHP OP CAR RHAB W/ECG: CPT

## 2023-07-25 PROCEDURE — 93797 PHYS/QHP OP CAR RHAB WO ECG: CPT | Mod: 59

## 2023-07-25 RX ORDER — OXYCODONE HYDROCHLORIDE 5 MG/1
5 TABLET ORAL EVERY 6 HOURS PRN
Qty: 20 TABLET | Refills: 0 | OUTPATIENT
Start: 2023-07-25 | End: 2023-07-30

## 2023-07-25 RX ORDER — METHOCARBAMOL 500 MG/1
500 TABLET, FILM COATED ORAL 4 TIMES DAILY PRN
Qty: 30 TABLET | Refills: 0 | OUTPATIENT
Start: 2023-07-25 | End: 2023-08-09

## 2023-07-25 NOTE — TELEPHONE ENCOUNTER
Call from patient who reports he woke up  at 11:35 pm - had a sharp searing pain in his back to right of his spine. The severe pain lasted 3-4 min but now it's gone.    Patient reports he had triple bypass surgery and aortic valve replacement on July 10th.    He check his BP and heart rate: /68, HR: 67    He reports he took his meds this evening at about 9:15 pm.      Disposition: go to ED now  Reviewed care advise with patient. Informed to call back w/ any questions or new concerns.  Patient agrees with advise but says he still needs to decide if he will go in or not.      Gracie Mina RN, BSN  Triage Nurse Advisor         Reason for Disposition   [1] SEVERE back pain (e.g., excruciating) AND [2] sudden onset AND [3] age > 60 years    Additional Information   Negative: Passed out (i.e., lost consciousness, collapsed and was not responding)   Negative: Shock suspected (e.g., cold/pale/clammy skin, too weak to stand, low BP, rapid pulse)   Negative: Sounds like a life-threatening emergency to the triager    Protocols used: Back Pain-A-

## 2023-07-25 NOTE — TELEPHONE ENCOUNTER
Patient called stating his BP is 129/78, HR 67 with increased Metoprolol. States he was awoken form sleep last evening with right flank pain , intense, lasted 2-3 min's. Not like previous pain with a kidney stone. Called surgeon on call. Instructed to monitor and ED if reoccurs, no reoccurrence. BP this am 120 sys with HR 71. States monitor said it was irregular. regular after Metoprolol. Had A-fib post op. On Coumadin. Instructed A-fib is not abnormal psot op, concerns if HR > 100 or BP < 95 sys and symptomatic. Side effects of medications discussed. States feels very sore today. Instructed to keep up with tylenol and Robaxin. Oxycodone being taken 2 X day as of yesterday. Requesting Methocarbamol and Oxycodone refills. Will discuss with provider. IS 2250 ml, no shortness of breath.

## 2023-07-27 ENCOUNTER — LAB (OUTPATIENT)
Dept: LAB | Facility: CLINIC | Age: 58
End: 2023-07-27
Payer: COMMERCIAL

## 2023-07-27 ENCOUNTER — HOSPITAL ENCOUNTER (OUTPATIENT)
Dept: CARDIAC REHAB | Facility: CLINIC | Age: 58
Discharge: HOME | End: 2023-07-27
Attending: PHYSICIAN ASSISTANT
Payer: COMMERCIAL

## 2023-07-27 ENCOUNTER — ANTICOAGULATION THERAPY VISIT (OUTPATIENT)
Dept: ANTICOAGULATION | Facility: CLINIC | Age: 58
End: 2023-07-27

## 2023-07-27 DIAGNOSIS — Z95.1 S/P CABG (CORONARY ARTERY BYPASS GRAFT): ICD-10-CM

## 2023-07-27 DIAGNOSIS — Z95.2 S/P AVR (AORTIC VALVE REPLACEMENT): ICD-10-CM

## 2023-07-27 DIAGNOSIS — Z95.2 S/P AVR (AORTIC VALVE REPLACEMENT): Primary | ICD-10-CM

## 2023-07-27 LAB — INR BLD: 2.5 (ref 0.9–1.1)

## 2023-07-27 PROCEDURE — 85610 PROTHROMBIN TIME: CPT

## 2023-07-27 PROCEDURE — 36415 COLL VENOUS BLD VENIPUNCTURE: CPT

## 2023-07-27 PROCEDURE — 93798 PHYS/QHP OP CAR RHAB W/ECG: CPT

## 2023-07-27 RX ORDER — OXYCODONE HYDROCHLORIDE 5 MG/1
5 TABLET ORAL EVERY 6 HOURS PRN
Qty: 20 TABLET | Refills: 0 | Status: SHIPPED | OUTPATIENT
Start: 2023-07-27 | End: 2023-08-18

## 2023-07-27 NOTE — PROGRESS NOTES
ANTICOAGULATION MANAGEMENT     Glen Cho 58 year old male is on warfarin with therapeutic INR result. (Goal INR 2.0-3.0)    Recent labs: (last 7 days)     07/27/23  1341   INR 2.5*       ASSESSMENT     Warfarin Lab Questionnaire    Warfarin Doses Last 7 Days      7/27/2023     1:38 PM   Dose in Tablet or Mg   TAB or MG? milligram (mg)           7/27/2023   Warfarin Lab Questionnaire   Missed doses within past 14 days? No   Changes in diet or alcohol within past 14 days? No   Medication changes since last result? No   Injuries or illness since last result? No   New shortness of breath, severe headaches or sudden changes in vision since last result? No   Abnormal bleeding since last result? No   Upcoming surgery, procedure? No     Previous result: Therapeutic last visit; previously outside of goal range  Additional findings: Recent heart valve replacement in last 10 weeks       PLAN     Recommended plan for no diet, medication or health factor changes and recent heart valve replacement last 10 weeks affecting INR     Dosing Instructions: Continue your current warfarin dose with next INR in 4 days       Summary  As of 7/27/2023      Full warfarin instructions:  10 mg every Wed, Sat; 12.5 mg all other days   Next INR check:  7/31/2023               Telephone call with Glen who verbalizes understanding and agrees to plan    Lab visit scheduled    Education provided:   Please call back if any changes to your diet, medications or how you've been taking warfarin    Plan made per [facility] anticoagulation protocol    Dorene Quezada RN  Anticoagulation Clinic  7/27/2023    _______________________________________________________________________     Anticoagulation Episode Summary       Current INR goal:  2.0-3.0   TTR:  --   Target end date:  Indefinite   Send INR reminders to:  [facility]    Indications    S/P AVR (aortic valve replacement)-23 mm On-X valve [Z95.2]  S/P CABG (coronary artery bypass  graft)-LIMA>LAD  VG>RCA  VG>OM [Z95.1]             Comments:               Anticoagulation Care Providers       Provider Role Specialty Phone number    Kelsey Vega PA-C Referring Cardiovascular & Thoracic Surgery [phone]    Mike Deal MD Referring Internal Medicine - Pediatrics [phone]

## 2023-07-31 ENCOUNTER — ANTICOAGULATION THERAPY VISIT (OUTPATIENT)
Dept: ANTICOAGULATION | Facility: CLINIC | Age: 58
End: 2023-07-31

## 2023-07-31 ENCOUNTER — LAB (OUTPATIENT)
Dept: LAB | Facility: CLINIC | Age: 58
End: 2023-07-31
Payer: COMMERCIAL

## 2023-07-31 ENCOUNTER — OFFICE VISIT (OUTPATIENT)
Dept: CARDIOLOGY | Facility: CLINIC | Age: 58
End: 2023-07-31
Payer: COMMERCIAL

## 2023-07-31 VITALS
SYSTOLIC BLOOD PRESSURE: 125 MMHG | HEIGHT: 65 IN | WEIGHT: 241.1 LBS | BODY MASS INDEX: 40.17 KG/M2 | HEART RATE: 70 BPM | DIASTOLIC BLOOD PRESSURE: 81 MMHG | OXYGEN SATURATION: 97 %

## 2023-07-31 DIAGNOSIS — Z95.1 S/P CABG (CORONARY ARTERY BYPASS GRAFT): ICD-10-CM

## 2023-07-31 DIAGNOSIS — Z95.2 S/P AVR (AORTIC VALVE REPLACEMENT): ICD-10-CM

## 2023-07-31 DIAGNOSIS — Z95.2 S/P AVR (AORTIC VALVE REPLACEMENT): Primary | ICD-10-CM

## 2023-07-31 LAB — INR BLD: 2.5 (ref 0.9–1.1)

## 2023-07-31 PROCEDURE — 85610 PROTHROMBIN TIME: CPT

## 2023-07-31 PROCEDURE — 99024 POSTOP FOLLOW-UP VISIT: CPT | Performed by: PHYSICIAN ASSISTANT

## 2023-07-31 PROCEDURE — 36416 COLLJ CAPILLARY BLOOD SPEC: CPT

## 2023-07-31 RX ORDER — METHOCARBAMOL 500 MG/1
1000 TABLET, FILM COATED ORAL 4 TIMES DAILY PRN
Qty: 40 TABLET | Refills: 1 | Status: SHIPPED | OUTPATIENT
Start: 2023-07-31 | End: 2023-08-18

## 2023-07-31 RX ORDER — METHOCARBAMOL 500 MG/1
1000 TABLET, FILM COATED ORAL 4 TIMES DAILY PRN
Qty: 40 TABLET | Refills: 0 | Status: SHIPPED | OUTPATIENT
Start: 2023-07-31 | End: 2023-07-31

## 2023-07-31 NOTE — PROGRESS NOTES
ANTICOAGULATION MANAGEMENT     Glen Cho 58 year old male is on warfarin with therapeutic INR result. (Goal INR 2.0-3.0)    Recent labs: (last 7 days)     07/31/23  1629   INR 2.5*       ASSESSMENT     Source(s): Chart Review  Previous INR was Therapeutic last 2(+) visits  Medication, diet, health changes since last INR chart reviewed; none identified  New On-X AVR and CABG x 3 on 7/10/23         PLAN     Unable to reach Glen today.    Left message to continue current dose of warfarin 12.5 mg tonight. Request call back for assessment.    Follow up required to confirm warfarin dose taken and assess for changes    Cecilia Burris RN  Anticoagulation Clinic  7/31/2023

## 2023-07-31 NOTE — PROGRESS NOTES
CARDIOTHORACIC SURGERY FOLLOW-UP VISIT     Glen Cho   1965   [id number]      Reason for visit: Post-Op AVR (On-X) and CABG x 3 with Dr. Antony on 07/10/2023    ASSESSMENT/PLAN:  Glen Cho is a 58 year old year old male status post AVR and CABG who returns to clinic for postop visit.     CAD S/P CABG x 3  Aortic stenosis, s/p AVR (On-X)  HTN/HLD  CKD  T2DM  CORINA  Today in clinic patient sounds to be in a regular rhythm with valve click noted  <100 bpm.   Discharge weight 236 lbs; weight today 241 lbs; Appears  euvolemic upon examinination with no appreciable JVD, BLE edema, abdominal bloating. LS are CTA.  Discussed the importance of nutrition in healing and staying adequately hydrated.   Midline sternal incision healing well with no noted erythema or drainage or signs of infection. Increasing activity and strength overall.    Hemodynamics are stable. Medications reviewed and no changes were needed today.  Follow up with your cardiologist as scheduled on 08/30/2023.  Continue Outpatient Cardiac Rehab until completed.   Continue sternal precautions for 12 weeks from surgery date.   No driving for 4 weeks from surgery date.     Postoperative restrictions have been reviewed and all of the patient's questions were answered. Our contact information was given to the patient if he should have any further questions/concerns. No further follow up is needed with us.  The total time spent with the patient was 25 minutes, > 50% of which was spent in counseling and coordination of care.    Melissa Shoemaker PA-C   Cardiothoracic Surgery  Pager: [phone]    HPI: Per discharge summary:    Returns to clinic today for postoperative visit.    Since discharge, has been recovering well  at home with help from family.    Patient is on Coumadin and INR is therapeutic with last INR check on 7/27/23 and it was 2.5.    He states pain is well controlled. He continues to need Oxycodone (2-3/day and using muscle relaxer as  well) for pain management. Sleep is going ok but has a hard time sleeping on his back. Participating in therapy at  and also walking about a mile a day with his wife. Breathing has been stable/improving. Denies SOB at rest, PND, orthopnea. Non-productive cough. Patient denies any fever, chills, chest pain, palpitations, edema, SOB, lightheadedness, nausea, and vomiting.  Blood glucose levels have been under good control.   Has had a normal appetite. Having regular bowel movements and voiding without difficulty.  Denies sternal popping or clicking or incisional drainage/erythema. No psychiatric concerns.    PAST MEDICAL HISTORY:  Past Medical History:   Diagnosis Date    Arthritis     Chronic kidney disease     Diabetes (H)     High cholesterol     HTN (hypertension)     Obesity     Sleep apnea     DOES NOT HAVE A CPAP       PAST SURGICAL HISTORY:  Past Surgical History:   Procedure Laterality Date    ANKLE SURGERY  02/01/2004     R ORIF    ARTHROSCOPY KNEE  03/26/2014    Procedure: ARTHROSCOPY KNEE;  ARTHROSCOPY KNEE- RIGHT   SURGEON REQUESTS CHOICE ANETHESIA ;  Surgeon: Sabino Simpson MD;  Location:  OR    BYPASS GRAFT ARTERY CORONARY, REPLACE VALVE AORTIC, COMBINED N/A 7/10/2023    Procedure: CORONARY ARTERY BYPASS GRAFT x 3 (LEFT INTERNAL MAMMARY ARTERY - LEFT ANTERIOR DESCENDING ARTERY; SAPHENOUS VEIN - OBTUSE MARGINAL ARTERY; SAPHENOUS VEIN TO ACUTE MARGINAL,  WITH ENDOSCOPIC SAPHENOUS VEIN HARVEST OF LEFT LOWER EXTREMITY; AORTIC VALVE REPLACEMENT WITH ON-X  PROSTHETIC HEART VALVE 23MM; ON CARDIOPULMONARY PUMP OXYGENATOR  (INTRAOPERATIVE TRANSESOPHAGEAL ECHOCARDIOGRAM BY A    CHOLECYSTECTOMY      CV CORONARY ANGIOGRAM N/A 06/12/2023    Procedure: Coronary Angiogram;  Surgeon: Jacobo Diez MD;  Location: [facility] CARDIAC CATH LAB    HERNIORRHAPHY UMBILICAL         CURRENT MEDICATIONS:   Current Outpatient Medications   Medication    acetaminophen (TYLENOL) 325 MG tablet    aspirin 81 MG EC tablet     atorvastatin (LIPITOR) 40 MG tablet    BD PEN NEEDLE KENTRELL 2ND GEN 32G X 4 MM miscellaneous    blood glucose monitoring (FREESTYLE INSULINX SYSTEM) meter device kit    blood glucose monitoring (FREESTYLE) lancets    insulin glargine (LANTUS PEN) 100 UNIT/ML pen    insulin lispro (HUMALOG KWIKPEN) 100 UNIT/ML (1 unit dial) KWIKPEN    insulin pen needle (BD KENTRELL U/F) 32G X 4 MM    metFORMIN (GLUCOPHAGE XR) 500 MG 24 hr tablet    methocarbamol (ROBAXIN) 750 MG tablet    metoprolol tartrate (LOPRESSOR) 25 MG tablet    order for DME    order for DME    order for DME    ORDER FOR DME    oxyCODONE (ROXICODONE) 5 MG tablet    senna-docusate (SENOKOT-S/PERICOLACE) 8.6-50 MG tablet    warfarin ANTICOAGULANT (COUMADIN) 5 MG tablet    famotidine (PEPCID) 20 MG tablet    methocarbamol (ROBAXIN) 500 MG tablet    polyethylene glycol (MIRALAX) 17 GM/Dose powder     No current facility-administered medications for this visit.       ALLERGIES:      Allergies   Allergen Reactions    Amlodipine Besylate      Water retention    Cefdinir      Joint pains    Lisinopril      Water retention    Amoxicillin Rash     White spots       ROS:  Review of symptoms otherwise negative unless commented about in HPI.     LABS:  Last Basic Metabolic Panel:  Lab Results   Component Value Date     07/17/2023     10/16/2020      Lab Results   Component Value Date    POTASSIUM 4.1 07/17/2023    POTASSIUM 4.0 07/10/2023    POTASSIUM 4.2 10/16/2020     Lab Results   Component Value Date    CHLORIDE 96 07/17/2023    CHLORIDE 104 10/16/2020     Lab Results   Component Value Date    ARTEMIO 9.4 07/17/2023    ARTEMIO 9.8 10/16/2020     Lab Results   Component Value Date    CO2 28 07/17/2023    CO2 23 10/16/2020     Lab Results   Component Value Date    BUN 14.3 07/17/2023    BUN 15 10/16/2020     Lab Results   Component Value Date    CR 0.88 07/17/2023    CR 0.78 10/16/2020     Lab Results   Component Value Date     07/17/2023     10/16/2020  "      Last CBC:   Lab Results   Component Value Date    WBC 12.4 07/17/2023    WBC 5.2 04/12/2015     Lab Results   Component Value Date    RBC 3.06 07/17/2023    RBC 4.87 04/12/2015     Lab Results   Component Value Date    HGB 8.8 07/17/2023    HGB 14.4 04/12/2015     Lab Results   Component Value Date    HCT 26.7 07/17/2023    HCT 41.8 04/12/2015     No components found for: MCT  Lab Results   Component Value Date    MCV 87 07/17/2023    MCV 86 04/12/2015     Lab Results   Component Value Date    MCH 28.8 07/17/2023    MCH 29.6 04/12/2015     Lab Results   Component Value Date    MCHC 33.0 07/17/2023    MCHC 34.4 04/12/2015     Lab Results   Component Value Date    RDW 14.2 07/17/2023    RDW 12.4 04/12/2015     Lab Results   Component Value Date     07/17/2023     04/12/2015       INR:  Lab Results   Component Value Date    INR 2.5 07/27/2023    INR 2.7 07/24/2023    INR 2.05 07/19/2023    INR 1.74 07/17/2023    INR 1.50 07/16/2023    INR 1.25 07/15/2023    INR 1.12 07/14/2023    INR 1.15 07/13/2023    INR 1.26 07/12/2023    INR 1.25 07/11/2023    INR 1.12 07/10/2023    INR 1.20 07/10/2023    INR 1.27 07/10/2023    INR 0.98 07/10/2023    INR 0.89 03/15/2010         IMAGING:  None    PHYSICAL EXAM:   /81   Pulse 70   Ht 1.651 m (5' 5\")   Wt 109.4 kg (241 lb 1.6 oz)   SpO2 97%   BMI 40.12 kg/m    General: alert and oriented x 3, pleasant, no acute distress, normal mood and affect  Neuro: no focal deficits   CV: S1 S2, no murmurs, rubs or gallops, regular rate and rhythm, no peripheral edema  Pulm: bilateral breath sounds, clear to auscultation, easy work of breathing  Incision: incisions clean dry and intact without erythema, swelling or drainage    PROCEDURES: None         CC  Jb Yehl     "

## 2023-07-31 NOTE — PATIENT INSTRUCTIONS
Your surgery was on 07/10/2023    Ok to start driving four weeks after the date of surgery as long as you are no longer taking narcotic pain medications.    From the date of surgery: no lifting greater than 10 pounds for 6 weeks, then no lifting greater than 20-30 pounds for an additional 6 weeks, and together this will total 12 weeks from surgery.    Do not soak your incisions until fully healed over with no scabbing; this includes hot tubs, baths, pools etc.    You had valve surgery, ensure you take prophylactic antibiotics prior to any dental procedure.    If you have questions or concerns, please call us:    Mela Bustillos RN or Jovana Balderas RN  [phone]

## 2023-07-31 NOTE — PROGRESS NOTES
ANTICOAGULATION MANAGEMENT     Glen Cho 58 year old male is on warfarin with therapeutic INR result. (Goal INR 2.0-3.0)    Recent labs: (last 7 days)     07/31/23  1629   INR 2.5*       ASSESSMENT     Source(s): Chart Review and Patient/Caregiver Call     Warfarin doses taken: Warfarin taken as instructed  Diet: No new diet changes identified  Medication/supplement changes: None noted  New illness, injury, or hospitalization: No  Signs or symptoms of bleeding or clotting: No  Previous result: Therapeutic last 2(+) visits  Additional findings: No changes per today's postop ov + cardiac rehab 3 x weekly       PLAN     Recommended plan for no diet, medication or health factor changes affecting INR     Dosing Instructions: Continue your current warfarin dose with next INR in 1 week       Summary  As of 7/31/2023      Full warfarin instructions:  10 mg every Wed, Sat; 12.5 mg all other days   Next INR check:  8/7/2023               Telephone call with Glen who verbalizes understanding and agrees to plan    Lab visit scheduled    Education provided:   Please call back if any changes to your diet, medications or how you've been taking warfarin  Symptom monitoring: monitoring for bleeding signs and symptoms and monitoring for clotting signs and symptoms    Plan made per [facility] anticoagulation protocol    Cecilia Burris RN  Anticoagulation Clinic  7/31/2023    _______________________________________________________________________     Anticoagulation Episode Summary       Current INR goal:  2.0-3.0   TTR:  100.0 % (4 d)   Target end date:  Indefinite   Send INR reminders to:  [facility]    Indications    S/P AVR (aortic valve replacement)-23 mm On-X valve [Z95.2]  S/P CABG (coronary artery bypass graft)-LIMA>LAD  VG>RCA  VG>OM [Z95.1]             Comments:               Anticoagulation Care Providers       Provider Role Specialty Phone number    Kelsey Vega PA-C Referring Cardiovascular &  Thoracic Surgery [phone]    Mike Deal MD Referring Internal Medicine - Pediatrics [phone]

## 2023-08-02 ENCOUNTER — MYC MEDICAL ADVICE (OUTPATIENT)
Dept: PEDIATRICS | Facility: CLINIC | Age: 58
End: 2023-08-02
Payer: COMMERCIAL

## 2023-08-02 ENCOUNTER — HOSPITAL ENCOUNTER (OUTPATIENT)
Dept: CARDIAC REHAB | Facility: CLINIC | Age: 58
Discharge: HOME | End: 2023-08-02
Attending: PHYSICIAN ASSISTANT
Payer: COMMERCIAL

## 2023-08-02 DIAGNOSIS — Z95.2 S/P AVR (AORTIC VALVE REPLACEMENT): ICD-10-CM

## 2023-08-02 DIAGNOSIS — Z95.1 S/P CABG (CORONARY ARTERY BYPASS GRAFT): Primary | ICD-10-CM

## 2023-08-02 PROCEDURE — 93798 PHYS/QHP OP CAR RHAB W/ECG: CPT

## 2023-08-02 RX ORDER — METOPROLOL TARTRATE 25 MG/1
25 TABLET, FILM COATED ORAL 2 TIMES DAILY
Qty: 60 TABLET | Refills: 1 | Status: SHIPPED | OUTPATIENT
Start: 2023-08-02 | End: 2023-08-18

## 2023-08-02 NOTE — TELEPHONE ENCOUNTER
Spoke with KINGA Whaley with Cardiac ([phone]). Reviewed [employer] message.     She will take over and follow up with Cardiologist to get a new rx sent for Metoprolol 25mg bid.     Updated patient via [employer] message.

## 2023-08-02 NOTE — PROGRESS NOTES
Patient spoke with Dr. Heaton and Lopressor dosing increased from 12.5mg BID to 25mg BID. New prescription sent to show this change and allow refills at new higher dosing.    Thanks,  Melissa Shoemaker PA-C

## 2023-08-04 ENCOUNTER — HOSPITAL ENCOUNTER (OUTPATIENT)
Dept: CARDIAC REHAB | Facility: CLINIC | Age: 58
Discharge: HOME | End: 2023-08-04
Attending: PHYSICIAN ASSISTANT
Payer: COMMERCIAL

## 2023-08-04 PROCEDURE — 93798 PHYS/QHP OP CAR RHAB W/ECG: CPT

## 2023-08-07 ENCOUNTER — MYC MEDICAL ADVICE (OUTPATIENT)
Dept: ANTICOAGULATION | Facility: CLINIC | Age: 58
End: 2023-08-07

## 2023-08-07 ENCOUNTER — HOSPITAL ENCOUNTER (OUTPATIENT)
Dept: CARDIAC REHAB | Facility: CLINIC | Age: 58
Discharge: HOME | End: 2023-08-07
Attending: PHYSICIAN ASSISTANT
Payer: COMMERCIAL

## 2023-08-07 ENCOUNTER — LAB (OUTPATIENT)
Dept: LAB | Facility: CLINIC | Age: 58
End: 2023-08-07
Payer: COMMERCIAL

## 2023-08-07 ENCOUNTER — ANTICOAGULATION THERAPY VISIT (OUTPATIENT)
Dept: ANTICOAGULATION | Facility: CLINIC | Age: 58
End: 2023-08-07

## 2023-08-07 DIAGNOSIS — Z95.1 S/P CABG (CORONARY ARTERY BYPASS GRAFT): ICD-10-CM

## 2023-08-07 DIAGNOSIS — Z95.2 S/P AVR (AORTIC VALVE REPLACEMENT): ICD-10-CM

## 2023-08-07 LAB — INR BLD: 2.2 (ref 0.9–1.1)

## 2023-08-07 PROCEDURE — 85610 PROTHROMBIN TIME: CPT

## 2023-08-07 PROCEDURE — 93798 PHYS/QHP OP CAR RHAB W/ECG: CPT

## 2023-08-07 PROCEDURE — 36416 COLLJ CAPILLARY BLOOD SPEC: CPT

## 2023-08-07 NOTE — PROGRESS NOTES
ANTICOAGULATION MANAGEMENT     Glen Cho 58 year old male is on warfarin with therapeutic INR result. (Goal INR 2.0-3.0)    Recent labs: (last 7 days)     08/07/23  0808   INR 2.2*       ASSESSMENT     Warfarin Lab Questionnaire    Warfarin Doses Last 7 Days      8/7/2023     8:05 AM   Dose in Tablet or Mg   TAB or MG? milligram (mg)     Pt Rptd Dose SUNDAY MONDAY TUESDAY WED THURS FRIDAY SATURDAY 8/7/2023   8:05 AM 12.5 12.5 12.5 10 12.5 12.5 10         8/7/2023   Warfarin Lab Questionnaire   Missed doses within past 14 days? No   Changes in diet or alcohol within past 14 days? No   Medication changes since last result? No   Injuries or illness since last result? No   New shortness of breath, severe headaches or sudden changes in vision since last result? No   Abnormal bleeding since last result? No   Upcoming surgery, procedure? No   Best number to call with results? [phone]     Previous result: Therapeutic last 2(+) visits  Additional findings: Recent heart valve replacement in last 10 weeks 7/10/23-started warfarin on 7/11/23       PLAN     Recommended plan for ongoing change(s) affecting INR     Dosing Instructions: Increase your warfarin dose (6.1% change) with next INR in 1 week       Summary  As of 8/7/2023      Full warfarin instructions:  12.5 mg every day   Next INR check:  8/14/2023               Detailed voice message left for Glen with dosing instructions and follow up date.   Sent [employer] message with dosing and follow up instructions    Contact [phone]  to schedule and with any changes, questions or concerns.     Education provided:   Contact [phone]  with any changes, questions or concerns.     Plan made with [facility] Pharmacist Kathy Moses, RN  Anticoagulation Clinic  8/7/2023    _______________________________________________________________________     Anticoagulation Episode Summary       Current INR goal:  2.0-3.0   TTR:  100.0 % (1.6 wk)   Target end  date:  Indefinite   Send INR reminders [facility]:  [facility]    Indications    S/P AVR (aortic valve replacement)-23 mm On-X valve [Z95.2]  S/P CABG (coronary artery bypass graft)-LIMA>LAD  VG>RCA  VG>OM [Z95.1]             Comments:               Anticoagulation Care Providers       Provider Role Specialty Phone number    Kelsey Vega PA-C Referring Cardiovascular & Thoracic Surgery [phone]    Mike Deal MD Referring Internal Medicine - Pediatrics [phone]

## 2023-08-09 ENCOUNTER — HOSPITAL ENCOUNTER (OUTPATIENT)
Dept: CARDIAC REHAB | Facility: CLINIC | Age: 58
Discharge: HOME | End: 2023-08-09
Attending: PHYSICIAN ASSISTANT
Payer: COMMERCIAL

## 2023-08-09 PROCEDURE — 93798 PHYS/QHP OP CAR RHAB W/ECG: CPT

## 2023-08-09 RX ORDER — WARFARIN SODIUM 5 MG/1
TABLET ORAL
Qty: 225 TABLET | Refills: 1 | Status: SHIPPED | OUTPATIENT
Start: 2023-08-09 | End: 2023-11-20

## 2023-08-09 NOTE — TELEPHONE ENCOUNTER
ANTICOAGULATION MANAGEMENT:  Medication Refill    Anticoagulation Summary  As of 8/7/2023      Warfarin maintenance plan:  12.5 mg (5 mg x 2.5) every day   Next INR check:  8/14/2023   Target end date:  Indefinite    Indications    S/P AVR (aortic valve replacement)-23 mm On-X valve [Z95.2]  S/P CABG (coronary artery bypass graft)-LIMA>LAD  VG>RCA  VG>OM [Z95.1]                 Anticoagulation Care Providers       Provider Role Specialty Phone number    Kelsey Vega PA-C Referring Cardiovascular & Thoracic Surgery [phone]    Mike Deal MD Referring Internal Medicine - Pediatrics [phone]            Refill Criteria    Visit with referring provider/group: Meets criteria: office visit within referring provider group in the last 1 year on 3/20/23    [facility] referral signed last signed: 07/18/2023; within last year: Yes    Lab monitoring not exceeding 2 weeks overdue: Yes    Glen meets all criteria for refill. Rx instructions and quantity supplied updated to match patient's current dosing plan. Warfarin 90 day supply with 1 refill granted per [facility] protocol     Jovana Carpenter, RN  Anticoagulation Clinic

## 2023-08-14 ENCOUNTER — ANTICOAGULATION THERAPY VISIT (OUTPATIENT)
Dept: ANTICOAGULATION | Facility: CLINIC | Age: 58
End: 2023-08-14

## 2023-08-14 ENCOUNTER — LAB (OUTPATIENT)
Dept: LAB | Facility: CLINIC | Age: 58
End: 2023-08-14
Payer: COMMERCIAL

## 2023-08-14 ENCOUNTER — HOSPITAL ENCOUNTER (OUTPATIENT)
Dept: CARDIAC REHAB | Facility: CLINIC | Age: 58
Discharge: HOME | End: 2023-08-14
Attending: PHYSICIAN ASSISTANT
Payer: COMMERCIAL

## 2023-08-14 DIAGNOSIS — Z95.1 S/P CABG (CORONARY ARTERY BYPASS GRAFT): ICD-10-CM

## 2023-08-14 DIAGNOSIS — Z95.2 S/P AVR (AORTIC VALVE REPLACEMENT): ICD-10-CM

## 2023-08-14 LAB — INR BLD: 2.7 (ref 0.9–1.1)

## 2023-08-14 PROCEDURE — 93798 PHYS/QHP OP CAR RHAB W/ECG: CPT

## 2023-08-14 PROCEDURE — 85610 PROTHROMBIN TIME: CPT

## 2023-08-14 PROCEDURE — 36416 COLLJ CAPILLARY BLOOD SPEC: CPT

## 2023-08-14 NOTE — PROGRESS NOTES
ANTICOAGULATION MANAGEMENT     Glen Cho 58 year old male is on warfarin with therapeutic INR result. (Goal INR 2.0-3.0)    Recent labs: (last 7 days)     08/14/23  1022   INR 2.7*       ASSESSMENT     Warfarin Lab Questionnaire    Warfarin Doses Last 7 Days      8/14/2023    10:18 AM   Dose in Tablet or Mg   TAB or MG? tablet (tab)     Pt Rptd Dose SUNDAY MONDAY TUESDAY WED THURS FRIDAY SATURDAY 8/14/2023  10:18 AM 2.5 2.5 2.5 2.5 2.5 2.5 2.5         8/14/2023   Warfarin Lab Questionnaire   Missed doses within past 14 days? No   Changes in diet or alcohol within past 14 days? No   Medication changes since last result? No   Injuries or illness since last result? No   New shortness of breath, severe headaches or sudden changes in vision since last result? No   Abnormal bleeding since last result? No   Upcoming surgery, procedure? No   Best number to call with results? [phone]     Previous result: Therapeutic last 2(+) visits  Additional findings: Recent heart valve replacement in last 10 weeks on 7/10/23. [facility] reviewed       PLAN     Recommended plan for ongoing change(s) affecting INR     Dosing Instructions: Continue your current warfarin dose with next INR in 2 weeks       Summary  As of 8/14/2023      Full warfarin instructions:  12.5 mg every day   Next INR check:  8/28/2023               Detailed voice message left for Glen with dosing instructions and follow up date.     Lab visit scheduled    Education provided:   Please call back if any changes to your diet, medications or how you've been taking warfarin  Contact [phone]  with any changes, questions or concerns.     Plan made with [facility] Pharmacist Nina Moses, RN  Anticoagulation Clinic  8/14/2023    _______________________________________________________________________     Anticoagulation Episode Summary       Current INR goal:  2.0-3.0   TTR:  100.0 % (2.6 wk)   Target end date:  Indefinite   Send INR reminders to:   [facility]    Indications    S/P AVR (aortic valve replacement)-23 mm On-X valve [Z95.2]  S/P CABG (coronary artery bypass graft)-LIMA>LAD  VG>RCA  VG>OM [Z95.1]             Comments:               Anticoagulation Care Providers       Provider Role Specialty Phone number    Kelsey Vega PA-C Referring Cardiovascular & Thoracic Surgery [phone]    Mike Deal MD Referring Internal Medicine - Pediatrics [phone]

## 2023-08-16 ENCOUNTER — HOSPITAL ENCOUNTER (OUTPATIENT)
Dept: CARDIAC REHAB | Facility: CLINIC | Age: 58
Discharge: HOME | End: 2023-08-16
Attending: PHYSICIAN ASSISTANT
Payer: COMMERCIAL

## 2023-08-16 PROCEDURE — 93797 PHYS/QHP OP CAR RHAB WO ECG: CPT

## 2023-08-16 PROCEDURE — 93798 PHYS/QHP OP CAR RHAB W/ECG: CPT

## 2023-08-17 ENCOUNTER — MYC MEDICAL ADVICE (OUTPATIENT)
Dept: PEDIATRICS | Facility: CLINIC | Age: 58
End: 2023-08-17

## 2023-08-18 ENCOUNTER — MYC MEDICAL ADVICE (OUTPATIENT)
Dept: PEDIATRICS | Facility: CLINIC | Age: 58
End: 2023-08-18

## 2023-08-18 ENCOUNTER — OFFICE VISIT (OUTPATIENT)
Dept: PEDIATRICS | Facility: CLINIC | Age: 58
End: 2023-08-18
Payer: COMMERCIAL

## 2023-08-18 VITALS
WEIGHT: 244.06 LBS | OXYGEN SATURATION: 97 % | HEART RATE: 79 BPM | BODY MASS INDEX: 40.61 KG/M2 | SYSTOLIC BLOOD PRESSURE: 144 MMHG | DIASTOLIC BLOOD PRESSURE: 89 MMHG | TEMPERATURE: 97.9 F

## 2023-08-18 DIAGNOSIS — Z79.4 TYPE 2 DIABETES MELLITUS WITH DIABETIC NEPHROPATHY, WITH LONG-TERM CURRENT USE OF INSULIN (H): ICD-10-CM

## 2023-08-18 DIAGNOSIS — Z11.4 SCREENING FOR HIV (HUMAN IMMUNODEFICIENCY VIRUS): ICD-10-CM

## 2023-08-18 DIAGNOSIS — E11.21 TYPE 2 DIABETES MELLITUS WITH DIABETIC NEPHROPATHY, WITH LONG-TERM CURRENT USE OF INSULIN (H): ICD-10-CM

## 2023-08-18 DIAGNOSIS — G25.81 RESTLESS LEGS SYNDROME (RLS): ICD-10-CM

## 2023-08-18 DIAGNOSIS — Z11.59 NEED FOR HEPATITIS C SCREENING TEST: ICD-10-CM

## 2023-08-18 DIAGNOSIS — Z12.11 SCREEN FOR COLON CANCER: ICD-10-CM

## 2023-08-18 DIAGNOSIS — Z95.2 S/P AVR (AORTIC VALVE REPLACEMENT): ICD-10-CM

## 2023-08-18 DIAGNOSIS — D64.9 ANEMIA, UNSPECIFIED TYPE: ICD-10-CM

## 2023-08-18 DIAGNOSIS — E66.01 MORBID OBESITY (H): ICD-10-CM

## 2023-08-18 DIAGNOSIS — I25.10 CORONARY ARTERY DISEASE INVOLVING NATIVE CORONARY ARTERY OF NATIVE HEART, UNSPECIFIED WHETHER ANGINA PRESENT: Primary | ICD-10-CM

## 2023-08-18 DIAGNOSIS — N18.1 CKD (CHRONIC KIDNEY DISEASE) STAGE 1, GFR 90 ML/MIN OR GREATER: ICD-10-CM

## 2023-08-18 PROBLEM — Z98.890 STATUS POST CORONARY ANGIOGRAM: Status: RESOLVED | Noted: 2023-06-12 | Resolved: 2023-08-18

## 2023-08-18 PROBLEM — R94.39 ABNORMAL STRESS TEST: Status: RESOLVED | Noted: 2023-06-12 | Resolved: 2023-08-18

## 2023-08-18 PROBLEM — E87.70 FLUID OVERLOAD: Status: RESOLVED | Noted: 2023-07-17 | Resolved: 2023-08-18

## 2023-08-18 LAB
BASOPHILS # BLD AUTO: 0 10E3/UL (ref 0–0.2)
BASOPHILS NFR BLD AUTO: 1 %
CREAT UR-MCNC: 37.3 MG/DL
EOSINOPHIL # BLD AUTO: 0.4 10E3/UL (ref 0–0.7)
EOSINOPHIL NFR BLD AUTO: 5 %
ERYTHROCYTE [DISTWIDTH] IN BLOOD BY AUTOMATED COUNT: 14.5 % (ref 10–15)
FERRITIN SERPL-MCNC: 48 NG/ML (ref 31–409)
HBA1C MFR BLD: 6.8 % (ref 0–5.6)
HCT VFR BLD AUTO: 40.4 % (ref 40–53)
HGB BLD-MCNC: 12.7 G/DL (ref 13.3–17.7)
IMM GRANULOCYTES # BLD: 0 10E3/UL
IMM GRANULOCYTES NFR BLD: 0 %
IRON BINDING CAPACITY (ROCHE): 412 UG/DL (ref 240–430)
IRON SATN MFR SERPL: 13 % (ref 15–46)
IRON SERPL-MCNC: 52 UG/DL (ref 61–157)
LYMPHOCYTES # BLD AUTO: 2.5 10E3/UL (ref 0.8–5.3)
LYMPHOCYTES NFR BLD AUTO: 31 %
MCH RBC QN AUTO: 26.6 PG (ref 26.5–33)
MCHC RBC AUTO-ENTMCNC: 31.4 G/DL (ref 31.5–36.5)
MCV RBC AUTO: 85 FL (ref 78–100)
MICROALBUMIN UR-MCNC: 48.3 MG/L
MICROALBUMIN/CREAT UR: 129.49 MG/G CR (ref 0–17)
MONOCYTES # BLD AUTO: 0.6 10E3/UL (ref 0–1.3)
MONOCYTES NFR BLD AUTO: 7 %
NEUTROPHILS # BLD AUTO: 4.6 10E3/UL (ref 1.6–8.3)
NEUTROPHILS NFR BLD AUTO: 56 %
PLATELET # BLD AUTO: 328 10E3/UL (ref 150–450)
RBC # BLD AUTO: 4.77 10E6/UL (ref 4.4–5.9)
WBC # BLD AUTO: 8.2 10E3/UL (ref 4–11)

## 2023-08-18 PROCEDURE — 99214 OFFICE O/P EST MOD 30 MIN: CPT | Performed by: INTERNAL MEDICINE

## 2023-08-18 PROCEDURE — 82728 ASSAY OF FERRITIN: CPT | Performed by: INTERNAL MEDICINE

## 2023-08-18 PROCEDURE — 83540 ASSAY OF IRON: CPT | Performed by: INTERNAL MEDICINE

## 2023-08-18 PROCEDURE — 83036 HEMOGLOBIN GLYCOSYLATED A1C: CPT | Performed by: INTERNAL MEDICINE

## 2023-08-18 PROCEDURE — 36415 COLL VENOUS BLD VENIPUNCTURE: CPT | Performed by: INTERNAL MEDICINE

## 2023-08-18 PROCEDURE — 82570 ASSAY OF URINE CREATININE: CPT | Performed by: INTERNAL MEDICINE

## 2023-08-18 PROCEDURE — 83550 IRON BINDING TEST: CPT | Performed by: INTERNAL MEDICINE

## 2023-08-18 PROCEDURE — 86803 HEPATITIS C AB TEST: CPT | Performed by: INTERNAL MEDICINE

## 2023-08-18 PROCEDURE — 82043 UR ALBUMIN QUANTITATIVE: CPT | Performed by: INTERNAL MEDICINE

## 2023-08-18 PROCEDURE — 85025 COMPLETE CBC W/AUTO DIFF WBC: CPT | Performed by: INTERNAL MEDICINE

## 2023-08-18 PROCEDURE — 87389 HIV-1 AG W/HIV-1&-2 AB AG IA: CPT | Performed by: INTERNAL MEDICINE

## 2023-08-18 RX ORDER — FERROUS SULFATE 325(65) MG
325 TABLET ORAL
Qty: 90 TABLET | Refills: 1 | Status: SHIPPED | OUTPATIENT
Start: 2023-08-18

## 2023-08-18 RX ORDER — MORPHINE SULFATE 15 MG/1
TABLET, FILM COATED, EXTENDED RELEASE ORAL
COMMUNITY
Start: 2023-04-06 | End: 2023-08-18

## 2023-08-18 RX ORDER — METHOCARBAMOL 500 MG/1
1000 TABLET, FILM COATED ORAL 3 TIMES DAILY PRN
Qty: 180 TABLET | Refills: 1 | Status: SHIPPED | OUTPATIENT
Start: 2023-08-18 | End: 2023-10-11

## 2023-08-18 RX ORDER — ONDANSETRON 4 MG/1
4 TABLET, FILM COATED ORAL
COMMUNITY
Start: 2023-04-05 | End: 2023-08-18

## 2023-08-18 RX ORDER — METOPROLOL SUCCINATE 50 MG/1
50 TABLET, EXTENDED RELEASE ORAL DAILY
Qty: 90 TABLET | Refills: 3 | Status: SHIPPED | OUTPATIENT
Start: 2023-08-18 | End: 2023-08-30

## 2023-08-18 RX ORDER — CEPHALEXIN 500 MG/1
CAPSULE ORAL
COMMUNITY
Start: 2023-04-06 | End: 2023-08-18

## 2023-08-18 RX ORDER — ACETAMINOPHEN 500 MG
1000 TABLET ORAL
COMMUNITY
Start: 2023-04-05 | End: 2023-10-27

## 2023-08-18 RX ORDER — HYDROMORPHONE HYDROCHLORIDE 2 MG/1
TABLET ORAL
COMMUNITY
Start: 2023-04-17 | End: 2023-08-18

## 2023-08-18 RX ORDER — DOCUSATE SODIUM 100 MG/1
100 CAPSULE, LIQUID FILLED ORAL
COMMUNITY
Start: 2023-04-05 | End: 2023-08-18

## 2023-08-18 RX ORDER — CELECOXIB 200 MG/1
CAPSULE ORAL
COMMUNITY
Start: 2023-07-17 | End: 2023-08-18

## 2023-08-18 ASSESSMENT — PATIENT HEALTH QUESTIONNAIRE - PHQ9
SUM OF ALL RESPONSES TO PHQ QUESTIONS 1-9: 2
10. IF YOU CHECKED OFF ANY PROBLEMS, HOW DIFFICULT HAVE THESE PROBLEMS MADE IT FOR YOU TO DO YOUR WORK, TAKE CARE OF THINGS AT HOME, OR GET ALONG WITH OTHER PEOPLE: NOT DIFFICULT AT ALL
SUM OF ALL RESPONSES TO PHQ QUESTIONS 1-9: 2

## 2023-08-18 ASSESSMENT — PAIN SCALES - GENERAL: PAINLEVEL: MILD PAIN (3)

## 2023-08-18 NOTE — PATIENT INSTRUCTIONS
"Lab work today:  We can do labs in the exam room today, or you can get them done downstairs in the lab.      If you are going downstairs:  Directions:  As you walk through the first floor, you'll see (on the right) first the pharmacy, then some bathrooms, then the \"Lab and Imaging\" area. Give them your name at the window there and wait for them to call you.     If your hemoglobin is still low, begin iron over-the-counter 325 mg daily.    Once your hemoglobin is back to normal, if still with restless leg syndrome symptoms, we can try prescription for requip.    Change metprolol form 25 mg twice daily, to 50 mg XL once daily .    If your diabetes blood test (A1c) is high, we may adjust insulin.     If your blood pressure remains above 140, we should resume losartan.      For now, remain on the lantus 4 units daily.  We may be able to stop this if your diabetes blood test (A1c) is nice and low today, as long as you are limiting carbs.     Follow up with me in 3 months.   "

## 2023-08-18 NOTE — PROGRESS NOTES
Assessment & Plan     Screening for HIV (human immunodeficiency virus)    - HIV Antigen Antibody Combo; Future    Need for hepatitis C screening test    - Hepatitis C Screen Reflex to HCV RNA Quant and Genotype; Future        Type 2 diabetes mellitus with diabetic nephropathy, with long-term current use of insulin (H)  His A1c was within normal limits back in April, and he is on minimal doses of insulin currently.  We will may be stopped his Lantus insulin and his NovoLog insulin if he remains in the 6.5 or less range.  For now, he will remain on Lantus 4 units at night and intermittent NovoLog with meals.  - Hemoglobin A1c; Future    Coronary artery disease involving native coronary artery of native heart, unspecified whether angina present  This is a new diagnosis.  He is status post three-vessel CABG back in 1 month, and he remains on statin, aspirin, and a beta-blocker.  We are going to change him to extended release metoprolol 50 mg, and consider adding back in his losartan if his blood pressure remains elevated in approximately 3 months.  He is concerned about starting a blood pressure medicine again now, owing to his current fatigue.  - metoprolol succinate ER (TOPROL XL) 50 MG 24 hr tablet; Take 1 tablet (50 mg) by mouth daily    CKD (chronic kidney disease) stage 1, GFR 90 ml/min or greater  We are repeating his labs today.  - Albumin Random Urine Quantitative with Creat Ratio; Future    Morbid obesity (H)  He has significant weight that is contributing to his rehab, and he is working on his diet.      Restless legs syndrome (RLS)  I believe that he is likely iron deficient, and we will try to Improve this before starting him on Requip.  - CBC with platelets and differential; Future  - Iron and iron binding capacity; Future  - Ferritin; Future    Anemia, unspecified type  As above.  - ferrous sulfate (FEROSUL) 325 (65 Fe) MG tablet; Take 1 tablet (325 mg) by mouth daily (with breakfast)    S/P AVR (aortic  "valve replacement)  He has INR checks at the [facility].  - methocarbamol (ROBAXIN) 500 MG tablet; Take 2 tablets (1,000 mg) by mouth 3 times daily as needed for muscle spasms           MED REC REQUIRED  Post Medication Reconciliation Status: discharge medications reconciled, continue medications without change  BMI:   Estimated body mass index is 40.61 kg/m  as calculated from the following:    Height as of 7/31/23: 1.651 m (5' 5\").    Weight as of this encounter: 110.7 kg (244 lb 1 oz).   Weight management plan: Patient was referred to their PCP to discuss a diet and exercise plan.        Mike Deal MD  [facility] MEGAN Carroll is a 58 year old, presenting for the following health issues:  Hospital F/U (Triple Bypass and Valve replacement)        8/18/2023    10:06 AM   Additional Questions   Roomed by Anai valenzuela CMA   Accompanied by N/A         8/18/2023    10:06 AM   Patient Reported Additional Medications   Patient reports taking the following new medications N/A       After knee surgery, would have anginal episodes lasting several minutes; seen in emergency room and then had echocardiogram stress test, then angiogram, then admitted for AVR and CABG x3 for Left main disease.    Surgery was July 10th.     Sees Cards end of August.  Still in cardiac rehab, three times per week.     INR therapeutic, 2.7.    Blood pressure controlled at rehab.     Has successfully quit smoking.   Using nicotine lozenges.     Has restless leg syndrome, and would like to consider meds.    Sugars are near 115-120 in AM, and after meals 165-180.     Is on 4 units lantus only, and minimal humalog.         HPI             Review of Systems   CONSTITUTIONAL: NEGATIVE for fever, chills, change in weight  INTEGUMENTARY/SKIN: NEGATIVE for worrisome rashes, moles or lesions  EYES: NEGATIVE for vision changes or irritation  ENT/MOUTH: NEGATIVE for ear, mouth and throat problems  RESP: NEGATIVE for " significant cough or SOB  BREAST: NEGATIVE for masses, tenderness or discharge  CV: NEGATIVE for chest pain, palpitations or peripheral edema  GI: NEGATIVE for nausea, abdominal pain, heartburn, or change in bowel habits  : NEGATIVE for frequency, dysuria, or hematuria  MUSCULOSKELETAL: NEGATIVE for significant arthralgias or myalgia  NEURO: NEGATIVE for weakness, dizziness or paresthesias  ENDOCRINE: NEGATIVE for temperature intolerance, skin/hair changes  HEME: NEGATIVE for bleeding problems  PSYCHIATRIC: NEGATIVE for changes in mood or affect      Objective    BP (!) 144/89 (BP Location: Right arm, Patient Position: Sitting, Cuff Size: Adult Large)   Pulse 79   Temp 97.9  F (36.6  C) (Oral)   Wt 110.7 kg (244 lb 1 oz)   SpO2 97%   BMI 40.61 kg/m    Body mass index is 40.61 kg/m .  Physical Exam   GENERAL: healthy, alert and no distress  EYES: Eyes grossly normal to inspection, PERRL and conjunctivae and sclerae normal  HENT: ear canals and TM's normal, nose and mouth without ulcers or lesions  NECK: no adenopathy, no asymmetry, masses, or scars and thyroid normal to palpation  RESP: lungs clear to auscultation - no rales, rhonchi or wheezes  CV: regular rate and rhythm, mechanical S1 S2, no S3 or S4, no murmur, click or rub, no peripheral edema and peripheral pulses strong  ABDOMEN: soft, nontender, no hepatosplenomegaly, no masses and bowel sounds normal  MS: no gross musculoskeletal defects noted, no edema  SKIN: no suspicious lesions or rashes  NEURO: Normal strength and tone, mentation intact and speech normal  PSYCH: mentation appears normal, affect normal/bright

## 2023-08-19 LAB
HCV AB SERPL QL IA: NONREACTIVE
HIV 1+2 AB+HIV1 P24 AG SERPL QL IA: NONREACTIVE

## 2023-08-21 ENCOUNTER — PATIENT OUTREACH (OUTPATIENT)
Dept: CARE COORDINATION | Facility: CLINIC | Age: 58
End: 2023-08-21

## 2023-08-22 ENCOUNTER — HOSPITAL ENCOUNTER (OUTPATIENT)
Dept: CARDIAC REHAB | Facility: CLINIC | Age: 58
Discharge: HOME | End: 2023-08-22
Attending: PHYSICIAN ASSISTANT
Payer: COMMERCIAL

## 2023-08-22 PROCEDURE — 93798 PHYS/QHP OP CAR RHAB W/ECG: CPT

## 2023-08-25 ENCOUNTER — HOSPITAL ENCOUNTER (OUTPATIENT)
Dept: CARDIAC REHAB | Facility: CLINIC | Age: 58
Discharge: HOME | End: 2023-08-25
Attending: PHYSICIAN ASSISTANT
Payer: COMMERCIAL

## 2023-08-25 PROCEDURE — 93798 PHYS/QHP OP CAR RHAB W/ECG: CPT | Performed by: REHABILITATION PRACTITIONER

## 2023-08-28 ENCOUNTER — HOSPITAL ENCOUNTER (OUTPATIENT)
Dept: CARDIAC REHAB | Facility: CLINIC | Age: 58
Discharge: HOME | End: 2023-08-28
Attending: PHYSICIAN ASSISTANT
Payer: COMMERCIAL

## 2023-08-28 ENCOUNTER — ANTICOAGULATION THERAPY VISIT (OUTPATIENT)
Dept: ANTICOAGULATION | Facility: CLINIC | Age: 58
End: 2023-08-28

## 2023-08-28 ENCOUNTER — LAB (OUTPATIENT)
Dept: LAB | Facility: CLINIC | Age: 58
End: 2023-08-28
Payer: COMMERCIAL

## 2023-08-28 DIAGNOSIS — Z95.2 S/P AVR (AORTIC VALVE REPLACEMENT): Primary | ICD-10-CM

## 2023-08-28 DIAGNOSIS — Z95.2 S/P AVR (AORTIC VALVE REPLACEMENT): ICD-10-CM

## 2023-08-28 DIAGNOSIS — Z95.1 S/P CABG (CORONARY ARTERY BYPASS GRAFT): ICD-10-CM

## 2023-08-28 LAB — INR BLD: 3.4 (ref 0.9–1.1)

## 2023-08-28 PROCEDURE — 85610 PROTHROMBIN TIME: CPT

## 2023-08-28 PROCEDURE — 93798 PHYS/QHP OP CAR RHAB W/ECG: CPT

## 2023-08-28 PROCEDURE — 36416 COLLJ CAPILLARY BLOOD SPEC: CPT

## 2023-08-28 NOTE — PROGRESS NOTES
Called Glen. No answer. Left vm to return call to [facility] RN.    Patient is within first 3 months post On-X AVR. Per chart review, last two INRs have been within therapeutic range. No other factors that should be influencing INR noted. If, after RN assessment, no temporary factors noted; then, per protocol, plan to do a partial hold of warfarin today and reduce maintenance dose by 5.7% by taking weekly warfarin dosing to 10 mg Tue and Sat, all other days will stay at 12.5 mg.    Karuna CANNON RN  Anticoagulation Team

## 2023-08-28 NOTE — PROGRESS NOTES
ANTICOAGULATION MANAGEMENT     Glen BRADFORD Chengrashaun 58 year old male is on warfarin with supratherapeutic INR result. (Goal INR 2.0-3.0)    Recent labs: (last 7 days)     08/28/23  1015   INR 3.4*       ASSESSMENT     Warfarin Lab Questionnaire    Warfarin Doses Last 7 Days      8/28/2023    10:13 AM   Dose in Tablet or Mg   TAB or MG? milligram (mg)     Pt Rptd Dose SUNDAY MONDAY TUESDAY WED THURS FRIDAY SATURDAY 8/28/2023  10:13 AM 12.5 12.5 12.5 12.5 12.5 12.5 12.5         8/28/2023   Warfarin Lab Questionnaire   Missed doses within past 14 days? No   Changes in diet or alcohol within past 14 days? No   Medication changes since last result? No, started iron supplements which shouldn't effect INR   Injuries or illness since last result? No   New shortness of breath, severe headaches or sudden changes in vision since last result? No   Abnormal bleeding since last result? No   Upcoming surgery, procedure? No   Best number to call with results? [phone]     Previous result: Supratherapeutic  Additional findings:  States he has been walking a lot more and his right knee is a bit more painful/swollen . Also states he is attempting to quit smoking using lozenges but having a rough time, has gained 15 lbs in a little over a month with poor eating habits to compensate for lack of cigarettes        PLAN     Recommended plan for ongoing change(s) affecting INR     Dosing Instructions: partial hold then decrease your warfarin dose (5.7% change) with next INR in 1-2 weeks      Summary  As of 8/28/2023      Full warfarin instructions:  8/28: 7.5 mg; Otherwise 10 mg every Tue, Sat; 12.5 mg all other days   Next INR check:  9/13/2023               Telephone call with Glen who verbalizes understanding and agrees to plan    Lab visit scheduled    Education provided:   Please call back if any changes to your diet, medications or how you've been taking warfarin  Dietary considerations: importance of consistent vitamin K  intake  Healthy lifestyle considerations: potential interaction between warfarin and alcohol, impact of smoking or tobacco on INR, and impact of exercise/activity level on INR  Symptom monitoring: monitoring for bleeding signs and symptoms, monitoring for clotting signs and symptoms, and when to seek medical attention/emergency care  Importance of notifying anticoagulation clinic for: changes in medications; a sooner lab recheck maybe needed and diarrhea, nausea/vomiting, reduced intake, cold/flu, and/or infections; a sooner lab recheck maybe needed  Contact [phone]  with any changes, questions or concerns.     Plan made per [facility] anticoagulation protocol for heart valve replacement in the last 10 weeks.    Karuna Jameson RN  Anticoagulation Clinic  8/28/2023    _______________________________________________________________________     Anticoagulation Episode Summary       Current INR goal:  2.0-3.0   TTR:  75.3 % (1.1 mo)   Target end date:  Indefinite   Send INR reminders to:  [facility]    Indications    S/P AVR (aortic valve replacement)-23 mm On-X valve [Z95.2]  S/P CABG (coronary artery bypass graft)-LIMA>LAD  VG>RCA  VG>OM [Z95.1]             Comments:               Anticoagulation Care Providers       Provider Role Specialty Phone number    Kelsey Vega PA-C Referring Cardiovascular & Thoracic Surgery [phone]    Mike Deal MD Referring Internal Medicine - Pediatrics [phone]

## 2023-08-28 NOTE — PROGRESS NOTES
HISTORY OF PRESENT ILLNESS:    This is a 58 year old male who follows with Dr Diez at [facility]  His past medical history includes:  coronary artery disease, aortic stenosis s/p AVR, hypertension, type II diabetes, sleep apnea, sleep apnea, hyperlipidemia    Mr Cho developed progressive angina and underwent a stress echo demonstrating both anterior and inferior wall motion abnormalities  His echo also showed moderate aortic stenosis.(6/9/23)  Coronary angiography (6/12/23) showed significant distal left main/ostial LAD disease and moderate aortic stenosis.     He underwent mechanical aortic valve replacement and 3V CABG with LIMA to LAD and SVG to right acute marginal branch and OM (7/10/23)  He developed a-fib with controlled ventricular response and was on amiodarone briefly (later stopped due to prolonged QT interval    ECHO (7/17/23)  LVEF 60-65%, 1+ MR, mean prosthetic AV gradient 16 mmHg      I reviewed records from cardiac rehab  He remains in sinus rhythm with good blood pressure   He has complained of fatigue and has been taking his Metoprolol XR after his visits    Our visit today is for further review.    Mr Cho complains of being more winded and has a harder time walking if he takes his Metoprolol prior to exercise  His PMD prescribed low acting Metoprolol XR 50 mg to take in the evening   He has yet to start this dose as he feels he cannot tolerate Lopressor 25 mg BID.  He denies any chest pain   He tells me that he could feel his A-fib when he had it in the hospital and has not noticed any palpitations.  He complains about ongoing mild ankle swelling  He previously was on hydrochlorothiazide for 5 yrs and this was stopped at time of his surgery.  He has gained 20 lbs since surgery, but thinks some of this is because he stopped smoking and he is eating more because of boredom  He denies orthopnea and uses his CPAP faithfully      VITAL SIGNS:  BP: 133/82  Pulse:  64  Weight:  252  lbs  (BMI: 42)    EKG today:  Sinus bradycardia  HR 57 bpm  QTc 431 ms    IMPRESSION AND PLAN:    Coronary Artery Disease:  -s/p 3 V CABG (7/10/23)  LIMA to LAD, separate SVG to OM and acute marginal branch  -denies angina  -continue with cardiac rehab    S/p Mechanical AVR (7/10/23)  -post-op mean gradient 16 mmhg  -chronic warfarin  -SBE prophylaxis    Post-op Paroxysmal Atrial Fibrillation, symptomatic  -remains in sinus rhythm    Hypertension:  -some intolerance to Metoprolol  as noted above.   Will try Metoprolol XR 25 mg to take in evening and restart hydrochlorothiazide 25 mg/day  He will call with any intolerance  -will recheck BMP in 2-3 wks     Hyperlipidemia:  -on Atorvastatin 40 mg  -will assess lipid panel in 2 wks when labs drawn    Type II Diabetes:  -on insulin  -Hgb A1C  6.8    Treated Sleep Apnea  Recently quit smoking  -encouraged continued cessation    The total time for the visit today was 30 minutes which includes patient visit, reviewing of records, discussion, and placing of orders of the outpatient coordination of cardiovascular care as described.  The level of medical decision making during this visit was of moderate complexity.  Thank you for allowing me to participate in their care.     Orders Placed This Encounter   Procedures    Basic metabolic panel    Lipid Profile    ALT    Follow-Up with Cardiology Nurse    EKG 12-lead complete w/read - Clinics (performed today)       Orders Placed This Encounter   Medications    metoprolol succinate ER (TOPROL XL) 25 MG 24 hr tablet     Sig: Take 1 tablet (25 mg) by mouth every evening     Dispense:  90 tablet     Refill:  3     Pt does not need refills at this time    hydrochlorothiazide (HYDRODIURIL) 25 MG tablet     Sig: Take 1 tablet (25 mg) by mouth daily     Dispense:  90 tablet     Refill:  1       Medications Discontinued During This Encounter   Medication Reason    metoprolol succinate ER (TOPROL XL) 50 MG 24 hr tablet Reorder (No AVS)          Encounter Diagnoses   Name Primary?    Palpitations Yes    S/P CABG (coronary artery bypass graft)-LIMA>LAD, VG>RCA, VG>OM     Paroxysmal atrial fibrillation (H)     Coronary artery disease involving native coronary artery of native heart, unspecified whether angina present     Benign essential hypertension        CURRENT MEDICATIONS:  Current Outpatient Medications   Medication Sig Dispense Refill    acetaminophen (TYLENOL) 325 MG tablet Take 2 tablets (650 mg) by mouth every 4 hours as needed for other or mild pain 30 tablet 1    acetaminophen (TYLENOL) 500 MG tablet Take 1,000 mg by mouth      aspirin 81 MG EC tablet Take 1 tablet (81 mg) by mouth daily 90 tablet 3    atorvastatin (LIPITOR) 40 MG tablet Take 1 tablet (40 mg) by mouth every evening 30 tablet 3    BD PEN NEEDLE KENTRELL 2ND GEN 32G X 4 MM miscellaneous USE 1 PEN NEEDLE TIP 3 TIMES DAILY 300 each 0    blood glucose monitoring (FREESTYLE INSULINX SYSTEM) meter device kit Use to test blood sugar 3 times daily or as directed. 1 kit 0    blood glucose monitoring (FREESTYLE) lancets Use to test blood sugars 2-3 times daily or as directed. 1 Box prn    ferrous sulfate (FEROSUL) 325 (65 Fe) MG tablet Take 1 tablet (325 mg) by mouth daily (with breakfast) 90 tablet 1    hydrochlorothiazide (HYDRODIURIL) 25 MG tablet Take 1 tablet (25 mg) by mouth daily 90 tablet 1    insulin glargine (LANTUS PEN) 100 UNIT/ML pen Inject 5 Units Subcutaneous At Bedtime (Patient taking differently: Inject 3 Units Subcutaneous At Bedtime) 15 mL 11    insulin lispro (HUMALOG KWIKPEN) 100 UNIT/ML (1 unit dial) KWIKPEN Inject 1 Units Subcutaneous 3 times daily (before meals) 15 mL 11    insulin pen needle (BD KENTRELL U/F) 32G X 4 MM USE 2 PEN NEEDLES DAILY OR AS DIRECTED. 200 each 0    metFORMIN (GLUCOPHAGE XR) 500 MG 24 hr tablet Take 2 tablets (1,000 mg) by mouth daily (with dinner) 180 tablet 3    methocarbamol (ROBAXIN) 500 MG tablet Take 2 tablets (1,000 mg) by mouth 3 times  daily as needed for muscle spasms 180 tablet 1    metoprolol succinate ER (TOPROL XL) 25 MG 24 hr tablet Take 1 tablet (25 mg) by mouth every evening 90 tablet 3    order for DME Equipment being ordered: Verio flex test strips  Patient tests 1 times daily. 100 Device 3    order for DME Equipment being ordered: lancets  Uses 4 a day.    Please dispense what insurance covers. 400 each 3    order for DME Equipment being ordered: glucose meter  Please dispense what insurance covers 1 each 0    ORDER FOR DME Equipment being ordered: glucometer    60 lancets and test strips (5 refills) for twice daily testing. 1 each 0    warfarin ANTICOAGULANT (COUMADIN) 5 MG tablet Take 2.5 tablets (12.5 mg) by mouth daily or as directed by INR Clinic 225 tablet 1       ALLERGIES     Allergies   Allergen Reactions    Amlodipine Other (See Comments)     Water retention    Amlodipine Besylate      Water retention    Cefdinir      Joint pains    Other Reaction(s): Arthralgia    Lisinopril Other (See Comments)     Water retention    Amoxicillin Rash     White spots       PAST MEDICAL HISTORY:  Past Medical History:   Diagnosis Date    Arthritis     Chronic kidney disease     Diabetes (H)     High cholesterol     HTN (hypertension)     Obesity     Sleep apnea     DOES NOT HAVE A CPAP       PAST SURGICAL HISTORY:  Past Surgical History:   Procedure Laterality Date    ANKLE SURGERY  02/01/2004     R ORIF    ARTHROSCOPY KNEE  03/26/2014    Procedure: ARTHROSCOPY KNEE;  ARTHROSCOPY KNEE- RIGHT   SURGEON REQUESTS CHOICE ANETHESIA ;  Surgeon: Sabino Simpson MD;  Location: [facility] OR    BYPASS GRAFT ARTERY CORONARY, REPLACE VALVE AORTIC, COMBINED N/A 7/10/2023    Procedure: CORONARY ARTERY BYPASS GRAFT x 3 (LEFT INTERNAL MAMMARY ARTERY - LEFT ANTERIOR DESCENDING ARTERY; SAPHENOUS VEIN - OBTUSE MARGINAL ARTERY; SAPHENOUS VEIN TO ACUTE MARGINAL,  WITH ENDOSCOPIC SAPHENOUS VEIN HARVEST OF LEFT LOWER EXTREMITY; AORTIC VALVE REPLACEMENT WITH ON-X   PROSTHETIC HEART VALVE 23MM; ON CARDIOPULMONARY PUMP OXYGENATOR  (INTRAOPERATIVE TRANSESOPHAGEAL ECHOCARDIOGRAM BY A    CHOLECYSTECTOMY      CV CORONARY ANGIOGRAM N/A 2023    Procedure: Coronary Angiogram;  Surgeon: Jacobo Diez MD;  Location:  [facility]    HERNIORRHAPHY UMBILICAL         FAMILY HISTORY:  Family History   Problem Relation Age of Onset    Cancer Father         skin    Myocardial Infarction Father 57       SOCIAL HISTORY:  Social History     Socioeconomic History    Marital status:      Spouse name: None    Number of children: 2    Years of education: None    Highest education level: None   Occupational History     Employer: [employer]   Tobacco Use    Smoking status: Former     Types: Cigarettes     Quit date: 2023     Years since quittin.3    Smokeless tobacco: Never   Vaping Use    Vaping Use: Never used   Substance and Sexual Activity    Alcohol use: Never     Alcohol/week: 0.0 standard drinks of alcohol     Comment: rarely    Drug use: No    Sexual activity: Yes   Other Topics Concern    Parent/sibling w/ CABG, MI or angioplasty before 65F 55M? No     Social Determinants of Health     Financial Resource Strain: Low Risk  (2022)    Overall Financial Resource Strain (CARDIA)     Difficulty of Paying Living Expenses: Not very hard   Food Insecurity: No Food Insecurity (2022)    Hunger Vital Sign     Worried About Running Out of Food in the Last Year: Never true     Ran Out of Food in the Last Year: Never true   Transportation Needs: No Transportation Needs (2022)    PRAPARE - Transportation     Lack of Transportation (Medical): No     Lack of Transportation (Non-Medical): No   Physical Activity: Inactive (2022)    Exercise Vital Sign     Days of Exercise per Week: 0 days     Minutes of Exercise per Session: 0 min   Stress: No Stress Concern Present (2022)    [place] [employer] - Occupational Stress  "Questionnaire     Feeling of Stress : Only a little   Social Connections: Socially Isolated (9/19/2022)    Social Connection and Isolation Panel [NHANES]     Frequency of Communication with Friends and Family: Twice a week     Frequency of Social Gatherings with Friends and Family: Never     Attends Congregational Services: Never     Active Member of Clubs or Organizations: No     Marital Status:    Intimate Partner Violence: Unknown (5/6/2020)    Humiliation, Afraid, Rape, and Kick questionnaire     Fear of Current or Ex-Partner: No   Housing Stability: Low Risk  (9/19/2022)    Housing Stability Vital Sign     Unable to Pay for Housing in the Last Year: No     Number of Places Lived in the Last Year: 1     Unstable Housing in the Last Year: No       Review of Systems:  Skin:  not assessed       Eyes:  Positive for glasses    ENT:  not assessed      Respiratory:  Positive for cough;sleep apnea;CPAP clears throat alot in the am; uses CPAP(10 yrs) @ night   Cardiovascular:    edema;Positive for minor edema (mainly at night)  Gastroenterology: not assessed      Genitourinary:  not assessed      Musculoskeletal:  not assessed      Neurologic:  not assessed      Psychiatric:  not assessed      Heme/Lymph/Imm:  not assessed      Endocrine:  not assessed        Physical Exam:  Vitals: /81 (BP Location: Right arm, Patient Position: Sitting, Cuff Size: Adult Large)   Pulse 64   Ht 1.651 m (5' 5\")   Wt 114.7 kg (252 lb 12.8 oz)   SpO2 99%   BMI 42.07 kg/m      Constitutional:  cooperative obese      Skin:  warm and dry to the touch surgical scars well-healed        Head:  normocephalic        Eyes:  pupils equal and round        Lymph:      ENT:  no pallor or cyanosis        Neck:  no carotid bruit        Respiratory:  clear to auscultation    intermittent rhonchi noted with deep inspiration    Cardiac: normal S1 and S2;regular rhythm     crisp prosthetic valve sounds          pulses full and equal                "                         GI:  abdomen soft obese      Extremities and Muscular Skeletal:      bilateral LE edema;trace;1+          Neurological:  affect appropriate        Psych:  Alert and Oriented x 3          CC  No referring provider defined for this encounter.

## 2023-08-30 ENCOUNTER — OFFICE VISIT (OUTPATIENT)
Dept: CARDIOLOGY | Facility: CLINIC | Age: 58
End: 2023-08-30
Payer: COMMERCIAL

## 2023-08-30 VITALS
DIASTOLIC BLOOD PRESSURE: 81 MMHG | BODY MASS INDEX: 42.12 KG/M2 | SYSTOLIC BLOOD PRESSURE: 133 MMHG | OXYGEN SATURATION: 99 % | HEART RATE: 64 BPM | WEIGHT: 252.8 LBS | HEIGHT: 65 IN

## 2023-08-30 DIAGNOSIS — I48.0 PAROXYSMAL ATRIAL FIBRILLATION (H): ICD-10-CM

## 2023-08-30 DIAGNOSIS — I10 BENIGN ESSENTIAL HYPERTENSION: ICD-10-CM

## 2023-08-30 DIAGNOSIS — R00.2 PALPITATIONS: Primary | ICD-10-CM

## 2023-08-30 DIAGNOSIS — Z95.1 S/P CABG (CORONARY ARTERY BYPASS GRAFT): ICD-10-CM

## 2023-08-30 DIAGNOSIS — I25.10 CORONARY ARTERY DISEASE INVOLVING NATIVE CORONARY ARTERY OF NATIVE HEART, UNSPECIFIED WHETHER ANGINA PRESENT: ICD-10-CM

## 2023-08-30 PROCEDURE — 99214 OFFICE O/P EST MOD 30 MIN: CPT | Performed by: NURSE PRACTITIONER

## 2023-08-30 PROCEDURE — 93000 ELECTROCARDIOGRAM COMPLETE: CPT | Performed by: NURSE PRACTITIONER

## 2023-08-30 RX ORDER — METOPROLOL SUCCINATE 25 MG/1
25 TABLET, EXTENDED RELEASE ORAL EVERY EVENING
Qty: 90 TABLET | Refills: 3 | Status: SHIPPED | OUTPATIENT
Start: 2023-08-30 | End: 2023-11-20

## 2023-08-30 RX ORDER — HYDROCHLOROTHIAZIDE 25 MG/1
25 TABLET ORAL DAILY
Qty: 90 TABLET | Refills: 1 | Status: SHIPPED | OUTPATIENT
Start: 2023-08-30 | End: 2023-11-20

## 2023-08-30 NOTE — LETTER
8/30/2023    Mike Deal MD  [address] [place] [address]    RE: Glen SANCHEZ Cho       Dear Colleague,     I had the pleasure of seeing Glen Cho in the [facility].  HISTORY OF PRESENT ILLNESS:    This is a 58 year old male who follows with Dr Diez at [facility]  His past medical history includes:  coronary artery disease, aortic stenosis s/p AVR, hypertension, type II diabetes, sleep apnea, sleep apnea, hyperlipidemia    Mr Cho developed progressive angina and underwent a stress echo demonstrating both anterior and inferior wall motion abnormalities  His echo also showed moderate aortic stenosis.(6/9/23)  Coronary angiography (6/12/23) showed significant distal left main/ostial LAD disease and moderate aortic stenosis.     He underwent mechanical aortic valve replacement and 3V CABG with LIMA to LAD and SVG to right acute marginal branch and OM (7/10/23)  He developed a-fib with controlled ventricular response and was on amiodarone briefly (later stopped due to prolonged QT interval    ECHO (7/17/23)  LVEF 60-65%, 1+ MR, mean prosthetic AV gradient 16 mmHg      I reviewed records from cardiac rehab  He remains in sinus rhythm with good blood pressure   He has complained of fatigue and has been taking his Metoprolol XR after his visits    Our visit today is for further review.    Mr Cho complains of being more winded and has a harder time walking if he takes his Metoprolol prior to exercise  His PMD prescribed low acting Metoprolol XR 50 mg to take in the evening   He has yet to start this dose as he feels he cannot tolerate Lopressor 25 mg BID.  He denies any chest pain   He tells me that he could feel his A-fib when he had it in the hospital and has not noticed any palpitations.  He complains about ongoing mild ankle swelling  He previously was on hydrochlorothiazide for 5 yrs and this was stopped at time of his surgery.  He has gained 20 lbs since  surgery, but thinks some of this is because he stopped smoking and he is eating more because of boredom  He denies orthopnea and uses his CPAP faithfully      VITAL SIGNS:  BP: 133/82  Pulse:  64  Weight:  252 lbs  (BMI: 42)    EKG today:  Sinus bradycardia  HR 57 bpm  QTc 431 ms    IMPRESSION AND PLAN:    Coronary Artery Disease:  -s/p 3 V CABG (7/10/23)  LIMA to LAD, separate SVG to OM and acute marginal branch  -denies angina  -continue with cardiac rehab    S/p Mechanical AVR (7/10/23)  -post-op mean gradient 16 mmhg  -chronic warfarin  -SBE prophylaxis    Post-op Paroxysmal Atrial Fibrillation, symptomatic  -remains in sinus rhythm    Hypertension:  -some intolerance to Metoprolol  as noted above.   Will try Metoprolol XR 25 mg to take in evening and restart hydrochlorothiazide 25 mg/day  He will call with any intolerance  -will recheck BMP in 2-3 wks     Hyperlipidemia:  -on Atorvastatin 40 mg  -will assess lipid panel in 2 wks when labs drawn    Type II Diabetes:  -on insulin  -Hgb A1C  6.8    Treated Sleep Apnea  Recently quit smoking  -encouraged continued cessation    The total time for the visit today was 30 minutes which includes patient visit, reviewing of records, discussion, and placing of orders of the outpatient coordination of cardiovascular care as described.  The level of medical decision making during this visit was of moderate complexity.  Thank you for allowing me to participate in their care.     Orders Placed This Encounter   Procedures    Basic metabolic panel    Lipid Profile    ALT    Follow-Up with Cardiology Nurse    EKG 12-lead complete w/read - Clinics (performed today)       Orders Placed This Encounter   Medications    metoprolol succinate ER (TOPROL XL) 25 MG 24 hr tablet     Sig: Take 1 tablet (25 mg) by mouth every evening     Dispense:  90 tablet     Refill:  3     Pt does not need refills at this time    hydrochlorothiazide (HYDRODIURIL) 25 MG tablet     Sig: Take 1 tablet (25  mg) by mouth daily     Dispense:  90 tablet     Refill:  1       Medications Discontinued During This Encounter   Medication Reason    metoprolol succinate ER (TOPROL XL) 50 MG 24 hr tablet Reorder (No AVS)         Encounter Diagnoses   Name Primary?    Palpitations Yes    S/P CABG (coronary artery bypass graft)-LIMA>LAD, VG>RCA, VG>OM     Paroxysmal atrial fibrillation (H)     Coronary artery disease involving native coronary artery of native heart, unspecified whether angina present     Benign essential hypertension        CURRENT MEDICATIONS:  Current Outpatient Medications   Medication Sig Dispense Refill    acetaminophen (TYLENOL) 325 MG tablet Take 2 tablets (650 mg) by mouth every 4 hours as needed for other or mild pain 30 tablet 1    acetaminophen (TYLENOL) 500 MG tablet Take 1,000 mg by mouth      aspirin 81 MG EC tablet Take 1 tablet (81 mg) by mouth daily 90 tablet 3    atorvastatin (LIPITOR) 40 MG tablet Take 1 tablet (40 mg) by mouth every evening 30 tablet 3    BD PEN NEEDLE KENTRELL 2ND GEN 32G X 4 MM miscellaneous USE 1 PEN NEEDLE TIP 3 TIMES DAILY 300 each 0    blood glucose monitoring (FREESTYLE INSULINX SYSTEM) meter device kit Use to test blood sugar 3 times daily or as directed. 1 kit 0    blood glucose monitoring (FREESTYLE) lancets Use to test blood sugars 2-3 times daily or as directed. 1 Box prn    ferrous sulfate (FEROSUL) 325 (65 Fe) MG tablet Take 1 tablet (325 mg) by mouth daily (with breakfast) 90 tablet 1    hydrochlorothiazide (HYDRODIURIL) 25 MG tablet Take 1 tablet (25 mg) by mouth daily 90 tablet 1    insulin glargine (LANTUS PEN) 100 UNIT/ML pen Inject 5 Units Subcutaneous At Bedtime (Patient taking differently: Inject 3 Units Subcutaneous At Bedtime) 15 mL 11    insulin lispro (HUMALOG KWIKPEN) 100 UNIT/ML (1 unit dial) KWIKPEN Inject 1 Units Subcutaneous 3 times daily (before meals) 15 mL 11    insulin pen needle (BD KENTRELL U/F) 32G X 4 MM USE 2 PEN NEEDLES DAILY OR AS DIRECTED. 200  each 0    metFORMIN (GLUCOPHAGE XR) 500 MG 24 hr tablet Take 2 tablets (1,000 mg) by mouth daily (with dinner) 180 tablet 3    methocarbamol (ROBAXIN) 500 MG tablet Take 2 tablets (1,000 mg) by mouth 3 times daily as needed for muscle spasms 180 tablet 1    metoprolol succinate ER (TOPROL XL) 25 MG 24 hr tablet Take 1 tablet (25 mg) by mouth every evening 90 tablet 3    order for DME Equipment being ordered: Verio flex test strips  Patient tests 1 times daily. 100 Device 3    order for DME Equipment being ordered: lancets  Uses 4 a day.    Please dispense what insurance covers. 400 each 3    order for DME Equipment being ordered: glucose meter  Please dispense what insurance covers 1 each 0    ORDER FOR DME Equipment being ordered: glucometer    60 lancets and test strips (5 refills) for twice daily testing. 1 each 0    warfarin ANTICOAGULANT (COUMADIN) 5 MG tablet Take 2.5 tablets (12.5 mg) by mouth daily or as directed by INR Clinic 225 tablet 1       ALLERGIES     Allergies   Allergen Reactions    Amlodipine Other (See Comments)     Water retention    Amlodipine Besylate      Water retention    Cefdinir      Joint pains    Other Reaction(s): Arthralgia    Lisinopril Other (See Comments)     Water retention    Amoxicillin Rash     White spots       PAST MEDICAL HISTORY:  Past Medical History:   Diagnosis Date    Arthritis     Chronic kidney disease     Diabetes (H)     High cholesterol     HTN (hypertension)     Obesity     Sleep apnea     DOES NOT HAVE A CPAP       PAST SURGICAL HISTORY:  Past Surgical History:   Procedure Laterality Date    ANKLE SURGERY  02/01/2004     R ORIF    ARTHROSCOPY KNEE  03/26/2014    Procedure: ARTHROSCOPY KNEE;  ARTHROSCOPY KNEE- RIGHT   SURGEON REQUESTS CHOICE ANETHESIA ;  Surgeon: Sabino Simpson MD;  Location: [facility] OR    BYPASS GRAFT ARTERY CORONARY, REPLACE VALVE AORTIC, COMBINED N/A 7/10/2023    Procedure: CORONARY ARTERY BYPASS GRAFT x 3 (LEFT INTERNAL MAMMARY ARTERY - LEFT  ANTERIOR DESCENDING ARTERY; SAPHENOUS VEIN - OBTUSE MARGINAL ARTERY; SAPHENOUS VEIN TO ACUTE MARGINAL,  WITH ENDOSCOPIC SAPHENOUS VEIN HARVEST OF LEFT LOWER EXTREMITY; AORTIC VALVE REPLACEMENT WITH ON-X  PROSTHETIC HEART VALVE 23MM; ON CARDIOPULMONARY PUMP OXYGENATOR  (INTRAOPERATIVE TRANSESOPHAGEAL ECHOCARDIOGRAM BY A    CHOLECYSTECTOMY      CV CORONARY ANGIOGRAM N/A 2023    Procedure: Coronary Angiogram;  Surgeon: Jacobo Diez MD;  Location:  HEART CARDI[facility] CATH LAB    HERNIORRHAPHY UMBILICAL         FAMILY HISTORY:  Family History   Problem Relation Age of Onset    Cancer Father         skin    Myocardial Infarction Father 57       SOCIAL HISTORY:  Social History     Socioeconomic History    Marital status:      Spouse name: None    Number of children: 2    Years of education: None    Highest education level: None   Occupational History     Employer: [employer]   Tobacco Use    Smoking status: Former     Types: Cigarettes     Quit date: 2023     Years since quittin.3    Smokeless tobacco: Never   Vaping Use    Vaping Use: Never used   Substance and Sexual Activity    Alcohol use: Never     Alcohol/week: 0.0 standard drinks of alcohol     Comment: rarely    Drug use: No    Sexual activity: Yes   Other Topics Concern    Parent/sibling w/ CABG, MI or angioplasty before 65F 55M? No     Social Determinants of Health     Financial Resource Strain: Low Risk  (2022)    Overall Financial Resource Strain (CARDIA)     Difficulty of Paying Living Expenses: Not very hard   Food Insecurity: No Food Insecurity (2022)    Hunger Vital Sign     Worried About Running Out of Food in the Last Year: Never true     Ran Out of Food in the Last Year: Never true   Transportation Needs: No Transportation Needs (2022)    PRAPARE - Transportation     Lack of Transportation (Medical): No     Lack of Transportation (Non-Medical): No   Physical Activity: Inactive (2022)    Exercise Vital Sign  "    Days of Exercise per Week: 0 days     Minutes of Exercise per Session: 0 min   Stress: No Stress Concern Present (9/19/2022)    [employer] [place] [employer] - Occupational Stress Questionnaire     Feeling of Stress : Only a little   Social Connections: Socially Isolated (9/19/2022)    Social Connection and Isolation Panel [NHANES]     Frequency of Communication with Friends and Family: Twice a week     Frequency of Social Gatherings with Friends and Family: Never     Attends Jain Services: Never     Active Member of Clubs or Organizations: No     Marital Status:    Intimate Partner Violence: Unknown (5/6/2020)    Humiliation, Afraid, Rape, and Kick questionnaire     Fear of Current or Ex-Partner: No   Housing Stability: Low Risk  (9/19/2022)    Housing Stability Vital Sign     Unable to Pay for Housing in the Last Year: No     Number of Places Lived in the Last Year: 1     Unstable Housing in the Last Year: No       Review of Systems:  Skin:  not assessed       Eyes:  Positive for glasses    ENT:  not assessed      Respiratory:  Positive for cough;sleep apnea;CPAP clears throat alot in the am; uses CPAP(10 yrs) @ night   Cardiovascular:    edema;Positive for minor edema (mainly at night)  Gastroenterology: not assessed      Genitourinary:  not assessed      Musculoskeletal:  not assessed      Neurologic:  not assessed      Psychiatric:  not assessed      Heme/Lymph/Imm:  not assessed      Endocrine:  not assessed        Physical Exam:  Vitals: /81 (BP Location: Right arm, Patient Position: Sitting, Cuff Size: Adult Large)   Pulse 64   Ht 1.651 m (5' 5\")   Wt 114.7 kg (252 lb 12.8 oz)   SpO2 99%   BMI 42.07 kg/m      Constitutional:  cooperative obese      Skin:  warm and dry to the touch surgical scars well-healed        Head:  normocephalic        Eyes:  pupils equal and round        Lymph:      ENT:  no pallor or cyanosis        Neck:  no carotid bruit        Respiratory:  " clear to auscultation    intermittent rhonchi noted with deep inspiration    Cardiac: normal S1 and S2;regular rhythm     crisp prosthetic valve sounds          pulses full and equal                                        GI:  abdomen soft obese      Extremities and Muscular Skeletal:      bilateral LE edema;trace;1+          Neurological:  affect appropriate        Psych:  Alert and Oriented x 3          CC  No referring provider defined for this encounter.      Thank you for allowing me to participate in the care of your patient.      Sincerely,     NAKIA Kelsey [facility]

## 2023-08-30 NOTE — PATIENT INSTRUCTIONS
Lower your Metoprolol XR to 25 mg and take in evening  Start hydrochlorothiazide 25 mg/day  Return in 2-3 wks with fasting labs and BP check  Call with any intolerance    It was a pleasure seeing you today     Please do not hesitate to call my nurse team with any questions or concerns:  [phone]    Scheduling number:  [phone]    NAKIA Cortez, CNP

## 2023-08-31 ENCOUNTER — DOCUMENTATION ONLY (OUTPATIENT)
Dept: CARDIOLOGY | Facility: CLINIC | Age: 58
End: 2023-08-31
Payer: COMMERCIAL

## 2023-09-01 ENCOUNTER — HOSPITAL ENCOUNTER (OUTPATIENT)
Dept: CARDIAC REHAB | Facility: CLINIC | Age: 58
Discharge: HOME | End: 2023-09-01
Attending: PHYSICIAN ASSISTANT
Payer: COMMERCIAL

## 2023-09-01 PROCEDURE — 93798 PHYS/QHP OP CAR RHAB W/ECG: CPT | Performed by: REHABILITATION PRACTITIONER

## 2023-09-04 ENCOUNTER — PATIENT OUTREACH (OUTPATIENT)
Dept: CARE COORDINATION | Facility: CLINIC | Age: 58
End: 2023-09-04
Payer: COMMERCIAL

## 2023-09-06 ENCOUNTER — HOSPITAL ENCOUNTER (OUTPATIENT)
Dept: CARDIAC REHAB | Facility: CLINIC | Age: 58
Discharge: HOME | End: 2023-09-06
Attending: PHYSICIAN ASSISTANT
Payer: COMMERCIAL

## 2023-09-06 PROCEDURE — 93798 PHYS/QHP OP CAR RHAB W/ECG: CPT

## 2023-09-08 ENCOUNTER — HOSPITAL ENCOUNTER (OUTPATIENT)
Dept: CARDIAC REHAB | Facility: CLINIC | Age: 58
Discharge: HOME | End: 2023-09-08
Attending: PHYSICIAN ASSISTANT
Payer: COMMERCIAL

## 2023-09-08 PROCEDURE — 93798 PHYS/QHP OP CAR RHAB W/ECG: CPT | Performed by: OCCUPATIONAL THERAPIST

## 2023-09-11 ENCOUNTER — HOSPITAL ENCOUNTER (OUTPATIENT)
Dept: CARDIAC REHAB | Facility: CLINIC | Age: 58
Discharge: HOME | End: 2023-09-11
Attending: PHYSICIAN ASSISTANT
Payer: COMMERCIAL

## 2023-09-11 ENCOUNTER — DOCUMENTATION ONLY (OUTPATIENT)
Dept: CARDIOLOGY | Facility: CLINIC | Age: 58
End: 2023-09-11
Payer: COMMERCIAL

## 2023-09-11 PROCEDURE — 93798 PHYS/QHP OP CAR RHAB W/ECG: CPT | Performed by: REHABILITATION PRACTITIONER

## 2023-09-12 DIAGNOSIS — Z95.2 S/P AVR (AORTIC VALVE REPLACEMENT): ICD-10-CM

## 2023-09-12 RX ORDER — ATORVASTATIN CALCIUM 40 MG/1
40 TABLET, FILM COATED ORAL EVERY EVENING
Qty: 90 TABLET | Refills: 3 | Status: SHIPPED | OUTPATIENT
Start: 2023-09-12

## 2023-09-12 NOTE — TELEPHONE ENCOUNTER
[facility] Refill Guideline reviewed.  Medication meets criteria for refill.  Karen Perez RN on 9/12/2023 at 12:51 PM

## 2023-09-13 ENCOUNTER — ANTICOAGULATION THERAPY VISIT (OUTPATIENT)
Dept: ANTICOAGULATION | Facility: CLINIC | Age: 58
End: 2023-09-13

## 2023-09-13 ENCOUNTER — LAB (OUTPATIENT)
Dept: LAB | Facility: CLINIC | Age: 58
End: 2023-09-13
Payer: COMMERCIAL

## 2023-09-13 ENCOUNTER — HOSPITAL ENCOUNTER (OUTPATIENT)
Dept: CARDIAC REHAB | Facility: CLINIC | Age: 58
Discharge: HOME | End: 2023-09-13
Attending: PHYSICIAN ASSISTANT
Payer: COMMERCIAL

## 2023-09-13 DIAGNOSIS — Z95.1 S/P CABG (CORONARY ARTERY BYPASS GRAFT): ICD-10-CM

## 2023-09-13 DIAGNOSIS — Z95.2 S/P AVR (AORTIC VALVE REPLACEMENT): Primary | ICD-10-CM

## 2023-09-13 DIAGNOSIS — Z95.2 S/P AVR (AORTIC VALVE REPLACEMENT): ICD-10-CM

## 2023-09-13 LAB — INR BLD: 2.7 (ref 0.9–1.1)

## 2023-09-13 PROCEDURE — 36416 COLLJ CAPILLARY BLOOD SPEC: CPT

## 2023-09-13 PROCEDURE — 85610 PROTHROMBIN TIME: CPT

## 2023-09-13 PROCEDURE — 93798 PHYS/QHP OP CAR RHAB W/ECG: CPT

## 2023-09-13 NOTE — PROGRESS NOTES
ANTICOAGULATION MANAGEMENT     Glen Cho 58 year old male is on warfarin with therapeutic INR result. (Goal INR 2.0-3.0)    Recent labs: (last 7 days)     09/13/23  1016   INR 2.7*       ASSESSMENT     Warfarin Lab Questionnaire    Warfarin Doses Last 7 Days      9/13/2023    10:15 AM   Dose in Tablet or Mg   TAB or MG? tablet (tab)     Pt Rptd Dose SUNDAY MONDAY TUESDAY WED THURS FRIDAY SATURDAY 9/13/2023  10:15 AM 2.5 2.5 2 2.5 2.5 2.5 2         9/13/2023   Warfarin Lab Questionnaire   Missed doses within past 14 days? No   Changes in diet or alcohol within past 14 days? No   Medication changes since last result? No   Injuries or illness since last result? No   New shortness of breath, severe headaches or sudden changes in vision since last result? No   Abnormal bleeding since last result? No   Upcoming surgery, procedure? No   Best number to call with results? [phone]     Previous result: Supratherapeutic  Additional findings: Warfarin maintenance dose was decreased 6% at last visit.  Patient reports he quit smoking on July 9, 2023, still using nicotine lozenges.         PLAN     Recommended plan for no diet, medication or health factor changes affecting INR     Dosing Instructions: Continue your current warfarin dose with next INR in 2 weeks       Summary  As of 9/13/2023      Full warfarin instructions:  10 mg every Tue, Sat; 12.5 mg all other days   Next INR check:  9/27/2023               Telephone call with Glen who verbalizes understanding and agrees to plan    Lab visit scheduled    Education provided:   Contact [phone]  with any changes, questions or concerns.     Plan made per [facility] anticoagulation protocol    Iram Lubin, RN  Anticoagulation Clinic  9/13/2023    _______________________________________________________________________     Anticoagulation Episode Summary       Current INR goal:  2.0-3.0   TTR:  64.6 % (1.6 mo)   Target end date:  Indefinite   Send INR reminders to:  STEPHANIE  [facility]    Indications    S/P AVR (aortic valve replacement)-23 mm On-X valve [Z95.2]  S/P CABG (coronary artery bypass graft)-LIMA>LAD  VG>RCA  VG>OM [Z95.1]             Comments:               Anticoagulation Care Providers       Provider Role Specialty Phone number    Kelsey Vega PA-C Referring Cardiovascular & Thoracic Surgery [phone]    Mike Deal MD Referring Internal Medicine - Pediatrics [phone]

## 2023-09-15 ENCOUNTER — HOSPITAL ENCOUNTER (OUTPATIENT)
Dept: CARDIAC REHAB | Facility: CLINIC | Age: 58
Discharge: HOME | End: 2023-09-15
Attending: PHYSICIAN ASSISTANT
Payer: COMMERCIAL

## 2023-09-15 PROCEDURE — 93798 PHYS/QHP OP CAR RHAB W/ECG: CPT

## 2023-09-18 ENCOUNTER — HOSPITAL ENCOUNTER (OUTPATIENT)
Dept: CARDIAC REHAB | Facility: CLINIC | Age: 58
Discharge: HOME | End: 2023-09-18
Attending: PHYSICIAN ASSISTANT
Payer: COMMERCIAL

## 2023-09-18 PROCEDURE — 93798 PHYS/QHP OP CAR RHAB W/ECG: CPT

## 2023-09-20 ENCOUNTER — ALLIED HEALTH/NURSE VISIT (OUTPATIENT)
Dept: CARDIOLOGY | Facility: CLINIC | Age: 58
End: 2023-09-20
Payer: COMMERCIAL

## 2023-09-20 ENCOUNTER — DOCUMENTATION ONLY (OUTPATIENT)
Dept: CARDIOLOGY | Facility: CLINIC | Age: 58
End: 2023-09-20

## 2023-09-20 ENCOUNTER — TELEPHONE (OUTPATIENT)
Dept: CARDIOLOGY | Facility: CLINIC | Age: 58
End: 2023-09-20

## 2023-09-20 ENCOUNTER — HOSPITAL ENCOUNTER (OUTPATIENT)
Dept: CARDIAC REHAB | Facility: CLINIC | Age: 58
Discharge: HOME | End: 2023-09-20
Attending: PHYSICIAN ASSISTANT
Payer: COMMERCIAL

## 2023-09-20 VITALS — SYSTOLIC BLOOD PRESSURE: 138 MMHG | DIASTOLIC BLOOD PRESSURE: 84 MMHG | HEART RATE: 76 BPM

## 2023-09-20 DIAGNOSIS — I10 HYPERTENSION GOAL BP (BLOOD PRESSURE) < 140/90: Primary | ICD-10-CM

## 2023-09-20 DIAGNOSIS — I25.10 CORONARY ARTERY DISEASE INVOLVING NATIVE CORONARY ARTERY OF NATIVE HEART, UNSPECIFIED WHETHER ANGINA PRESENT: ICD-10-CM

## 2023-09-20 DIAGNOSIS — I10 BENIGN ESSENTIAL HYPERTENSION: ICD-10-CM

## 2023-09-20 PROCEDURE — 99207 PR NO CHARGE LOS: CPT

## 2023-09-20 PROCEDURE — 93798 PHYS/QHP OP CAR RHAB W/ECG: CPT | Performed by: REHABILITATION PRACTITIONER

## 2023-09-20 NOTE — PROGRESS NOTES
[facility] BLOOD PRESSURE CHECK     Last office visit: 8/30/23    Previous blood pressure: 133/81 mm Hg  Previous heart rate: 64 bpm      Time of visit: 10:19 am    Morning medications were taken at: He does not take any morning medications, he takes all medications at bedtime - took them last night at 2 am.      Today's blood pressure: 138/84 mm Hg  Today's heart rate: 76 bpm     Additional Comments: He is wondering if he can increase his hydrochlorothiazide as he has been noticing sock lines at the end of the day.       Results routed to: Martha Cortez      Ordering Provider: Reshma Hay  In clinic Provider: Dr. Saha

## 2023-09-20 NOTE — TELEPHONE ENCOUNTER
Pt with borderline hypertension with recently added back hydrochlorothiazide  He needs BMP done as he has a history of mild hyponatremia. He complains of some mild ankle swelling  Recommend assessing BMP and consider changing hydrochlorothiazide to maxzide  Pls call him to review  ThanksURSZULA

## 2023-09-22 ENCOUNTER — LAB (OUTPATIENT)
Dept: LAB | Facility: CLINIC | Age: 58
End: 2023-09-22
Payer: COMMERCIAL

## 2023-09-22 ENCOUNTER — HOSPITAL ENCOUNTER (OUTPATIENT)
Dept: CARDIAC REHAB | Facility: CLINIC | Age: 58
Discharge: HOME | End: 2023-09-22
Attending: PHYSICIAN ASSISTANT
Payer: COMMERCIAL

## 2023-09-22 DIAGNOSIS — I10 BENIGN ESSENTIAL HYPERTENSION: ICD-10-CM

## 2023-09-22 DIAGNOSIS — Z95.1 S/P CABG (CORONARY ARTERY BYPASS GRAFT): ICD-10-CM

## 2023-09-22 LAB
ALT SERPL W P-5'-P-CCNC: 31 U/L (ref 0–70)
ANION GAP SERPL CALCULATED.3IONS-SCNC: 13 MMOL/L (ref 7–15)
BUN SERPL-MCNC: 15.8 MG/DL (ref 8–23)
CALCIUM SERPL-MCNC: 9.3 MG/DL (ref 8.8–10.2)
CHLORIDE SERPL-SCNC: 101 MMOL/L (ref 98–107)
CHOLEST SERPL-MCNC: 157 MG/DL
CREAT SERPL-MCNC: 0.66 MG/DL (ref 0.67–1.17)
DEPRECATED HCO3 PLAS-SCNC: 22 MMOL/L (ref 22–29)
EGFRCR SERPLBLD CKD-EPI 2021: >90 ML/MIN/1.73M2
GLUCOSE SERPL-MCNC: 196 MG/DL (ref 70–99)
HDLC SERPL-MCNC: 33 MG/DL
LDLC SERPL CALC-MCNC: 73 MG/DL
NONHDLC SERPL-MCNC: 124 MG/DL
POTASSIUM SERPL-SCNC: 4.1 MMOL/L (ref 3.4–5.3)
SODIUM SERPL-SCNC: 136 MMOL/L (ref 136–145)
TRIGL SERPL-MCNC: 257 MG/DL

## 2023-09-22 PROCEDURE — 80048 BASIC METABOLIC PNL TOTAL CA: CPT | Performed by: NURSE PRACTITIONER

## 2023-09-22 PROCEDURE — 36415 COLL VENOUS BLD VENIPUNCTURE: CPT | Performed by: NURSE PRACTITIONER

## 2023-09-22 PROCEDURE — 84460 ALANINE AMINO (ALT) (SGPT): CPT | Performed by: NURSE PRACTITIONER

## 2023-09-22 PROCEDURE — 80061 LIPID PANEL: CPT | Performed by: NURSE PRACTITIONER

## 2023-09-22 PROCEDURE — 93798 PHYS/QHP OP CAR RHAB W/ECG: CPT

## 2023-09-25 ENCOUNTER — HOSPITAL ENCOUNTER (OUTPATIENT)
Dept: CARDIAC REHAB | Facility: CLINIC | Age: 58
Discharge: HOME | End: 2023-09-25
Attending: PHYSICIAN ASSISTANT
Payer: COMMERCIAL

## 2023-09-25 PROCEDURE — 93798 PHYS/QHP OP CAR RHAB W/ECG: CPT

## 2023-09-27 ENCOUNTER — HOSPITAL ENCOUNTER (OUTPATIENT)
Dept: CARDIAC REHAB | Facility: CLINIC | Age: 58
Discharge: HOME | End: 2023-09-27
Attending: PHYSICIAN ASSISTANT
Payer: COMMERCIAL

## 2023-09-27 PROCEDURE — 93798 PHYS/QHP OP CAR RHAB W/ECG: CPT

## 2023-10-02 ENCOUNTER — HOSPITAL ENCOUNTER (OUTPATIENT)
Dept: CARDIAC REHAB | Facility: CLINIC | Age: 58
Discharge: HOME | End: 2023-10-02
Attending: PHYSICIAN ASSISTANT
Payer: COMMERCIAL

## 2023-10-02 PROCEDURE — 93798 PHYS/QHP OP CAR RHAB W/ECG: CPT | Performed by: REHABILITATION PRACTITIONER

## 2023-10-11 ENCOUNTER — TELEPHONE (OUTPATIENT)
Dept: ANTICOAGULATION | Facility: CLINIC | Age: 58
End: 2023-10-11
Payer: COMMERCIAL

## 2023-10-11 DIAGNOSIS — Z95.2 S/P AVR (AORTIC VALVE REPLACEMENT): ICD-10-CM

## 2023-10-11 RX ORDER — METHOCARBAMOL 500 MG/1
1000 TABLET, FILM COATED ORAL 3 TIMES DAILY PRN
Qty: 180 TABLET | Refills: 1 | Status: SHIPPED | OUTPATIENT
Start: 2023-10-11 | End: 2023-11-20

## 2023-10-11 NOTE — TELEPHONE ENCOUNTER
ANTICOAGULATION     Glen Cho is overdue for an INR check.      Spoke with Glen and scheduled lab appointment on 10/12/23.    Jovana Carpenter RN

## 2023-10-12 ENCOUNTER — LAB (OUTPATIENT)
Dept: LAB | Facility: CLINIC | Age: 58
End: 2023-10-12
Payer: COMMERCIAL

## 2023-10-12 ENCOUNTER — HOSPITAL ENCOUNTER (OUTPATIENT)
Dept: CARDIAC REHAB | Facility: CLINIC | Age: 58
Discharge: HOME | End: 2023-10-12
Attending: PHYSICIAN ASSISTANT
Payer: COMMERCIAL

## 2023-10-12 ENCOUNTER — TELEPHONE (OUTPATIENT)
Dept: ANTICOAGULATION | Facility: CLINIC | Age: 58
End: 2023-10-12

## 2023-10-12 ENCOUNTER — ANTICOAGULATION THERAPY VISIT (OUTPATIENT)
Dept: ANTICOAGULATION | Facility: CLINIC | Age: 58
End: 2023-10-12

## 2023-10-12 DIAGNOSIS — Z95.1 S/P CABG (CORONARY ARTERY BYPASS GRAFT): ICD-10-CM

## 2023-10-12 DIAGNOSIS — Z95.2 S/P AVR (AORTIC VALVE REPLACEMENT): Primary | ICD-10-CM

## 2023-10-12 DIAGNOSIS — Z95.2 S/P AVR (AORTIC VALVE REPLACEMENT): ICD-10-CM

## 2023-10-12 LAB — INR BLD: 2.2 (ref 0.9–1.1)

## 2023-10-12 PROCEDURE — 93797 PHYS/QHP OP CAR RHAB WO ECG: CPT | Mod: 59

## 2023-10-12 PROCEDURE — 93798 PHYS/QHP OP CAR RHAB W/ECG: CPT

## 2023-10-12 PROCEDURE — 85610 PROTHROMBIN TIME: CPT

## 2023-10-12 PROCEDURE — 36416 COLLJ CAPILLARY BLOOD SPEC: CPT

## 2023-10-12 NOTE — PROGRESS NOTES
ANTICOAGULATION MANAGEMENT     Glen Cho 58 year old male is on warfarin with therapeutic INR result. (Goal INR 2.0-3.0)    Recent labs: (last 7 days)     10/12/23  1316   INR 2.2*       ASSESSMENT     Warfarin Lab Questionnaire    Warfarin Doses Last 7 Days      10/12/2023     1:14 PM   Dose in Tablet or Mg   TAB or MG? tablet (tab)     Pt Rptd Dose GIGI MONDAY TUESDAY WED THURS FRIDAY SATURDAY   10/12/2023   1:14 PM 2.5 2.5 2 2.5 2.5 2.5 2         10/12/2023   Warfarin Lab Questionnaire   Missed doses within past 14 days? No   Changes in diet or alcohol within past 14 days? No   Medication changes since last result? No   Injuries or illness since last result? No   New shortness of breath, severe headaches or sudden changes in vision since last result? No   Abnormal bleeding since last result? No   Upcoming surgery, procedure? No     Previous result: Therapeutic last visit; previously outside of goal range  Additional findings: None       PLAN     Recommended plan for no diet, medication or health factor changes affecting INR     Dosing Instructions: Continue your current warfarin dose with next INR in 3 weeks       Summary  As of 10/12/2023      Full warfarin instructions:  10 mg every Tue, Sat; 12.5 mg all other days   Next INR check:  11/2/2023               Telephone call with Glen who verbalizes understanding and agrees to plan    Lab visit scheduled    Education provided:   Please call back if any changes to your diet, medications or how you've been taking warfarin  Contact [phone]  with any changes, questions or concerns.     Plan made per [facility] anticoagulation protocol    Marti Balderas RN  Anticoagulation Clinic  10/12/2023    _______________________________________________________________________     Anticoagulation Episode Summary       Current INR goal:  2.0-3.0   TTR:  77.9% (2.6 mo)   Target end date:  Indefinite   Send INR reminders to:  [facility]    Indications    S/P AVR  (aortic valve replacement)-23 mm On-X valve [Z95.2]  S/P CABG (coronary artery bypass graft)-LIMA>LAD  VG>RCA  VG>OM [Z95.1]             Comments:               Anticoagulation Care Providers       Provider Role Specialty Phone number    Kelsey Vega PA-C Referring Cardiovascular & Thoracic Surgery [phone]    Mike Deal MD Referring Internal Medicine - Pediatrics [phone]

## 2023-10-12 NOTE — TELEPHONE ENCOUNTER
Patient had Aortic valve replacement with a 23 mm On-X mechanical valve, coronary artery bypass grafting x3 on 7/10/23. Patient did have post-op Afib.     Is it ok for patient to decrease INR goal range to 1.5-2.0 or should he be kept at 2.0-3.0?    Marti Balderas RN, BSN  Anticoagulation Clinic

## 2023-10-13 NOTE — TELEPHONE ENCOUNTER
Patient's INR goal range to remain at 2.0-3.0 until he is seen by cardio. Patient advised that INR goal range will not change at this time.    Marti Balderas RN, BSN  Anticoagulation Clinic      Jacobo Diez MD  You31 minutes ago (12:32 PM)     MM  Since he had postoperative atrial fibrillation, I would prefer we wait until he is seen back in clinic to reduce the dose to our usual level for this valve as long as there are no interim bleeding problems Thanks

## 2023-10-27 ENCOUNTER — OFFICE VISIT (OUTPATIENT)
Dept: CARDIOLOGY | Facility: CLINIC | Age: 58
End: 2023-10-27
Payer: COMMERCIAL

## 2023-10-27 VITALS
WEIGHT: 264 LBS | BODY MASS INDEX: 43.99 KG/M2 | OXYGEN SATURATION: 98 % | HEIGHT: 65 IN | HEART RATE: 62 BPM | SYSTOLIC BLOOD PRESSURE: 134 MMHG | DIASTOLIC BLOOD PRESSURE: 82 MMHG

## 2023-10-27 DIAGNOSIS — I25.10 CORONARY ARTERY DISEASE INVOLVING NATIVE CORONARY ARTERY OF NATIVE HEART WITHOUT ANGINA PECTORIS: Primary | ICD-10-CM

## 2023-10-27 DIAGNOSIS — Z95.2 S/P AVR: ICD-10-CM

## 2023-10-27 PROCEDURE — 99214 OFFICE O/P EST MOD 30 MIN: CPT | Performed by: INTERNAL MEDICINE

## 2023-10-27 NOTE — PROGRESS NOTES
HISTORY OF PRESENT ILLNESS:  Glen Cho a 58 year old male with coronary artery disease/aortic stenosis (  SPCABX3/Medtronic LAWRENCE mechanical  AVR)  , essential hypertension, type 2 diabetes mellitus, obstructive sleep apnea, obesity, and osteoarthritis was seen today at the request of his primary care providers for follow-up.    I initially saw the patient in June 2023 when he presented with exertional angina, and abnormal stress test, and moderate aortic stenosis.  Coronary angiography demonstrated left main and three-vessel disease.  The resting ejection fraction was normal with moderate aortic stenosis.  On 7/10/2023 he underwent aortic valve replacement with a #23 On-X mechanical Medtronic valve and three-vessel bypass surgery with a LIMA graft the LAD, a vein graft to the acute marginal branch of the right coronary, and a vein graft to the obtuse marginal branch of the circumflex.    His postoperative course was complicated by transient atrial fibrillation, briefly treated with amiodarone, but resolving entirely.  The patient has completed cardiac rehabilitation, and may eventually require knee surgery for disabling knee osteoarthritis.  He remains on warfarin with a target INR between 2 and 3 because of his postoperative atrial fibrillation.  The patient has gained weight which he attributes to being physically inactive and association with trying to stop smoking      PAST MEDICAL HISTORY  1) Hypertension  2) Type 2 diabetes mellitus  3) Obstructive sleep apnea  4) Obesity  5) Osteoarthritis-status post open reduction internal fixation right ankle  Orders this Visit:  No orders of the defined types were placed in this encounter.    No orders of the defined types were placed in this encounter.    Medications Discontinued During This Encounter   Medication Reason    acetaminophen (TYLENOL) 500 MG tablet Duplicate Therapy (No AVS / No eCancel)       No diagnosis found.    CURRENT MEDICATIONS:  Current  Outpatient Medications   Medication Sig Dispense Refill    acetaminophen (TYLENOL) 325 MG tablet Take 2 tablets (650 mg) by mouth every 4 hours as needed for other or mild pain 30 tablet 1    aspirin 81 MG EC tablet Take 1 tablet (81 mg) by mouth daily 90 tablet 3    atorvastatin (LIPITOR) 40 MG tablet Take 1 tablet (40 mg) by mouth every evening 90 tablet 3    BD PEN NEEDLE KENTRELL 2ND GEN 32G X 4 MM miscellaneous USE 1 PEN NEEDLE TIP 3 TIMES DAILY 300 each 0    blood glucose monitoring (FREESTYLE INSULINX SYSTEM) meter device kit Use to test blood sugar 3 times daily or as directed. 1 kit 0    blood glucose monitoring (FREESTYLE) lancets Use to test blood sugars 2-3 times daily or as directed. 1 Box prn    ferrous sulfate (FEROSUL) 325 (65 Fe) MG tablet Take 1 tablet (325 mg) by mouth daily (with breakfast) 90 tablet 1    hydrochlorothiazide (HYDRODIURIL) 25 MG tablet Take 1 tablet (25 mg) by mouth daily 90 tablet 1    insulin glargine (LANTUS PEN) 100 UNIT/ML pen Inject 5 Units Subcutaneous At Bedtime (Patient taking differently: Inject 35 Units Subcutaneous at bedtime) 15 mL 11    insulin lispro (HUMALOG KWIKPEN) 100 UNIT/ML (1 unit dial) KWIKPEN Inject 1 Units Subcutaneous 3 times daily (before meals) 15 mL 11    insulin pen needle (BD KENTRELL U/F) 32G X 4 MM USE 2 PEN NEEDLES DAILY OR AS DIRECTED. 200 each 0    metFORMIN (GLUCOPHAGE XR) 500 MG 24 hr tablet Take 2 tablets (1,000 mg) by mouth daily (with dinner) 180 tablet 3    methocarbamol (ROBAXIN) 500 MG tablet TAKE 2 TABLETS (1,000 MG) BY MOUTH 3 TIMES DAILY AS NEEDED FOR MUSCLE SPASMS 180 tablet 1    metoprolol succinate ER (TOPROL XL) 25 MG 24 hr tablet Take 1 tablet (25 mg) by mouth every evening (Patient taking differently: Take 25 mg by mouth every evening Pt currently has 50 mg tablets and is taking 1/2 tablet QD) 90 tablet 3    order for DME Equipment being ordered: Verio flex test strips  Patient tests 1 times daily. 100 Device 3    order for DME  "Equipment being ordered: lancets  Uses 4 a day.    Please dispense what insurance covers. 400 each 3    order for DME Equipment being ordered: glucose meter  Please dispense what insurance covers 1 each 0    ORDER FOR DME Equipment being ordered: glucometer    60 lancets and test strips (5 refills) for twice daily testing. 1 each 0    warfarin ANTICOAGULANT (COUMADIN) 5 MG tablet Take 2.5 tablets (12.5 mg) by mouth daily or as directed by INR Clinic 225 tablet 1       ALLERGIES     Allergies   Allergen Reactions    Amlodipine Other (See Comments)     Water retention    Amlodipine Besylate      Water retention    Cefdinir      Joint pains    Other Reaction(s): Arthralgia    Lisinopril Other (See Comments)     Water retention    Amoxicillin Rash     White spots       PAST MEDICAL, SURGICAL, FAMILY, SOCIAL HISTORY:  History was reviewed and updated as needed, see medical record.        Review of Systems:  A 12-point review of systems was completed, see medical record for detailed review of systems information.    Physical Exam:  Vitals: /82 (BP Location: Right arm, Patient Position: Sitting, Cuff Size: Adult Large)   Pulse 62   Ht 1.651 m (5' 5\")   Wt 119.7 kg (264 lb)   SpO2 98%   BMI 43.93 kg/m      Constitutional: No apparent distress overweight    HEENT: pupils equal round reactive to light, thyroid normal size, JVP is normal    Chest: Clear to percussion and auscultation sternotomy  well healed    Cardiac: Normal prosthetic S2  soft 1/6 ALBINA    Extremitiies: mild edema left leg      ASSESSMENT: The patient remains NSR with well-controlled blood pressure, and I believe metoprolol can be discontinued.  Since there has been no recurrence of atrial fibrillation, he can be anticoagulated with an INR target between 1.5 and 2 consistent with FDA approved protocol for Medtronic [place] mechanical aortic valve. We have advised total abstention from tobacco, a Mediterranean style diet and regular exercise program. " Current medical literature indicates he may be a god candidate  for  an SGLT-2 transport inhibitor, a decisions  I  would  defer to his primary care MD who manages his diabetes. The local leg swelling at SVG graft harvest site can be treated with leg elevation and support stockings. Finally, I believe he  can undergo orthopedic surgery at reasonably low cardiovascular risk at any time deemed appropriate by his primary care providers.      RECOMMENDATIONS:   1) Discontinue metoprolol   2) Can target INR 1.5 to 2.0 given lack of recurrent atrial fibrillation and  LAWRENCE valve  3)  Knee surgery ok, no need for further pre-operative testing  4) Mediterranean style weight loss diet. Regular exercise program and abstention from tobacco  5) Elevation of leg /support hose  6) Assess candidacy for SGLT-2 inhibitor with         Recent Lab Results:  LIPID RESULTS:  Lab Results   Component Value Date    CHOL 157 09/22/2023    CHOL 221 (H) 10/16/2020    HDL 33 (L) 09/22/2023    HDL 27 (L) 10/16/2020    LDL 73 09/22/2023     (H) 10/16/2020    TRIG 257 (H) 09/22/2023    TRIG 327 (H) 10/16/2020    CHOLHDLRATIO 4.5 02/09/2015       LIVER ENZYME RESULTS:  Lab Results   Component Value Date    AST 25 07/17/2023    AST 21 10/16/2020    ALT 31 09/22/2023    ALT 36 10/16/2020       CBC RESULTS:  Lab Results   Component Value Date    WBC 8.2 08/18/2023    WBC 5.2 04/12/2015    RBC 4.77 08/18/2023    RBC 4.87 04/12/2015    HGB 12.7 (L) 08/18/2023    HGB 14.4 04/12/2015    HCT 40.4 08/18/2023    HCT 41.8 04/12/2015    MCV 85 08/18/2023    MCV 86 04/12/2015    MCH 26.6 08/18/2023    MCH 29.6 04/12/2015    MCHC 31.4 (L) 08/18/2023    MCHC 34.4 04/12/2015    RDW 14.5 08/18/2023    RDW 12.4 04/12/2015     08/18/2023     04/12/2015       BMP RESULTS:  Lab Results   Component Value Date     09/22/2023     10/16/2020    POTASSIUM 4.1 09/22/2023    POTASSIUM 4.0 07/10/2023    POTASSIUM 4.2 10/16/2020     CHLORIDE 101 09/22/2023    CHLORIDE 104 10/16/2020    CO2 22 09/22/2023    CO2 23 10/16/2020    ANIONGAP 13 09/22/2023    ANIONGAP 9 10/16/2020     (H) 09/22/2023     (H) 07/17/2023     (H) 10/16/2020    BUN 15.8 09/22/2023    BUN 15 10/16/2020    CR 0.66 (L) 09/22/2023    CR 0.78 10/16/2020    GFRESTIMATED >90 09/22/2023    GFRESTIMATED >90 10/16/2020    GFRESTBLACK >90 10/16/2020    ARTEMIO 9.3 09/22/2023    ARTEMIO 9.8 10/16/2020        A1C RESULTS:  Lab Results   Component Value Date    A1C 6.8 (H) 08/18/2023    A1C 7.3 (H) 06/15/2021       INR RESULTS:  Lab Results   Component Value Date    INR 2.2 (H) 10/12/2023    INR 2.7 (H) 09/13/2023    INR 2.05 (H) 07/19/2023    INR 1.74 (H) 07/17/2023    INR 0.89 03/15/2010       We greatly appreciate the opportunity to be involved in the care of your patient, Glen Cho.    Sincerely,  Jacobo Diez MD      CC  No referring provider defined for this encounter.

## 2023-10-27 NOTE — LETTER
10/27/2023    Mike Deal MD  [address] [place] [address]    RE: Glen Cho       Dear Colleague,     I had the pleasure of seeing Glen Cho in the [facility].  HISTORY OF PRESENT ILLNESS:  Glen Cho a 58 year old male with   Weight gain with stopping smoking no PAF     Orders this Visit:  No orders of the defined types were placed in this encounter.    No orders of the defined types were placed in this encounter.    Medications Discontinued During This Encounter   Medication Reason    acetaminophen (TYLENOL) 500 MG tablet Duplicate Therapy (No AVS / No eCancel)       No diagnosis found.    CURRENT MEDICATIONS:  Current Outpatient Medications   Medication Sig Dispense Refill    acetaminophen (TYLENOL) 325 MG tablet Take 2 tablets (650 mg) by mouth every 4 hours as needed for other or mild pain 30 tablet 1    aspirin 81 MG EC tablet Take 1 tablet (81 mg) by mouth daily 90 tablet 3    atorvastatin (LIPITOR) 40 MG tablet Take 1 tablet (40 mg) by mouth every evening 90 tablet 3    BD PEN NEEDLE KENTRELL 2ND GEN 32G X 4 MM miscellaneous USE 1 PEN NEEDLE TIP 3 TIMES DAILY 300 each 0    blood glucose monitoring (FREESTYLE INSULINX SYSTEM) meter device kit Use to test blood sugar 3 times daily or as directed. 1 kit 0    blood glucose monitoring (FREESTYLE) lancets Use to test blood sugars 2-3 times daily or as directed. 1 Box prn    ferrous sulfate (FEROSUL) 325 (65 Fe) MG tablet Take 1 tablet (325 mg) by mouth daily (with breakfast) 90 tablet 1    hydrochlorothiazide (HYDRODIURIL) 25 MG tablet Take 1 tablet (25 mg) by mouth daily 90 tablet 1    insulin glargine (LANTUS PEN) 100 UNIT/ML pen Inject 5 Units Subcutaneous At Bedtime (Patient taking differently: Inject 35 Units Subcutaneous at bedtime) 15 mL 11    insulin lispro (HUMALOG KWIKPEN) 100 UNIT/ML (1 unit dial) KWIKPEN Inject 1 Units Subcutaneous 3 times daily (before meals) 15 mL 11    insulin pen needle (BD KENTRELL U/F) 32G  "X 4 MM USE 2 PEN NEEDLES DAILY OR AS DIRECTED. 200 each 0    metFORMIN (GLUCOPHAGE XR) 500 MG 24 hr tablet Take 2 tablets (1,000 mg) by mouth daily (with dinner) 180 tablet 3    methocarbamol (ROBAXIN) 500 MG tablet TAKE 2 TABLETS (1,000 MG) BY MOUTH 3 TIMES DAILY AS NEEDED FOR MUSCLE SPASMS 180 tablet 1    metoprolol succinate ER (TOPROL XL) 25 MG 24 hr tablet Take 1 tablet (25 mg) by mouth every evening (Patient taking differently: Take 25 mg by mouth every evening Pt currently has 50 mg tablets and is taking 1/2 tablet QD) 90 tablet 3    order for DME Equipment being ordered: Verio flex test strips  Patient tests 1 times daily. 100 Device 3    order for DME Equipment being ordered: lancets  Uses 4 a day.    Please dispense what insurance covers. 400 each 3    order for DME Equipment being ordered: glucose meter  Please dispense what insurance covers 1 each 0    ORDER FOR DME Equipment being ordered: glucometer    60 lancets and test strips (5 refills) for twice daily testing. 1 each 0    warfarin ANTICOAGULANT (COUMADIN) 5 MG tablet Take 2.5 tablets (12.5 mg) by mouth daily or as directed by INR Clinic 225 tablet 1       ALLERGIES     Allergies   Allergen Reactions    Amlodipine Other (See Comments)     Water retention    Amlodipine Besylate      Water retention    Cefdinir      Joint pains    Other Reaction(s): Arthralgia    Lisinopril Other (See Comments)     Water retention    Amoxicillin Rash     White spots       PAST MEDICAL, SURGICAL, FAMILY, SOCIAL HISTORY:  History was reviewed and updated as needed, see medical record.        Review of Systems:  A 12-point review of systems was completed, see medical record for detailed review of systems information.    Physical Exam:  Vitals: /82 (BP Location: Right arm, Patient Position: Sitting, Cuff Size: Adult Large)   Pulse 62   Ht 1.651 m (5' 5\")   Wt 119.7 kg (264 lb)   SpO2 98%   BMI 43.93 kg/m      Constitutional: No apparent distress    HEENT: " pupils equal round reactive to light, thyroid normal size, JVP is normal    Chest: Clear to percussion and auscultation    Cardiac: Normal S1, normal S2 no S3, no murmur or click    Abdomen: Scaphoid.  Liver percusses to 6 cm spleen is not palpable aorta is not tender or enlarged    Extremitiies: no edema.    Neurological:  Cranial nerves II through XII are intact, strength equal and symmetrical, displays normal insight and judgment        ASSESSMENT: Glen Cho is a 58 year old male with          We reviewed the benefits of a regular exercise program, a Mediterranean-style weight loss diet, and contacting us for any changes in between now and the time of her next visit.     RECOMMENDATIONS:   1) Discontinue metoprolol   2) Can target INR 1.5 to 2.0 given lack of recurrent atrial fibrillation and  LAWRENCE valve  3)  Knee surgery ok, no need for further pre-operative testing  4) Mediterranean style weight loss diet. Regular exercise program and abstention from tobacco  5) Elevation of leg stockings for support  6) Assess candidacy for SGLT-2 inhibitor        Recent Lab Results:  LIPID RESULTS:  Lab Results   Component Value Date    CHOL 157 09/22/2023    CHOL 221 (H) 10/16/2020    HDL 33 (L) 09/22/2023    HDL 27 (L) 10/16/2020    LDL 73 09/22/2023     (H) 10/16/2020    TRIG 257 (H) 09/22/2023    TRIG 327 (H) 10/16/2020    CHOLHDLRATIO 4.5 02/09/2015       LIVER ENZYME RESULTS:  Lab Results   Component Value Date    AST 25 07/17/2023    AST 21 10/16/2020    ALT 31 09/22/2023    ALT 36 10/16/2020       CBC RESULTS:  Lab Results   Component Value Date    WBC 8.2 08/18/2023    WBC 5.2 04/12/2015    RBC 4.77 08/18/2023    RBC 4.87 04/12/2015    HGB 12.7 (L) 08/18/2023    HGB 14.4 04/12/2015    HCT 40.4 08/18/2023    HCT 41.8 04/12/2015    MCV 85 08/18/2023    MCV 86 04/12/2015    MCH 26.6 08/18/2023    MCH 29.6 04/12/2015    MCHC 31.4 (L) 08/18/2023    MCHC 34.4 04/12/2015    RDW 14.5 08/18/2023    RDW 12.4  04/12/2015     08/18/2023     04/12/2015       BMP RESULTS:  Lab Results   Component Value Date     09/22/2023     10/16/2020    POTASSIUM 4.1 09/22/2023    POTASSIUM 4.0 07/10/2023    POTASSIUM 4.2 10/16/2020    CHLORIDE 101 09/22/2023    CHLORIDE 104 10/16/2020    CO2 22 09/22/2023    CO2 23 10/16/2020    ANIONGAP 13 09/22/2023    ANIONGAP 9 10/16/2020     (H) 09/22/2023     (H) 07/17/2023     (H) 10/16/2020    BUN 15.8 09/22/2023    BUN 15 10/16/2020    CR 0.66 (L) 09/22/2023    CR 0.78 10/16/2020    GFRESTIMATED >90 09/22/2023    GFRESTIMATED >90 10/16/2020    GFRESTBLACK >90 10/16/2020    ARTEMIO 9.3 09/22/2023    ARTEMIO 9.8 10/16/2020        A1C RESULTS:  Lab Results   Component Value Date    A1C 6.8 (H) 08/18/2023    A1C 7.3 (H) 06/15/2021       INR RESULTS:  Lab Results   Component Value Date    INR 2.2 (H) 10/12/2023    INR 2.7 (H) 09/13/2023    INR 2.05 (H) 07/19/2023    INR 1.74 (H) 07/17/2023    INR 0.89 03/15/2010       We greatly appreciate the opportunity to be involved in the care of your patient, Glen Cho.    Sincerely,  Jacobo Diez MD      CC  No referring provider defined for this encounter.

## 2023-11-09 ENCOUNTER — TELEPHONE (OUTPATIENT)
Dept: ANTICOAGULATION | Facility: CLINIC | Age: 58
End: 2023-11-09
Payer: COMMERCIAL

## 2023-11-09 ENCOUNTER — DOCUMENTATION ONLY (OUTPATIENT)
Dept: ANTICOAGULATION | Facility: CLINIC | Age: 58
End: 2023-11-09
Payer: COMMERCIAL

## 2023-11-09 DIAGNOSIS — Z95.2 S/P AVR (AORTIC VALVE REPLACEMENT): Primary | ICD-10-CM

## 2023-11-09 DIAGNOSIS — Z95.1 S/P CABG (CORONARY ARTERY BYPASS GRAFT): ICD-10-CM

## 2023-11-09 NOTE — TELEPHONE ENCOUNTER
ANTICOAGULATION     Glen Cho is overdue for an INR check. Patient was due for INR 11/2/23. Per 10/27/23 Cardio note, patient's INR goal has decreased to 1.5 - 2.0    Spoke with Glen and scheduled lab appointment on 11/13/23 at 2:45 PM    Marti Balderas RN

## 2023-11-09 NOTE — PROGRESS NOTES
ANTICOAGULATION CLINIC REFERRAL RENEWAL REQUEST       An renewal order is required for all patients referred to [facility] with an INR goal change.?  Please review and sign the pended referral order for Glen Cho.       ANTICOAGULATION SUMMARY      Warfarin indication(s)   Mechanical AVR    Mechanical heart valve present?  Mechanical AVR- Bileaflet- On X       Current goal range   INR: 1.5-2.0  Goal range changed at 10/27/23 Cardio appointment     Goal appropriate for indication? Goal INR 1.5-2.0 standard for indication(s)     Time in Therapeutic Range (TTR)  (Goal > 60%) 77.9%       Office visit with referring provider's group within last year yes on 8/18/23       Marti Balderas RN  [facility]

## 2023-11-13 ENCOUNTER — ANTICOAGULATION THERAPY VISIT (OUTPATIENT)
Dept: ANTICOAGULATION | Facility: CLINIC | Age: 58
End: 2023-11-13

## 2023-11-13 ENCOUNTER — LAB (OUTPATIENT)
Dept: LAB | Facility: CLINIC | Age: 58
End: 2023-11-13
Payer: COMMERCIAL

## 2023-11-13 DIAGNOSIS — Z95.2 S/P AVR (AORTIC VALVE REPLACEMENT): ICD-10-CM

## 2023-11-13 DIAGNOSIS — Z95.2 S/P AVR (AORTIC VALVE REPLACEMENT): Primary | ICD-10-CM

## 2023-11-13 DIAGNOSIS — Z95.1 S/P CABG (CORONARY ARTERY BYPASS GRAFT): ICD-10-CM

## 2023-11-13 LAB — INR BLD: 2.2 (ref 0.9–1.1)

## 2023-11-13 PROCEDURE — 36416 COLLJ CAPILLARY BLOOD SPEC: CPT

## 2023-11-13 PROCEDURE — 85610 PROTHROMBIN TIME: CPT

## 2023-11-13 NOTE — PROGRESS NOTES
ANTICOAGULATION MANAGEMENT     Glen Cho 58 year old male is on warfarin with supratherapeutic INR result. (Goal INR 1.5-2.0)    Recent labs: (last 7 days)     11/13/23  1528   INR 2.2*       ASSESSMENT     Warfarin Lab Questionnaire    Warfarin Doses Last 7 Days      11/13/2023     3:29 PM   Dose in Tablet or Mg   TAB or MG? tablet (tab)           11/13/2023   Warfarin Lab Questionnaire   Missed doses within past 14 days? No   Changes in diet or alcohol within past 14 days? No   Medication changes since last result? No   Injuries or illness since last result? No   New shortness of breath, severe headaches or sudden changes in vision since last result? No   Abnormal bleeding since last result? No   Upcoming surgery, procedure? No     Previous result: Therapeutic last 2(+) visits, today is the first check for new lower INR goal, this will be the 1st dose adjustment since being notified of decreased goal range.  Additional findings:  Per 10/27/23 Cardio note, INR goal range changed to 1.5-2.0       PLAN     Recommended plan for ongoing change(s) affecting INR     Dosing Instructions: decrease your warfarin dose (3% change) with next INR in 1 week       Summary  As of 11/13/2023      Full warfarin instructions:  10 mg every Mon, Wed, Fri; 12.5 mg all other days   Next INR check:  11/20/2023               Telephone call with Glen who agrees to plan and repeated back plan correctly    Check at provider office visit    Education provided:   Please call back if any changes to your diet, medications or how you've been taking warfarin  Contact [phone]  with any changes, questions or concerns.     Plan made per [facility] anticoagulation protocol    Marti Balderas RN  Anticoagulation Clinic  11/13/2023    _______________________________________________________________________     Anticoagulation Episode Summary       Current INR goal:  1.5-2.0   TTR:  83.8% (3.6 mo)   Target end date:  Indefinite   Send INR reminders to:   [facility]    Indications    S/P AVR (aortic valve replacement)-23 mm On-X valve [Z95.2]  S/P CABG (coronary artery bypass graft)-LIMA>LAD  VG>RCA  VG>OM [Z95.1]             Comments:               Anticoagulation Care Providers       Provider Role Specialty Phone number    Kelsey Vega PA-C Referring Cardiovascular & Thoracic Surgery [phone]    Mike Deal MD Referring Internal Medicine - Pediatrics [phone]

## 2023-11-17 ENCOUNTER — TELEPHONE (OUTPATIENT)
Dept: PEDIATRICS | Facility: CLINIC | Age: 58
End: 2023-11-17
Payer: COMMERCIAL

## 2023-11-17 NOTE — TELEPHONE ENCOUNTER
Patient Returning Call    Reason for call:  patient was calling in regards  to his meds and upcoming surgery and some other concerns     Information relayed to patient:  left message for call back     Patient has additional questions:  No      Could we send this information to you in [facility] or would you prefer to receive a phone call?:   Patient would prefer a phone call   Okay to leave a detailed message?: Yes at Cell number on file:    Telephone Information:   Mobile [phone]

## 2023-11-17 NOTE — TELEPHONE ENCOUNTER
I spoke to patient, he reports he saw orthopedic doctor for a steroid injection in his right knee but also reports on-going swelling in his left knee that was replaced in 04/2023.   Patient inquiring about taking an NSAID and if so, how long can he safely take it. I informed patient of the bleeding risk, especially in the gut, when taking an NSAID with warfarin and signs/symptoms to watch for.   I informed patient to discuss with his PCP at office visit on 11/20/23, patient verbalized understanding.     Iram Lubin RN  Anticoagulation Clinic

## 2023-11-19 ENCOUNTER — HEALTH MAINTENANCE LETTER (OUTPATIENT)
Age: 58
End: 2023-11-19

## 2023-11-20 ENCOUNTER — ANTICOAGULATION THERAPY VISIT (OUTPATIENT)
Dept: ANTICOAGULATION | Facility: CLINIC | Age: 58
End: 2023-11-20

## 2023-11-20 ENCOUNTER — OFFICE VISIT (OUTPATIENT)
Dept: PEDIATRICS | Facility: CLINIC | Age: 58
End: 2023-11-20
Attending: INTERNAL MEDICINE
Payer: COMMERCIAL

## 2023-11-20 VITALS
RESPIRATION RATE: 20 BRPM | BODY MASS INDEX: 43.45 KG/M2 | HEART RATE: 65 BPM | OXYGEN SATURATION: 95 % | SYSTOLIC BLOOD PRESSURE: 145 MMHG | WEIGHT: 261.1 LBS | TEMPERATURE: 98 F | DIASTOLIC BLOOD PRESSURE: 86 MMHG

## 2023-11-20 DIAGNOSIS — Z95.2 S/P AVR (AORTIC VALVE REPLACEMENT): ICD-10-CM

## 2023-11-20 DIAGNOSIS — I10 BENIGN ESSENTIAL HYPERTENSION: ICD-10-CM

## 2023-11-20 DIAGNOSIS — E11.21 TYPE 2 DIABETES MELLITUS WITH DIABETIC NEPHROPATHY, WITH LONG-TERM CURRENT USE OF INSULIN (H): ICD-10-CM

## 2023-11-20 DIAGNOSIS — Z95.2 S/P AVR (AORTIC VALVE REPLACEMENT): Primary | ICD-10-CM

## 2023-11-20 DIAGNOSIS — E11.21 TYPE 2 DIABETES MELLITUS WITH DIABETIC NEPHROPATHY, WITH LONG-TERM CURRENT USE OF INSULIN (H): Primary | ICD-10-CM

## 2023-11-20 DIAGNOSIS — Z79.4 TYPE 2 DIABETES MELLITUS WITH DIABETIC NEPHROPATHY, WITH LONG-TERM CURRENT USE OF INSULIN (H): Primary | ICD-10-CM

## 2023-11-20 DIAGNOSIS — Z79.4 TYPE 2 DIABETES MELLITUS WITH DIABETIC NEPHROPATHY, WITH LONG-TERM CURRENT USE OF INSULIN (H): ICD-10-CM

## 2023-11-20 DIAGNOSIS — Z12.11 SCREEN FOR COLON CANCER: ICD-10-CM

## 2023-11-20 DIAGNOSIS — I48.0 PAROXYSMAL ATRIAL FIBRILLATION (H): ICD-10-CM

## 2023-11-20 DIAGNOSIS — Z95.1 S/P CABG (CORONARY ARTERY BYPASS GRAFT): ICD-10-CM

## 2023-11-20 DIAGNOSIS — Z79.01 ANTICOAGULATED: ICD-10-CM

## 2023-11-20 DIAGNOSIS — I10 HYPERTENSION GOAL BP (BLOOD PRESSURE) < 140/90: ICD-10-CM

## 2023-11-20 LAB
HBA1C MFR BLD: 9.3 % (ref 0–5.6)
INR BLD: 2.8 (ref 0.9–1.1)

## 2023-11-20 PROCEDURE — 90678 RSV VACC PREF BIVALENT IM: CPT | Performed by: INTERNAL MEDICINE

## 2023-11-20 PROCEDURE — 36415 COLL VENOUS BLD VENIPUNCTURE: CPT | Performed by: INTERNAL MEDICINE

## 2023-11-20 PROCEDURE — 83036 HEMOGLOBIN GLYCOSYLATED A1C: CPT | Performed by: INTERNAL MEDICINE

## 2023-11-20 PROCEDURE — 90480 ADMN SARSCOV2 VAC 1/ONLY CMP: CPT | Performed by: INTERNAL MEDICINE

## 2023-11-20 PROCEDURE — 91320 SARSCV2 VAC 30MCG TRS-SUC IM: CPT | Performed by: INTERNAL MEDICINE

## 2023-11-20 PROCEDURE — 99214 OFFICE O/P EST MOD 30 MIN: CPT | Mod: 25 | Performed by: INTERNAL MEDICINE

## 2023-11-20 PROCEDURE — 90472 IMMUNIZATION ADMIN EACH ADD: CPT | Performed by: INTERNAL MEDICINE

## 2023-11-20 PROCEDURE — 85610 PROTHROMBIN TIME: CPT | Performed by: INTERNAL MEDICINE

## 2023-11-20 PROCEDURE — 90471 IMMUNIZATION ADMIN: CPT | Performed by: INTERNAL MEDICINE

## 2023-11-20 PROCEDURE — 90686 IIV4 VACC NO PRSV 0.5 ML IM: CPT | Performed by: INTERNAL MEDICINE

## 2023-11-20 RX ORDER — HYDROCHLOROTHIAZIDE 25 MG/1
25 TABLET ORAL DAILY
Qty: 90 TABLET | Refills: 3 | Status: SHIPPED | OUTPATIENT
Start: 2023-11-20

## 2023-11-20 RX ORDER — WARFARIN SODIUM 5 MG/1
TABLET ORAL
Qty: 225 TABLET | Refills: 3 | Status: SHIPPED | OUTPATIENT
Start: 2023-11-20

## 2023-11-20 RX ORDER — LOSARTAN POTASSIUM 100 MG/1
TABLET ORAL
Qty: 90 TABLET | Refills: 3 | Status: SHIPPED | OUTPATIENT
Start: 2023-11-20

## 2023-11-20 RX ORDER — METFORMIN HCL 500 MG
1000 TABLET, EXTENDED RELEASE 24 HR ORAL
Qty: 180 TABLET | Refills: 3 | Status: SHIPPED | OUTPATIENT
Start: 2023-11-20

## 2023-11-20 ASSESSMENT — PAIN SCALES - GENERAL: PAINLEVEL: NO PAIN (0)

## 2023-11-20 NOTE — PROGRESS NOTES
ANTICOAGULATION MANAGEMENT     Glen Cho 58 year old male is on warfarin with supratherapeutic INR result. (Goal INR 1.5-2.0)    Recent labs: (last 7 days)     11/20/23  0824   INR 2.8*       ASSESSMENT     Source(s): Chart Review and Patient/Caregiver Call     Warfarin doses taken: Warfarin taken as instructed  Diet: No new diet changes identified  Medication/supplement changes: None noted  New illness, injury, or hospitalization: No  Signs or symptoms of bleeding or clotting: No  Previous result: Supratherapeutic  Additional findings:  Cortisone injection last week.        PLAN     Recommended plan for no diet, medication or health factor changes affecting INR     Dosing Instructions: partial hold then decrease your warfarin dose (6.2% change) with next INR in 1-2 weeks       Summary  As of 11/20/2023      Full warfarin instructions:  11/20: 5 mg; Otherwise 12.5 mg every Sun, Thu; 10 mg all other days   Next INR check:  12/1/2023               Telephone call with Glen who verbalizes understanding and agrees to plan    Lab visit scheduled    Education provided:   Goal range and lab monitoring: goal range and significance of current result and Importance of therapeutic range    Plan made per [facility] anticoagulation protocol    Ron Lin RN  Anticoagulation Clinic  11/20/2023    _______________________________________________________________________     Anticoagulation Episode Summary       Current INR goal:  1.5-2.0   TTR:  79.0% (3.9 mo)   Target end date:  Indefinite   Send INR reminders to:  [facility]    Indications    S/P AVR (aortic valve replacement)-23 mm On-X valve [Z95.2]  S/P CABG (coronary artery bypass graft)-LIMA>LAD  VG>RCA  VG>OM [Z95.1]             Comments:               Anticoagulation Care Providers       Provider Role Specialty Phone number    Kelsey Vega PA-C Referring Cardiovascular & Thoracic Surgery [phone]    iMke Deal MD Referring Internal Medicine -  Pediatrics [phone]

## 2023-11-20 NOTE — PROGRESS NOTES
ANTICOAGULATION MANAGEMENT     Glen Cho 58 year old male is on warfarin with supratherapeutic INR result. (Goal INR 1.5-2.0)    Recent labs: (last 7 days)     11/20/23  0824   INR 2.8*       ASSESSMENT     Source(s): Chart Review  Previous INR was Supratherapeutic  Medication, diet, health changes since last INR chart reviewed; none identified    Recent INR goal change from 2-3 to 1.5-2.0.   Pt has been having some pain in knee, had injection on 11/17/23.     PLAN     Unable to reach Glen today.    Left message to take reduced dose of warfarin, 5 mg tonight. Request call back for assessment.    Follow up required to confirm warfarin dose taken and assess for changes and discuss out of range result     Ron Lin RN  Anticoagulation Clinic  11/20/2023

## 2023-11-20 NOTE — PATIENT INSTRUCTIONS
"3 shots:  RSV< COVID, Flu.    Lab work today:  We can do labs in the exam room today, or you can get them done downstairs in the lab.      If you are going downstairs:  Directions:  As you walk through the first floor, you'll see (on the right) first the pharmacy, then some bathrooms, then the \"Lab and Imaging\" area. Give them your name at the window there and wait for them to call you.     Next fall:  due for colonoscopy.    Begin 1/2 tab of losartan for 2 weeks, then increase to 1 ful tab    Follow up in 1 month for possible Jardiance.   "

## 2023-11-20 NOTE — PROGRESS NOTES
Assessment & Plan     S/P AVR (aortic valve replacement)  INR today. Goal 1.5-2, per patient.   - warfarin ANTICOAGULANT (COUMADIN) 5 MG tablet; Take 2.5 tablets (12.5 mg) by mouth daily or as directed by INR Clinic  - INR; Future  - INR    Screen for colon cancer  Due in 2024.    Type 2 diabetes mellitus with diabetic nephropathy, with long-term current use of insulin (H)  Will add Jardiance in 1 month owing to concurrent coronary artery disease.   - Hemoglobin A1c; Future  - insulin glargine (LANTUS PEN) 100 UNIT/ML pen; Inject 35 Units Subcutaneous at bedtime  - metFORMIN (GLUCOPHAGE XR) 500 MG 24 hr tablet; Take 2 tablets (1,000 mg) by mouth daily (with dinner)  - Hemoglobin A1c    Benign essential hypertension  Not at goal.  Taper up losartan and repeat labs and visit in 1 month.   - hydrochlorothiazide (HYDRODIURIL) 25 MG tablet; Take 1 tablet (25 mg) by mouth daily  - losartan (COZAAR) 100 MG tablet; Takle 1/2 tab daily for 2 weeks.  If blood pressure not consistently below 130, increase to 1 full tab daily    Hypertension goal BP (blood pressure) < 140/90      Paroxysmal atrial fibrillation (H)  INR 1.5-2                 Mike Deal MD  [facility] MEGAN Carroll is a 58 year old, presenting for the following health issues:  Diabetes      11/20/2023     7:15 AM   Additional Questions   Roomed by JAYLYN Caballero   Accompanied by RAMAKRISHNA         11/20/2023     7:15 AM   Patient Reported Additional Medications   Patient reports taking the following new medications RAMAKRISHNA       History of Present Illness       Diabetes:   He presents for follow up of diabetes.  He is checking home blood glucose a few times a week.   He checks blood glucose after meals.  Blood glucose is never over 200 and never under 70. He is aware of hypoglycemia symptoms including none.    He has no concerns regarding his diabetes at this time.   He is not experiencing numbness or burning in feet, excessive thirst, blurry  vision, weight changes or redness, sores or blisters on feet. The patient has had a diabetic eye exam in the last 12 months. Eye exam performed on Jan 23. Location of [facility] eye exam [facility].          Finished rehab; feels like he thought he'd have more energy.  Has not noted much improvement since rehab.     No chest pain, shortness of breath or tightness.  When sleeps in side, feels some sternal pain.      INR was 2.2 last week; goal 1.5-2.0    Insulin is at 36-38 daily, and not a lot of humalog    Has stopped metoprolol due to his fatigue.      Doing exercise at gym, and doing overall about 2 hours aquafit, and few other days.   Back to work full time.     Sleeping well; sleeping better and more.      Had a recent right steroid injection into knee.Typically last 5 months or so.                 Review of Systems   CONSTITUTIONAL: NEGATIVE for fever, chills, change in weight  INTEGUMENTARY/SKIN: NEGATIVE for worrisome rashes, moles or lesions  EYES: NEGATIVE for vision changes or irritation  ENT/MOUTH: NEGATIVE for ear, mouth and throat problems  RESP: NEGATIVE for significant cough or SOB  BREAST: NEGATIVE for masses, tenderness or discharge  CV: NEGATIVE for chest pain, palpitations or peripheral edema  GI: NEGATIVE for nausea, abdominal pain, heartburn, or change in bowel habits  : NEGATIVE for frequency, dysuria, or hematuria  MUSCULOSKELETAL: NEGATIVE for significant arthralgias or myalgia  NEURO: NEGATIVE for weakness, dizziness or paresthesias  ENDOCRINE: NEGATIVE for temperature intolerance, skin/hair changes  HEME: NEGATIVE for bleeding problems  PSYCHIATRIC: NEGATIVE for changes in mood or affect      Objective    BP (!) 148/91 (BP Location: Right arm, Patient Position: Sitting, Cuff Size: Adult Large)   Pulse 65   Temp 98  F (36.7  C) (Oral)   Resp 20   Wt 118.4 kg (261 lb 1.6 oz)   SpO2 95%   BMI 43.45 kg/m    Body mass index is 43.45 kg/m .  Physical Exam   GENERAL: healthy, alert and no  distress  EYES: Eyes grossly normal to inspection, PERRL and conjunctivae and sclerae normal  HENT: ear canals and TM's normal, nose and mouth without ulcers or lesions  NECK: no adenopathy, no asymmetry, masses, or scars and thyroid normal to palpation  RESP: lungs clear to auscultation - no rales, rhonchi or wheezes  CV: regular rate and rhythm, normal S1 S2, no S3 or S4, no murmur, click or rub, no peripheral edema and peripheral pulses strong  ABDOMEN: soft, nontender, no hepatosplenomegaly, no masses and bowel sounds normal  MS: no gross musculoskeletal defects noted, no edema  SKIN: no suspicious lesions or rashes  NEURO: Normal strength and tone, mentation intact and speech normal  PSYCH: mentation appears normal, affect normal/bright

## 2023-11-21 ENCOUNTER — MYC MEDICAL ADVICE (OUTPATIENT)
Dept: PEDIATRICS | Facility: CLINIC | Age: 58
End: 2023-11-21
Payer: COMMERCIAL

## 2023-11-21 NOTE — TELEPHONE ENCOUNTER
Patient is asking if he should increase his insulin.    Please advise.    KINGA Hernandez on 11/21/2023 at 4:26 PM

## 2023-11-21 NOTE — TELEPHONE ENCOUNTER
Dr Deal,    Patient sending [facility] Message     Would you advise increasing insulin when getting cortisone injections?  Please advise regarding insulin.       Darby MUNROE RN on 11/21/2023 at 12:20 PM

## 2023-12-01 ENCOUNTER — LAB (OUTPATIENT)
Dept: LAB | Facility: CLINIC | Age: 58
End: 2023-12-01
Payer: COMMERCIAL

## 2023-12-01 ENCOUNTER — ANTICOAGULATION THERAPY VISIT (OUTPATIENT)
Dept: ANTICOAGULATION | Facility: CLINIC | Age: 58
End: 2023-12-01

## 2023-12-01 DIAGNOSIS — Z95.2 S/P AVR (AORTIC VALVE REPLACEMENT): Primary | ICD-10-CM

## 2023-12-01 DIAGNOSIS — Z95.1 S/P CABG (CORONARY ARTERY BYPASS GRAFT): ICD-10-CM

## 2023-12-01 DIAGNOSIS — Z95.2 S/P AVR (AORTIC VALVE REPLACEMENT): ICD-10-CM

## 2023-12-01 LAB — INR BLD: 1.8 (ref 0.9–1.1)

## 2023-12-01 PROCEDURE — 36416 COLLJ CAPILLARY BLOOD SPEC: CPT

## 2023-12-01 PROCEDURE — 85610 PROTHROMBIN TIME: CPT

## 2023-12-01 NOTE — PROGRESS NOTES
ANTICOAGULATION MANAGEMENT     Glen Cho 58 year old male is on warfarin with therapeutic INR result. (Goal INR 1.5-2.0)    Recent labs: (last 7 days)     12/01/23  1014   INR 1.8*       ASSESSMENT     Warfarin Lab Questionnaire    Warfarin Doses Last 7 Days-Patient confirmed taking warfarin maintenance dose as listed        12/1/2023    10:08 AM   Dose in Tablet or Mg   TAB or MG? tablet (tab)           12/1/2023   Warfarin Lab Questionnaire   Missed doses within past 14 days? No   Changes in diet or alcohol within past 14 days? No   Medication changes since last result? No   Injuries or illness since last result? No   New shortness of breath, severe headaches or sudden changes in vision since last result? No   Abnormal bleeding since last result? No   Upcoming surgery, procedure? No     Previous result: Supratherapeutic  Additional findings: Warfarin maintenance dose was decreased at last 2 visits         PLAN     Recommended plan for no diet, medication or health factor changes affecting INR     Dosing Instructions: Continue your current warfarin dose with next INR in 2 weeks       Summary  As of 12/1/2023      Full warfarin instructions:  12.5 mg every Sun, Thu; 10 mg all other days   Next INR check:  12/18/2023               Telephone call with Glen who verbalizes understanding and agrees to plan    Check at provider office visit    Education provided:   None required    Plan made per [facility] anticoagulation protocol    Iram Lubin RN  Anticoagulation Clinic  12/1/2023    _______________________________________________________________________     Anticoagulation Episode Summary       Current INR goal:  1.5-2.0   TTR:  73.8% (4.2 mo)   Target end date:  Indefinite   Send INR reminders to:  [facility]    Indications    S/P AVR (aortic valve replacement)-23 mm On-X valve [Z95.2]  S/P CABG (coronary artery bypass graft)-LIMA>LAD  VG>RCA  VG>OM [Z95.1]             Comments:                Anticoagulation Care Providers       Provider Role Specialty Phone number    Kelsey Vega PA-C Referring Cardiovascular & Thoracic Surgery [phone]    Mike Deal MD Referring Internal Medicine - Pediatrics [phone]

## 2023-12-18 ENCOUNTER — OFFICE VISIT (OUTPATIENT)
Dept: PEDIATRICS | Facility: CLINIC | Age: 58
End: 2023-12-18
Payer: COMMERCIAL

## 2023-12-18 ENCOUNTER — ANTICOAGULATION THERAPY VISIT (OUTPATIENT)
Dept: ANTICOAGULATION | Facility: CLINIC | Age: 58
End: 2023-12-18

## 2023-12-18 VITALS
TEMPERATURE: 97.9 F | WEIGHT: 263.8 LBS | OXYGEN SATURATION: 98 % | HEART RATE: 61 BPM | HEIGHT: 65 IN | SYSTOLIC BLOOD PRESSURE: 123 MMHG | DIASTOLIC BLOOD PRESSURE: 72 MMHG | RESPIRATION RATE: 18 BRPM | BODY MASS INDEX: 43.95 KG/M2

## 2023-12-18 DIAGNOSIS — E66.01 MORBID OBESITY (H): ICD-10-CM

## 2023-12-18 DIAGNOSIS — Z12.11 SCREEN FOR COLON CANCER: ICD-10-CM

## 2023-12-18 DIAGNOSIS — E78.5 HYPERLIPIDEMIA LDL GOAL <100: ICD-10-CM

## 2023-12-18 DIAGNOSIS — I10 HYPERTENSION GOAL BP (BLOOD PRESSURE) < 140/90: ICD-10-CM

## 2023-12-18 DIAGNOSIS — I48.0 PAROXYSMAL ATRIAL FIBRILLATION (H): ICD-10-CM

## 2023-12-18 DIAGNOSIS — I25.10 CORONARY ARTERY DISEASE INVOLVING NATIVE CORONARY ARTERY OF NATIVE HEART, UNSPECIFIED WHETHER ANGINA PRESENT: ICD-10-CM

## 2023-12-18 DIAGNOSIS — Z95.1 S/P CABG (CORONARY ARTERY BYPASS GRAFT): ICD-10-CM

## 2023-12-18 DIAGNOSIS — Z95.2 S/P AVR (AORTIC VALVE REPLACEMENT): Primary | ICD-10-CM

## 2023-12-18 DIAGNOSIS — E11.21 TYPE 2 DIABETES MELLITUS WITH DIABETIC NEPHROPATHY, WITH LONG-TERM CURRENT USE OF INSULIN (H): Primary | ICD-10-CM

## 2023-12-18 DIAGNOSIS — Z95.2 S/P AVR (AORTIC VALVE REPLACEMENT): ICD-10-CM

## 2023-12-18 DIAGNOSIS — Z79.4 TYPE 2 DIABETES MELLITUS WITH DIABETIC NEPHROPATHY, WITH LONG-TERM CURRENT USE OF INSULIN (H): Primary | ICD-10-CM

## 2023-12-18 PROBLEM — R07.9 CHEST PAIN, UNSPECIFIED TYPE: Status: RESOLVED | Noted: 2023-06-12 | Resolved: 2023-12-18

## 2023-12-18 LAB
ANION GAP SERPL CALCULATED.3IONS-SCNC: 10 MMOL/L (ref 7–15)
BUN SERPL-MCNC: 16.3 MG/DL (ref 8–23)
CALCIUM SERPL-MCNC: 9.9 MG/DL (ref 8.8–10.2)
CHLORIDE SERPL-SCNC: 100 MMOL/L (ref 98–107)
CREAT SERPL-MCNC: 0.7 MG/DL (ref 0.67–1.17)
DEPRECATED HCO3 PLAS-SCNC: 27 MMOL/L (ref 22–29)
EGFRCR SERPLBLD CKD-EPI 2021: >90 ML/MIN/1.73M2
GLUCOSE SERPL-MCNC: 211 MG/DL (ref 70–99)
INR BLD: 1.8 (ref 0.9–1.1)
POTASSIUM SERPL-SCNC: 4 MMOL/L (ref 3.4–5.3)
SODIUM SERPL-SCNC: 137 MMOL/L (ref 135–145)

## 2023-12-18 PROCEDURE — 80048 BASIC METABOLIC PNL TOTAL CA: CPT | Performed by: INTERNAL MEDICINE

## 2023-12-18 PROCEDURE — 36415 COLL VENOUS BLD VENIPUNCTURE: CPT | Performed by: INTERNAL MEDICINE

## 2023-12-18 PROCEDURE — 99214 OFFICE O/P EST MOD 30 MIN: CPT | Performed by: INTERNAL MEDICINE

## 2023-12-18 PROCEDURE — 85610 PROTHROMBIN TIME: CPT | Performed by: INTERNAL MEDICINE

## 2023-12-18 ASSESSMENT — PAIN SCALES - GENERAL: PAINLEVEL: NO PAIN (0)

## 2023-12-18 NOTE — PROGRESS NOTES
Assessment & Plan       Type 2 diabetes mellitus with diabetic nephropathy, with long-term current use of insulin (H)  Patient Instructions   With diabetes, you need an diabetic eye exam every year!  Please have your eye doctor forward your yearly eye exam results to me: Dr. Deal, fax . (You could instead just start seeing our eye clinic here at [place] by calling [phone], Dr. Kimberly Balderas. Optical Shop is .)     Start jardiance for a 1 week.  Stay on 10 mg for now.    Monitor sugars,  twice daily.     After a week goes by,  increase lantus until your fasting sugars are 100 or less.  (Increase by 5 units every 3 or 4 days.) Also may increase the humalog if your post meal sugars are above 150 (increase by 3 units every 3 or 4 days.)    [facility] pharmacy will call you for an appointment to discuss glucose monitor and other titration of meds.     Follow up in 3 months with me.    - Med Therapy Management Referral  - Basic metabolic panel  (Ca, Cl, CO2, Creat, Gluc, K, Na, BUN); Future  - empagliflozin (JARDIANCE) 10 MG TABS tablet; Take 1 tablet (10 mg) by mouth daily  - insulin pen needle (32G X 4 MM) 32G X 4 MM miscellaneous; Use 4 pen needles daily or as directed.  - Basic metabolic panel  (Ca, Cl, CO2, Creat, Gluc, K, Na, BUN)    Paroxysmal atrial fibrillation (H)  Appears in NSR today.  INR today.     Hyperlipidemia LDL goal <100  Statin well tolerated.     Hypertension goal BP (blood pressure) < 140/90  Blood pressure at goal on angiotensin receptor blocker; continue.    Morbid obesity (H)      Coronary artery disease involving native coronary artery of native heart, unspecified whether angina present  Secondary risk factor modification.     S/P CABG (coronary artery bypass graft)-LIMA>LAD, VG>RCA, VG>OM  Secondary risk factor modification.   - INR point of care    S/P AVR (aortic valve replacement)-23 mm On-X valve  Ongoing blood pressure control and warfarin goal 1.5-2.   - INR  point of care             See Patient Instructions    Mike Deal MD  [facility] MEGAN Carroll is a 58 year old, presenting for the following health issues:  Diabetes, Hypertension, Recheck Medication (Losartan and Jardiance ), and INR Followup        12/18/2023    11:17 AM   Additional Questions   Roomed by JAYLYN Caballero   Accompanied by kelley         12/18/2023    11:17 AM   Patient Reported Additional Medications   Patient reports taking the following new medications Losartan       History of Present Illness       Diabetes:   He presents for follow up of diabetes.  He is checking home blood glucose a few times a week.   He checks blood glucose before and after meals.  Blood glucose is sometimes over 200 and never under 70. He is aware of hypoglycemia symptoms including none.   He is concerned about blood sugar frequently over 200.    He is not experiencing numbness or burning in feet, excessive thirst, blurry vision, weight changes or redness, sores or blisters on feet. The patient has not had a diabetic eye exam in the last 12 months.          Hypertension: He presents for follow up of hypertension.  He does not check blood pressure  regularly outside of the clinic. Outside blood pressures have been over 140/90. He does not follow a low salt diet.     He eats 2-3 servings of fruits and vegetables daily.He consumes 0 sweetened beverage(s) daily.He exercises with enough effort to increase his heart rate 10 to 19 minutes per day.  He exercises with enough effort to increase his heart rate 3 or less days per week.   He is taking medications regularly.       Has improved blood pressures on the losartan.      Blood sugars have been higher:  AM sugars not checking.  After meals  recently 230.    Feels like during his rehab was doing better. These days, has been doing aquafit; but no longer in therapy.  Some shoulder issues coming up.  Twice weekly aquafit.  Trying to get motivated to do more  "weights.     Taking 38 lantus, and 18-20 on humalog.                Review of Systems   CONSTITUTIONAL: NEGATIVE for fever, chills, change in weight  INTEGUMENTARY/SKIN: NEGATIVE for worrisome rashes, moles or lesions  EYES: NEGATIVE for vision changes or irritation  ENT/MOUTH: NEGATIVE for ear, mouth and throat problems  RESP: NEGATIVE for significant cough or SOB  BREAST: NEGATIVE for masses, tenderness or discharge  CV: NEGATIVE for chest pain, palpitations or peripheral edema  GI: NEGATIVE for nausea, abdominal pain, heartburn, or change in bowel habits  : NEGATIVE for frequency, dysuria, or hematuria  MUSCULOSKELETAL: NEGATIVE for significant arthralgias or myalgia  NEURO: NEGATIVE for weakness, dizziness or paresthesias  ENDOCRINE: NEGATIVE for temperature intolerance, skin/hair changes  HEME: NEGATIVE for bleeding problems  PSYCHIATRIC: NEGATIVE for changes in mood or affect      Objective    /72   Pulse 61   Temp 97.9  F (36.6  C) (Oral)   Resp 18   Ht 1.651 m (5' 5\")   Wt 119.7 kg (263 lb 12.8 oz)   SpO2 98%   BMI 43.90 kg/m    Body mass index is 43.9 kg/m .  Physical Exam   GENERAL: healthy, alert and no distress  EYES: Eyes grossly normal to inspection, PERRL and conjunctivae and sclerae normal  HENT: ear canals and TM's normal, nose and mouth without ulcers or lesions  NECK: no adenopathy, no asymmetry, masses, or scars and thyroid normal to palpation  RESP: lungs clear to auscultation - no rales, rhonchi or wheezes  CV: regular rate and rhythm, normal S1 S2, no S3 or S4, no murmur, click or rub, no peripheral edema and peripheral pulses strong  ABDOMEN: soft, nontender, no hepatosplenomegaly, no masses and bowel sounds normal  MS: no gross musculoskeletal defects noted, no edema  SKIN: no suspicious lesions or rashes  NEURO: Normal strength and tone, mentation intact and speech normal  PSYCH: mentation appears normal, affect normal/bright                      "

## 2023-12-18 NOTE — PROGRESS NOTES
ANTICOAGULATION MANAGEMENT     Glen Cho 58 year old male is on warfarin with therapeutic INR result. (Goal INR 1.5-2.0)    Recent labs: (last 7 days)     12/18/23  1214   INR 1.8*       ASSESSMENT     Source(s): Chart Review and Patient/Caregiver Call     Warfarin doses taken: Warfarin taken as instructed  Diet: No new diet changes identified  Medication/supplement changes: recently started losartan, will be starting jardiance as soon as he picks it up at the pharmacy.  New illness, injury, or hospitalization: No  Signs or symptoms of bleeding or clotting: No  Previous result: Therapeutic last visit; previously outside of goal range  Additional findings: None       PLAN     Recommended plan for no diet, medication or health factor changes affecting INR     Dosing Instructions: Continue your current warfarin dose with next INR in 3 weeks       Summary  As of 12/18/2023      Full warfarin instructions:  12.5 mg every Sun, Thu; 10 mg all other days   Next INR check:  1/8/2024               Telephone call with Glen who verbalizes understanding and agrees to plan    Lab visit scheduled    Education provided:   None required    Plan made per [facility] anticoagulation protocol    Iram Lubin, RN  Anticoagulation Clinic  12/18/2023    _______________________________________________________________________     Anticoagulation Episode Summary       Current INR goal:  1.5-2.0   TTR:  76.9% (4.8 mo)   Target end date:  Indefinite   Send INR reminders to:  [facility]    Indications    S/P AVR (aortic valve replacement)-23 mm On-X valve [Z95.2]  S/P CABG (coronary artery bypass graft)-LIMA>LAD  VG>RCA  VG>OM [Z95.1]             Comments:               Anticoagulation Care Providers       Provider Role Specialty Phone number    Kelsey Vega PA-C Referring Cardiovascular & Thoracic Surgery [phone]    Mike Deal MD Referring Internal Medicine - Pediatrics [phone]

## 2023-12-18 NOTE — PATIENT INSTRUCTIONS
With diabetes, you need an diabetic eye exam every year!  Please have your eye doctor forward your yearly eye exam results to me: Dr. Deal, fax . (You could instead just start seeing our eye clinic here at [place] by calling [phone], Dr. Kimberly Balderas. [facility] is .)     Start jardiance for a 1 week.  Stay on 10 mg for now.    Monitor sugars,  twice daily.     After a week goes by,  increase lantus until your fasting sugars are 100 or less.  (Increase by 5 units every 3 or 4 days.) Also may increase the humalog if your post meal sugars are above 150 (increase by 3 units every 3 or 4 days.)    [facility] pharmacy will call you for an appointment to discuss glucose monitor and other titration of meds.     Follow up in 3 months with me.

## 2023-12-19 NOTE — RESULT ENCOUNTER NOTE
Dear Glen,    I am covering for Dr. Deal, who is away from the office    Other than an elevated glucose, your metabolic panel is within normal limits.    Please feel free to call with any questions.      Sincerely,    Dylon Pimentel MD

## 2023-12-20 ENCOUNTER — TELEPHONE (OUTPATIENT)
Dept: PEDIATRICS | Facility: CLINIC | Age: 58
End: 2023-12-20
Payer: COMMERCIAL

## 2023-12-20 NOTE — TELEPHONE ENCOUNTER
MT referral from: [facility] visit (referral by provider)    MTM referral outreach attempt #2 on December 20, 2023 at 10:24 AM      Outcome: Patient not reachable after several attempts, will route to [facility] Pharmacist/Provider as an FYI.  [facility] scheduling number is .  Thank you for the referral.    Use bcbs oos gfe needed for the carrier/Plan on the flowsheet      [employer] Accessoriest Message Sent    Naomy Calvillo CPhT  [facility]

## 2024-01-08 ENCOUNTER — LAB (OUTPATIENT)
Dept: LAB | Facility: CLINIC | Age: 59
End: 2024-01-08
Payer: COMMERCIAL

## 2024-01-08 ENCOUNTER — MYC MEDICAL ADVICE (OUTPATIENT)
Dept: ANTICOAGULATION | Facility: CLINIC | Age: 59
End: 2024-01-08

## 2024-01-08 ENCOUNTER — TELEPHONE (OUTPATIENT)
Dept: PEDIATRICS | Facility: CLINIC | Age: 59
End: 2024-01-08

## 2024-01-08 ENCOUNTER — ANTICOAGULATION THERAPY VISIT (OUTPATIENT)
Dept: ANTICOAGULATION | Facility: CLINIC | Age: 59
End: 2024-01-08

## 2024-01-08 DIAGNOSIS — Z95.1 S/P CABG (CORONARY ARTERY BYPASS GRAFT): ICD-10-CM

## 2024-01-08 DIAGNOSIS — Z95.2 S/P AVR (AORTIC VALVE REPLACEMENT): Primary | ICD-10-CM

## 2024-01-08 DIAGNOSIS — Z95.2 S/P AVR (AORTIC VALVE REPLACEMENT): ICD-10-CM

## 2024-01-08 LAB — INR BLD: 1.5 (ref 0.9–1.1)

## 2024-01-08 PROCEDURE — 85610 PROTHROMBIN TIME: CPT

## 2024-01-08 PROCEDURE — 36416 COLLJ CAPILLARY BLOOD SPEC: CPT

## 2024-01-08 NOTE — PROGRESS NOTES
ANTICOAGULATION MANAGEMENT     Glen Cho 58 year old male is on warfarin with therapeutic INR result. (Goal INR 1.5-2.0)    Recent labs: (last 7 days)     01/08/24  1315   INR 1.5*       ASSESSMENT     Source(s): Chart Review  Previous INR was Therapeutic last 2(+) visits  Medication, diet, health changes since last INR chart reviewed; none identified         PLAN     Recommended plan for no diet, medication or health factor changes affecting INR     Dosing Instructions: Continue your current warfarin dose with next INR in 4 weeks       Summary  As of 1/8/2024      Full warfarin instructions:  12.5 mg every Sun, Thu; 10 mg all other days   Next INR check:  2/5/2024               Detailed voice message left for Glen with dosing instructions and follow up date.   Sent [employer] message with dosing and follow up instructions    Contact [phone]  to schedule and with any changes, questions or concerns.     Education provided:   Please call back if any changes to your diet, medications or how you've been taking warfarin    Plan made per [facility] anticoagulation protocol    Ambreen Balderas RN  Anticoagulation Clinic  1/8/2024    _______________________________________________________________________     Anticoagulation Episode Summary       Current INR goal:  1.5-2.0   TTR:  79.9% (5.5 mo)   Target end date:  Indefinite   Send INR reminders to:  [facility]    Indications    S/P AVR (aortic valve replacement)-23 mm On-X valve [Z95.2]  S/P CABG (coronary artery bypass graft)-LIMA>LAD  VG>RCA  VG>OM [Z95.1]             Comments:               Anticoagulation Care Providers       Provider Role Specialty Phone number    Kelsey Vega PA-C Referring Cardiovascular & Thoracic Surgery [phone]    Mike Deal MD Referring Internal Medicine - Pediatrics [phone]

## 2024-01-08 NOTE — TELEPHONE ENCOUNTER
Medication Question or Refill    Contacts         Type Contact Phone/Fax    01/08/2024 09:02 AM CST Phone (Incoming) Glen Cho (Self) [phone] (M)            What medication are you calling about (include dose and sig)?: Antibiotic    Preferred Pharmacy:  [facility] [id number] [facility] - [facility] [address] [address] [facility]  [address] [address] [address]  Phone: [phone] Fax: [phone]          Controlled Substance Agreement on file:   CSA -- Patient Level:    CSA: None found at the patient level.       Who prescribed the medication?: n/a    Do you need a refill?   N/a    When did you use the medication last? N/a    Patient offered an appointment? No    Do you have any questions or concerns?  Yes: Patient is at dentist office, and dentist told him he didn't know what kind of antibiotic his provider wants him on.  His appointment is at 9 am and forgot to reach out to the clinic.      Could we send this information to you in [facility] or would you prefer to receive a phone call?:   Patient would prefer a phone call   Okay to leave a detailed message?: Yes at Cell number on file:    Telephone Information:   Mobile [phone]

## 2024-01-09 ENCOUNTER — DOCUMENTATION ONLY (OUTPATIENT)
Dept: ANTICOAGULATION | Facility: CLINIC | Age: 59
End: 2024-01-09
Payer: COMMERCIAL

## 2024-01-09 RX ORDER — AZITHROMYCIN 250 MG/1
500 TABLET, FILM COATED ORAL DAILY
Qty: 2 TABLET | Refills: 0 | Status: SHIPPED | OUTPATIENT
Start: 2024-01-09 | End: 2024-01-10

## 2024-01-09 NOTE — PROGRESS NOTES
ANTICOAGULATION  MANAGEMENT     Interacting Medication Review    Interacting medication(s): Azithromycin (Zithromax, Z-nancy) with warfarin.    Duration: Single dose on 1/9/24    Indication: Prophylaxis for dental procedure    New medication?: Yes, interaction may increase INR and risk of bleeding. Closer INR monitoring recommended.        PLAN     Continue current warfarin dosing    12.5 mg every Sun, Thu; 10 mg all other days     No adjustment to anticoagulation calendar was required    Plan made per [facility] anticoagulation protocol    Marti Balderas RN  Anticoagulation Clinic

## 2024-01-10 NOTE — TELEPHONE ENCOUNTER
Attempted to reach patient, [facility].     Uvaldo CHRISTIAN RN 1/10/2024 at 10:19 AM

## 2024-01-11 NOTE — TELEPHONE ENCOUNTER
Attempt #3, [id number]. ulike message sent. Closing encounter.     Uvaldo CHRISTIAN RN 1/11/2024 at 8:48 AM

## 2024-01-17 ENCOUNTER — TRANSFERRED RECORDS (OUTPATIENT)
Dept: HEALTH INFORMATION MANAGEMENT | Facility: CLINIC | Age: 59
End: 2024-01-17
Payer: COMMERCIAL

## 2024-01-24 ENCOUNTER — PATIENT OUTREACH (OUTPATIENT)
Dept: GASTROENTEROLOGY | Facility: CLINIC | Age: 59
End: 2024-01-24
Payer: COMMERCIAL

## 2024-01-24 DIAGNOSIS — Z12.11 SPECIAL SCREENING FOR MALIGNANT NEOPLASMS, COLON: Primary | ICD-10-CM

## 2024-01-24 NOTE — PROGRESS NOTES
"Hx adenomatous polyps. Recalled in 7 years from 2014 colonoscopy.   [facility] Screening Colonoscopy Referral Review    Patient meets the inclusion criteria for screening colonoscopy standing order.    Ordering/Referring Provider:  Mike Deal    BMI: Estimated body mass index is 43.9 kg/m  as calculated from the following:    Height as of 12/18/23: 1.651 m (5' 5\").    Weight as of 12/18/23: 119.7 kg (263 lb 12.8 oz).     Sedation:  Does patient have any of the following conditions affecting sedation?  No medical conditions affecting sedation.    Previous Scopes:  Any previous recommendations or follow up needs based on previous scope?  na / No recommendations.    Medical Concerns to Postpone Order:  Does patient have any of the following medical concerns that should postpone/delay colonoscopy referral?  No medical conditions affecting colonoscopy referral.    Final Referral Details:  Based on patient's medical history patient is appropriate for referral order with moderate sedation. If patient's BMI > 50 do not schedule in [facility].  "

## 2024-02-05 ENCOUNTER — ANTICOAGULATION THERAPY VISIT (OUTPATIENT)
Dept: ANTICOAGULATION | Facility: CLINIC | Age: 59
End: 2024-02-05

## 2024-02-05 ENCOUNTER — LAB (OUTPATIENT)
Dept: LAB | Facility: CLINIC | Age: 59
End: 2024-02-05
Payer: COMMERCIAL

## 2024-02-05 DIAGNOSIS — Z95.2 S/P AVR (AORTIC VALVE REPLACEMENT): Primary | ICD-10-CM

## 2024-02-05 DIAGNOSIS — Z95.2 S/P AVR (AORTIC VALVE REPLACEMENT): ICD-10-CM

## 2024-02-05 DIAGNOSIS — Z95.1 S/P CABG (CORONARY ARTERY BYPASS GRAFT): ICD-10-CM

## 2024-02-05 LAB — INR BLD: 1.8 (ref 0.9–1.1)

## 2024-02-05 PROCEDURE — 85610 PROTHROMBIN TIME: CPT

## 2024-02-05 PROCEDURE — 36415 COLL VENOUS BLD VENIPUNCTURE: CPT

## 2024-02-05 NOTE — PROGRESS NOTES
ANTICOAGULATION MANAGEMENT     Glen Cho 58 year old male is on warfarin with therapeutic INR result. (Goal INR 1.5-2.0)    Recent labs: (last 7 days)     02/05/24  1034   INR 1.8*       ASSESSMENT     Source(s): Chart Review  Previous INR was Therapeutic last 2(+) visits  Medication, diet, health changes since last INR chart reviewed; none identified         PLAN     Recommended plan for no diet, medication or health factor changes affecting INR     Dosing Instructions: Continue your current warfarin dose with next INR in 5 weeks       Summary  As of 2/5/2024      Full warfarin instructions:  12.5 mg every Sun, Thu; 10 mg all other days   Next INR check:  3/11/2024               Detailed voice message left for Glen with dosing instructions and follow up date.   Sent [employer] message with dosing and follow up instructions    Contact [phone]  to schedule and with any changes, questions or concerns.     Education provided:   Please call back if any changes to your diet, medications or how you've been taking warfarin    Plan made per [facility] anticoagulation protocol    Ambreen Balderas RN  Anticoagulation Clinic  2/5/2024    _______________________________________________________________________     Anticoagulation Episode Summary       Current INR goal:  1.5-2.0   TTR:  82.8% (6.4 mo)   Target end date:  Indefinite   Send INR reminders to:  [facility]    Indications    S/P AVR (aortic valve replacement)-23 mm On-X valve [Z95.2]  S/P CABG (coronary artery bypass graft)-LIMA>LAD  VG>RCA  VG>OM [Z95.1]             Comments:               Anticoagulation Care Providers       Provider Role Specialty Phone number    Kelsey Vega PA-C Referring Cardiovascular & Thoracic Surgery [phone]    Mike Deal MD Referring Internal Medicine - Pediatrics [phone]

## 2024-02-12 ENCOUNTER — MYC MEDICAL ADVICE (OUTPATIENT)
Dept: PEDIATRICS | Facility: CLINIC | Age: 59
End: 2024-02-12
Payer: COMMERCIAL

## 2024-02-12 DIAGNOSIS — G47.33 OSA (OBSTRUCTIVE SLEEP APNEA): Primary | ICD-10-CM

## 2024-02-12 NOTE — TELEPHONE ENCOUNTER
Sent a [employer] message to the patient   - Asked the patient what pressure setting he is on for his CPAP   - Awaiting a response from the patient at this time      Mariama GREWAL RN   Patient Advocate Liaison (PAL)  [facility]   [phone]

## 2024-02-12 NOTE — TELEPHONE ENCOUNTER
Can we clarify what pressure settings he is on? They are required for this DME order.    Mike Deal MD  Internal Medicine and Pediatrics

## 2024-02-12 NOTE — TELEPHONE ENCOUNTER
Pt sends Mychart as follows Dr Deal     I had gotten a referral from you for a new CPAP machine a couple of years ago. For whatever reason, I never got one. Can I get an updated referral for a new one? The humidifier and heated hose have stopped working on my current machine.     Thanks     Glen    DME is pending if appropriate.

## 2024-03-18 ENCOUNTER — MYC MEDICAL ADVICE (OUTPATIENT)
Dept: ANTICOAGULATION | Facility: CLINIC | Age: 59
End: 2024-03-18

## 2024-03-18 ENCOUNTER — ANTICOAGULATION THERAPY VISIT (OUTPATIENT)
Dept: ANTICOAGULATION | Facility: CLINIC | Age: 59
End: 2024-03-18

## 2024-03-18 ENCOUNTER — LAB (OUTPATIENT)
Dept: LAB | Facility: CLINIC | Age: 59
End: 2024-03-18
Payer: COMMERCIAL

## 2024-03-18 DIAGNOSIS — Z95.1 S/P CABG (CORONARY ARTERY BYPASS GRAFT): ICD-10-CM

## 2024-03-18 DIAGNOSIS — Z95.2 S/P AVR (AORTIC VALVE REPLACEMENT): Primary | ICD-10-CM

## 2024-03-18 DIAGNOSIS — Z95.2 S/P AVR (AORTIC VALVE REPLACEMENT): ICD-10-CM

## 2024-03-18 LAB — INR BLD: 1.4 (ref 0.9–1.1)

## 2024-03-18 PROCEDURE — 36416 COLLJ CAPILLARY BLOOD SPEC: CPT

## 2024-03-18 PROCEDURE — 85610 PROTHROMBIN TIME: CPT

## 2024-03-18 NOTE — TELEPHONE ENCOUNTER
Call returned to patient, see 3/18/24 Anticoagulation encounter for warfarin dosing instructions.  Marti Balderas RN, BSN  Anticoagulation Clinic

## 2024-03-18 NOTE — PROGRESS NOTES
ANTICOAGULATION MANAGEMENT     Glen Cho 58 year old male is on warfarin with subtherapeutic INR result. (Goal INR 1.5-2.0)    Recent labs: (last 7 days)     03/18/24  1427   INR 1.4*       ASSESSMENT     Source(s): Chart Review and Patient/Caregiver Call     Warfarin doses taken: Warfarin taken as instructed  Diet: Increased greens/vitamin K in diet; plans to resume previous intake  Medication/supplement changes: None noted  New illness, injury, or hospitalization: No  Signs or symptoms of bleeding or clotting: No  Previous result: Therapeutic last 2(+) visits  Additional findings: None       PLAN     Recommended plan for temporary change(s) affecting INR     Dosing Instructions: Continue your current warfarin dose with next INR in 2 weeks       Summary  As of 3/18/2024      Full warfarin instructions:  12.5 mg every Sun, Thu; 10 mg all other days   Next INR check:  4/1/2024               Telephone call with lGen who verbalizes understanding and agrees to plan    Lab visit scheduled    Education provided:   Please call back if any changes to your diet, medications or how you've been taking warfarin  Contact [phone]  with any changes, questions or concerns.     Plan made per [facility] anticoagulation protocol    Marti Balderas RN  Anticoagulation Clinic  3/18/2024    _______________________________________________________________________     Anticoagulation Episode Summary       Current INR goal:  1.5-2.0   TTR:  81.4% (7.8 mo)   Target end date:  Indefinite   Send INR reminders to:  [facility]    Indications    S/P AVR (aortic valve replacement)-23 mm On-X valve [Z95.2]  S/P CABG (coronary artery bypass graft)-LIMA>LAD  VG>RCA  VG>OM [Z95.1]             Comments:               Anticoagulation Care Providers       Provider Role Specialty Phone number    Kelsey Vega PA-C Referring Cardiovascular & Thoracic Surgery [phone]    Mike Deal MD Referring Internal Medicine - Pediatrics  [phone]

## 2024-03-18 NOTE — TELEPHONE ENCOUNTER
General Call      Reason for Call:  returning call from inr nurse    What are your questions or concerns:  Discuss inr and dosing     Date of last appointment with provider: n/a    Could we send this information to you in [facility] or would you prefer to receive a phone call?:   Patient would prefer a phone call   Okay to leave a detailed message?: Yes at Cell number on file:    Telephone Information:   Mobile [phone]

## 2024-03-18 NOTE — PROGRESS NOTES
ANTICOAGULATION MANAGEMENT     Glen Cho 58 year old male is on warfarin with subtherapeutic INR result. (Goal INR 1.5-2.0)    Recent labs: (last 7 days)     03/18/24  1427   INR 1.4*       ASSESSMENT     Source(s): Chart Review  Previous INR was Therapeutic last 2(+) visits  Medication, diet, health changes since last INR chart reviewed; none identified         PLAN     Unable to reach Glen today. Also sent Sermo assessment questions.    Left message to continue current dose of warfarin 10 mg tonight. Request call back for assessment.    Follow up required to confirm warfarin dose taken and assess for changes    Ambreen Balderas RN  Anticoagulation Clinic  3/18/2024

## 2024-03-21 ENCOUNTER — OFFICE VISIT (OUTPATIENT)
Dept: PEDIATRICS | Facility: CLINIC | Age: 59
End: 2024-03-21
Payer: COMMERCIAL

## 2024-03-21 VITALS
RESPIRATION RATE: 14 BRPM | OXYGEN SATURATION: 96 % | HEIGHT: 65 IN | DIASTOLIC BLOOD PRESSURE: 70 MMHG | SYSTOLIC BLOOD PRESSURE: 122 MMHG | HEART RATE: 68 BPM | WEIGHT: 262.2 LBS | TEMPERATURE: 97.4 F | BODY MASS INDEX: 43.68 KG/M2

## 2024-03-21 DIAGNOSIS — I48.0 PAROXYSMAL ATRIAL FIBRILLATION (H): ICD-10-CM

## 2024-03-21 DIAGNOSIS — E11.21 TYPE 2 DIABETES MELLITUS WITH DIABETIC NEPHROPATHY, WITH LONG-TERM CURRENT USE OF INSULIN (H): Primary | ICD-10-CM

## 2024-03-21 DIAGNOSIS — E66.01 MORBID OBESITY (H): ICD-10-CM

## 2024-03-21 DIAGNOSIS — N18.1 CKD (CHRONIC KIDNEY DISEASE) STAGE 1, GFR 90 ML/MIN OR GREATER: ICD-10-CM

## 2024-03-21 DIAGNOSIS — I25.10 CORONARY ARTERY DISEASE INVOLVING NATIVE CORONARY ARTERY OF NATIVE HEART, UNSPECIFIED WHETHER ANGINA PRESENT: ICD-10-CM

## 2024-03-21 DIAGNOSIS — Z79.4 TYPE 2 DIABETES MELLITUS WITH DIABETIC NEPHROPATHY, WITH LONG-TERM CURRENT USE OF INSULIN (H): Primary | ICD-10-CM

## 2024-03-21 LAB — HBA1C MFR BLD: 8.3 % (ref 0–5.6)

## 2024-03-21 PROCEDURE — 36415 COLL VENOUS BLD VENIPUNCTURE: CPT | Performed by: INTERNAL MEDICINE

## 2024-03-21 PROCEDURE — 99214 OFFICE O/P EST MOD 30 MIN: CPT | Performed by: INTERNAL MEDICINE

## 2024-03-21 PROCEDURE — 83036 HEMOGLOBIN GLYCOSYLATED A1C: CPT | Performed by: INTERNAL MEDICINE

## 2024-03-21 RX ORDER — INSULIN LISPRO 100 [IU]/ML
25 INJECTION, SOLUTION INTRAVENOUS; SUBCUTANEOUS
Qty: 66 ML | Refills: 3 | Status: SHIPPED | OUTPATIENT
Start: 2024-03-21

## 2024-03-21 ASSESSMENT — ANXIETY QUESTIONNAIRES
3. WORRYING TOO MUCH ABOUT DIFFERENT THINGS: NOT AT ALL
GAD7 TOTAL SCORE: 0
GAD7 TOTAL SCORE: 0
4. TROUBLE RELAXING: NOT AT ALL
8. IF YOU CHECKED OFF ANY PROBLEMS, HOW DIFFICULT HAVE THESE MADE IT FOR YOU TO DO YOUR WORK, TAKE CARE OF THINGS AT HOME, OR GET ALONG WITH OTHER PEOPLE?: NOT DIFFICULT AT ALL
5. BEING SO RESTLESS THAT IT IS HARD TO SIT STILL: NOT AT ALL
7. FEELING AFRAID AS IF SOMETHING AWFUL MIGHT HAPPEN: NOT AT ALL
1. FEELING NERVOUS, ANXIOUS, OR ON EDGE: NOT AT ALL
IF YOU CHECKED OFF ANY PROBLEMS ON THIS QUESTIONNAIRE, HOW DIFFICULT HAVE THESE PROBLEMS MADE IT FOR YOU TO DO YOUR WORK, TAKE CARE OF THINGS AT HOME, OR GET ALONG WITH OTHER PEOPLE: NOT DIFFICULT AT ALL
2. NOT BEING ABLE TO STOP OR CONTROL WORRYING: NOT AT ALL
GAD7 TOTAL SCORE: 0
7. FEELING AFRAID AS IF SOMETHING AWFUL MIGHT HAPPEN: NOT AT ALL
6. BECOMING EASILY ANNOYED OR IRRITABLE: NOT AT ALL

## 2024-03-21 ASSESSMENT — PATIENT HEALTH QUESTIONNAIRE - PHQ9
10. IF YOU CHECKED OFF ANY PROBLEMS, HOW DIFFICULT HAVE THESE PROBLEMS MADE IT FOR YOU TO DO YOUR WORK, TAKE CARE OF THINGS AT HOME, OR GET ALONG WITH OTHER PEOPLE: NOT DIFFICULT AT ALL
SUM OF ALL RESPONSES TO PHQ QUESTIONS 1-9: 3
SUM OF ALL RESPONSES TO PHQ QUESTIONS 1-9: 3

## 2024-03-21 NOTE — PATIENT INSTRUCTIONS
"Lab work today:  We can do labs in the exam room today, or you can get them done downstairs in the lab.      If you are going downstairs:  Directions:  As you walk through the first floor, you'll see (on the right) first the pharmacy, then some bathrooms, then the \"Lab and Imaging\" area. Give them your name at the window there and wait for them to call you.     Let's increase your jardiance once your 10 mg bottle is done;  25 mg.    For now, we'll stay on the 50 of lantus and 25 up to three times daily of the humalog.    We may need to adjust these if diabetes blood test (A1c) is high today.    Mike Deal MD  Internal Medicine and Pediatrics     "

## 2024-03-21 NOTE — PROGRESS NOTES
"  Assessment & Plan     Type 2 diabetes mellitus with diabetic nephropathy, with long-term current use of insulin (H)  If not at goal, will increase jardiance to 25 mg and also ensure taking his 3rd dose of humalog, 25 units.   - insulin glargine (LANTUS PEN) 100 UNIT/ML pen; Inject 50 Units Subcutaneous at bedtime  - insulin lispro (HUMALOG KWIKPEN) 100 UNIT/ML (1 unit dial) KWIKPEN; Inject 25 Units Subcutaneous 3 times daily (before meals)  - empagliflozin (JARDIANCE) 25 MG TABS tablet; Take 1 tablet (25 mg) by mouth daily  - Hemoglobin A1c; Future  - Hemoglobin A1c    Paroxysmal atrial fibrillation (H)  On coumadin and rate control.     Morbid obesity (H)  Weight loss 10 pounds encouraged.     Coronary artery disease involving native coronary artery of native heart, unspecified whether angina present  Secondary risk factor modification.   - Hemoglobin A1c; Future  - Hemoglobin A1c    CKD (chronic kidney disease) stage 1, GFR 90 ml/min or greater  Tolerating jardiance well.             BMI  Estimated body mass index is 43.63 kg/m  as calculated from the following:    Height as of this encounter: 1.651 m (5' 5\").    Weight as of this encounter: 118.9 kg (262 lb 3.2 oz).   Weight management plan: Discussed healthy diet and exercise guidelines      Patient Instructions   Lab work today:  We can do labs in the exam room today, or you can get them done downstairs in the lab.      If you are going downstairs:  Directions:  As you walk through the first floor, you'll see (on the right) first the pharmacy, then some bathrooms, then the \"Lab and Imaging\" area. Give them your name at the window there and wait for them to call you.     Let's increase your jardiance once your 10 mg bottle is done;  25 mg.    For now, we'll stay on the 50 of lantus and 25 up to three times daily of the humalog.    We may need to adjust these if diabetes blood test (A1c) is high today.    Mike Deal MD  Internal Medicine and Pediatrics " "    Danisha Carroll is a 58 year old, presenting for the following health issues:  RECHECK      3/21/2024     7:26 AM   Additional Questions   Roomed by Courtney Barragan CMA     History of Present Illness       Diabetes:   He presents for follow up of diabetes.  He is checking home blood glucose a few times a month.   He checks blood glucose after meals.  Blood glucose is sometimes over 200 and never under 70. He is aware of hypoglycemia symptoms including none.    He has no concerns regarding his diabetes at this time.   He is not experiencing numbness or burning in feet, excessive thirst, blurry vision, weight changes or redness, sores or blisters on feet.           He eats 4 or more servings of fruits and vegetables daily.He consumes 0 sweetened beverage(s) daily.He exercises with enough effort to increase his heart rate 10 to 19 minutes per day.  He exercises with enough effort to increase his heart rate 3 or less days per week.   He is taking medications regularly.     AM sugars: not checking. 165-185 after meals.     Takign 25 twice daily humalog, and 50 at bedtime of the lantus.  10 of jardiance.     Denies chest pain or shortness of breath.  Occasionally has \"clicks\" in sternum when sleeps on side.    New continuous positive airway pressure machine, struggling with nose pillow.  Feels like is sleeping better with a sleep mask.                Review of Systems  Constitutional, HEENT, cardiovascular, pulmonary, GI, , musculoskeletal, neuro, skin, endocrine and psych systems are negative, except as otherwise noted.      Objective    /70 (BP Location: Right arm, Patient Position: Sitting, Cuff Size: Adult Large)   Pulse 68   Temp 97.4  F (36.3  C) (Temporal)   Resp 14   Ht 1.651 m (5' 5\")   Wt 118.9 kg (262 lb 3.2 oz)   SpO2 96%   BMI 43.63 kg/m    Body mass index is 43.63 kg/m .  Physical Exam   GENERAL: alert and no distress  EYES: Eyes grossly normal to inspection, PERRL and conjunctivae and " sclerae normal  HENT: ear canals and TM's normal, nose and mouth without ulcers or lesions  NECK: no adenopathy, no asymmetry, masses, or scars  RESP: lungs clear to auscultation - no rales, rhonchi or wheezes  CV: regular rate and rhythm, normal S1 S2, no S3 or S4, no murmur, click or rub, no peripheral edema  MS: no gross musculoskeletal defects noted, no edema  SKIN: no suspicious lesions or rashes  NEURO: Normal strength and tone, mentation intact and speech normal  PSYCH: mentation appears normal, affect normal/bright  Diabetic foot exam: normal DP and PT pulses, no trophic changes or ulcerative lesions, and normal sensory exam            Signed Electronically by: Mike Deal MD

## 2024-04-08 ENCOUNTER — MYC MEDICAL ADVICE (OUTPATIENT)
Dept: PEDIATRICS | Facility: CLINIC | Age: 59
End: 2024-04-08
Payer: COMMERCIAL

## 2024-04-15 ENCOUNTER — TELEPHONE (OUTPATIENT)
Dept: ANTICOAGULATION | Facility: CLINIC | Age: 59
End: 2024-04-15
Payer: COMMERCIAL

## 2024-04-15 NOTE — TELEPHONE ENCOUNTER
ANTICOAGULATION     Glen BRADFORD Zulyjovanny is overdue for an INR check. Patient was due for INR check 4/1/24    Left message for patient to call and schedule lab appointment as soon as possible. If returning call, please schedule.     Marti Balderas RN

## 2024-04-17 ENCOUNTER — ANTICOAGULATION THERAPY VISIT (OUTPATIENT)
Dept: ANTICOAGULATION | Facility: CLINIC | Age: 59
End: 2024-04-17

## 2024-04-17 ENCOUNTER — LAB (OUTPATIENT)
Dept: LAB | Facility: CLINIC | Age: 59
End: 2024-04-17
Payer: COMMERCIAL

## 2024-04-17 DIAGNOSIS — Z95.2 S/P AVR (AORTIC VALVE REPLACEMENT): Primary | ICD-10-CM

## 2024-04-17 DIAGNOSIS — Z95.1 S/P CABG (CORONARY ARTERY BYPASS GRAFT): ICD-10-CM

## 2024-04-17 DIAGNOSIS — Z95.2 S/P AVR (AORTIC VALVE REPLACEMENT): ICD-10-CM

## 2024-04-17 LAB — INR BLD: 1.3 (ref 0.9–1.1)

## 2024-04-17 PROCEDURE — 36415 COLL VENOUS BLD VENIPUNCTURE: CPT

## 2024-04-17 PROCEDURE — 85610 PROTHROMBIN TIME: CPT

## 2024-04-17 NOTE — PROGRESS NOTES
ANTICOAGULATION MANAGEMENT     Glen Cho 58 year old male is on warfarin with subtherapeutic INR result. (Goal INR 1.5-2.0)    Recent labs: (last 7 days)     04/17/24  1457   INR 1.3*       ASSESSMENT     Source(s): Chart Review  Previous INR was Therapeutic last 2(+) visits  Medication, diet, health changes since last INR   3/21/24 office visit--jardiance dose increased from 10mg to 25mg.  INR trending down; however, last 5 have been therapeutic       PLAN     Unable to reach Glen today.    Left message to continue current dose of warfarin 10 mg tonight. Request call back for assessment.    Follow up required to confirm warfarin dose taken and assess for changes and discuss out of range result     Iram Lubin RN  Anticoagulation Clinic  4/17/2024

## 2024-04-17 NOTE — PROGRESS NOTES
ANTICOAGULATION MANAGEMENT     Glen Cho 58 year old male is on warfarin with subtherapeutic INR result. (Goal INR 1.5-2.0)    Recent labs: (last 7 days)     04/17/24  1457   INR 1.3*       ASSESSMENT     Source(s): Chart Review and Patient/Caregiver Call     Warfarin doses taken: Warfarin taken as instructed  Diet: No new diet changes identified other than 5 clementines per day over the past month which should not be affecting INR. Patient also reports that over the past month, he has avoided greens.  Medication/supplement changes: None noted  New illness, injury, or hospitalization: No  Signs or symptoms of bleeding or clotting: No  Previous result: Therapeutic last 2(+) visits  Additional findings: None       PLAN     Recommended plan for no diet, medication or health factor changes affecting INR     Dosing Instructions: Continue your current warfarin dose with next INR in 10-14 days       Summary  As of 4/17/2024      Full warfarin instructions:  12.5 mg every Sun, Thu; 10 mg all other days   Next INR check:  4/29/2024               Telephone call with Glen who verbalizes understanding and agrees to plan    Lab visit scheduled    Education provided:   Dietary considerations: importance of consistent vitamin K intake and vitamin K content of foods    Plan made per [facility] anticoagulation protocol    Iram Lubin, RN  Anticoagulation Clinic  4/17/2024    _______________________________________________________________________     Anticoagulation Episode Summary       Current INR goal:  1.5-2.0   TTR:  72.2% (8.8 mo)   Target end date:  Indefinite   Send INR reminders to:  STEPHANIE GUZMAN    Indications    S/P AVR (aortic valve replacement)-23 mm On-X valve [Z95.2]  S/P CABG (coronary artery bypass graft)-LIMA>LAD  VG>RCA  VG>OM [Z95.1]             Comments:  07/10/23-On-X AVR             Anticoagulation Care Providers       Provider Role Specialty Phone number    Kelsey Vega PA-C Referring Cardiovascular &  Thoracic Surgery [phone]    Mike Deal MD Referring Internal Medicine - Pediatrics [phone]

## 2024-04-29 ENCOUNTER — LAB (OUTPATIENT)
Dept: LAB | Facility: CLINIC | Age: 59
End: 2024-04-29
Payer: COMMERCIAL

## 2024-04-29 ENCOUNTER — ANTICOAGULATION THERAPY VISIT (OUTPATIENT)
Dept: ANTICOAGULATION | Facility: CLINIC | Age: 59
End: 2024-04-29

## 2024-04-29 DIAGNOSIS — Z95.2 S/P AVR (AORTIC VALVE REPLACEMENT): Primary | ICD-10-CM

## 2024-04-29 DIAGNOSIS — Z95.2 S/P AVR (AORTIC VALVE REPLACEMENT): ICD-10-CM

## 2024-04-29 DIAGNOSIS — Z95.1 S/P CABG (CORONARY ARTERY BYPASS GRAFT): ICD-10-CM

## 2024-04-29 LAB — INR BLD: 1.3 (ref 0.9–1.1)

## 2024-04-29 PROCEDURE — 85610 PROTHROMBIN TIME: CPT

## 2024-04-29 PROCEDURE — 36416 COLLJ CAPILLARY BLOOD SPEC: CPT

## 2024-04-29 NOTE — PROGRESS NOTES
ANTICOAGULATION MANAGEMENT     Glen Cho 58 year old male is on warfarin with subtherapeutic INR result. (Goal INR 1.5-2.0)    Recent labs: (last 7 days)     04/29/24  1533   INR 1.3*       ASSESSMENT     Source(s): Chart Review  Previous INR was Subtherapeutic  Medication, diet, health changes since last INR chart reviewed; none identified         PLAN     Unable to reach Glen today.    Left message to take a booster dose of warfarin,  12.5 mg tonight. Request call back for assessment. Left message to call [phone].     Follow up required to confirm warfarin dose taken and assess for changes and discuss out of range result     Marti Balderas RN  Anticoagulation Clinic  4/29/2024

## 2024-04-29 NOTE — PROGRESS NOTES
ANTICOAGULATION MANAGEMENT     Glen Cho 58 year old male is on warfarin with therapeutic INR result. (Goal INR 1.5-2.0)    Recent labs: (last 7 days)     04/29/24  1533   INR 1.3*       ASSESSMENT     Source(s): Chart Review and Patient/Caregiver Call     Warfarin doses taken: Warfarin taken as instructed, patient denies any missed warfarin doses  Diet: No new diet changes identified  Medication/supplement changes:  Patient reports he resumed Celebrex about 3 weeks ago, per [employer]: Nonsteroidal Anti-Inflammatory Agents (POMPA-2 Selective) may enhance the anticoagulant effect of Anticoagulants  New illness, injury, or hospitalization: No  Signs or symptoms of bleeding or clotting: No  Previous result: Subtherapeutic  Additional findings: None       PLAN     Recommended plan for ongoing change(s) affecting INR     Dosing Instructions: booster dose then Increase your warfarin dose (3.3% change) with next INR in 1-2 weeks       Summary  As of 4/29/2024      Full warfarin instructions:  4/29: 12.5 mg; Otherwise 12.5 mg every Sun, Tue, Thu; 10 mg all other days   Next INR check:  5/13/2024               Telephone call with Glen who verbalizes understanding and agrees to plan    Lab visit scheduled    Education provided:   Please call back if any changes to your diet, medications or how you've been taking warfarin  Contact [phone]  with any changes, questions or concerns.     Plan made per [facility] anticoagulation protocol    Marti Balderas RN  Anticoagulation Clinic  4/29/2024    _______________________________________________________________________     Anticoagulation Episode Summary       Current INR goal:  1.5-2.0   TTR:  69.0% (9.2 mo)   Target end date:  Indefinite   Send INR reminders to:  STEPHANIE GUZMAN    Indications    S/P AVR (aortic valve replacement)-23 mm On-X valve [Z95.2]  S/P CABG (coronary artery bypass graft)-LIMA>LAD  VG>RCA  VG>OM [Z95.1]             Comments:  07/10/23-On-X AVR              Anticoagulation Care Providers       Provider Role Specialty Phone number    Kelsey Vega PA-C Referring Cardiovascular & Thoracic Surgery [phone]    Mike Deal MD Referring Internal Medicine - Pediatrics [phone]

## 2024-05-13 ENCOUNTER — LAB (OUTPATIENT)
Dept: LAB | Facility: CLINIC | Age: 59
End: 2024-05-13
Payer: COMMERCIAL

## 2024-05-13 ENCOUNTER — ANTICOAGULATION THERAPY VISIT (OUTPATIENT)
Dept: ANTICOAGULATION | Facility: CLINIC | Age: 59
End: 2024-05-13

## 2024-05-13 DIAGNOSIS — Z95.1 S/P CABG (CORONARY ARTERY BYPASS GRAFT): ICD-10-CM

## 2024-05-13 DIAGNOSIS — Z95.2 S/P AVR (AORTIC VALVE REPLACEMENT): ICD-10-CM

## 2024-05-13 DIAGNOSIS — Z95.2 S/P AVR (AORTIC VALVE REPLACEMENT): Primary | ICD-10-CM

## 2024-05-13 LAB — INR BLD: 1.3 (ref 0.9–1.1)

## 2024-05-13 PROCEDURE — 85610 PROTHROMBIN TIME: CPT

## 2024-05-13 PROCEDURE — 36416 COLLJ CAPILLARY BLOOD SPEC: CPT

## 2024-05-13 NOTE — PROGRESS NOTES
ANTICOAGULATION MANAGEMENT     Glen Cho 58 year old male is on warfarin with subtherapeutic INR result. (Goal INR 1.5-2.0)    Recent labs: (last 7 days)     05/13/24  1424   INR 1.3*       ASSESSMENT     Source(s): Chart Review and Patient/Caregiver Call     Warfarin doses taken: Warfarin taken as instructed - confirmed booster dose on 4/29 and increase in maintenance dose  Diet: No new diet changes identified  Medication/supplement changes: None noted  New illness, injury, or hospitalization: No  Signs or symptoms of bleeding or clotting: No  Previous result: Subtherapeutic  Additional findings: None       PLAN     Recommended plan for no diet, medication or health factor changes affecting INR     Dosing Instructions: booster dose then Increase your warfarin dose (6.5% change) with next INR in 1-2 weeks. Elected to return on 5/30.     Summary  As of 5/13/2024      Full warfarin instructions:  5/13: 12.5 mg; Otherwise 10 mg every Mon, Fri; 12.5 mg all other days   Next INR check:  5/30/2024               Telephone call with Glen who verbalizes understanding and agrees to plan    Lab visit scheduled    Education provided:   Please call back if any changes to your diet, medications or how you've been taking warfarin    Plan made per [facility] anticoagulation protocol    Ambreen Balderas RN  Anticoagulation Clinic  5/13/2024    _______________________________________________________________________     Anticoagulation Episode Summary       Current INR goal:  1.5-2.0   TTR:  65.7% (9.7 mo)   Target end date:  Indefinite   Send INR reminders to:  STEPHANIE GUZMAN    Indications    S/P AVR (aortic valve replacement)-23 mm On-X valve [Z95.2]  S/P CABG (coronary artery bypass graft)-LIMA>LAD  VG>RCA  VG>OM [Z95.1]             Comments:  07/10/23-On-X AVR             Anticoagulation Care Providers       Provider Role Specialty Phone number    Kelsey Vega PA-C Referring Cardiovascular & Thoracic Surgery  [phone]    Mike Deal MD Referring Internal Medicine - Pediatrics [phone]

## 2024-05-30 ENCOUNTER — ANTICOAGULATION THERAPY VISIT (OUTPATIENT)
Dept: ANTICOAGULATION | Facility: CLINIC | Age: 59
End: 2024-05-30

## 2024-05-30 ENCOUNTER — LAB (OUTPATIENT)
Dept: LAB | Facility: CLINIC | Age: 59
End: 2024-05-30
Payer: COMMERCIAL

## 2024-05-30 DIAGNOSIS — Z95.1 S/P CABG (CORONARY ARTERY BYPASS GRAFT): ICD-10-CM

## 2024-05-30 DIAGNOSIS — Z95.2 S/P AVR (AORTIC VALVE REPLACEMENT): ICD-10-CM

## 2024-05-30 DIAGNOSIS — Z95.2 S/P AVR (AORTIC VALVE REPLACEMENT): Primary | ICD-10-CM

## 2024-05-30 LAB — INR BLD: 1.9 (ref 0.9–1.1)

## 2024-05-30 PROCEDURE — 85610 PROTHROMBIN TIME: CPT

## 2024-05-30 PROCEDURE — 36415 COLL VENOUS BLD VENIPUNCTURE: CPT

## 2024-05-30 NOTE — PROGRESS NOTES
ANTICOAGULATION MANAGEMENT     Glen Cho 58 year old male is on warfarin with therapeutic INR result. (Goal INR 1.5-2.0)    Recent labs: (last 7 days)     05/30/24  1517   INR 1.9*       ASSESSMENT     Source(s): Chart Review  Previous INR was Subtherapeutic  Medication, diet, health changes since last INR chart reviewed; none identified         PLAN     Recommended plan for no diet, medication or health factor changes affecting INR     Dosing Instructions: Continue your current warfarin dose with next INR in 3 weeks       Summary  As of 5/30/2024      Full warfarin instructions:  10 mg every Mon, Fri; 12.5 mg all other days   Next INR check:  6/20/2024               Detailed voice message left for Glen with dosing instructions and follow up date.   Sent [employer] message with dosing and follow up instructions    Contact [phone]  to schedule and with any changes, questions or concerns.     Education provided:   Please call back if any changes to your diet, medications or how you've been taking warfarin  Contact [phone]  with any changes, questions or concerns.     Plan made per [facility] anticoagulation protocol    Christine Olson RN  Anticoagulation Clinic  5/30/2024    _______________________________________________________________________     Anticoagulation Episode Summary       Current INR goal:  1.5-2.0   TTR:  65.8% (10.3 mo)   Target end date:  Indefinite   Send INR reminders to:  STEPHANIE GUZMAN    Indications    S/P AVR (aortic valve replacement)-23 mm On-X valve [Z95.2]  S/P CABG (coronary artery bypass graft)-LIMA>LAD  VG>RCA  VG>OM [Z95.1]             Comments:  07/10/23-On-X AVR             Anticoagulation Care Providers       Provider Role Specialty Phone number    Kelsey Vega PA-C Referring Cardiovascular & Thoracic Surgery [phone]    Mike Deal MD Referring Internal Medicine - Pediatrics [phone]

## 2024-06-26 ENCOUNTER — DOCUMENTATION ONLY (OUTPATIENT)
Dept: ANTICOAGULATION | Facility: CLINIC | Age: 59
End: 2024-06-26
Payer: COMMERCIAL

## 2024-06-26 DIAGNOSIS — I48.0 PAROXYSMAL ATRIAL FIBRILLATION (H): Primary | ICD-10-CM

## 2024-06-26 NOTE — PROGRESS NOTES
ANTICOAGULATION CLINIC REFERRAL RENEWAL REQUEST     Patient was due for INR on 6/20/24, reminder sent via My Chart today    An annual renewal order is required for all patients referred to [facility].?  Please review and sign the pended referral order for Glen Cho.       ANTICOAGULATION SUMMARY      Warfarin indication(s)   Mechanical AVR and CABG    Mechanical heart valve present?  Mechanical AVR- [place]- On X       Current goal range   INR: 1.5-2.0     Goal appropriate for indication? Goal INR 1.5-2.0 standard for indication(s)     Time in Therapeutic Range (TTR)  (Goal > 60%) 66%       Office visit with referring provider's group within last year yes on 03/21/2024       Iram Lubin RN  [facility] Anticoagulation Clinic

## 2024-07-03 ENCOUNTER — DOCUMENTATION ONLY (OUTPATIENT)
Dept: ANTICOAGULATION | Facility: CLINIC | Age: 59
End: 2024-07-03
Payer: COMMERCIAL

## 2024-07-03 NOTE — PROGRESS NOTES
ANTICOAGULATION     Glen Cho is overdue for an INR check.     My Chart message sent    Iram Lubin RN

## 2024-07-09 ENCOUNTER — MYC REFILL (OUTPATIENT)
Dept: PEDIATRICS | Facility: CLINIC | Age: 59
End: 2024-07-09
Payer: COMMERCIAL

## 2024-07-09 DIAGNOSIS — Z79.4 TYPE 2 DIABETES MELLITUS WITH DIABETIC NEPHROPATHY, WITH LONG-TERM CURRENT USE OF INSULIN (H): ICD-10-CM

## 2024-07-09 DIAGNOSIS — E11.21 TYPE 2 DIABETES MELLITUS WITH DIABETIC NEPHROPATHY, WITH LONG-TERM CURRENT USE OF INSULIN (H): ICD-10-CM

## 2024-07-09 RX ORDER — INSULIN LISPRO 100 [IU]/ML
25 INJECTION, SOLUTION INTRAVENOUS; SUBCUTANEOUS
Qty: 66 ML | Refills: 3 | OUTPATIENT
Start: 2024-07-09

## 2024-07-11 ENCOUNTER — ANTICOAGULATION THERAPY VISIT (OUTPATIENT)
Dept: ANTICOAGULATION | Facility: CLINIC | Age: 59
End: 2024-07-11

## 2024-07-11 ENCOUNTER — LAB (OUTPATIENT)
Dept: LAB | Facility: CLINIC | Age: 59
End: 2024-07-11
Payer: COMMERCIAL

## 2024-07-11 DIAGNOSIS — Z95.2 S/P AVR (AORTIC VALVE REPLACEMENT): Primary | ICD-10-CM

## 2024-07-11 DIAGNOSIS — I48.0 PAROXYSMAL ATRIAL FIBRILLATION (H): ICD-10-CM

## 2024-07-11 DIAGNOSIS — Z95.1 S/P CABG (CORONARY ARTERY BYPASS GRAFT): ICD-10-CM

## 2024-07-11 LAB — INR BLD: 1.8 (ref 0.9–1.1)

## 2024-07-11 PROCEDURE — 36415 COLL VENOUS BLD VENIPUNCTURE: CPT

## 2024-07-11 PROCEDURE — 85610 PROTHROMBIN TIME: CPT

## 2024-07-11 NOTE — PROGRESS NOTES
ANTICOAGULATION MANAGEMENT     Glen Cho 59 year old male is on warfarin with therapeutic INR result. (Goal INR 1.5-2.0)    Recent labs: (last 7 days)     07/11/24  1532   INR 1.8*       ASSESSMENT     Source(s): Chart Review  Previous INR was Therapeutic last visit; previously outside of goal range  Medication, diet, health changes since last INR chart reviewed; none identified         PLAN     Recommended plan for no diet, medication or health factor changes affecting INR per chart review    Dosing Instructions: Continue your current warfarin dose with next INR in 4 weeks       Summary  As of 7/11/2024      Full warfarin instructions:  10 mg every Mon, Fri; 12.5 mg all other days   Next INR check:  8/8/2024               Detailed voice message left for Glen with dosing instructions and follow up date. Did not send [employer] message as the message sent 5/30/24 with dosing instruction and follow-up date has not been read as of 7/11/24     Contact [phone] to schedule and with any changes, questions or concerns.     Education provided: Please call back if any changes to your diet, medications or how you've been taking warfarin  Contact [phone] with any changes, questions or concerns.     Plan made per [facility] anticoagulation protocol    Marti Balderas RN  Anticoagulation Clinic  7/11/2024    _______________________________________________________________________     Anticoagulation Episode Summary       Current INR goal:  1.5-2.0   TTR:  69.9% (11.7 mo)   Target end date:  Indefinite   Send INR reminders to:  [facility]    Indications    S/P AVR (aortic valve replacement)-23 mm On-X valve [Z95.2]  S/P CABG (coronary artery bypass graft)-LIMA>LAD  VG>RCA  VG>OM [Z95.1]  Paroxysmal atrial fibrillation (H) [I48.0]             Comments:  07/10/23-On-X AVR             Anticoagulation Care Providers       Provider Role Specialty Phone number    Kelsey Vega PA-C Referring Cardiovascular & Thoracic  Surgery [phone]    Mike Deal MD Referring Internal Medicine - Pediatrics [phone]

## 2024-08-15 ENCOUNTER — TELEPHONE (OUTPATIENT)
Dept: ANTICOAGULATION | Facility: CLINIC | Age: 59
End: 2024-08-15
Payer: COMMERCIAL

## 2024-08-15 NOTE — TELEPHONE ENCOUNTER
ANTICOAGULATION     Glen Cho is overdue for an INR check.     Left message for patient to call and schedule lab appointment as soon as possible. If returning call, please schedule.     Jovana Carpenter RN

## 2024-08-22 ENCOUNTER — MYC MEDICAL ADVICE (OUTPATIENT)
Dept: ANTICOAGULATION | Facility: CLINIC | Age: 59
End: 2024-08-22
Payer: COMMERCIAL

## 2024-08-25 DIAGNOSIS — I10 BENIGN ESSENTIAL HYPERTENSION: ICD-10-CM

## 2024-08-25 DIAGNOSIS — Z95.2 S/P AVR (AORTIC VALVE REPLACEMENT): ICD-10-CM

## 2024-08-26 RX ORDER — WARFARIN SODIUM 5 MG/1
TABLET ORAL
Qty: 225 TABLET | Refills: 3 | OUTPATIENT
Start: 2024-08-26

## 2024-08-26 RX ORDER — LOSARTAN POTASSIUM 100 MG/1
TABLET ORAL
Qty: 90 TABLET | Refills: 3 | OUTPATIENT
Start: 2024-08-26

## 2024-08-26 RX ORDER — HYDROCHLOROTHIAZIDE 25 MG/1
25 TABLET ORAL DAILY
Qty: 90 TABLET | Refills: 3 | OUTPATIENT
Start: 2024-08-26

## 2024-08-29 ENCOUNTER — LAB (OUTPATIENT)
Dept: LAB | Facility: CLINIC | Age: 59
End: 2024-08-29
Payer: COMMERCIAL

## 2024-08-29 ENCOUNTER — TELEPHONE (OUTPATIENT)
Dept: ANTICOAGULATION | Facility: CLINIC | Age: 59
End: 2024-08-29
Payer: COMMERCIAL

## 2024-08-29 ENCOUNTER — ANTICOAGULATION THERAPY VISIT (OUTPATIENT)
Dept: ANTICOAGULATION | Facility: CLINIC | Age: 59
End: 2024-08-29

## 2024-08-29 DIAGNOSIS — Z95.2 S/P AVR (AORTIC VALVE REPLACEMENT): Primary | ICD-10-CM

## 2024-08-29 DIAGNOSIS — Z95.1 S/P CABG (CORONARY ARTERY BYPASS GRAFT): ICD-10-CM

## 2024-08-29 DIAGNOSIS — I48.0 PAROXYSMAL ATRIAL FIBRILLATION (H): ICD-10-CM

## 2024-08-29 LAB — INR BLD: 1.8 (ref 0.9–1.1)

## 2024-08-29 PROCEDURE — 85610 PROTHROMBIN TIME: CPT

## 2024-08-29 PROCEDURE — 36415 COLL VENOUS BLD VENIPUNCTURE: CPT

## 2024-08-29 NOTE — TELEPHONE ENCOUNTER
ANTICOAGULATION     Glen Cho is overdue for an INR check.     Reminder letter sent    Jovana Carpenter RN

## 2024-08-29 NOTE — PROGRESS NOTES
ANTICOAGULATION MANAGEMENT     Glen Cho 59 year old male is on warfarin with therapeutic INR result. (Goal INR 1.5-2.0)    Recent labs: (last 7 days)     08/29/24  1410   INR 1.8*       ASSESSMENT     Source(s): Chart Review and Patient/Caregiver Call     Warfarin doses taken: Warfarin taken as instructed  Diet: No new diet changes identified  Medication/supplement changes: None noted  New illness, injury, or hospitalization: No  Signs or symptoms of bleeding or clotting: No  Previous result: Therapeutic last 2(+) visits  Additional findings: None       PLAN     Recommended plan for no diet, medication or health factor changes affecting INR     Dosing Instructions: Continue your current warfarin dose with next INR in 5 weeks       Summary  As of 8/29/2024      Full warfarin instructions:  10 mg every Mon, Fri; 12.5 mg all other days   Next INR check:  10/2/2024               Telephone call with Glen who agrees to plan and repeated back plan correctly    Lab visit scheduled    Education provided: Please call back if any changes to your diet, medications or how you've been taking warfarin    Plan made per [facility] anticoagulation protocol    Jovana Carpenter RN  Anticoagulation Clinic  8/29/2024    _______________________________________________________________________     Anticoagulation Episode Summary       Current INR goal:  1.5-2.0   TTR:  73.7% (1 y)   Target end date:  Indefinite   Send INR reminders to:  STEPHANIE GUZMAN    Indications    S/P AVR (aortic valve replacement)-23 mm On-X valve [Z95.2]  S/P CABG (coronary artery bypass graft)-LIMA>LAD  VG>RCA  VG>OM [Z95.1]  Paroxysmal atrial fibrillation (H) [I48.0]             Comments:  07/10/23-On-X AVR             Anticoagulation Care Providers       Provider Role Specialty Phone number    Kelsey Vega PA-C Referring Cardiovascular & Thoracic Surgery [phone]    Mike Deal MD Referring Internal Medicine - Pediatrics [phone]

## 2024-08-29 NOTE — LETTER
[facility]  [address] [facility] [address]  Phone: [phone]  Fax: [phone]   August 29, 2024        Glen Cho  [address] [facility] [address]            Dear Glen,    You are currently under the care of [facility] for your warfarin (Coumadin , Jantoven ) therapy.  We are contacting you because our records show you were due for an INR on 8/8/24.    There are potentially serious risks when taking warfarin without careful monitoring and we want to make sure you are safely managed.  Routine lab monitoring is required for warfarin refills.     Please call [phone] as soon as possible to schedule a lab appointment. If it is difficult for you to get to lab, please call us to discuss options.  If there has been a change in your care or other concerns, please let us know so we can help and/or update our records.         Sincerely,       [facility]

## 2024-09-16 ENCOUNTER — MYC MEDICAL ADVICE (OUTPATIENT)
Dept: PEDIATRICS | Facility: CLINIC | Age: 59
End: 2024-09-16
Payer: COMMERCIAL

## 2024-09-16 DIAGNOSIS — L98.9 SKIN LESION: Primary | ICD-10-CM

## 2024-09-16 NOTE — TELEPHONE ENCOUNTER
See patient's MyChart message   - Patient states that he has noted a lesion/sore to his right shoulder for about one month   - Patient wondering if he should be seen by Dr. Deal or dermatology   - Patient attached an image of the sore in the MyChart message   - Patient states that it is located between his neck and right shoulder   - Patient states that the width is about the size of a pencil   - Patient states that the outside edge is raised   - Patient denies pain, reports itching to the area occasionally     Dr. Deal, please review and advise. Do you recommend that the patient be seen here or by dermatology?     Mariama GREWAL RN   [facility]

## 2024-09-16 NOTE — TELEPHONE ENCOUNTER
See patient's MyChart message   - Patient states that he has noted a lesion/sore to his right shoulder for about one month   - Patient wondering if he should be seen by Dr. Deal or dermatology     Sent a Ludesit message to the patient   - Informed the patient to gather additional information   - Awaiting a response from the patient at this time     Mariama GREWAL RN   [facility]

## 2024-09-17 NOTE — TELEPHONE ENCOUNTER
See the 9/23/2024 appointment note   - Patient has been scheduled for an appointment with Dr. Glass (General Surgery) for the suspicious skin lesion; per the appointment note, patient was referred to General Surgery by Dr. Johnny Sue (Dermatologist) as dermatology was booking out until May/Reshma of 2025     Appointments in Next Year      Sep 23, 2024 2:30 PM  (Arrive by 2:15 PM)  New Patient with Marine Glass MD  [facility] ([facility]) [phone]     Sent a AlphaSmart message to the patient   - Informed the patient to keep his upcoming appointment on 9/23/2024 for his skin lesion/sore rather than waiting for a dermatology appointment in May/Reshma of 2025   - Informed the patient that he was referred to General Surgery by the dermatologist (Dr. Chintan Sue) rather than having him wait to be seen by dermatology     Mariama GREWAL RN   [facility]

## 2024-09-23 ENCOUNTER — OFFICE VISIT (OUTPATIENT)
Dept: SURGERY | Facility: CLINIC | Age: 59
End: 2024-09-23
Payer: COMMERCIAL

## 2024-09-23 ENCOUNTER — LAB (OUTPATIENT)
Dept: LAB | Facility: CLINIC | Age: 59
End: 2024-09-23
Payer: COMMERCIAL

## 2024-09-23 VITALS
HEART RATE: 74 BPM | OXYGEN SATURATION: 96 % | BODY MASS INDEX: 43.65 KG/M2 | SYSTOLIC BLOOD PRESSURE: 130 MMHG | DIASTOLIC BLOOD PRESSURE: 78 MMHG | WEIGHT: 262 LBS | HEIGHT: 65 IN

## 2024-09-23 DIAGNOSIS — L98.9 SKIN LESION: Primary | ICD-10-CM

## 2024-09-23 DIAGNOSIS — L98.9 SKIN LESION: ICD-10-CM

## 2024-09-23 PROCEDURE — 11401 EXC TR-EXT B9+MARG 0.6-1 CM: CPT | Performed by: SURGERY

## 2024-09-23 PROCEDURE — 88341 IMHCHEM/IMCYTCHM EA ADD ANTB: CPT | Mod: 26

## 2024-09-23 PROCEDURE — 99203 OFFICE O/P NEW LOW 30 MIN: CPT | Mod: 25 | Performed by: SURGERY

## 2024-09-23 PROCEDURE — 88342 IMHCHEM/IMCYTCHM 1ST ANTB: CPT | Mod: 26

## 2024-09-23 PROCEDURE — 88342 IMHCHEM/IMCYTCHM 1ST ANTB: CPT | Mod: TC

## 2024-09-23 PROCEDURE — 88305 TISSUE EXAM BY PATHOLOGIST: CPT | Mod: 26

## 2024-09-23 NOTE — PROGRESS NOTES
Assessment:    Glen Cho is a 59 year old male with hx DM2, mechanical AVR on coumadin, with a right shoulder  nonhealing skin ulceration, suspicious for skin cancer, looks like a basal cell . Additionally has a small crusting nodule on the right forearm.    Plan:  Excision of both skin lesions completed in the office today, see procedure note.  Remove sutures in 7-10 days in the office.  I will update him with pathology is returned. Discussed if this is a small basal cell carcinoma no further treatment should be required if margins are negative.                      HPI:  Glen Cho is a 59 year old male who presents today in consultation for lesion on his right shoulder   Duration:  7 weeks  Started as a pimple then progressed to a scabby lesion with raised borders. His niece who is a medical student looked at it and told him it looked like a BCC.   He had a right forearm punch biopsy done years ago for a benign lesion. Actually has a scabby raised skin lesion near there that has been present for a while as well.        PMH:  Past Medical History:   Diagnosis Date    Arthritis     Chronic kidney disease     Diabetes (H)     High cholesterol     HTN (hypertension)     Obesity     Sleep apnea     DOES NOT HAVE A CPAP       PSH:  Past Surgical History:   Procedure Laterality Date    ANKLE SURGERY  02/01/2004     R ORIF    ARTHROSCOPY KNEE  03/26/2014    Procedure: ARTHROSCOPY KNEE;  ARTHROSCOPY KNEE- RIGHT   SURGEON REQUESTS CHOICE ANETHESIA ;  Surgeon: Sabino Simpson MD;  Location: [facility] OR    BYPASS GRAFT ARTERY CORONARY, REPLACE VALVE AORTIC, COMBINED N/A 7/10/2023    Procedure: CORONARY ARTERY BYPASS GRAFT x 3 (LEFT INTERNAL MAMMARY ARTERY - LEFT ANTERIOR DESCENDING ARTERY; SAPHENOUS VEIN - OBTUSE MARGINAL ARTERY; SAPHENOUS VEIN TO ACUTE MARGINAL,  WITH ENDOSCOPIC SAPHENOUS VEIN HARVEST OF LEFT LOWER EXTREMITY; AORTIC VALVE REPLACEMENT WITH ON-X  PROSTHETIC HEART VALVE 23MM; ON CARDIOPULMONARY PUMP  OXYGENATOR  (INTRAOPERATIVE TRANSESOPHAGEAL ECHOCARDIOGRAM BY A    CHOLECYSTECTOMY      CV CORONARY ANGIOGRAM N/A 06/12/2023    Procedure: Coronary Angiogram;  Surgeon: Jacobo Diez MD;  Location:  HEART CARDIAC CATH LAB    HERNIORRHAPHY UMBILICAL         Allergies:  Allergies   Allergen Reactions    Amlodipine Other (See Comments)     Water retention    Amlodipine Besylate      Water retention    Cefdinir      Joint pains    Other Reaction(s): Arthralgia    Lisinopril Other (See Comments)     Water retention    Amoxicillin Rash     White spots       Home Medications:  Current Outpatient Medications   Medication Sig Dispense Refill    acetaminophen (TYLENOL) 325 MG tablet Take 2 tablets (650 mg) by mouth every 4 hours as needed for other or mild pain 30 tablet 1    aspirin 81 MG EC tablet Take 1 tablet (81 mg) by mouth daily 90 tablet 3    atorvastatin (LIPITOR) 40 MG tablet Take 1 tablet (40 mg) by mouth every evening 90 tablet 3    BD PEN NEEDLE KENTRELL 2ND GEN 32G X 4 MM miscellaneous USE 1 PEN NEEDLE TIP 3 TIMES DAILY 300 each 0    blood glucose monitoring (FREESTYLE INSULINX SYSTEM) meter device kit Use to test blood sugar 3 times daily or as directed. 1 kit 0    blood glucose monitoring (FREESTYLE) lancets Use to test blood sugars 2-3 times daily or as directed. 1 Box prn    empagliflozin (JARDIANCE) 25 MG TABS tablet Take 1 tablet (25 mg) by mouth daily 90 tablet 3    ferrous sulfate (FEROSUL) 325 (65 Fe) MG tablet Take 1 tablet (325 mg) by mouth daily (with breakfast) 90 tablet 1    hydrochlorothiazide (HYDRODIURIL) 25 MG tablet Take 1 tablet (25 mg) by mouth daily 90 tablet 3    insulin glargine (LANTUS PEN) 100 UNIT/ML pen Inject 50 Units Subcutaneous at bedtime 45 mL 3    insulin lispro (HUMALOG KWIKPEN) 100 UNIT/ML (1 unit dial) KWIKPEN Inject 25 Units Subcutaneous 3 times daily (before meals) 66 mL 3    insulin pen needle (32G X 4 MM) 32G X 4 MM miscellaneous Use 4 pen needles daily or as  "directed. 200 each 11    insulin pen needle (BD KENTRELL U/F) 32G X 4 MM USE 2 PEN NEEDLES DAILY OR AS DIRECTED. 200 each 0    losartan (COZAAR) 100 MG tablet Takle 1/2 tab daily for 2 weeks.  If blood pressure not consistently below 130, increase to 1 full tab daily 90 tablet 3    metFORMIN (GLUCOPHAGE XR) 500 MG 24 hr tablet Take 2 tablets (1,000 mg) by mouth daily (with dinner) 180 tablet 3    order for DME Equipment being ordered: Verio flex test strips  Patient tests 1 times daily. 100 Device 3    order for DME Equipment being ordered: lancets  Uses 4 a day.    Please dispense what insurance covers. 400 each 3    order for DME Equipment being ordered: glucose meter  Please dispense what insurance covers 1 each 0    ORDER FOR DME Equipment being ordered: glucometer    60 lancets and test strips (5 refills) for twice daily testing. 1 each 0    warfarin ANTICOAGULANT (COUMADIN) 5 MG tablet Take 2.5 tablets (12.5 mg) by mouth daily or as directed by INR Clinic 225 tablet 3       Social History:  Social History     Tobacco Use    Smoking status: Former     Current packs/day: 0.00     Types: Cigarettes     Quit date: 2023     Years since quittin.4     Passive exposure: Never    Smokeless tobacco: Never   Vaping Use    Vaping status: Never Used   Substance Use Topics    Alcohol use: Never     Alcohol/week: 0.0 standard drinks of alcohol     Comment: rarely    Drug use: No       Family History:  Family History   Problem Relation Age of Onset    Cancer Father         skin    Myocardial Infarction Father 57     Reports no significant known family history of melanoma. Father had some kind of skin cancer    ROS:  The 10 point review of systems is negative other than noted in the HPI and above.    PE:  /78   Pulse 74   Ht 1.651 m (5' 5\")   Wt 118.8 kg (262 lb)   SpO2 96%   BMI 43.60 kg/m    General appearance: well-nourished, no apparent distress  Skin: there is a 1 cm raised skin lesion located on the right " shoulder area between the shoulder and the base of the lateral neck. rolled raised edges with central scabbing. There is also a <1cm raised skin lesion on the right dorsal forearm with a white central scab.         Marine Glass MD          Please route or send letter to:  Referring Provider

## 2024-09-23 NOTE — PATIENT INSTRUCTIONS
Remove dressing and okay for normal shower the day after the procedure.   Do not soak in a tub/bath/pool until sutures removed.   Replace dressing with triple antibiotic/neosporin or bacitracin and gauze/tape or bandaid daily.  Return for suture removal in the surgery office in 7-10 days

## 2024-09-23 NOTE — PROCEDURES
General Surgery Procedure    The right shoulder was prepped with chloraprep and draped in standard sterile fashion.  The skin surrounding the lesion was anesthetized with local anesthetic, 1% lidocaine with epinephrine.  An elliptical incision was made with a length of 5.0 cm around the lesion.  This was excised with a knife. I placed a suture at the superior aspect for orientation and it was passed off the field as specimen. The skin was closed with seven 3-0 nylon interrupted sutures.   The right dorsal forearm was prepped and draped in standard sterile fashion.  The skin surrounding the lesion was anesthetized with local anesthetic.  An elliptical incision was made in a longitudinal direction with a length of 5.0 cm around the lesion.  This was excised with a knife and passed off the field as specimen. The skin was closed with seven 3-0 nylon interrupted sutures.   The patient tolerated the procedure well.      Marine Glass MD

## 2024-09-23 NOTE — LETTER
"September 23, 2024        RE:   Glen Cho 1965      Dear Colleague,    Thank you for referring your patient, Glen Cho, to [facility]. Please see a copy of my visit note below.    Assessment:    Glen Cho is a 59 year old male with hx DM2, mechanical AVR on coumadin, with a right shoulder  nonhealing skin ulceration, suspicious for skin cancer, looks like a basal cell . Additionally has a small crusting nodule on the right forearm.    Plan:  Excision of both skin lesions completed in the office today, see procedure note.  Remove sutures in 7-10 days in the office.  I will update him with pathology is returned. Discussed if this is a small basal cell carcinoma no further treatment should be required if margins are negative.    HPI:  Glen Cho is a 59 year old male who presents today in consultation for lesion on his right shoulder   Duration:  7 weeks  Started as a pimple then progressed to a scabby lesion with raised borders. His niece who is a medical student looked at it and told him it looked like a BCC.   He had a right forearm punch biopsy done years ago for a benign lesion. Actually has a scabby raised skin lesion near there that has been present for a while as well.      Reports no significant known family history of melanoma. Father had some kind of skin cancer    ROS:  The 10 point review of systems is negative other than noted in the HPI and above.    PE:  /78   Pulse 74   Ht 1.651 m (5' 5\")   Wt 118.8 kg (262 lb)   SpO2 96%   BMI 43.60 kg/m    General appearance: well-nourished, no apparent distress  Skin: there is a 1 cm raised skin lesion located on the right shoulder area between the shoulder and the base of the lateral neck. rolled raised edges with central scabbing. There is also a <1cm raised skin lesion on the right dorsal forearm with a white central scab.          Again, thank you for allowing me to participate in " the care of your patient.      Sincerely,      Marine Glass MD

## 2024-09-26 ENCOUNTER — MYC MEDICAL ADVICE (OUTPATIENT)
Dept: PEDIATRICS | Facility: CLINIC | Age: 59
End: 2024-09-26
Payer: COMMERCIAL

## 2024-09-26 DIAGNOSIS — E11.21 TYPE 2 DIABETES MELLITUS WITH DIABETIC NEPHROPATHY, WITH LONG-TERM CURRENT USE OF INSULIN (H): Primary | ICD-10-CM

## 2024-09-26 DIAGNOSIS — Z79.4 TYPE 2 DIABETES MELLITUS WITH DIABETIC NEPHROPATHY, WITH LONG-TERM CURRENT USE OF INSULIN (H): Primary | ICD-10-CM

## 2024-09-26 NOTE — TELEPHONE ENCOUNTER
See patient's Materialisehart message   - Patient states that he is scheduled for a right knee replacement surgery on 12/17/2024 and states that his A1c needs to be under 8 in order to proceed with the surgery   - Patient requesting to check his A1c at this time to determine his A1c level   - Patient aware of his need to schedule a preop prior to his surgery     Per the patient's health maintenance, patient is due for an A1c check     Component      Latest Ref Rng 3/21/2024  8:04 AM   Hemoglobin A1C      0.0 - 5.6 % 8.3 (H)      - Pended A1c     Dr. Deal, please review and order as appropriate.      Mariama GREWAL RN   [facility]

## 2024-09-27 ENCOUNTER — LAB (OUTPATIENT)
Dept: LAB | Facility: CLINIC | Age: 59
End: 2024-09-27
Payer: COMMERCIAL

## 2024-09-27 DIAGNOSIS — E11.21 TYPE 2 DIABETES MELLITUS WITH DIABETIC NEPHROPATHY, WITH LONG-TERM CURRENT USE OF INSULIN (H): ICD-10-CM

## 2024-09-27 DIAGNOSIS — Z79.4 TYPE 2 DIABETES MELLITUS WITH DIABETIC NEPHROPATHY, WITH LONG-TERM CURRENT USE OF INSULIN (H): ICD-10-CM

## 2024-09-27 LAB
EST. AVERAGE GLUCOSE BLD GHB EST-MCNC: 146 MG/DL
HBA1C MFR BLD: 6.7 % (ref 0–5.6)

## 2024-09-27 PROCEDURE — 83036 HEMOGLOBIN GLYCOSYLATED A1C: CPT

## 2024-09-27 PROCEDURE — 36415 COLL VENOUS BLD VENIPUNCTURE: CPT

## 2024-10-02 ENCOUNTER — ANTICOAGULATION THERAPY VISIT (OUTPATIENT)
Dept: ANTICOAGULATION | Facility: CLINIC | Age: 59
End: 2024-10-02

## 2024-10-02 ENCOUNTER — LAB REQUISITION (OUTPATIENT)
Dept: LAB | Facility: CLINIC | Age: 59
End: 2024-10-02
Payer: COMMERCIAL

## 2024-10-02 ENCOUNTER — LAB (OUTPATIENT)
Dept: LAB | Facility: CLINIC | Age: 59
End: 2024-10-02
Payer: COMMERCIAL

## 2024-10-02 DIAGNOSIS — Z95.1 S/P CABG (CORONARY ARTERY BYPASS GRAFT): ICD-10-CM

## 2024-10-02 DIAGNOSIS — I48.0 PAROXYSMAL ATRIAL FIBRILLATION (H): ICD-10-CM

## 2024-10-02 DIAGNOSIS — Z95.2 S/P AVR (AORTIC VALVE REPLACEMENT): Primary | ICD-10-CM

## 2024-10-02 LAB
INR BLD: 1.7 (ref 0.9–1.1)
PATH REPORT.COMMENTS IMP SPEC: ABNORMAL
PATH REPORT.COMMENTS IMP SPEC: YES
PATH REPORT.COMMENTS IMP SPEC: YES
PATH REPORT.FINAL DX SPEC: ABNORMAL
PATH REPORT.FINAL DX SPEC: ABNORMAL
PATH REPORT.GROSS SPEC: ABNORMAL
PATH REPORT.GROSS SPEC: ABNORMAL
PATH REPORT.MICROSCOPIC SPEC OTHER STN: ABNORMAL
PATH REPORT.MICROSCOPIC SPEC OTHER STN: ABNORMAL
PATH REPORT.RELEVANT HX SPEC: ABNORMAL
PATH REPORT.SITE OF ORIGIN SPEC: ABNORMAL
PHOTO IMAGE: ABNORMAL

## 2024-10-02 PROCEDURE — 88323 CONSLTJ&REPRT MATRL PREP SLD: CPT | Mod: TC

## 2024-10-02 PROCEDURE — 36416 COLLJ CAPILLARY BLOOD SPEC: CPT

## 2024-10-02 PROCEDURE — 85610 PROTHROMBIN TIME: CPT

## 2024-10-02 PROCEDURE — 88323 CONSLTJ&REPRT MATRL PREP SLD: CPT | Mod: 26 | Performed by: DERMATOLOGY

## 2024-10-02 NOTE — PROGRESS NOTES
ANTICOAGULATION MANAGEMENT     Glen Cho 59 year old male is on warfarin with therapeutic INR result. (Goal INR 1.5-2.0)    Recent labs: (last 7 days)     10/02/24  1429   INR 1.7*       ASSESSMENT     Source(s): Chart Review  Previous INR was Therapeutic last 2(+) visits  Medication, diet, health changes since last INR   Skin lesions on right shoulder and right forearm were excised and sutured on 9/23/24  Orthopedic office visit on 9/25/24 for right knee pain-no fracture noted         PLAN     Recommended plan for temporary change(s) affecting INR     Dosing Instructions: Continue your current warfarin dose with next INR in 6 weeks       Summary  As of 10/2/2024      Full warfarin instructions:  10 mg every Mon, Fri; 12.5 mg all other days   Next INR check:  11/13/2024               Detailed voice message left for Glen with dosing instructions and follow up date.     Contact [phone] to schedule and with any changes, questions or concerns.     Education provided: Please call back if any changes to your diet, medications or how you've been taking warfarin  Taking warfarin: Importance of taking warfarin as instructed    Plan made per [facility] anticoagulation protocol    Iram Lubin, RN  10/2/2024  Anticoagulation Clinic  [employer] for routing messages: nora GUZMAN  [facility] patient phone line: [phone]        _______________________________________________________________________     Anticoagulation Episode Summary       Current INR goal:  1.5-2.0   TTR:  75.8% (1 y)   Target end date:  Indefinite   Send INR reminders to:  STEPHANIE GUZMNA    Indications    S/P AVR (aortic valve replacement)-23 mm On-X valve [Z95.2]  S/P CABG (coronary artery bypass graft)-LIMA>LAD  VG>RCA  VG>OM [Z95.1]  Paroxysmal atrial fibrillation (H) [I48.0]             Comments:  07/10/23-On-X AVR             Anticoagulation Care Providers       Provider Role Specialty Phone number    Kelsey Vega PA-C Referring Cardiovascular &  Thoracic Surgery [phone]    Mike Deal MD Referring Internal Medicine - Pediatrics [phone]

## 2024-10-03 ENCOUNTER — OFFICE VISIT (OUTPATIENT)
Dept: SURGERY | Facility: CLINIC | Age: 59
End: 2024-10-03
Payer: COMMERCIAL

## 2024-10-03 VITALS — BODY MASS INDEX: 43.65 KG/M2 | HEIGHT: 65 IN | WEIGHT: 262 LBS

## 2024-10-03 DIAGNOSIS — Z09 SURGICAL FOLLOWUP VISIT: Primary | ICD-10-CM

## 2024-10-03 PROCEDURE — 99024 POSTOP FOLLOW-UP VISIT: CPT | Performed by: SURGERY

## 2024-10-03 NOTE — PROGRESS NOTES
The patient returns today for suture removal after excision of a right arm and right shoulder lesion by Dr. Glass.  Pathology will be reviewed with the patient by her.  [facility]    The patient's incisions are well-healed.  Sutures are removed.    The patient may return to clinic as needed.    Matthias Goetz MD  [facility]    Please route or send letter to:  Primary Care Provider (PCP)

## 2024-10-03 NOTE — LETTER
October 3, 2024        RE:   Glen Cho 1965      Dear Colleague,    Thank you for referring your patient, Glen Cho, to [facility]. Please see a copy of my visit note below.    The patient returns today for suture removal after excision of a right arm and right shoulder lesion by Dr. Glass.  Pathology will be reviewed with the patient by her.  He has already seen the results in [facility].    The patient's incisions are well-healed.  Sutures are removed.    The patient may return to clinic as needed.      Again, thank you for allowing me to participate in the care of your patient.      Sincerely,      MD MELIA Singleton/nms

## 2024-10-10 ENCOUNTER — TRANSFERRED RECORDS (OUTPATIENT)
Dept: MULTI SPECIALTY CLINIC | Facility: CLINIC | Age: 59
End: 2024-10-10

## 2024-10-10 LAB — RETINOPATHY: NORMAL

## 2024-10-11 ENCOUNTER — TELEPHONE (OUTPATIENT)
Dept: CARDIOLOGY | Facility: CLINIC | Age: 59
End: 2024-10-11
Payer: COMMERCIAL

## 2024-10-11 DIAGNOSIS — Z95.2 S/P AVR (AORTIC VALVE REPLACEMENT): ICD-10-CM

## 2024-10-11 RX ORDER — ATORVASTATIN CALCIUM 40 MG/1
40 TABLET, FILM COATED ORAL EVERY EVENING
Qty: 90 TABLET | Refills: 0 | Status: SHIPPED | OUTPATIENT
Start: 2024-10-11 | End: 2024-11-06

## 2024-10-25 ENCOUNTER — TELEPHONE (OUTPATIENT)
Dept: PEDIATRICS | Facility: CLINIC | Age: 59
End: 2024-10-25

## 2024-10-25 ENCOUNTER — OFFICE VISIT (OUTPATIENT)
Dept: PEDIATRICS | Facility: CLINIC | Age: 59
End: 2024-10-25
Payer: COMMERCIAL

## 2024-10-25 VITALS
DIASTOLIC BLOOD PRESSURE: 64 MMHG | HEIGHT: 65 IN | BODY MASS INDEX: 41.4 KG/M2 | TEMPERATURE: 97.8 F | HEART RATE: 70 BPM | WEIGHT: 248.5 LBS | OXYGEN SATURATION: 99 % | RESPIRATION RATE: 16 BRPM | SYSTOLIC BLOOD PRESSURE: 128 MMHG

## 2024-10-25 DIAGNOSIS — R05.1 ACUTE COUGH: ICD-10-CM

## 2024-10-25 DIAGNOSIS — Z95.1 S/P CABG (CORONARY ARTERY BYPASS GRAFT): ICD-10-CM

## 2024-10-25 DIAGNOSIS — I48.0 PAROXYSMAL ATRIAL FIBRILLATION (H): ICD-10-CM

## 2024-10-25 DIAGNOSIS — J20.9 ACUTE BRONCHITIS, UNSPECIFIED ORGANISM: Primary | ICD-10-CM

## 2024-10-25 DIAGNOSIS — Z95.2 S/P AVR (AORTIC VALVE REPLACEMENT): Primary | ICD-10-CM

## 2024-10-25 PROCEDURE — 99214 OFFICE O/P EST MOD 30 MIN: CPT | Performed by: PHYSICIAN ASSISTANT

## 2024-10-25 RX ORDER — AZITHROMYCIN 250 MG/1
TABLET, FILM COATED ORAL
Qty: 6 TABLET | Refills: 0 | Status: SHIPPED | OUTPATIENT
Start: 2024-10-25 | End: 2024-10-30

## 2024-10-25 RX ORDER — ALBUTEROL SULFATE 90 UG/1
2 INHALANT RESPIRATORY (INHALATION) EVERY 4 HOURS PRN
Qty: 18 G | Refills: 0 | Status: SHIPPED | OUTPATIENT
Start: 2024-10-25 | End: 2024-11-06

## 2024-10-25 ASSESSMENT — PAIN SCALES - GENERAL: PAINLEVEL_OUTOF10: NO PAIN (0)

## 2024-10-25 ASSESSMENT — ENCOUNTER SYMPTOMS: COUGH: 1

## 2024-10-25 NOTE — TELEPHONE ENCOUNTER
FYI - Status Update    Who is Calling: patient    Update: on qan antibotic for bronchotis and notifying inr     Does caller want a call/response back: Yes     Could we send this information to you in Aheadt or would you prefer to receive a phone call?:   Patient would prefer a phone call   Okay to leave a detailed message?: Yes at Home number on file [phone] (home)

## 2024-10-25 NOTE — TELEPHONE ENCOUNTER
"ANTICOAGULATION  MANAGEMENT     Interacting Medication Review    Interacting medication(s): Azithromycin (Zithromax, Z-nancy) with warfarin.    Duration: 5 days (10/25/24 to 10/29/24)    Indication:  bronchitis    New medication?: Yes, interaction may increase INR and risk of bleeding. Closer INR monitoring recommended.        PLAN     Continue your current warfarin dose with next INR in 5 days        Summary  As of 10/25/2024      Full warfarin instructions:  10 mg every Mon, Fri; 12.5 mg all other days   Next INR check:  10/29/2024               Telephone call with Glen who agrees to plan and repeated back plan correctly    New recheck date updated on anticoagulation calendar     [facility] priority set/moved to \"high\" for new major drug interaction    Plan made per [facility] anticoagulation protocol    Jovana Carpenter RN  10/25/2024  Anticoagulation Clinic  [facility] for routing messages: nora GUZMAN  [facility] patient phone line: [phone]  "

## 2024-10-25 NOTE — PROGRESS NOTES
"  Assessment & Plan     Acute bronchitis, unspecified organism    - albuterol (PROAIR HFA/PROVENTIL HFA/VENTOLIN HFA) 108 (90 Base) MCG/ACT inhaler; Inhale 2 puffs into the lungs every 4 hours as needed for shortness of breath, wheezing or cough.  - azithromycin (ZITHROMAX) 250 MG tablet; Take 2 tablets (500 mg) by mouth daily for 1 day, THEN 1 tablet (250 mg) daily for 4 days.    Acute cough    - albuterol (PROAIR HFA/PROVENTIL HFA/VENTOLIN HFA) 108 (90 Base) MCG/ACT inhaler; Inhale 2 puffs into the lungs every 4 hours as needed for shortness of breath, wheezing or cough.  - azithromycin (ZITHROMAX) 250 MG tablet; Take 2 tablets (500 mg) by mouth daily for 1 day, THEN 1 tablet (250 mg) daily for 4 days.      He was informed to notify the INR clinic that he will be starting Azithromycin because this interacts with the coumadin and may need adjustments and more frequent INR visits.  Patient understands and agrees.  Patient to followup as needed.       BMI  Estimated body mass index is 41.35 kg/m  as calculated from the following:    Height as of this encounter: 1.651 m (5' 5\").    Weight as of this encounter: 112.7 kg (248 lb 8 oz).             Danisha Carroll is a 59 year old, presenting for the following health issues:  Cough      10/25/2024     8:45 AM   Additional Questions   Roomed by Courtney Barragan CMA     Cough    History of Present Illness       Reason for visit:  Cough/congestion  Symptom onset:  1-2 weeks ago   He is taking medications regularly.  Cough is worse when laying down. Different positioning does not help. Has gotten minimal amounts of sleep the past 2 days. Coughing fits, productive cough. Some wheezing. White phlegm. Taking OTC cough and mucinex. Started around the 13th or so with sinus pressure and drainage, then moved to his lungs.        Review of Systems  Constitutional, neuro, ENT, endocrine, pulmonary, cardiac, gastrointestinal, genitourinary, musculoskeletal, integument and psychiatric " "systems are negative, except as otherwise noted.      Objective    /64 (BP Location: Right arm, Patient Position: Sitting, Cuff Size: Adult Large)   Pulse 70   Temp 97.8  F (36.6  C) (Temporal)   Resp 16   Ht 1.651 m (5' 5\")   Wt 112.7 kg (248 lb 8 oz)   SpO2 99%   BMI 41.35 kg/m    Body mass index is 41.35 kg/m .  Physical Exam   GENERAL: alert and no distress  EYES: Eyes grossly normal to inspection, PERRL and conjunctivae and sclerae normal  HENT: ear canals and TM's normal, nose and mouth without ulcers or lesions  NECK: no adenopathy, no asymmetry, masses, or scars  RESP: lungs clear to auscultation - no rales, rhonchi or wheezes  CV: regular rate and rhythm, normal S1 S2, no S3 or S4, no murmur, click or rub, no peripheral edema  MS: no gross musculoskeletal defects noted, no edema          Signed Electronically by: Mela Morales PA-C    "

## 2024-10-29 ENCOUNTER — LAB (OUTPATIENT)
Dept: LAB | Facility: CLINIC | Age: 59
End: 2024-10-29
Payer: COMMERCIAL

## 2024-10-29 ENCOUNTER — ANTICOAGULATION THERAPY VISIT (OUTPATIENT)
Dept: ANTICOAGULATION | Facility: CLINIC | Age: 59
End: 2024-10-29

## 2024-10-29 ENCOUNTER — TELEPHONE (OUTPATIENT)
Dept: ANTICOAGULATION | Facility: CLINIC | Age: 59
End: 2024-10-29

## 2024-10-29 DIAGNOSIS — Z95.2 S/P AVR (AORTIC VALVE REPLACEMENT): Primary | ICD-10-CM

## 2024-10-29 DIAGNOSIS — I48.0 PAROXYSMAL ATRIAL FIBRILLATION (H): ICD-10-CM

## 2024-10-29 DIAGNOSIS — Z95.1 S/P CABG (CORONARY ARTERY BYPASS GRAFT): ICD-10-CM

## 2024-10-29 LAB — INR BLD: 1.9 (ref 0.9–1.1)

## 2024-10-29 PROCEDURE — 36416 COLLJ CAPILLARY BLOOD SPEC: CPT

## 2024-10-29 PROCEDURE — 85610 PROTHROMBIN TIME: CPT

## 2024-10-29 NOTE — PROGRESS NOTES
ANTICOAGULATION MANAGEMENT     Glen Cho 59 year old male is on warfarin with therapeutic INR result. (Goal INR 1.5-2.0)    Recent labs: (last 7 days)     10/29/24  1139   INR 1.9*       ASSESSMENT     Source(s): Chart Review and Patient/Caregiver Call     Warfarin doses taken: Warfarin taken as instructed  Diet: No new diet changes identified  Medication/supplement changes: completed Augmentin today.    New illness, injury, or hospitalization: bronchitis symptoms are improving.  Signs or symptoms of bleeding or clotting: No  Previous result: Therapeutic last 2(+) visits  Additional findings: Upcoming surgery/procedure knee surgery scheduled 12/17/24       PLAN     Recommended plan for temporary change(s) affecting INR     Dosing Instructions: Continue your current warfarin dose with next INR in 6 weeks       Summary  As of 10/29/2024      Full warfarin instructions:  10 mg every Mon, Fri; 12.5 mg all other days   Next INR check:  12/10/2024               Telephone call with Glen who agrees to plan and repeated back plan correctly    Lab visit scheduled    Education provided: Please call back if any changes to your diet, medications or how you've been taking warfarin    Plan made per [facility] anticoagulation protocol    Jovana Carpenter RN  10/29/2024  Anticoagulation Clinic  [employer] - /l?k â€¢ splik/ for routing messages: nora GUZMAN  [facility] patient phone line: [phone]        _______________________________________________________________________     Anticoagulation Episode Summary       Current INR goal:  1.5-2.0   TTR:  75.8% (1 y)   Target end date:  Indefinite   Send INR reminders to:  STEPHANIE GUZMAN    Indications    S/P AVR (aortic valve replacement)-23 mm On-X valve [Z95.2]  S/P CABG (coronary artery bypass graft)-LIMA>LAD  VG>RCA  VG>OM [Z95.1]  Paroxysmal atrial fibrillation (H) [I48.0]             Comments:  07/10/23-On-X AVR             Anticoagulation Care Providers       Provider Role Specialty Phone number     Kelsey Vega PA-C Referring Cardiovascular & Thoracic Surgery [phone]    Mike Deal MD Referring Internal Medicine - Pediatrics [phone]

## 2024-10-29 NOTE — TELEPHONE ENCOUNTER
[facility]ULL[facility]-PROCEDURAL ANTICOAGULATION  MANAGEMENT    Glen requesting pre-procedure hold orders for warfarin and review for bridging      Procedure date: 12/17/24       Procedure: Total Knee Replacement      Procedure location and phone number (if external):  Kiko Schreiber  [phone]     Number of warfarin hold days requested and/or target INR: unknown    Pre-op date: not yet scheduled.  Does have an appointment on 11/6 with cardiology for surgical clearance from cardiac standpoint.      Routing to Anticoagulation Pharmacist for review.     ACC pool: nora Carpenter RN

## 2024-10-30 ENCOUNTER — HOSPITAL ENCOUNTER (OUTPATIENT)
Dept: CARDIOLOGY | Facility: CLINIC | Age: 59
Discharge: HOME | End: 2024-10-30
Attending: INTERNAL MEDICINE | Admitting: INTERNAL MEDICINE
Payer: COMMERCIAL

## 2024-10-30 DIAGNOSIS — Z95.2 S/P AVR: ICD-10-CM

## 2024-10-30 DIAGNOSIS — I25.10 CORONARY ARTERY DISEASE INVOLVING NATIVE CORONARY ARTERY OF NATIVE HEART WITHOUT ANGINA PECTORIS: ICD-10-CM

## 2024-10-30 LAB — LVEF ECHO: NORMAL

## 2024-10-30 PROCEDURE — 255N000002 HC RX 255 OP 636: Performed by: INTERNAL MEDICINE

## 2024-10-30 PROCEDURE — 999N000208 ECHOCARDIOGRAM COMPLETE

## 2024-10-30 PROCEDURE — 93306 TTE W/DOPPLER COMPLETE: CPT | Mod: 26 | Performed by: INTERNAL MEDICINE

## 2024-10-30 RX ADMIN — HUMAN ALBUMIN MICROSPHERES AND PERFLUTREN 4 ML: 10; .22 INJECTION, SOLUTION INTRAVENOUS at 08:53

## 2024-11-06 ENCOUNTER — OFFICE VISIT (OUTPATIENT)
Dept: CARDIOLOGY | Facility: CLINIC | Age: 59
End: 2024-11-06
Payer: COMMERCIAL

## 2024-11-06 VITALS
BODY MASS INDEX: 41.65 KG/M2 | HEART RATE: 76 BPM | DIASTOLIC BLOOD PRESSURE: 76 MMHG | WEIGHT: 250 LBS | OXYGEN SATURATION: 98 % | HEIGHT: 65 IN | SYSTOLIC BLOOD PRESSURE: 125 MMHG

## 2024-11-06 DIAGNOSIS — I10 BENIGN ESSENTIAL HYPERTENSION: ICD-10-CM

## 2024-11-06 DIAGNOSIS — Z01.810 PRE-OPERATIVE CARDIOVASCULAR EXAMINATION: Primary | ICD-10-CM

## 2024-11-06 DIAGNOSIS — Z95.2 HISTORY OF MECHANICAL AORTIC VALVE REPLACEMENT: ICD-10-CM

## 2024-11-06 DIAGNOSIS — Z95.1 HX OF CABG: ICD-10-CM

## 2024-11-06 DIAGNOSIS — I25.10 CORONARY ARTERY DISEASE INVOLVING NATIVE CORONARY ARTERY OF NATIVE HEART WITHOUT ANGINA PECTORIS: ICD-10-CM

## 2024-11-06 DIAGNOSIS — N18.31 TYPE 2 DIABETES MELLITUS WITH STAGE 3A CHRONIC KIDNEY DISEASE, WITH LONG-TERM CURRENT USE OF INSULIN (H): ICD-10-CM

## 2024-11-06 DIAGNOSIS — Z79.4 TYPE 2 DIABETES MELLITUS WITH STAGE 3A CHRONIC KIDNEY DISEASE, WITH LONG-TERM CURRENT USE OF INSULIN (H): ICD-10-CM

## 2024-11-06 DIAGNOSIS — E11.22 TYPE 2 DIABETES MELLITUS WITH STAGE 3A CHRONIC KIDNEY DISEASE, WITH LONG-TERM CURRENT USE OF INSULIN (H): ICD-10-CM

## 2024-11-06 PROCEDURE — 99214 OFFICE O/P EST MOD 30 MIN: CPT | Performed by: INTERNAL MEDICINE

## 2024-11-06 PROCEDURE — 93000 ELECTROCARDIOGRAM COMPLETE: CPT | Performed by: INTERNAL MEDICINE

## 2024-11-06 RX ORDER — ATORVASTATIN CALCIUM 40 MG/1
80 TABLET, FILM COATED ORAL EVERY EVENING
Qty: 200 TABLET | Refills: 3 | Status: SHIPPED | OUTPATIENT
Start: 2024-11-06 | End: 2024-11-06

## 2024-11-06 RX ORDER — LOSARTAN POTASSIUM 100 MG/1
100 TABLET ORAL DAILY
COMMUNITY
Start: 2024-11-06

## 2024-11-06 RX ORDER — LOSARTAN POTASSIUM 100 MG/1
100 TABLET ORAL DAILY
COMMUNITY
Start: 2024-11-06 | End: 2024-11-06

## 2024-11-06 RX ORDER — ATORVASTATIN CALCIUM 40 MG/1
80 TABLET, FILM COATED ORAL EVERY EVENING
Qty: 200 TABLET | Refills: 3 | Status: SHIPPED | OUTPATIENT
Start: 2024-11-06

## 2024-11-06 NOTE — PROGRESS NOTES
SERVICE DATE: November 6, 2024    PRIMARY CARDIOLOGY TEAM: Dr. Jacobo Diez MD.    DIAGNOSES/ASSESSMENT:    Preoperative clearance for right knee surgery.  Recent successful CABG.  Stress testing not indicated.  Stable coronary artery disease status post CABGx 3, Medtronic On-X mechanical aortic valve replacement, July 2024.  On warfarin anticoagulation.  Well-functioning valve on echo, normal EKG.  Well-controlled systemic hypertension. /64.  Continue losartan 100 mg daily, hydrochlorothiazide 25 mg daily.  Type 2 DM. HbA1c improved from 8.3% to 6.7% on Jardiance.  Hyperlipidemia with LDL not at target (50 or below).  Increase atorvastatin to 80 mg daily.  Morbid obesity (BMI 41).  Consider GLP-1 agonist.  Tobacco use. Restarted in February after 18-year cessation.      PLAN:  Successful preoperative cardiac clearance.  Continue warfarin, INR 1.5-2.0. Follow INR clinic for LMWH bridging pre/post knee surgery.  Increase atorvastatin from 40 mg to 80 mg daily for LDL target of 50 or below.  Encouraged tobacco cessation.  Reassure patient about palpable heartbeat, monitor for changes.  Discontinue aspirin.  Not indicated as on warfarin.  Schedule postoperative follow-up with [facility] with previsit labs and echocardiogram.  If LDL not at target, add PCSK9 inhibitor.  Follow-up with Dr. Diez in 1 year.  See PCP for GLP-1 agonists such as Ozempic.      Simona Cotton MD  Cardiology        REASON FOR VISIT:  Cardiac clearance prior to right knee surgery on 12/17/2024.    HISTORY OF PRESENT ILLNESS:  Glen Cho is 59 year old male, follows with my partner Dr. Jacobo Diez, presenting for preoperative cardiac clearance prior to elective knee arthroplasty.    He had left knee arthroplasty in April 2023 that was uncomplicated, subsequently underwent CABG x3 and 23 mm On-X mechanical aortic valve replacement for moderate aortic valve stenosis (possibly bicuspid valve and, brief postoperative atrial  "fibrillation with no recurrence, on long-term warfarin with goal INR of 1.5-2.0 for his On-X valve.  He is scheduled to undergo right knee arthroplasty on December 17. He is active at work but has limited exercise due to knee pain.  No angina, A-fib recurrence.    Recently started on Jardiance and his HbA1c has improved from 8.3% to 6.7%. He uses CPAP daily for CORINA. He resumed smoking in February (0.5-0.75 pack/day) after an 18-year cessation.    BP: 128/64 mmHg, HR: 70 BPM. Normal first heart sound, crisp mechanical click of the second heart sound, no murmurs. BMI: 41 with central adiposity.    Labs show an LDL of 73, mixed dyslipidemia with low HDL of 33 and hypertriglyceridemia of 2.257, HbA1c of 6.7%, creatinine 0.7, potassium 4.0.  His serial INRs have been therapeutic.    His recent echocardiogram dated 10/30/2024 shows a well-functioning mechanical aortic valve with a normal prosthetic valve gradient of 15-19 mmHg, no regurgitation, normal LVEF of 65%, normal right ventricle.    New patient.  Moderate complexity medical decision making.        This note was completed in part using dictation via the Dragon voice recognition software. Some word and grammatical errors may occur and must be interpreted in the appropriate clinical context. If there are any questions pertaining to this issue, please contact me for further clarification.     Orders Placed This Encounter   Procedures    Comprehensive metabolic panel    Lipid Profile    TSH with free T4 reflex    Hemoglobin A1c    Follow-Up with Cardiology [facility]    EKG 12-lead complete w/read - Clinics (performed today)    Echocardiogram Complete    CBC with platelets differential         Vitals: /76 (BP Location: Right arm, Patient Position: Sitting)   Pulse 76   Ht 1.651 m (5' 5\")   Wt 113.4 kg (250 lb)   SpO2 98%   BMI 41.60 kg/m        CURRENT MEDICATIONS:  Current Outpatient Medications   Medication Sig Dispense Refill    acetaminophen (TYLENOL) 325 MG " tablet Take 2 tablets (650 mg) by mouth every 4 hours as needed for other or mild pain 30 tablet 1    atorvastatin (LIPITOR) 40 MG tablet Take 2 tablets (80 mg) by mouth every evening. 200 tablet 3    BD PEN NEEDLE KENTRELL 2ND GEN 32G X 4 MM miscellaneous USE 1 PEN NEEDLE TIP 3 TIMES DAILY 300 each 0    blood glucose monitoring (FREESTYLE INSULINX SYSTEM) meter device kit Use to test blood sugar 3 times daily or as directed. 1 kit 0    blood glucose monitoring (FREESTYLE) lancets Use to test blood sugars 2-3 times daily or as directed. 1 Box prn    empagliflozin (JARDIANCE) 25 MG TABS tablet Take 1 tablet (25 mg) by mouth daily 90 tablet 3    hydrochlorothiazide (HYDRODIURIL) 25 MG tablet Take 1 tablet (25 mg) by mouth daily 90 tablet 3    insulin glargine (LANTUS PEN) 100 UNIT/ML pen Inject 50 Units Subcutaneous at bedtime 45 mL 3    insulin lispro (HUMALOG KWIKPEN) 100 UNIT/ML (1 unit dial) KWIKPEN Inject 25 Units Subcutaneous 3 times daily (before meals) 66 mL 3    insulin pen needle (32G X 4 MM) 32G X 4 MM miscellaneous Use 4 pen needles daily or as directed. 200 each 11    insulin pen needle (BD KENTRELL U/F) 32G X 4 MM USE 2 PEN NEEDLES DAILY OR AS DIRECTED. 200 each 0    losartan (COZAAR) 100 MG tablet Take 1 tablet (100 mg) by mouth daily. Takle 1/2 tab daily for 2 weeks.  If blood pressure not consistently below 130, increase to 1 full tab daily      metFORMIN (GLUCOPHAGE XR) 500 MG 24 hr tablet Take 2 tablets (1,000 mg) by mouth daily (with dinner) 180 tablet 3    order for DME Equipment being ordered: Verio flex test strips  Patient tests 1 times daily. 100 Device 3    order for DME Equipment being ordered: lancets  Uses 4 a day.    Please dispense what insurance covers. 400 each 3    order for DME Equipment being ordered: glucose meter  Please dispense what insurance covers 1 each 0    ORDER FOR DME Equipment being ordered: glucometer    60 lancets and test strips (5 refills) for twice daily testing. 1 each 0     warfarin ANTICOAGULANT (COUMADIN) 5 MG tablet Take 2.5 tablets (12.5 mg) by mouth daily or as directed by INR Clinic (Patient taking differently: Take 2.5 tablets (12.5 mg) by mouth Monday and Friday and 2 tablets (10mg) all other days of the week  or as directed by INR Clinic) 225 tablet 3         ALLERGIES:  Allergies   Allergen Reactions    Amlodipine Other (See Comments)     Water retention    Amlodipine Besylate      Water retention    Cefdinir      Joint pains    Other Reaction(s): Arthralgia    Lisinopril Other (See Comments)     Water retention    Amoxicillin Rash     White spots

## 2024-11-06 NOTE — LETTER
11/6/2024    Mike Deal MD  [address] [place] [address]    RE: Glen Cho       Dear Colleague,     I had the pleasure of seeing Glen Cho in the [facility].      SERVICE DATE: November 6, 2024    PRIMARY CARDIOLOGY TEAM: Dr. Jacobo Diez MD.    DIAGNOSES/ASSESSMENT:    Preoperative clearance for right knee surgery.  Recent successful CABG.  Stress testing not indicated.  Stable coronary artery disease status post CABGx 3, Medtronic On-X mechanical aortic valve replacement, July 2024.  On warfarin anticoagulation.  Well-functioning valve on echo, normal EKG.  Well-controlled systemic hypertension. /64.  Continue losartan 100 mg daily, hydrochlorothiazide 25 mg daily.  Type 2 DM. HbA1c improved from 8.3% to 6.7% on Jardiance.  Hyperlipidemia with LDL not at target (50 or below).  Increase atorvastatin to 80 mg daily.  Morbid obesity (BMI 41).  Consider GLP-1 agonist.  Tobacco use. Restarted in February after 18-year cessation.      PLAN:  Successful preoperative cardiac clearance.  Continue warfarin, INR 1.5-2.0. Follow INR clinic for LMWH bridging pre/post knee surgery.  Increase atorvastatin from 40 mg to 80 mg daily for LDL target of 50 or below.  Encouraged tobacco cessation.  Reassure patient about palpable heartbeat, monitor for changes.  Discontinue aspirin.  Not indicated as on warfarin.  Schedule postoperative follow-up with [facility] with previsit labs and echocardiogram.  If LDL not at target, add PCSK9 inhibitor.  Follow-up with Dr. Diez in 1 year.  See PCP for GLP-1 agonists such as Ozempic.      Simona Cotton MD  Cardiology        REASON FOR VISIT:  Cardiac clearance prior to right knee surgery on 12/17/2024.    HISTORY OF PRESENT ILLNESS:  Glen Cho is 59 year old male, follows with my partner Dr. Jacobo Diez, presenting for preoperative cardiac clearance prior to elective knee arthroplasty.    He had left knee arthroplasty in April  2023 that was uncomplicated, subsequently underwent CABG x3 and 23 mm On-X mechanical aortic valve replacement for moderate aortic valve stenosis (possibly bicuspid valve and, brief postoperative atrial fibrillation with no recurrence, on long-term warfarin with goal INR of 1.5-2.0 for his On-X valve.  He is scheduled to undergo right knee arthroplasty on December 17. He is active at work but has limited exercise due to knee pain.  No angina, A-fib recurrence.    Recently started on Jardiance and his HbA1c has improved from 8.3% to 6.7%. He uses CPAP daily for CORINA. He resumed smoking in February (0.5-0.75 pack/day) after an 18-year cessation.    BP: 128/64 mmHg, HR: 70 BPM. Normal first heart sound, crisp mechanical click of the second heart sound, no murmurs. BMI: 41 with central adiposity.    Labs show an LDL of 73, mixed dyslipidemia with low HDL of 33 and hypertriglyceridemia of 2.257, HbA1c of 6.7%, creatinine 0.7, potassium 4.0.  His serial INRs have been therapeutic.    His recent echocardiogram dated 10/30/2024 shows a well-functioning mechanical aortic valve with a normal prosthetic valve gradient of 15-19 mmHg, no regurgitation, normal LVEF of 65%, normal right ventricle.    New patient.  Moderate complexity medical decision making.        This note was completed in part using dictation via the Dragon voice recognition software. Some word and grammatical errors may occur and must be interpreted in the appropriate clinical context. If there are any questions pertaining to this issue, please contact me for further clarification.     Orders Placed This Encounter   Procedures     Comprehensive metabolic panel     Lipid Profile     TSH with free T4 reflex     Hemoglobin A1c     Follow-Up with Cardiology [facility]     EKG 12-lead complete w/read - Clinics (performed today)     Echocardiogram Complete     CBC with platelets differential         Vitals: /76 (BP Location: Right arm, Patient Position: Sitting)    "Pulse 76   Ht 1.651 m (5' 5\")   Wt 113.4 kg (250 lb)   SpO2 98%   BMI 41.60 kg/m        CURRENT MEDICATIONS:  Current Outpatient Medications   Medication Sig Dispense Refill     acetaminophen (TYLENOL) 325 MG tablet Take 2 tablets (650 mg) by mouth every 4 hours as needed for other or mild pain 30 tablet 1     atorvastatin (LIPITOR) 40 MG tablet Take 2 tablets (80 mg) by mouth every evening. 200 tablet 3     BD PEN NEEDLE KENTRELL 2ND GEN 32G X 4 MM miscellaneous USE 1 PEN NEEDLE TIP 3 TIMES DAILY 300 each 0     blood glucose monitoring (FREESTYLE INSULINX SYSTEM) meter device kit Use to test blood sugar 3 times daily or as directed. 1 kit 0     blood glucose monitoring (FREESTYLE) lancets Use to test blood sugars 2-3 times daily or as directed. 1 Box prn     empagliflozin (JARDIANCE) 25 MG TABS tablet Take 1 tablet (25 mg) by mouth daily 90 tablet 3     hydrochlorothiazide (HYDRODIURIL) 25 MG tablet Take 1 tablet (25 mg) by mouth daily 90 tablet 3     insulin glargine (LANTUS PEN) 100 UNIT/ML pen Inject 50 Units Subcutaneous at bedtime 45 mL 3     insulin lispro (HUMALOG KWIKPEN) 100 UNIT/ML (1 unit dial) KWIKPEN Inject 25 Units Subcutaneous 3 times daily (before meals) 66 mL 3     insulin pen needle (32G X 4 MM) 32G X 4 MM miscellaneous Use 4 pen needles daily or as directed. 200 each 11     insulin pen needle (BD KENTRELL U/F) 32G X 4 MM USE 2 PEN NEEDLES DAILY OR AS DIRECTED. 200 each 0     losartan (COZAAR) 100 MG tablet Take 1 tablet (100 mg) by mouth daily. Takle 1/2 tab daily for 2 weeks.  If blood pressure not consistently below 130, increase to 1 full tab daily       metFORMIN (GLUCOPHAGE XR) 500 MG 24 hr tablet Take 2 tablets (1,000 mg) by mouth daily (with dinner) 180 tablet 3     order for DME Equipment being ordered: Verio flex test strips  Patient tests 1 times daily. 100 Device 3     order for DME Equipment being ordered: lancets  Uses 4 a day.    Please dispense what insurance covers. 400 each 3     " order for DME Equipment being ordered: glucose meter  Please dispense what insurance covers 1 each 0     ORDER FOR DME Equipment being ordered: glucometer    60 lancets and test strips (5 refills) for twice daily testing. 1 each 0     warfarin ANTICOAGULANT (COUMADIN) 5 MG tablet Take 2.5 tablets (12.5 mg) by mouth daily or as directed by INR Clinic (Patient taking differently: Take 2.5 tablets (12.5 mg) by mouth Monday and Friday and 2 tablets (10mg) all other days of the week  or as directed by INR Clinic) 225 tablet 3         ALLERGIES:  Allergies   Allergen Reactions     Amlodipine Other (See Comments)     Water retention     Amlodipine Besylate      Water retention     Cefdinir      Joint pains    Other Reaction(s): Arthralgia     Lisinopril Other (See Comments)     Water retention     Amoxicillin Rash     White spots               Thank you for allowing me to participate in the care of your patient.      Sincerely,     Simona Cotton MD     [facility]  cc:   Jacobo Diez MD  [address] [address]

## 2024-11-11 ENCOUNTER — TELEPHONE (OUTPATIENT)
Dept: PEDIATRICS | Facility: CLINIC | Age: 59
End: 2024-11-11
Payer: COMMERCIAL

## 2024-11-11 NOTE — TELEPHONE ENCOUNTER
General Call    Contacts       Contact Date/Time Type Contact Phone/Fax    11/11/2024 11:13 AM CST Phone (Incoming) Glen Cho (Self) [phone] (M)          Reason for Call:  speak to ACN    What are your questions or concerns:  is having knee surgery on December 17th and would like to know what he should do regarding his Coumadin/Warfarin to prepare for that    Date of last appointment with provider:     Could we send this information to you in [facility] or would you prefer to receive a phone call?:   Patient would prefer a phone call   Okay to leave a detailed message?: Yes at Cell number on file:    Telephone Information:   Mobile [phone]

## 2024-11-11 NOTE — TELEPHONE ENCOUNTER
Returned call to patient. Advised warfarin hold plan request was sent to [facility] 10/29/24. [facility] will call patient with hold plan once it is completed. Patient verbalized understanding. Patient was cleared by Cardio for 12/17/24 knee surgery per 11/6/24 office visit note.  Marti Balderas RN, BSN  Anticoagulation Clinic

## 2024-11-25 ENCOUNTER — OFFICE VISIT (OUTPATIENT)
Dept: PEDIATRICS | Facility: CLINIC | Age: 59
End: 2024-11-25
Payer: COMMERCIAL

## 2024-11-25 VITALS
RESPIRATION RATE: 14 BRPM | SYSTOLIC BLOOD PRESSURE: 108 MMHG | DIASTOLIC BLOOD PRESSURE: 72 MMHG | TEMPERATURE: 97.7 F | OXYGEN SATURATION: 97 % | WEIGHT: 248.7 LBS | HEIGHT: 65 IN | HEART RATE: 73 BPM | BODY MASS INDEX: 41.44 KG/M2

## 2024-11-25 DIAGNOSIS — E78.5 DYSLIPIDEMIA: ICD-10-CM

## 2024-11-25 DIAGNOSIS — Z95.2 S/P AVR (AORTIC VALVE REPLACEMENT): ICD-10-CM

## 2024-11-25 DIAGNOSIS — Z01.818 PREOP GENERAL PHYSICAL EXAM: ICD-10-CM

## 2024-11-25 DIAGNOSIS — Z79.4 TYPE 2 DIABETES MELLITUS WITH DIABETIC NEPHROPATHY, WITH LONG-TERM CURRENT USE OF INSULIN (H): ICD-10-CM

## 2024-11-25 DIAGNOSIS — E11.21 TYPE 2 DIABETES MELLITUS WITH DIABETIC NEPHROPATHY, WITH LONG-TERM CURRENT USE OF INSULIN (H): ICD-10-CM

## 2024-11-25 DIAGNOSIS — N18.1 CKD (CHRONIC KIDNEY DISEASE) STAGE 1, GFR 90 ML/MIN OR GREATER: Primary | ICD-10-CM

## 2024-11-25 DIAGNOSIS — I10 BENIGN ESSENTIAL HYPERTENSION: ICD-10-CM

## 2024-11-25 LAB
BASOPHILS # BLD AUTO: 0 10E3/UL (ref 0–0.2)
BASOPHILS NFR BLD AUTO: 0 %
EOSINOPHIL # BLD AUTO: 0.2 10E3/UL (ref 0–0.7)
EOSINOPHIL NFR BLD AUTO: 1 %
ERYTHROCYTE [DISTWIDTH] IN BLOOD BY AUTOMATED COUNT: 13.9 % (ref 10–15)
HCT VFR BLD AUTO: 48.7 % (ref 40–53)
HGB BLD-MCNC: 15.4 G/DL (ref 13.3–17.7)
IMM GRANULOCYTES # BLD: 0 10E3/UL
IMM GRANULOCYTES NFR BLD: 0 %
LYMPHOCYTES # BLD AUTO: 3.2 10E3/UL (ref 0.8–5.3)
LYMPHOCYTES NFR BLD AUTO: 28 %
MCH RBC QN AUTO: 27.9 PG (ref 26.5–33)
MCHC RBC AUTO-ENTMCNC: 31.6 G/DL (ref 31.5–36.5)
MCV RBC AUTO: 88 FL (ref 78–100)
MONOCYTES # BLD AUTO: 0.6 10E3/UL (ref 0–1.3)
MONOCYTES NFR BLD AUTO: 5 %
NEUTROPHILS # BLD AUTO: 7.6 10E3/UL (ref 1.6–8.3)
NEUTROPHILS NFR BLD AUTO: 65 %
PLATELET # BLD AUTO: 300 10E3/UL (ref 150–450)
RBC # BLD AUTO: 5.51 10E6/UL (ref 4.4–5.9)
WBC # BLD AUTO: 11.6 10E3/UL (ref 4–11)

## 2024-11-25 PROCEDURE — 85025 COMPLETE CBC W/AUTO DIFF WBC: CPT | Performed by: INTERNAL MEDICINE

## 2024-11-25 PROCEDURE — 90480 ADMN SARSCOV2 VAC 1/ONLY CMP: CPT | Performed by: INTERNAL MEDICINE

## 2024-11-25 PROCEDURE — 36415 COLL VENOUS BLD VENIPUNCTURE: CPT | Performed by: INTERNAL MEDICINE

## 2024-11-25 PROCEDURE — 90673 RIV3 VACCINE NO PRESERV IM: CPT | Performed by: INTERNAL MEDICINE

## 2024-11-25 PROCEDURE — 99214 OFFICE O/P EST MOD 30 MIN: CPT | Mod: 25 | Performed by: INTERNAL MEDICINE

## 2024-11-25 PROCEDURE — 90471 IMMUNIZATION ADMIN: CPT | Performed by: INTERNAL MEDICINE

## 2024-11-25 PROCEDURE — 80053 COMPREHEN METABOLIC PANEL: CPT | Performed by: INTERNAL MEDICINE

## 2024-11-25 PROCEDURE — 82043 UR ALBUMIN QUANTITATIVE: CPT | Performed by: INTERNAL MEDICINE

## 2024-11-25 PROCEDURE — 82570 ASSAY OF URINE CREATININE: CPT | Performed by: INTERNAL MEDICINE

## 2024-11-25 PROCEDURE — 91320 SARSCV2 VAC 30MCG TRS-SUC IM: CPT | Performed by: INTERNAL MEDICINE

## 2024-11-25 RX ORDER — LOSARTAN POTASSIUM 100 MG/1
100 TABLET ORAL DAILY
Qty: 90 TABLET | Refills: 3 | Status: SHIPPED | OUTPATIENT
Start: 2024-11-25

## 2024-11-25 NOTE — PROGRESS NOTES
Preoperative Evaluation  [facility] [place]  [address]  [address]  [place] [address]  Phone: [phone]  Fax: [phone]  Primary Provider: Mike Deal MD  Pre-op Performing Provider: Mike Deal MD  Nov 25, 2024 11/25/2024   Surgical Information   What procedure is being done? right knee replacement    [facility]    Who is doing the procedure / surgery? dr cammie ruiz    Date of surgery / procedure: 12/17/24    Time of surgery / procedure: not sure    Where do you plan to recover after surgery? at home with family        Patient-reported     Fax number for surgical facility: Note does not need to be faxed, will be available electronically in Epic.    Assessment & Plan     The proposed surgical procedure is considered INTERMEDIATE risk.    CKD (chronic kidney disease) stage 1, GFR 90 ml/min or greater  Secondary risk factor modification.   - Albumin Random Urine Quantitative with Creat Ratio; Future    Benign essential hypertension  At goal > Refilled.   - losartan (COZAAR) 100 MG tablet; Take 1 tablet (100 mg) by mouth daily.  - CBC with platelets and differential; Future  - Comprehensive metabolic panel (BMP + Alb, Alk Phos, ALT, AST, Total. Bili, TP); Future    Dyslipidemia  Ongoing statin.     S/P AVR (aortic valve replacement)-23 mm On-X valve  Anticoag plan will come from [facility]; will need bridging.   - CBC with platelets and differential; Future    Type 2 diabetes mellitus with diabetic nephropathy, with long-term current use of insulin (H)  Parameters well controlled.  Continue secondary risk factor modification for BP, cholesterol, anticoagulation, and smoking cessation.     Preop general physical exam  Advised to stop all aspirin products and nonsteroidals (like ibuprofen or naproxen) for 10 days prior to procedure.  Advised follow surgical center's instructions re: arrival time and when to stop  eating.   - CBC with platelets and differential; Future           - No identified additional risk factors other than previously addressed    Warfarin dosing and lovenox bridging will be done your anticoagulation nurses.  Likely we'll stop the warfarin a few days before and begin injections.    Do not take the metformin or the hydrochlorothiazide or the short acting insulin on the AM of surgery.    Take your AM lantus but only take 40 units that AM.        Recommendation  Approval given to proceed with proposed procedure, without further diagnostic evaluation.    Danisha Carroll is a 59 year old, presenting for the following:  Pre-Op Exam          11/25/2024     3:54 PM   Additional Questions   Roomed by KEELEY Gonzales related to upcoming procedure:     Right knee replacment.    Echocardiogram from 10/30/34:        Interpretation Summary     s/p aortic valve replacement with a 23 mm On-X mechanical valve, well  seated.The gradient is normal for this prosthetic aortic valve.  There is mild concentric left ventricular hypertrophy.  The visual ejection fraction is 60-65%.     The study was technically difficult. Contrast was used without apparent  complications. Compared to prior study, there is no significant change.  _________________________________________________________      Electrocardiogram within normal limits 11/6    Seen by cardiology recently:  was told to         11/25/2024   Pre-Op Questionnaire   Have you ever had a heart attack or stroke? No    Have you ever had surgery on your heart or blood vessels, such as a stent placement, a coronary artery bypass, or surgery on an artery in your head, neck, heart, or legs? (!) YES     Do you have chest pain with activity? No    Do you have a history of heart failure? No    Do you currently have a cold, bronchitis or symptoms of other infection? No    Do you have a cough, shortness of breath, or wheezing? No    Do you or anyone in your family have  previous history of blood clots? No    Do you or does anyone in your family have a serious bleeding problem such as prolonged bleeding following surgeries or cuts? (!) UNKNOWN     Have you ever had problems with anemia or been told to take iron pills? No    Have you had any abnormal blood loss such as black, tarry or bloody stools? No    Have you ever had a blood transfusion? No    Are you willing to have a blood transfusion if it is medically needed before, during, or after your surgery? Yes    Have you or any of your relatives ever had problems with anesthesia? No    Do you have sleep apnea, excessive snoring or daytime drowsiness? (!) YES    Do you have a CPAP machine? Yes    Do you have any artifical heart valves or other implanted medical devices like a pacemaker, defibrillator, or continuous glucose monitor? (!) YES    What type of device do you have? mechanial aortic valve    Name of the clinic that manages your device [facility]    Do you have artificial joints? (!) YES    Are you allergic to latex? No        Patient-reported     Health Care Directive  Patient does not have a Health Care Directive: Patient states has Advance Directive and will bring in a copy to clinic.    Preoperative Review of    reviewed - no record of controlled substances prescribed.      Status of Chronic Conditions:  See problem list for active medical problems.  Problems all longstanding and stable, except as noted/documented.  See ROS for pertinent symptoms related to these conditions.    Patient Active Problem List    Diagnosis Date Noted    Paroxysmal atrial fibrillation (H) 11/20/2023     Priority: Medium    S/P AVR (aortic valve replacement)-23 mm On-X valve 07/17/2023     Priority: Medium     INR goal 1.5-2      Anticoagulated 07/17/2023     Priority: Medium     Goal 1.5-2      S/P CABG (coronary artery bypass graft)-LIMA>LAD, VG>RCA, VG>OM 07/10/2023     Priority: Medium    Dyslipidemia 06/12/2023      Priority: Medium    Coronary artery disease involving native coronary artery of native heart, unspecified whether angina present 06/12/2023     Priority: Medium    CORINA (obstructive sleep apnea) 02/05/2019     Priority: Medium    Type 2 diabetes mellitus with diabetic nephropathy, with long-term current use of insulin (H) 10/31/2016     Priority: Medium    Morbid obesity (H) 10/12/2015     Priority: Medium    CKD (chronic kidney disease) stage 1, GFR 90 ml/min or greater 02/09/2015     Priority: Medium    Tubular adenoma of colon 12/18/2014     Priority: Medium    Tear of lateral cartilage or meniscus of knee, current 03/20/2014     Priority: Medium    Rosacea 02/06/2013     Priority: Medium    Anxiety 12/12/2011     Priority: Medium      Past Medical History:   Diagnosis Date    Arthritis     Chronic kidney disease     Depressive disorder 2010    Diabetes (H)     High cholesterol     HTN (hypertension)     Obesity     Sleep apnea     DOES NOT HAVE A CPAP     Past Surgical History:   Procedure Laterality Date    ABDOMEN SURGERY  2001    ANKLE SURGERY  02/01/2004     R ORIF    ARTHROSCOPY KNEE  03/26/2014    Procedure: ARTHROSCOPY KNEE;  ARTHROSCOPY KNEE- RIGHT   SURGEON REQUESTS CHOICE ANETHESIA ;  Surgeon: Sabino Simpson MD;  Location: [facility] OR    BIOPSY  2011    BYPASS GRAFT ARTERY CORONARY, REPLACE VALVE AORTIC, COMBINED N/A 07/10/2023    Procedure: CORONARY ARTERY BYPASS GRAFT x 3 (LEFT INTERNAL MAMMARY ARTERY - LEFT ANTERIOR DESCENDING ARTERY; SAPHENOUS VEIN - OBTUSE MARGINAL ARTERY; SAPHENOUS VEIN TO ACUTE MARGINAL,  WITH ENDOSCOPIC SAPHENOUS VEIN HARVEST OF LEFT LOWER EXTREMITY; AORTIC VALVE REPLACEMENT WITH ON-X  PROSTHETIC HEART VALVE 23MM; ON CARDIOPULMONARY PUMP OXYGENATOR  (INTRAOPERATIVE TRANSESOPHAGEAL ECHOCARDIOGRAM BY A    CHOLECYSTECTOMY      COLONOSCOPY  2014    CV CORONARY ANGIOGRAM N/A 06/12/2023    Procedure: Coronary Angiogram;  Surgeon: Jacobo Diez MD;  Location: [facility] CARDIAC CATH  LAB    HERNIORRHAPHY UMBILICAL       Current Outpatient Medications   Medication Sig Dispense Refill    acetaminophen (TYLENOL) 325 MG tablet Take 2 tablets (650 mg) by mouth every 4 hours as needed for other or mild pain 30 tablet 1    atorvastatin (LIPITOR) 40 MG tablet Take 2 tablets (80 mg) by mouth every evening. 200 tablet 3    BD PEN NEEDLE KENTRELL 2ND GEN 32G X 4 MM miscellaneous USE 1 PEN NEEDLE TIP 3 TIMES DAILY 300 each 0    blood glucose monitoring (FREESTYLE INSULINX SYSTEM) meter device kit Use to test blood sugar 3 times daily or as directed. 1 kit 0    blood glucose monitoring (FREESTYLE) lancets Use to test blood sugars 2-3 times daily or as directed. 1 Box prn    empagliflozin (JARDIANCE) 25 MG TABS tablet Take 1 tablet (25 mg) by mouth daily 90 tablet 3    hydrochlorothiazide (HYDRODIURIL) 25 MG tablet Take 1 tablet (25 mg) by mouth daily 90 tablet 3    insulin glargine (LANTUS PEN) 100 UNIT/ML pen Inject 50 Units Subcutaneous at bedtime 45 mL 3    insulin lispro (HUMALOG KWIKPEN) 100 UNIT/ML (1 unit dial) KWIKPEN Inject 25 Units Subcutaneous 3 times daily (before meals) 66 mL 3    insulin pen needle (32G X 4 MM) 32G X 4 MM miscellaneous Use 4 pen needles daily or as directed. 200 each 11    insulin pen needle (BD KENTRELL U/F) 32G X 4 MM USE 2 PEN NEEDLES DAILY OR AS DIRECTED. 200 each 0    losartan (COZAAR) 100 MG tablet Take 1 tablet (100 mg) by mouth daily. Takle 1/2 tab daily for 2 weeks.  If blood pressure not consistently below 130, increase to 1 full tab daily      metFORMIN (GLUCOPHAGE XR) 500 MG 24 hr tablet Take 2 tablets (1,000 mg) by mouth daily (with dinner) 180 tablet 3    order for DME Equipment being ordered: Verio flex test strips  Patient tests 1 times daily. 100 Device 3    order for DME Equipment being ordered: lancets  Uses 4 a day.    Please dispense what insurance covers. 400 each 3    order for DME Equipment being ordered: glucose meter  Please dispense what insurance covers 1  "each 0    ORDER FOR DME Equipment being ordered: glucometer    60 lancets and test strips (5 refills) for twice daily testing. 1 each 0    warfarin ANTICOAGULANT (COUMADIN) 5 MG tablet Take 2.5 tablets (12.5 mg) by mouth daily or as directed by INR Clinic (Patient taking differently: Take 2.5 tablets (12.5 mg) by mouth Monday and Friday and 2 tablets (10mg) all other days of the week  or as directed by INR Clinic) 225 tablet 3       Allergies   Allergen Reactions    Amlodipine Other (See Comments)     Water retention    Amlodipine Besylate      Water retention    Cefdinir      Joint pains    Other Reaction(s): Arthralgia    Lisinopril Other (See Comments)     Water retention    Amoxicillin Rash     White spots        Social History     Tobacco Use    Smoking status: Former     Current packs/day: 0.50     Average packs/day: 1.5 packs/day for 42.7 years (63.2 ttl pk-yrs)     Types: Cigarettes     Start date: 7/1/1981     Quit date: 5/1/2023     Passive exposure: Current    Smokeless tobacco: Never   Substance Use Topics    Alcohol use: No     Comment: rarely     Family History   Problem Relation Age of Onset    Cancer Father         skin    Myocardial Infarction Father 57    Prostate Cancer Father      History   Drug Use No             Review of Systems  Constitutional, HEENT, cardiovascular, pulmonary, GI, , musculoskeletal, neuro, skin, endocrine and psych systems are negative, except as otherwise noted.    Objective    /72 (BP Location: Right arm, Patient Position: Sitting, Cuff Size: Adult Large)   Pulse 73   Temp 97.7  F (36.5  C) (Temporal)   Resp 14   Ht 1.651 m (5' 5\")   Wt 112.8 kg (248 lb 11.2 oz)   SpO2 97%   BMI 41.39 kg/m     Estimated body mass index is 41.39 kg/m  as calculated from the following:    Height as of this encounter: 1.651 m (5' 5\").    Weight as of this encounter: 112.8 kg (248 lb 11.2 oz).  Physical Exam  GENERAL: alert and no distress  EYES: Eyes grossly normal to " inspection, PERRL and conjunctivae and sclerae normal  HENT: ear canals and TM's normal, nose and mouth without ulcers or lesions  NECK: no adenopathy, no asymmetry, masses, or scars  RESP: lungs clear to auscultation - no rales, rhonchi or wheezes  CV: regular rate and rhythm, normal S1 S2, no S3 or S4, no murmur, click or rub, no peripheral edema  ABDOMEN: soft, nontender, no hepatosplenomegaly, no masses and bowel sounds normal  MS: no gross musculoskeletal defects noted, no edema  SKIN: no suspicious lesions or rashes  NEURO: Normal strength and tone, mentation intact and speech normal  PSYCH: mentation appears normal, affect normal/bright    Recent Labs   Lab Test 10/29/24  1139 10/02/24  1429 09/27/24  1451 04/17/24  1457 03/21/24  0804 01/08/24  1315 12/18/23  1214   INR 1.9* 1.7*  --    < >  --    < > 1.8*   NA  --   --   --   --   --   --  137   POTASSIUM  --   --   --   --   --   --  4.0   CR  --   --   --   --   --   --  0.70   A1C  --   --  6.7*  --  8.3*  --   --     < > = values in this interval not displayed.        Diagnostics  Labs pending at this time.  Results will be reviewed when available.   EKG: appears normal, NSR, normal axis, normal intervals, no acute ST/T changes c/w ischemia, no LVH by voltage criteria, unchanged from previous tracings    Revised Cardiac Risk Index (RCRI)  The patient has the following serious cardiovascular risks for perioperative complications:   - No serious cardiac risks = 0 points     RCRI Interpretation: 0 points: Class I (very low risk - 0.4% complication rate)         Signed Electronically by: Mike Deal MD  A copy of this evaluation report is provided to the requesting physician.

## 2024-11-25 NOTE — PATIENT INSTRUCTIONS
Warfarin dosing and lovenox bridging will be done your anticoagulation nurses.  Likely we'll stop the warfarin a few days before and begin injections.    Do not take the metformin or the hydrochlorothiazide or the short acting insulin on the AM of surgery.    Take your AM lantus but only take 40 units that AM.     Lab work today:  We can do labs in the exam room today, or you can get them done downstairs in the lab.      2 shots today: Flu and COVID       Patient Education   Preparing for Your Surgery  For Adults  Getting started  In most cases, a nurse will call to review your health history and instructions. They will give you an arrival time based on your scheduled surgery time. Please be ready to share:  Your doctor's clinic name and phone number  Your medical, surgical, and anesthesia history  A list of allergies and sensitivities  A list of medicines, including herbal treatments and over-the-counter drugs  Whether the patient has a legal guardian (ask how to send us the papers in advance)  Note: You may not receive a call if you were seen at our PAC (Preoperative Assessment Center).  Please tell us if you're pregnant--or if there's any chance you might be pregnant. Some surgeries may injure a fetus (unborn baby), so they require a pregnancy test. Surgeries that are safe for a fetus don't always need a test, and you can choose whether to have one.   Preparing for surgery  Within 10 to 30 days of surgery: Have a pre-op exam (sometimes called an H&P, or History and Physical). This can be done at a clinic or pre-operative center.  If you're having a , you may not need this exam. Talk to your care team.  At your pre-op exam, talk to your care team about all medicines you take. (This includes CBD oil and any drugs, such as THC, marijuana, and other forms of cannabis.) If you need to stop any medicine before surgery, ask when to start taking it again.  This is for your safety. Many medicines and drugs can  make you bleed too much during surgery. Some change how well surgery (anesthesia) drugs work.  Call your insurance company to let them know you're having surgery. (If you don't have insurance, call [phone].)  Call your clinic if there's any change in your health. This includes a scrape or scratch near the surgery site, or any signs of a cold (sore throat, runny nose, cough, rash, fever).  Eating and drinking guidelines  For your safety: Unless your surgeon tells you otherwise, follow the guidelines below.  Eat and drink as normal until 8 hours before you arrive for surgery. After that, no food or milk. You can spit out gum when you arrive.  Drink clear liquids until 2 hours before you arrive. These are liquids you can see through, like water, Gatorade, and Propel Water. They also include plain black coffee and tea (no cream or milk).  No alcohol for 24 hours before you arrive. The night before surgery, stop any drinks that contain THC.  If your care team tells you to take medicine on the morning of surgery, it's okay to take it with a sip of water. No other medicines or drugs are allowed (including CBD oil)--follow your care team's instructions.  If you have questions the day of surgery, call your hospital or surgery center.   Preventing infection  Shower or bathe the night before and the morning of surgery. Follow the instructions your clinic gave you. (If no instructions, use regular soap.)  Don't shave or clip hair near your surgery site. We'll remove the hair if needed.  Don't smoke or vape the morning of surgery. No chewing tobacco for 6 hours before you arrive. A nicotine patch is okay. You may spit out nicotine gum when you arrive.  For some surgeries, the surgeon will tell you to fully quit smoking and nicotine.  We will make every effort to keep you safe from infection. We will:  Clean our hands often with soap and water (or an alcohol-based hand rub).  Clean the skin at your surgery site with a  special soap that kills germs.  Give you a special gown to keep you warm. (Cold raises the risk of infection.)  Wear hair covers, masks, gowns, and gloves during surgery.  Give antibiotic medicine, if prescribed. Not all surgeries need this medicine.  What to bring on the day of surgery  Photo ID and insurance card  Copy of your health care directive, if you have one  Glasses and hearing aids (bring cases)  You can't wear contacts during surgery  Inhaler and eye drops, if you use them (tell us about these when you arrive)  CPAP machine or breathing device, if you use them  A few personal items, if spending the night  If you have . . .  A pacemaker, ICD (cardiac defibrillator), or other implant: Bring the ID card.  An implanted stimulator: Bring the remote control.  A legal guardian: Bring a copy of the certified (court-stamped) guardianship papers.  Please remove any jewelry, including body piercings. Leave jewelry and other valuables at home.  If you're going home the day of surgery  You must have a responsible adult drive you home. They should stay with you overnight as well.  If you don't have someone to stay with you, and you aren't safe to go home alone, we may keep you overnight. Insurance often won't pay for this.  After surgery  If it's hard to control your pain or you need more pain medicine, please call your surgeon's office.  Questions?   If you have any questions for your care team, list them here:   ____________________________________________________________________________________________________________________________________________________________________________________________________________________________________________________________  For informational purposes only. Not to replace the advice of your health care provider. Copyright   2003, 2019 [facility]. All rights reserved. Clinically reviewed by Shlomo Bernal MD. [employer]ART[employer] [id number] - REV 08/24.

## 2024-11-26 LAB
ALBUMIN SERPL BCG-MCNC: 4.6 G/DL (ref 3.5–5.2)
ALP SERPL-CCNC: 65 U/L (ref 40–150)
ALT SERPL W P-5'-P-CCNC: 28 U/L (ref 0–70)
ANION GAP SERPL CALCULATED.3IONS-SCNC: 13 MMOL/L (ref 7–15)
AST SERPL W P-5'-P-CCNC: 27 U/L (ref 0–45)
BILIRUB SERPL-MCNC: 0.4 MG/DL
BUN SERPL-MCNC: 16 MG/DL (ref 8–23)
CALCIUM SERPL-MCNC: 10.2 MG/DL (ref 8.8–10.4)
CHLORIDE SERPL-SCNC: 101 MMOL/L (ref 98–107)
CREAT SERPL-MCNC: 0.73 MG/DL (ref 0.67–1.17)
CREAT UR-MCNC: 77.5 MG/DL
EGFRCR SERPLBLD CKD-EPI 2021: >90 ML/MIN/1.73M2
GLUCOSE SERPL-MCNC: 91 MG/DL (ref 70–99)
HCO3 SERPL-SCNC: 24 MMOL/L (ref 22–29)
MICROALBUMIN UR-MCNC: 41.7 MG/L
MICROALBUMIN/CREAT UR: 53.81 MG/G CR (ref 0–17)
POTASSIUM SERPL-SCNC: 3.8 MMOL/L (ref 3.4–5.3)
PROT SERPL-MCNC: 7.8 G/DL (ref 6.4–8.3)
SODIUM SERPL-SCNC: 138 MMOL/L (ref 135–145)

## 2024-12-06 NOTE — TELEPHONE ENCOUNTER
"CHERYL-PROCEDURAL ANTICOAGULATION  MANAGEMENT    ASSESSMENT     Warfarin interruption plan for right TKA on 12/17/24.    Indication for Anticoagulation: Mechanical AVR (On-X)    stroke risk factors in mechanical heart valves (chest cheryl-procedural guideline 2022): hypertension and diabetes    Cheryl-Procedure Risk stratification for thromboembolism: moderate (2022 Chest guidelines)    AVR/MVR: 2022 Chest Perioperative Management guideline suggests no bridging for mechanical heart valves except select patients at high thromboembolic risk such as with an older-generation mechanical heart valve, MVR with one more more risk factors for thromboembolism, a recent (within 3 months) thromboembolic event, or with prior perioperative stroke    RECOMMENDATION     Pre-Procedure:  Hold warfarin for 5 days, until after procedure starting: Thursday, December 12   No Bridge    Post-Procedure:  Resume warfarin dose if okay with provider doing procedure on night of procedure, Tuesday, December 17 PM: take 20 mg x 1, then resume home dose  Recheck INR ~ 7 days after resuming warfarin     OV with Dr. Cotton 11/6/24 for pre-op clearance: Continue warfarin, INR 1.5-2.0. Follow INR clinic for LMWH bridging pre/post knee surgery.  ?   Nina Fernandez, [facility]    SUBJECTIVE/OBJECTIVE     Glen BRADFORD Zulyjovanny, a 59 year old male    Goal INR Range: 1.5-2.0     Patient bridged in past: No      Wt Readings from Last 3 Encounters:   11/25/24 112.8 kg (248 lb 11.2 oz)   11/06/24 113.4 kg (250 lb)   10/25/24 112.7 kg (248 lb 8 oz)      Ideal body weight: 61.5 kg (135 lb 9.3 oz)  Adjusted ideal body weight: 82 kg (180 lb 13.3 oz)     Estimated body mass index is 41.39 kg/m  as calculated from the following:    Height as of 11/25/24: 1.651 m (5' 5\").    Weight as of 11/25/24: 112.8 kg (248 lb 11.2 oz).    Lab Results   Component Value Date    INR 1.9 (H) 10/29/2024    INR 1.7 (H) 10/02/2024    INR 1.8 (H) 08/29/2024     Lab Results   Component Value Date    HGB 15.4 " 11/25/2024    HCT 48.7 11/25/2024     11/25/2024     Lab Results   Component Value Date    CR 0.73 11/25/2024    CR 0.70 12/18/2023    CR 0.66 (L) 09/22/2023     Estimated Creatinine Clearance: 123.2 mL/min (based on SCr of 0.73 mg/dL).

## 2024-12-09 ENCOUNTER — TELEPHONE (OUTPATIENT)
Dept: PEDIATRICS | Facility: CLINIC | Age: 59
End: 2024-12-09
Payer: COMMERCIAL

## 2024-12-09 DIAGNOSIS — Z95.1 S/P CABG (CORONARY ARTERY BYPASS GRAFT): ICD-10-CM

## 2024-12-09 DIAGNOSIS — I48.0 PAROXYSMAL ATRIAL FIBRILLATION (H): ICD-10-CM

## 2024-12-09 DIAGNOSIS — Z95.2 S/P AVR (AORTIC VALVE REPLACEMENT): Primary | ICD-10-CM

## 2024-12-09 NOTE — TELEPHONE ENCOUNTER
Patient Returning Call    Reason for call:  Pt is calling to get warfarin plan for upcoming surgery.  Please call pt to discuss plan       Could we send this information to you in [employer] or would you prefer to receive a phone call?:   Patient would prefer a phone call   Okay to leave a detailed message?: Yes at Cell number on file:    Telephone Information:   Mobile [phone]

## 2024-12-11 NOTE — TELEPHONE ENCOUNTER
Note from Dr. Cotton:    What I meant to say in my note is that perioperative management per INR clinic.  I agree.  Bridging not indicated.  Note modified.    Thanks.  Dr. Cotton     Warfarin hold and resumption reviewed with patient, next INR scheduled on 12/23/24 (6 days after surgery). My Chart message also sent per patient preference.    Iram Lubin RN  Anticoagulation Clinic

## 2024-12-11 NOTE — TELEPHONE ENCOUNTER
Writer spoke to patient, see procedure encounter dated 10/29/24.  Warfarin calendar updated.    Iram Lubin RN  Anticoagulation Clinic

## 2024-12-19 ENCOUNTER — TELEPHONE (OUTPATIENT)
Dept: PEDIATRICS | Facility: CLINIC | Age: 59
End: 2024-12-19
Payer: COMMERCIAL

## 2024-12-19 NOTE — TELEPHONE ENCOUNTER
Home Care is calling regarding an established patient with [facility].       Requesting orders from: Mike Deal  Provider is following patient: Yes  Is this a 60-day recertification request?  No    Orders Requested    Physical Therapy  Request for initial certification (first set of orders)   Frequency: one more visit this week, then  2x/wk for 2 wks    Information was gathered and will be sent to provider for review.  RN will contact Home Care with information after provider review.  Confirmed ok to leave a detailed message with call back.  Contact information confirmed and updated as needed.    Selam Quesada RN

## 2024-12-19 NOTE — TELEPHONE ENCOUNTER
Called and spoke with MARIANN Carmona at [facility] and informed of orders approved (see below).    KINGA Paul David A, MD  [facility]1 minute ago (3:22 PM)       OK

## 2024-12-23 ENCOUNTER — ANTICOAGULATION THERAPY VISIT (OUTPATIENT)
Dept: ANTICOAGULATION | Facility: CLINIC | Age: 59
End: 2024-12-23

## 2024-12-23 ENCOUNTER — LAB (OUTPATIENT)
Dept: LAB | Facility: CLINIC | Age: 59
End: 2024-12-23
Payer: COMMERCIAL

## 2024-12-23 DIAGNOSIS — I48.0 PAROXYSMAL ATRIAL FIBRILLATION (H): ICD-10-CM

## 2024-12-23 DIAGNOSIS — Z95.1 S/P CABG (CORONARY ARTERY BYPASS GRAFT): ICD-10-CM

## 2024-12-23 DIAGNOSIS — Z95.2 S/P AVR (AORTIC VALVE REPLACEMENT): Primary | ICD-10-CM

## 2024-12-23 LAB — INR BLD: 1.5 (ref 0.9–1.1)

## 2024-12-23 PROCEDURE — 36416 COLLJ CAPILLARY BLOOD SPEC: CPT

## 2024-12-23 PROCEDURE — 85610 PROTHROMBIN TIME: CPT

## 2024-12-23 NOTE — PROGRESS NOTES
ANTICOAGULATION MANAGEMENT     Glen Cho 59 year old male is on warfarin with therapeutic INR result. (Goal INR 1.5-2.0)    Recent labs: (last 7 days)     12/23/24  1309   INR 1.5*       ASSESSMENT     Source(s): Chart Review and Patient/Caregiver Call     Warfarin doses taken: Booster dose(s) recently taken which may be affecting INR and Resumed warfarin on 12/17/24 after interruption for surgery/procedure which may be affecting INR Booster doses taken differently than instructed per surgeon, warfarin calendar updated, did not change the amount of total warfarin boost, it was spread out over 3 days instead of in 1 dose on 12/17/24  Diet: No new diet changes identified  Medication/supplement changes: None noted, patient reports taking 2 of the arthritis Tylenol daily (3750 mg daily)  New illness, injury, or hospitalization: Yes: patient had TKA 12/17/24, patient reports increased pain yesterday, feeling better today.   Signs or symptoms of bleeding or clotting: Yes: bruising from surgery  Previous result: Therapeutic last 2(+) visits  Additional findings: None       PLAN     Recommended plan for temporary change(s) affecting INR     Dosing Instructions: Continue your current warfarin dose with next INR in 10 days       Summary  As of 12/23/2024      Full warfarin instructions:  10 mg every Mon, Fri; 12.5 mg all other days   Next INR check:  1/3/2025               Telephone call with Glen who verbalizes understanding and agrees to plan    Lab visit scheduled    Education provided: Please call back if any changes to your diet, medications or how you've been taking warfarin  Contact [phone] with any changes, questions or concerns.     Plan made per [facility] anticoagulation protocol    Marti Balderas RN  12/23/2024  Anticoagulation Clinic  [facility] for routing messages: nora GUZMAN  [facility] patient phone line: [phone]        _______________________________________________________________________      Anticoagulation Episode Summary       Current INR goal:  1.5-2.0   TTR:  80.2% (1 y)   Target end date:  Indefinite   Send INR reminders to:  STEPHANIE GUZMAN    Indications    S/P AVR (aortic valve replacement)-23 mm On-X valve [Z95.2]  S/P CABG (coronary artery bypass graft)-LIMA>LAD  VG>RCA  VG>OM [Z95.1]  Paroxysmal atrial fibrillation (H) [I48.0]             Comments:  07/10/23-On-X AVR             Anticoagulation Care Providers       Provider Role Specialty Phone number    Kelsey Vega PA-C Referring Cardiovascular & Thoracic Surgery [phone]    Mike Deal MD Referring Internal Medicine - Pediatrics [phone]

## 2024-12-29 ENCOUNTER — HEALTH MAINTENANCE LETTER (OUTPATIENT)
Age: 59
End: 2024-12-29

## 2025-01-11 DIAGNOSIS — Z95.2 S/P AVR (AORTIC VALVE REPLACEMENT): ICD-10-CM

## 2025-01-13 RX ORDER — WARFARIN SODIUM 5 MG/1
TABLET ORAL
Qty: 204 TABLET | Refills: 1 | Status: SHIPPED | OUTPATIENT
Start: 2025-01-13

## 2025-01-13 NOTE — TELEPHONE ENCOUNTER
ANTICOAGULATION MANAGEMENT:  Medication Refill    Anticoagulation Summary  As of 1/3/2025      Warfarin maintenance plan:  10 mg (5 mg x 2) every Mon, Fri; 12.5 mg (5 mg x 2.5) all other days   Next INR check:  1/17/2025   Target end date:  Indefinite    Indications    S/P AVR (aortic valve replacement)-23 mm On-X valve [Z95.2]  S/P CABG (coronary artery bypass graft)-LIMA>LAD  VG>RCA  VG>OM [Z95.1]  Paroxysmal atrial fibrillation (H) [I48.0]                 Anticoagulation Care Providers       Provider Role Specialty Phone number    Kelsey Vega PA-C Referring Cardiovascular & Thoracic Surgery [phone]    Mike Deal MD Referring Internal Medicine - Pediatrics [phone]            Refill Criteria    Visit with referring provider/group: Meets criteria: visit within referring provider group in the last 15 months on 11/25/2024    [facility] referral last signed: 06/27/2024; within last year:  Yes    Lab monitoring not exceeding 2 weeks overdue: Yes    Glen meets all criteria for refill. Rx instructions and quantity supplied updated to match patient's current dosing plan. Warfarin 90 day supply with 1 refill granted per [facility] protocol     Marti Balderas RN  Anticoagulation Clinic

## 2025-01-14 ENCOUNTER — ORDERS ONLY (AUTO-RELEASED) (OUTPATIENT)
Dept: CARDIOLOGY | Facility: CLINIC | Age: 60
End: 2025-01-14
Payer: COMMERCIAL

## 2025-01-14 DIAGNOSIS — I48.0 PAROXYSMAL ATRIAL FIBRILLATION (H): ICD-10-CM

## 2025-02-10 ENCOUNTER — TELEPHONE (OUTPATIENT)
Dept: ANTICOAGULATION | Facility: CLINIC | Age: 60
End: 2025-02-10
Payer: COMMERCIAL

## 2025-02-10 NOTE — TELEPHONE ENCOUNTER
ANTICOAGULATION     Glen Cho is overdue for an INR check.     Left message for patient to call and schedule lab appointment as soon as possible. If returning call, please schedule.     Anh Melendez, RN  2/10/2025  Anticoagulation Clinic  [facility] for routing messages: nora GUZMAN  [facility] patient phone line: [phone]

## 2025-02-11 ENCOUNTER — LAB (OUTPATIENT)
Dept: LAB | Facility: CLINIC | Age: 60
End: 2025-02-11
Payer: COMMERCIAL

## 2025-02-11 ENCOUNTER — ANTICOAGULATION THERAPY VISIT (OUTPATIENT)
Dept: ANTICOAGULATION | Facility: CLINIC | Age: 60
End: 2025-02-11

## 2025-02-11 DIAGNOSIS — I48.0 PAROXYSMAL ATRIAL FIBRILLATION (H): ICD-10-CM

## 2025-02-11 DIAGNOSIS — Z95.2 S/P AVR (AORTIC VALVE REPLACEMENT): Primary | ICD-10-CM

## 2025-02-11 DIAGNOSIS — Z95.1 S/P CABG (CORONARY ARTERY BYPASS GRAFT): ICD-10-CM

## 2025-02-11 LAB — INR BLD: 1.8 (ref 0.9–1.1)

## 2025-02-11 PROCEDURE — 36416 COLLJ CAPILLARY BLOOD SPEC: CPT

## 2025-02-11 PROCEDURE — 85610 PROTHROMBIN TIME: CPT

## 2025-02-11 NOTE — PROGRESS NOTES
ANTICOAGULATION MANAGEMENT     Glen Cho 59 year old male is on warfarin with therapeutic INR result. (Goal INR 1.5-2.0)    Recent labs: (last 7 days)     02/11/25  1253   INR 1.8*       ASSESSMENT     Source(s): Chart Review and Patient/Caregiver Call     Warfarin doses taken: Warfarin taken as instructed  Diet: No new diet changes identified  Medication/supplement changes: None noted  New illness, injury, or hospitalization: No  Signs or symptoms of bleeding or clotting: No  Previous result: Therapeutic last 2(+) visits  Additional findings: None       PLAN     Recommended plan for no diet, medication or health factor changes affecting INR     Dosing Instructions: Continue your current warfarin dose with next INR in 4 weeks       Summary  As of 2/11/2025      Full warfarin instructions:  10 mg every Mon, Fri; 12.5 mg all other days   Next INR check:  3/11/2025               Telephone call with Glen who agrees to plan and repeated back plan correctly    Lab visit scheduled    Education provided: Please call back if any changes to your diet, medications or how you've been taking warfarin    Plan made per [facility] anticoagulation protocol    Jovana Carpenter RN  2/11/2025  Anticoagulation Clinic  [employer] for routing messages: nora GUZMAN  [facility] patient phone line: [phone]        _______________________________________________________________________     Anticoagulation Episode Summary       Current INR goal:  1.5-2.0   TTR:  80.2% (1 y)   Target end date:  Indefinite   Send INR reminders to:  STEPHANIE GUZMAN    Indications    S/P AVR (aortic valve replacement)-23 mm On-X valve [Z95.2]  S/P CABG (coronary artery bypass graft)-LIMA>LAD  VG>RCA  VG>OM [Z95.1]  Paroxysmal atrial fibrillation (H) [I48.0]             Comments:  07/10/23-On-X AVR             Anticoagulation Care Providers       Provider Role Specialty Phone number    Kelsey Vega PA-C Referring Cardiovascular & Thoracic Surgery [phone]     Mike Deal MD Referring Internal Medicine - Pediatrics [phone]

## 2025-03-12 ENCOUNTER — LAB (OUTPATIENT)
Dept: LAB | Facility: CLINIC | Age: 60
End: 2025-03-12
Payer: COMMERCIAL

## 2025-03-12 ENCOUNTER — ANTICOAGULATION THERAPY VISIT (OUTPATIENT)
Dept: ANTICOAGULATION | Facility: CLINIC | Age: 60
End: 2025-03-12

## 2025-03-12 DIAGNOSIS — I48.0 PAROXYSMAL ATRIAL FIBRILLATION (H): ICD-10-CM

## 2025-03-12 DIAGNOSIS — Z95.1 S/P CABG (CORONARY ARTERY BYPASS GRAFT): ICD-10-CM

## 2025-03-12 DIAGNOSIS — Z95.2 S/P AVR (AORTIC VALVE REPLACEMENT): Primary | ICD-10-CM

## 2025-03-12 LAB — INR BLD: 1.5 (ref 0.9–1.1)

## 2025-03-12 PROCEDURE — 36416 COLLJ CAPILLARY BLOOD SPEC: CPT

## 2025-03-12 PROCEDURE — 85610 PROTHROMBIN TIME: CPT

## 2025-03-12 NOTE — PROGRESS NOTES
ANTICOAGULATION MANAGEMENT     Glen Cho 59 year old male is on warfarin with therapeutic INR result. (Goal INR 1.5-2.0)    Recent labs: (last 7 days)     03/12/25  1406   INR 1.5*       ASSESSMENT     Source(s): Chart Review  Previous INR was Therapeutic last 2(+) visits  Medication, diet, health changes since last INR chart reviewed; none identified         PLAN     Recommended plan for no diet, medication or health factor changes affecting INR     Dosing Instructions: Continue your current warfarin dose with next INR in 4-5 weeks since INR is at lowest end of goal range    Summary  As of 3/12/2025      Full warfarin instructions:  10 mg every Mon, Fri; 12.5 mg all other days   Next INR check:  4/16/2025               Detailed voice message left for Glen with dosing instructions and follow up date.     Contact [phone] to schedule and with any changes, questions or concerns.     Education provided: Please call back if any changes to your diet, medications or how you've been taking warfarin  Taking warfarin: Importance of taking warfarin as instructed    Plan made per [facility] anticoagulation protocol    Iram Lubin RN  3/12/2025  Anticoagulation Clinic  [facility] for routing messages: nora GUZMAN  [facility] patient phone line: [phone]        _______________________________________________________________________     Anticoagulation Episode Summary       Current INR goal:  1.5-2.0   TTR:  81.3% (1 y)   Target end date:  Indefinite   Send INR reminders to:  STEPHANIE GUZMAN    Indications    S/P AVR (aortic valve replacement)-23 mm On-X valve [Z95.2]  S/P CABG (coronary artery bypass graft)-LIMA>LAD  VG>RCA  VG>OM [Z95.1]  Paroxysmal atrial fibrillation (H) [I48.0]             Comments:  07/10/23-On-X AVR             Anticoagulation Care Providers       Provider Role Specialty Phone number    Kelsey Vega PA-C Referring Cardiovascular & Thoracic Surgery [phone]    Mike Deal MD Referring  Internal Medicine - Pediatrics [phone]

## 2025-03-16 DIAGNOSIS — E11.21 TYPE 2 DIABETES MELLITUS WITH DIABETIC NEPHROPATHY, WITH LONG-TERM CURRENT USE OF INSULIN (H): ICD-10-CM

## 2025-03-16 DIAGNOSIS — Z79.4 TYPE 2 DIABETES MELLITUS WITH DIABETIC NEPHROPATHY, WITH LONG-TERM CURRENT USE OF INSULIN (H): ICD-10-CM

## 2025-03-17 RX ORDER — METFORMIN HYDROCHLORIDE 500 MG/1
1000 TABLET, EXTENDED RELEASE ORAL
Qty: 180 TABLET | Refills: 0 | Status: SHIPPED | OUTPATIENT
Start: 2025-03-17

## 2025-03-29 ENCOUNTER — HEALTH MAINTENANCE LETTER (OUTPATIENT)
Age: 60
End: 2025-03-29

## 2025-04-10 DIAGNOSIS — Z79.4 TYPE 2 DIABETES MELLITUS WITH DIABETIC NEPHROPATHY, WITH LONG-TERM CURRENT USE OF INSULIN (H): ICD-10-CM

## 2025-04-10 DIAGNOSIS — E11.21 TYPE 2 DIABETES MELLITUS WITH DIABETIC NEPHROPATHY, WITH LONG-TERM CURRENT USE OF INSULIN (H): ICD-10-CM

## 2025-04-10 RX ORDER — INSULIN GLARGINE 100 [IU]/ML
50 INJECTION, SOLUTION SUBCUTANEOUS AT BEDTIME
Qty: 45 ML | Refills: 3 | Status: SHIPPED | OUTPATIENT
Start: 2025-04-10

## 2025-05-19 ENCOUNTER — MYC MEDICAL ADVICE (OUTPATIENT)
Dept: ANTICOAGULATION | Facility: CLINIC | Age: 60
End: 2025-05-19
Payer: COMMERCIAL

## 2025-05-20 ENCOUNTER — MYC REFILL (OUTPATIENT)
Dept: PEDIATRICS | Facility: CLINIC | Age: 60
End: 2025-05-20
Payer: COMMERCIAL

## 2025-05-20 DIAGNOSIS — Z79.4 TYPE 2 DIABETES MELLITUS WITH DIABETIC NEPHROPATHY, WITH LONG-TERM CURRENT USE OF INSULIN (H): ICD-10-CM

## 2025-05-20 DIAGNOSIS — E11.21 TYPE 2 DIABETES MELLITUS WITH DIABETIC NEPHROPATHY, WITH LONG-TERM CURRENT USE OF INSULIN (H): ICD-10-CM

## 2025-05-20 DIAGNOSIS — I10 BENIGN ESSENTIAL HYPERTENSION: ICD-10-CM

## 2025-05-21 RX ORDER — HYDROCHLOROTHIAZIDE 25 MG/1
25 TABLET ORAL DAILY
Qty: 90 TABLET | Refills: 3 | Status: SHIPPED | OUTPATIENT
Start: 2025-05-21

## 2025-05-21 RX ORDER — PEN NEEDLE, DIABETIC 32GX 5/32"
NEEDLE, DISPOSABLE MISCELLANEOUS
Qty: 300 EACH | Refills: 3 | Status: SHIPPED | OUTPATIENT
Start: 2025-05-21

## 2025-05-21 NOTE — TELEPHONE ENCOUNTER
Called and spoke with pt. Pt reports he is still using his insulin pen needles 3 times daily and has about 3 needles left.   Pt also reporting he is taking hydrochlorothiazide 25 mg daily. Pt had some extra and didn't need a refill until now.     Dr. Deal- pt needing refills for insulin pen needles and hydrochlorothiazide 25 mg. Pended below.     Zena Wynn RN

## 2025-05-21 NOTE — TELEPHONE ENCOUNTER
Clinic RN: Please investigate patient's chart or contact patient if the information cannot be found because patient should have run out of this medication on 11/20/2024. Confirm patient is taking this medication as prescribed. Document findings and route refill encounter to provider for approval or denial.  Amy Street RN, BSN  [facility]

## 2025-05-28 ENCOUNTER — TELEPHONE (OUTPATIENT)
Dept: ANTICOAGULATION | Facility: CLINIC | Age: 60
End: 2025-05-28
Payer: COMMERCIAL

## 2025-05-28 NOTE — TELEPHONE ENCOUNTER
ANTICOAGULATION     Glen Cho is overdue for an INR check.     Left message for patient to call and schedule lab appointment as soon as possible. If returning call, please schedule.     Jovana Carpenter RN  5/28/2025  Anticoagulation Clinic  [facility] for routing messages: nora GUZMAN  [facility] patient phone line: [phone]

## 2025-06-04 ENCOUNTER — TELEPHONE (OUTPATIENT)
Dept: ANTICOAGULATION | Facility: CLINIC | Age: 60
End: 2025-06-04
Payer: COMMERCIAL

## 2025-06-04 NOTE — TELEPHONE ENCOUNTER
ANTICOAGULATION     Glen SANCHEZ Cho is overdue for an INR check.     Reminder letter sent    Jovana Carpenter RN  6/4/2025  Anticoagulation Clinic  [facility] for routing messages: nora GUZMAN  [facility] patient phone line: [phone]

## 2025-06-04 NOTE — LETTER
"     [facility] ANTICOAGULATION CLINIC  [address] [facility] [address]  Phone: [phone]  Fax: [phone]   June 4, 2025        Glen Cho  [address] [facility] [address]            Dear Glen,    You are currently under the care of [facility] for your warfarin (Coumadin , Jantoven ) therapy.  We are contacting you because our records show you were due for an INR on 5/12/25.    There are potentially serious risks when taking warfarin without careful monitoring and we want to make sure you are safely managed.  Routine lab monitoring is required for warfarin refills.     Please schedule a lab appointment as soon as possible either by calling [phone] or scheduling directly in [employer]. To schedule in [employer] open the \"schedule an appointment\" section then select \"lab only\" as the reason you are coming in and \"INR\" as the lab test. If it is difficult for you to get to lab, please call us to discuss options.  If there has been a change in your care or other concerns, please let us know so we can help and/or update our records.         Sincerely,       [facility]    "

## 2025-06-05 ENCOUNTER — LAB (OUTPATIENT)
Dept: LAB | Facility: CLINIC | Age: 60
End: 2025-06-05
Payer: COMMERCIAL

## 2025-06-05 ENCOUNTER — ANTICOAGULATION THERAPY VISIT (OUTPATIENT)
Dept: ANTICOAGULATION | Facility: CLINIC | Age: 60
End: 2025-06-05

## 2025-06-05 ENCOUNTER — RESULTS FOLLOW-UP (OUTPATIENT)
Dept: ANTICOAGULATION | Facility: CLINIC | Age: 60
End: 2025-06-05

## 2025-06-05 DIAGNOSIS — I48.0 PAROXYSMAL ATRIAL FIBRILLATION (H): ICD-10-CM

## 2025-06-05 DIAGNOSIS — Z95.1 S/P CABG (CORONARY ARTERY BYPASS GRAFT): ICD-10-CM

## 2025-06-05 DIAGNOSIS — Z95.2 S/P AVR (AORTIC VALVE REPLACEMENT): Primary | ICD-10-CM

## 2025-06-05 LAB — INR BLD: 1.3 (ref 0.9–1.1)

## 2025-06-05 NOTE — PROGRESS NOTES
ANTICOAGULATION MANAGEMENT     Glen Cho 59 year old male is on warfarin with subtherapeutic INR result. (Goal INR 1.5-2.0)    Recent labs: (last 7 days)     06/05/25  1610   INR 1.3*       ASSESSMENT     Source(s): Chart Review and Patient/Caregiver Call     Warfarin doses taken: Warfarin taken as instructed  Diet: No new diet changes identified  Medication/supplement changes: None noted  New illness, injury, or hospitalization: Yes, patient reports he has had bronchitis over the last 3-4 weeks, improving,   Signs or symptoms of bleeding or clotting: No  Previous result: Subtherapeutic, INR has been trending downward  Additional findings: None       PLAN     Recommended plan for no diet, medication or health factor changes affecting INR     Dosing Instructions: booster dose then Increase your warfarin dose (3% change) with next INR in 2 weeks       Summary  As of 6/5/2025      Full warfarin instructions:  6/5: 15 mg; Otherwise 10 mg every Mon; 12.5 mg all other days   Next INR check:  6/19/2025               Telephone call with Glen who agrees to plan and repeated back plan correctly    Lab visit scheduled    Education provided: Please call back if any changes to your diet, medications or how you've been taking warfarin  Contact [phone] with any changes, questions or concerns.     Plan made per [facility] anticoagulation protocol    Marti Balderas RN  6/5/2025  Anticoagulation Clinic  [facility] for routing messages: nora GUZMAN  [facility] patient phone line: [phone]        _______________________________________________________________________     Anticoagulation Episode Summary       Current INR goal:  1.5-2.0   TTR:  76.7% (1 y)   Target end date:  Indefinite   Send INR reminders to:  STEPHANIE GUZMAN    Indications    S/P AVR (aortic valve replacement)-23 mm On-X valve [Z95.2]  S/P CABG (coronary artery bypass graft)-LIMA>LAD  VG>RCA  VG>OM [Z95.1]  Paroxysmal atrial fibrillation (H) [I48.0]              Comments:  07/10/23-On-X AVR             Anticoagulation Care Providers       Provider Role Specialty Phone number    Kelsey Vega PA-C Referring Cardiovascular & Thoracic Surgery [phone]    Mike Deal MD Referring Internal Medicine - Pediatrics [phone]

## 2025-06-16 ENCOUNTER — LAB (OUTPATIENT)
Dept: LAB | Facility: CLINIC | Age: 60
End: 2025-06-16
Payer: COMMERCIAL

## 2025-06-16 ENCOUNTER — ANTICOAGULATION THERAPY VISIT (OUTPATIENT)
Dept: ANTICOAGULATION | Facility: CLINIC | Age: 60
End: 2025-06-16

## 2025-06-16 ENCOUNTER — RESULTS FOLLOW-UP (OUTPATIENT)
Dept: ANTICOAGULATION | Facility: CLINIC | Age: 60
End: 2025-06-16

## 2025-06-16 DIAGNOSIS — Z95.1 S/P CABG (CORONARY ARTERY BYPASS GRAFT): ICD-10-CM

## 2025-06-16 DIAGNOSIS — I48.0 PAROXYSMAL ATRIAL FIBRILLATION (H): ICD-10-CM

## 2025-06-16 DIAGNOSIS — Z95.2 S/P AVR (AORTIC VALVE REPLACEMENT): Primary | ICD-10-CM

## 2025-06-16 DIAGNOSIS — Z95.2 S/P AVR (AORTIC VALVE REPLACEMENT): ICD-10-CM

## 2025-06-16 LAB — INR BLD: 1.6 (ref 0.9–1.1)

## 2025-06-16 PROCEDURE — 85610 PROTHROMBIN TIME: CPT

## 2025-06-16 PROCEDURE — 36415 COLL VENOUS BLD VENIPUNCTURE: CPT

## 2025-06-16 NOTE — PROGRESS NOTES
ANTICOAGULATION MANAGEMENT     Glen Cho 59 year old male is on warfarin with therapeutic INR result. (Goal INR 1.5-2.0)    Recent labs: (last 7 days)     06/16/25  1701   INR 1.6*       ASSESSMENT     Source(s): Chart Review and Patient/Caregiver Call     Warfarin doses taken: Warfarin taken as instructed  Diet: No new diet changes identified  Medication/supplement changes: None noted  New illness, injury, or hospitalization: No  Signs or symptoms of bleeding or clotting: No  Previous result: Subtherapeutic  Additional findings: None       PLAN     Recommended plan for no diet, medication or health factor changes affecting INR     Dosing Instructions: Continue your current warfarin dose with next INR in 2 weeks  patient preference    Summary  As of 6/16/2025      Full warfarin instructions:  10 mg every Mon; 12.5 mg all other days   Next INR check:  7/2/2025               Telephone call with Glen who verbalizes understanding and agrees to plan    Lab visit scheduled on patient's preferred day    Education provided: Please call back if any changes to your diet, medications or how you've been taking warfarin  Contact [phone] with any changes, questions or concerns.     Plan made per [facility] anticoagulation protocol    Marti Balderas RN  6/16/2025  Anticoagulation Clinic  [employer] for routing messages: nora GUZMAN  [facility] patient phone line: [phone]        _______________________________________________________________________     Anticoagulation Episode Summary       Current INR goal:  1.5-2.0   TTR:  74.7% (1 y)   Target end date:  Indefinite   Send INR reminders to:  STEPHANIE GUZMAN    Indications    S/P AVR (aortic valve replacement)-23 mm On-X valve [Z95.2]  S/P CABG (coronary artery bypass graft)-LIMA>LAD  VG>RCA  VG>OM [Z95.1]  Paroxysmal atrial fibrillation (H) [I48.0]             Comments:  07/10/23-On-X AVR             Anticoagulation Care Providers       Provider Role Specialty Phone number     Kelsey Vega PA-C Referring Cardiovascular & Thoracic Surgery [phone]    Mike Deal MD Referring Internal Medicine - Pediatrics [phone]

## 2025-06-28 DIAGNOSIS — Z95.2 S/P AVR (AORTIC VALVE REPLACEMENT): ICD-10-CM

## 2025-06-30 RX ORDER — WARFARIN SODIUM 5 MG/1
TABLET ORAL
Qty: 210 TABLET | Refills: 1 | Status: SHIPPED | OUTPATIENT
Start: 2025-06-30

## 2025-06-30 NOTE — TELEPHONE ENCOUNTER
ANTICOAGULATION MANAGEMENT:  Medication Refill    Anticoagulation Summary  As of 6/16/2025      Warfarin maintenance plan:  10 mg (5 mg x 2) every Mon; 12.5 mg (5 mg x 2.5) all other days   Next INR check:  7/2/2025   Target end date:  Indefinite    Indications    S/P AVR (aortic valve replacement)-23 mm On-X valve [Z95.2]  S/P CABG (coronary artery bypass graft)-LIMA>LAD  VG>RCA  VG>OM [Z95.1]  Paroxysmal atrial fibrillation (H) [I48.0]                 Anticoagulation Care Providers       Provider Role Specialty Phone number    Kelsey Vega PA-C Referring Cardiovascular & Thoracic Surgery [phone]    Mike Deal MD Referring Internal Medicine - Pediatrics [phone]            Refill Criteria    Visit with referring provider/group: Meets criteria: visit within referring provider group in the last 15 months on 11/25/24    [facility] referral last signed: 06/06/2025; within last year:  Yes    Lab monitoring is up to date (not exceeding 2 weeks overdue): Yes    Glen meets all criteria for refill. Rx instructions and quantity supplied updated to match patient's current dosing plan. Warfarin 90 day supply with 1 refill granted per [facility] protocol     Ron Lin RN  Anticoagulation Clinic

## 2025-07-07 ENCOUNTER — MYC MEDICAL ADVICE (OUTPATIENT)
Dept: PEDIATRICS | Facility: CLINIC | Age: 60
End: 2025-07-07
Payer: COMMERCIAL

## 2025-07-07 ENCOUNTER — MYC REFILL (OUTPATIENT)
Dept: PEDIATRICS | Facility: CLINIC | Age: 60
End: 2025-07-07
Payer: COMMERCIAL

## 2025-07-07 DIAGNOSIS — Z79.4 TYPE 2 DIABETES MELLITUS WITH DIABETIC NEPHROPATHY, WITH LONG-TERM CURRENT USE OF INSULIN (H): ICD-10-CM

## 2025-07-07 DIAGNOSIS — E11.21 TYPE 2 DIABETES MELLITUS WITH DIABETIC NEPHROPATHY, WITH LONG-TERM CURRENT USE OF INSULIN (H): ICD-10-CM

## 2025-07-07 RX ORDER — INSULIN LISPRO 100 [IU]/ML
25 INJECTION, SOLUTION INTRAVENOUS; SUBCUTANEOUS
Qty: 66 ML | Refills: 3 | Status: SHIPPED | OUTPATIENT
Start: 2025-07-07 | End: 2025-07-10

## 2025-07-10 ENCOUNTER — OFFICE VISIT (OUTPATIENT)
Dept: PEDIATRICS | Facility: CLINIC | Age: 60
End: 2025-07-10
Payer: COMMERCIAL

## 2025-07-10 VITALS
TEMPERATURE: 98.7 F | RESPIRATION RATE: 17 BRPM | HEART RATE: 71 BPM | HEIGHT: 64 IN | WEIGHT: 240.1 LBS | SYSTOLIC BLOOD PRESSURE: 139 MMHG | OXYGEN SATURATION: 96 % | BODY MASS INDEX: 40.99 KG/M2 | DIASTOLIC BLOOD PRESSURE: 77 MMHG

## 2025-07-10 DIAGNOSIS — E11.22 TYPE 2 DIABETES MELLITUS WITH STAGE 3A CHRONIC KIDNEY DISEASE, WITH LONG-TERM CURRENT USE OF INSULIN (H): ICD-10-CM

## 2025-07-10 DIAGNOSIS — E66.01 MORBID OBESITY (H): ICD-10-CM

## 2025-07-10 DIAGNOSIS — N18.31 TYPE 2 DIABETES MELLITUS WITH STAGE 3A CHRONIC KIDNEY DISEASE, WITH LONG-TERM CURRENT USE OF INSULIN (H): ICD-10-CM

## 2025-07-10 DIAGNOSIS — E66.813 CLASS 3 OBESITY (H): ICD-10-CM

## 2025-07-10 DIAGNOSIS — E11.21 TYPE 2 DIABETES MELLITUS WITH DIABETIC NEPHROPATHY, WITH LONG-TERM CURRENT USE OF INSULIN (H): Primary | ICD-10-CM

## 2025-07-10 DIAGNOSIS — Z79.4 TYPE 2 DIABETES MELLITUS WITH DIABETIC NEPHROPATHY, WITH LONG-TERM CURRENT USE OF INSULIN (H): Primary | ICD-10-CM

## 2025-07-10 DIAGNOSIS — Z79.4 TYPE 2 DIABETES MELLITUS WITH STAGE 3A CHRONIC KIDNEY DISEASE, WITH LONG-TERM CURRENT USE OF INSULIN (H): ICD-10-CM

## 2025-07-10 DIAGNOSIS — I48.0 PAROXYSMAL ATRIAL FIBRILLATION (H): ICD-10-CM

## 2025-07-10 DIAGNOSIS — Z95.2 S/P AVR (AORTIC VALVE REPLACEMENT): ICD-10-CM

## 2025-07-10 LAB
BASOPHILS # BLD AUTO: 0 10E3/UL (ref 0–0.2)
BASOPHILS NFR BLD AUTO: 1 %
EOSINOPHIL # BLD AUTO: 0.2 10E3/UL (ref 0–0.7)
EOSINOPHIL NFR BLD AUTO: 3 %
ERYTHROCYTE [DISTWIDTH] IN BLOOD BY AUTOMATED COUNT: 14.6 % (ref 10–15)
EST. AVERAGE GLUCOSE BLD GHB EST-MCNC: 137 MG/DL
HBA1C MFR BLD: 6.4 % (ref 0–5.6)
HCT VFR BLD AUTO: 47.8 % (ref 40–53)
HGB BLD-MCNC: 15.5 G/DL (ref 13.3–17.7)
IMM GRANULOCYTES # BLD: 0 10E3/UL
IMM GRANULOCYTES NFR BLD: 0 %
INR PPP: 1.33 (ref 0.85–1.15)
LYMPHOCYTES # BLD AUTO: 2.5 10E3/UL (ref 0.8–5.3)
LYMPHOCYTES NFR BLD AUTO: 28 %
MCH RBC QN AUTO: 28.4 PG (ref 26.5–33)
MCHC RBC AUTO-ENTMCNC: 32.4 G/DL (ref 31.5–36.5)
MCV RBC AUTO: 88 FL (ref 78–100)
MONOCYTES # BLD AUTO: 0.5 10E3/UL (ref 0–1.3)
MONOCYTES NFR BLD AUTO: 6 %
NEUTROPHILS # BLD AUTO: 5.5 10E3/UL (ref 1.6–8.3)
NEUTROPHILS NFR BLD AUTO: 63 %
PLATELET # BLD AUTO: 295 10E3/UL (ref 150–450)
PROTHROMBIN TIME: 16.9 SECONDS (ref 11.8–14.8)
RBC # BLD AUTO: 5.45 10E6/UL (ref 4.4–5.9)
WBC # BLD AUTO: 8.8 10E3/UL (ref 4–11)

## 2025-07-10 RX ORDER — INSULIN GLARGINE 100 [IU]/ML
50 INJECTION, SOLUTION SUBCUTANEOUS AT BEDTIME
Qty: 45 ML | Refills: 3 | Status: SHIPPED | OUTPATIENT
Start: 2025-07-10

## 2025-07-10 RX ORDER — INSULIN LISPRO 100 [IU]/ML
25 INJECTION, SOLUTION INTRAVENOUS; SUBCUTANEOUS
Qty: 66 ML | Refills: 3 | Status: SHIPPED | OUTPATIENT
Start: 2025-07-10

## 2025-07-10 RX ORDER — METFORMIN HYDROCHLORIDE 500 MG/1
1000 TABLET, EXTENDED RELEASE ORAL
Qty: 180 TABLET | Refills: 3 | Status: SHIPPED | OUTPATIENT
Start: 2025-07-10

## 2025-07-10 ASSESSMENT — PAIN SCALES - GENERAL: PAINLEVEL_OUTOF10: NO PAIN (0)

## 2025-07-10 NOTE — PROGRESS NOTES
"  Assessment & Plan     Type 2 diabetes mellitus with diabetic nephropathy, with long-term current use of insulin (H)  Parameters well controlled.  Continue secondary risk factor modification for BP, cholesterol, anticoagulation, and smoking cessation.   - Hemoglobin A1c; Future  - Lipid panel reflex to direct LDL Fasting; Future  - Comprehensive metabolic panel (BMP + Alb, Alk Phos, ALT, AST, Total. Bili, TP); Future  - insulin glargine (LANTUS SOLOSTAR) 100 UNIT/ML pen; Inject 50 Units subcutaneously at bedtime.  - metFORMIN (GLUCOPHAGE XR) 500 MG 24 hr tablet; Take 2 tablets (1,000 mg) by mouth daily (with dinner).  - insulin lispro (HUMALOG KWIKPEN) 100 UNIT/ML (1 unit dial) KWIKPEN; Inject 25 Units subcutaneously 3 times daily (before meals).  - Hemoglobin A1c  - Lipid panel reflex to direct LDL Fasting  - Comprehensive metabolic panel (BMP + Alb, Alk Phos, ALT, AST, Total. Bili, TP)    Paroxysmal atrial fibrillation (H)  Ongoing lower dose warfarin.     S/P AVR (aortic valve replacement)-23 mm On-X valve  Ongoing warfarin.   - INR; Future  - CBC with platelets and differential; Future  - INR  - CBC with platelets and differential    Type 2 diabetes mellitus with stage 3a chronic kidney disease, with long-term current use of insulin (H)    - Albumin Random Urine Quantitative with Creat Ratio; Future  - Albumin Random Urine Quantitative with Creat Ratio    Morbid obesity (H)      Class 3 obesity (H)  Weight has decline; congratulated.     Nicotine/Tobacco Cessation  He reports that he has been smoking cigarettes. He started smoking about 44 years ago. He has a 63.5 pack-year smoking history. He has been exposed to tobacco smoke. He has never used smokeless tobacco.  Nicotine/Tobacco Cessation Plan  Self help information given to patient      BMI  Estimated body mass index is 40.79 kg/m  as calculated from the following:    Height as of this encounter: 1.634 m (5' 4.33\").    Weight as of this encounter: 108.9 kg " (240 lb 1.6 oz).   Weight management plan: Discussed healthy diet and exercise guidelines    Follow-up       Danisha Carroll is a 60 year old, presenting for the following health issues:  Follow Up        7/10/2025    12:49 PM   Additional Questions   Roomed by Enid TOSCANO   Accompanied by Self         7/10/2025    12:49 PM   Patient Reported Additional Medications   Patient reports taking the following new medications NA     Via the Health Maintenance questionnaire, the patient has reported the following services have been completed -[facility] Exam: [facility] 2024-10-10, this information has been sent to the abstraction team.  History of Present Illness       Diabetes:   He presents for follow up of diabetes.  He is checking home blood glucose a few times a month.   He checks blood glucose before and after meals.  Blood glucose is sometimes over 200 and never under 70.  When his blood glucose is low, the patient is asymptomatic for confusion, blurred vision, lethargy and reports not feeling dizzy, shaky, or weak.   He has no concerns regarding his diabetes at this time.  He is having numbness in feet.  The patient has had a diabetic eye exam in the last 12 months. Eye exam performed on oct 24. [facility]        Reason for visit:  Follow Up  : NA.  Symptoms include:  NA  : NA.  : NA.  : NA.  Prior treatment description:  NA  What makes it worse:  NA  What makes it better:  NA    He eats 2-3 servings of fruits and vegetables daily.He consumes 0 sweetened beverage(s) daily.He exercises with enough effort to increase his heart rate 9 or less minutes per day.  He exercises with enough effort to increase his heart rate 3 or less days per week.   He is taking medications regularly.        Not really checking sugars.  124 in AM.     Successful knee surgery.     Weight down another 8 pounds.  Had been as low as 233 after his heart surgery.                Review of Systems  Constitutional, HEENT,  "cardiovascular, pulmonary, GI, , musculoskeletal, neuro, skin, endocrine and psych systems are negative, except as otherwise noted.      Objective    /77 (BP Location: Right arm, Patient Position: Sitting, Cuff Size: Adult Large)   Pulse 71   Temp 98.7  F (37.1  C) (Oral)   Resp 17   Ht 1.634 m (5' 4.33\")   Wt 108.9 kg (240 lb 1.6 oz)   SpO2 96%   BMI 40.79 kg/m    Body mass index is 40.79 kg/m .  Physical Exam   GENERAL: alert and no distress  EYES: Eyes grossly normal to inspection, PERRL and conjunctivae and sclerae normal  HENT: ear canals and TM's normal, nose and mouth without ulcers or lesions  NECK: no adenopathy, no asymmetry, masses, or scars  RESP: lungs clear to auscultation - no rales, rhonchi or wheezes  CV: regular rate and rhythm, normal S1 S2, no S3 or S4, no murmur, click or rub, no peripheral edema  ABDOMEN: soft, nontender, no hepatosplenomegaly, no masses and bowel sounds normal  MS: no gross musculoskeletal defects noted, no edema  SKIN: no suspicious lesions or rashes  NEURO: Normal strength and tone, mentation intact and speech normal  PSYCH: mentation appears normal, affect normal/bright  Diabetic foot exam: normal DP and PT pulses, no trophic changes or ulcerative lesions, and normal sensory exam            Signed Electronically by: Mike Deal MD    "

## 2025-07-10 NOTE — PATIENT INSTRUCTIONS
"Call for a colonoscopy.    Lab work today:  We can do labs in the exam room today, or you can get them done downstairs in the lab.      If you are going downstairs:  Directions:  As you walk through the first floor, you'll see (on the right) first the pharmacy, then some bathrooms, then the \"Lab and Imaging\" area. Give them your name at the window there and wait for them to call you.     No changes in your diabetes mellitus meds unless your diabetes blood test (A1c) is higher.    Call cardiology for the echocardiogram.     See eye doc.    With diabetes, you need an diabetic eye exam every year!  Please have your eye doctor forward your yearly eye exam results to me: Dr. Deal, fax . (You could instead just start seeing our eye clinic here at [facility] by calling [phone], Dr. Kimberly Balderas. [facility] is .)   "

## 2025-07-11 ENCOUNTER — ANTICOAGULATION THERAPY VISIT (OUTPATIENT)
Dept: ANTICOAGULATION | Facility: CLINIC | Age: 60
End: 2025-07-11
Payer: COMMERCIAL

## 2025-07-11 DIAGNOSIS — Z95.2 S/P AVR (AORTIC VALVE REPLACEMENT): Primary | ICD-10-CM

## 2025-07-11 DIAGNOSIS — I48.0 PAROXYSMAL ATRIAL FIBRILLATION (H): ICD-10-CM

## 2025-07-11 DIAGNOSIS — Z95.1 S/P CABG (CORONARY ARTERY BYPASS GRAFT): ICD-10-CM

## 2025-07-11 NOTE — PROGRESS NOTES
ANTICOAGULATION MANAGEMENT     Glen Cho 60 year old male is on warfarin with subtherapeutic INR result. (Goal INR 1.5-2.0)    Recent labs: (last 7 days)     07/10/25  1332   INR 1.33*       ASSESSMENT     Source(s): Chart Review  Previous INR was Therapeutic last visit; previously outside of goal range  Medication, diet, health changes since last INR:   Office visit with PCP on 7/10/25--resumed smoking cigarettes--ACRN to inquire when; patient is to schedule a colonoscopy.  If no temporary factors, ACRN to increase warfarin maintenance dose to 12.5 mg daily =2.9% increase.       PLAN     Unable to reach Glen x 2 today.    Left message to take a booster dose of warfarin,  15 mg tonight. Request call back for assessment.    Follow up required to confirm warfarin dose taken and assess for changes and discuss out of range result     Iram Lubin RN  7/11/2025  Anticoagulation Clinic  [facility] for routing messages: nora GUZMAN  [facility] patient phone line: [phone]

## 2025-07-14 NOTE — PROGRESS NOTES
Left voicemail to return call, My Chart message also sent    Iram Lubin, KINGA  Anticoagulation Clinic

## 2025-07-15 NOTE — PROGRESS NOTES
ANTICOAGULATION MANAGEMENT     Glen Cho 60 year old male is on warfarin with subtherapeutic INR result. (Goal INR 1.5-2.0)    Recent labs: (last 7 days)     07/10/25  1332   INR 1.33*       ASSESSMENT     Source(s): Chart Review and Patient/Caregiver Call     Warfarin doses taken: Warfarin taken as instructed  Diet: No new diet changes identified  Medication/supplement changes: None noted  New illness, injury, or hospitalization: No  Signs or symptoms of bleeding or clotting: No  Previous result: Therapeutic last visit; previously outside of goal range  Additional findings: Patient reports he did not get the voicemail on 7/11/25 to take a booster dose; however, he did take a boost dose on 7/14/25--see calendar.  INR on 7/10/25 was a venous draw since patient was having other labs drawn, he reports he typically has POC INR only. The venous draw could be seen as a temporary factor; however, patient reports no changes, as noted above, and has not been more active outside of work. Also, patient had a 1.3 INR on 6/5/25, as well.       PLAN     Recommended plan for no diet, medication or health factor changes affecting INR     Dosing Instructions: Increase your warfarin dose (2.9% change) with next INR in 2 weeks       Summary  As of 7/11/2025      Full warfarin instructions:  7/14: 12.5 mg; Otherwise 12.5 mg every day   Next INR check:  7/25/2025               Telephone call with Glen who verbalizes understanding and agrees to plan and who agrees to plan and repeated back plan correctly    Lab visit scheduled    Education provided: Taking warfarin: Importance of taking warfarin as instructed  Goal range and lab monitoring: goal range and significance of current result  And importance of following up with [facility] especially when INR is not within the goal range, patient verbalized understanding.    Plan made per [facility] anticoagulation protocol    Iram Lubin, RN  7/15/2025  Anticoagulation Clinic  [facility] for routing  messages: nora GUZMAN  [facility] patient phone line: [phone]        _______________________________________________________________________     Anticoagulation Episode Summary       Current INR goal:  1.5-2.0   TTR:  70.2% (1 y)   Target end date:  Indefinite   Send INR reminders to:  STEPHANIE GUZMAN    Indications    S/P AVR (aortic valve replacement)-23 mm On-X valve [Z95.2]  S/P CABG (coronary artery bypass graft)-LIMA>LAD  VG>RCA  VG>OM [Z95.1]  Paroxysmal atrial fibrillation (H) [I48.0]             Comments:  07/10/23-On-X AVR             Anticoagulation Care Providers       Provider Role Specialty Phone number    Kelsey Vega PA-C Referring Cardiovascular & Thoracic Surgery [phone]    Mike Deal MD Referring Internal Medicine - Pediatrics [phone]

## 2025-08-04 ENCOUNTER — MYC REFILL (OUTPATIENT)
Dept: PEDIATRICS | Facility: CLINIC | Age: 60
End: 2025-08-04
Payer: COMMERCIAL

## 2025-08-04 DIAGNOSIS — E11.21 TYPE 2 DIABETES MELLITUS WITH DIABETIC NEPHROPATHY, WITH LONG-TERM CURRENT USE OF INSULIN (H): ICD-10-CM

## 2025-08-04 DIAGNOSIS — Z79.4 TYPE 2 DIABETES MELLITUS WITH DIABETIC NEPHROPATHY, WITH LONG-TERM CURRENT USE OF INSULIN (H): ICD-10-CM

## 2025-08-05 RX ORDER — INSULIN LISPRO 100 [IU]/ML
25 INJECTION, SOLUTION INTRAVENOUS; SUBCUTANEOUS
Qty: 66 ML | Refills: 0 | Status: SHIPPED | OUTPATIENT
Start: 2025-08-05

## 2025-08-05 RX ORDER — INSULIN GLARGINE 100 [IU]/ML
50 INJECTION, SOLUTION SUBCUTANEOUS AT BEDTIME
Qty: 45 ML | Refills: 0 | Status: SHIPPED | OUTPATIENT
Start: 2025-08-05

## 2025-08-25 ENCOUNTER — MYC MEDICAL ADVICE (OUTPATIENT)
Dept: ANTICOAGULATION | Facility: CLINIC | Age: 60
End: 2025-08-25
Payer: COMMERCIAL

## 2025-08-26 ENCOUNTER — ANTICOAGULATION THERAPY VISIT (OUTPATIENT)
Dept: ANTICOAGULATION | Facility: CLINIC | Age: 60
End: 2025-08-26

## 2025-08-26 ENCOUNTER — LAB (OUTPATIENT)
Dept: LAB | Facility: CLINIC | Age: 60
End: 2025-08-26
Payer: COMMERCIAL

## 2025-08-26 DIAGNOSIS — Z95.2 S/P AVR (AORTIC VALVE REPLACEMENT): ICD-10-CM

## 2025-08-26 DIAGNOSIS — I48.0 PAROXYSMAL ATRIAL FIBRILLATION (H): ICD-10-CM

## 2025-08-26 DIAGNOSIS — Z95.1 S/P CABG (CORONARY ARTERY BYPASS GRAFT): ICD-10-CM

## 2025-08-26 DIAGNOSIS — Z95.2 S/P AVR (AORTIC VALVE REPLACEMENT): Primary | ICD-10-CM

## 2025-08-26 LAB — INR BLD: 1.5 (ref 0.9–1.1)

## 2025-08-26 PROCEDURE — 85610 PROTHROMBIN TIME: CPT

## 2025-08-26 PROCEDURE — 36416 COLLJ CAPILLARY BLOOD SPEC: CPT

## (undated) DEVICE — LINEN TOWEL PACK X30 5481

## (undated) DEVICE — SU PROLENE 4-0 SHDA 36" 8521H

## (undated) DEVICE — TOTE ANGIO CORP PC15AT SAN32CC83O

## (undated) DEVICE — SU ETHIBOND 2-0 V-5DA 10X30" PXX52

## (undated) DEVICE — SU ETHIBOND 2-0 SHDA 30" X563H

## (undated) DEVICE — Device

## (undated) DEVICE — SU PROLENE 7-0 BV-1DA 4X24" M8702

## (undated) DEVICE — SOL NACL 0.9% IRRIG 1000ML BOTTLE 2F7124

## (undated) DEVICE — SU MONOCRYL 4-0 PS-2 18" UND Y496G

## (undated) DEVICE — DEVICE ASSEMBLY SUCTION/ANTI COAG BTC93

## (undated) DEVICE — MANIFOLD KIT ANGIO AUTOMATED 014613

## (undated) DEVICE — BLOWER/MISTER CLEARVIEW 22150

## (undated) DEVICE — SU SILK 1 TIE 10X30" SA87G

## (undated) DEVICE — SUCTION CANISTER MEDIVAC LINER 3000ML W/LID 65651-530

## (undated) DEVICE — SU PROLENE 4-0 RB-1DA 36" 8557H

## (undated) DEVICE — DRSG KERLIX 4 1/2"X4YDS ROLL 6715

## (undated) DEVICE — PREP CHLORAPREP W/ORANGE TINT 10.5ML 930715

## (undated) DEVICE — SU DEVICE COR-KNOT MINI 4X14MM 031350

## (undated) DEVICE — PROTECTOR ARM ONE-STEP TRENDELENBURG 40418

## (undated) DEVICE — POSITIONER ASSIST ESSTECH 3S T401210S

## (undated) DEVICE — TUBING PERFUSION 1/4X1/16X8FT

## (undated) DEVICE — LEAD PACER MYOCARDIAL BIPOLAR TEMPORARY 53CM 6495F

## (undated) DEVICE — SLEEVE TR BAND RADIAL COMPRESSION DEVICE 24CM TRB24-REG

## (undated) DEVICE — SU ETHIBOND 0 CT-1 CR 8X18" CX21D

## (undated) DEVICE — PACK TUBING MINI VAC CUSTOM 3/8X3/8T BB7V94R1

## (undated) DEVICE — SURGICEL POWDER ABSORBABLE HEMOSTAT 3GM 3013SP

## (undated) DEVICE — RESERVOIR CELL SAVING BLOOD COLLECTION EL2120

## (undated) DEVICE — KIT ENDO VASOVIEW HEMOPRO 2 VH-4000

## (undated) DEVICE — BONE WAX OSTENE 3.5GM CT410

## (undated) DEVICE — SU ETHIBOND 3-0 BBDA 36" X588H

## (undated) DEVICE — CANNULA PERFUSION 14FRX16" LEFT 12016

## (undated) DEVICE — SUCTION DRY CHEST DRAIN OASIS 3600-100

## (undated) DEVICE — SYSTEM PANNUS RETENTION 4 PAD 2 STRAP CZ-PRS-04

## (undated) DEVICE — SU VICRYL 3-0 FS-1 27" J442H

## (undated) DEVICE — CABLE MYO/LEAD PACING WHITE DISP 019-530

## (undated) DEVICE — CLIP HORIZON MULTI MED BLUE 002204

## (undated) DEVICE — BLADE KNIFE BEAVER MINI STR BEAVER6900

## (undated) DEVICE — DEFIB PRO-PADZ LVP LQD GEL ADULT 8900-2105-01

## (undated) DEVICE — KIT WASH CELL SAVING ATL2001

## (undated) DEVICE — SU PROLENE 6-0 C-1DA 30" 8706H

## (undated) DEVICE — CATH JACKY 5FR 3.5 CURVE 40-5023

## (undated) DEVICE — SU VICRYL 0 CTX 36" J370H

## (undated) DEVICE — INTRO GLIDESHEATH SLENDER 6FR 10X45CM 60-1060

## (undated) DEVICE — CANNULA PERFUSION ARTERIAL 20FR 12" 77420

## (undated) DEVICE — KIT HAND CONTROL ANGIOTOUCH ACIST 65CM AT-P65

## (undated) DEVICE — COVER TABLE POLY 65X90" 8186

## (undated) DEVICE — SU PLEDGET SOFT TFE 3/8"X3/26"X1/16" PCP40

## (undated) DEVICE — SU STEEL 6 CCS 4X18" M654G

## (undated) DEVICE — SU PROLENE 3-0 SHDA 36" 8522H

## (undated) DEVICE — LINEN LEG ROLL 5489

## (undated) DEVICE — SU PROLENE 6-0 C-1DA 30" M8706

## (undated) DEVICE — DECANTER BAG 2002S

## (undated) DEVICE — TOURNIQUET VASCULAR

## (undated) DEVICE — PACK OPEN HEART PV12OH524

## (undated) DEVICE — SU VICRYL 2-0 CT-1 27" UND J259H

## (undated) DEVICE — DEVICE TISSUE STABILIZATION OCTOBASE 28707

## (undated) DEVICE — SPONGE RAY-TEC 4X8" 7318

## (undated) DEVICE — CANNULA VENOUS 2 STAGE 32-40FR

## (undated) DEVICE — SU DEVICE ENDO COR KNOT QUICK LOAD 030850

## (undated) DEVICE — PUNCH AORTIC 4.0MMX8" RCB40

## (undated) DEVICE — SURGICEL HEMOSTAT 2X14" 1951

## (undated) DEVICE — RX SURGIFLO HEMOSTATIC MATRIX 10ML 199102S

## (undated) DEVICE — DRAIN CHEST TUBE 32FR STR 8032

## (undated) DEVICE — CANNULA PERFUSION AORTIC ROOT 22FR 20012

## (undated) DEVICE — SOMASENSOR CEREBRAL OXIMETER ADULT SAFB-SM

## (undated) DEVICE — LINEN TOWEL PACK X5 5464

## (undated) DEVICE — PACK MINI VAC CUSTOM CARDOPULMONARY BB5Z97R15

## (undated) DEVICE — CLIP HORIZON MULTI SM YELLOW 001204

## (undated) DEVICE — SU PROLENE 7-0 BV175-8 24" M8747

## (undated) DEVICE — TUBING SUCTION 12"X1/4" N612

## (undated) RX ORDER — FENTANYL CITRATE 0.05 MG/ML
INJECTION, SOLUTION INTRAMUSCULAR; INTRAVENOUS
Status: DISPENSED
Start: 2023-07-10

## (undated) RX ORDER — PROTAMINE SULFATE 10 MG/ML
INJECTION, SOLUTION INTRAVENOUS
Status: DISPENSED
Start: 2023-07-10

## (undated) RX ORDER — VECURONIUM BROMIDE 1 MG/ML
INJECTION, POWDER, LYOPHILIZED, FOR SOLUTION INTRAVENOUS
Status: DISPENSED
Start: 2023-07-10

## (undated) RX ORDER — LIDOCAINE HYDROCHLORIDE 10 MG/ML
INJECTION, SOLUTION EPIDURAL; INFILTRATION; INTRACAUDAL; PERINEURAL
Status: DISPENSED
Start: 2023-06-12

## (undated) RX ORDER — HEPARIN SODIUM 1000 [USP'U]/ML
INJECTION, SOLUTION INTRAVENOUS; SUBCUTANEOUS
Status: DISPENSED
Start: 2023-06-12

## (undated) RX ORDER — HEPARIN SODIUM 200 [USP'U]/100ML
INJECTION, SOLUTION INTRAVENOUS
Status: DISPENSED
Start: 2023-06-12

## (undated) RX ORDER — FAMOTIDINE 20 MG/1
TABLET, FILM COATED ORAL
Status: DISPENSED
Start: 2023-07-10

## (undated) RX ORDER — VERAPAMIL HYDROCHLORIDE 2.5 MG/ML
INJECTION, SOLUTION INTRAVENOUS
Status: DISPENSED
Start: 2023-06-12

## (undated) RX ORDER — PROPOFOL 10 MG/ML
INJECTION, EMULSION INTRAVENOUS
Status: DISPENSED
Start: 2023-07-10

## (undated) RX ORDER — NEOSTIGMINE METHYLSULFATE 1 MG/ML
VIAL (ML) INJECTION
Status: DISPENSED
Start: 2023-07-10

## (undated) RX ORDER — ASPIRIN 325 MG
TABLET ORAL
Status: DISPENSED
Start: 2023-06-12

## (undated) RX ORDER — GLYCOPYRROLATE 0.2 MG/ML
INJECTION, SOLUTION INTRAMUSCULAR; INTRAVENOUS
Status: DISPENSED
Start: 2023-07-10

## (undated) RX ORDER — NITROGLYCERIN 5 MG/ML
VIAL (ML) INTRAVENOUS
Status: DISPENSED
Start: 2023-06-12

## (undated) RX ORDER — FENTANYL CITRATE 50 UG/ML
INJECTION, SOLUTION INTRAMUSCULAR; INTRAVENOUS
Status: DISPENSED
Start: 2023-06-12

## (undated) RX ORDER — CLINDAMYCIN PHOSPHATE 900 MG/50ML
INJECTION, SOLUTION INTRAVENOUS
Status: DISPENSED
Start: 2023-07-10

## (undated) RX ORDER — PAPAVERINE HYDROCHLORIDE 30 MG/ML
INJECTION INTRAMUSCULAR; INTRAVENOUS
Status: DISPENSED
Start: 2023-07-10

## (undated) RX ORDER — HEPARIN SODIUM 1000 [USP'U]/ML
INJECTION, SOLUTION INTRAVENOUS; SUBCUTANEOUS
Status: DISPENSED
Start: 2023-07-10

## (undated) RX ORDER — VANCOMYCIN HYDROCHLORIDE 500 MG/10ML
INJECTION, POWDER, LYOPHILIZED, FOR SOLUTION INTRAVENOUS
Status: DISPENSED
Start: 2023-07-10

## (undated) RX ORDER — CHLORHEXIDINE GLUCONATE ORAL RINSE 1.2 MG/ML
SOLUTION DENTAL
Status: DISPENSED
Start: 2023-07-10